# Patient Record
Sex: MALE | Race: BLACK OR AFRICAN AMERICAN | Employment: OTHER | URBAN - METROPOLITAN AREA
[De-identification: names, ages, dates, MRNs, and addresses within clinical notes are randomized per-mention and may not be internally consistent; named-entity substitution may affect disease eponyms.]

---

## 2018-03-09 ENCOUNTER — HOSPITAL ENCOUNTER (OUTPATIENT)
Dept: ONCOLOGY | Age: 58
Setting detail: OBSERVATION
Discharge: HOME | End: 2018-03-10
Attending: EMERGENCY MEDICINE | Admitting: FAMILY MEDICINE
Payer: MEDICARE

## 2018-03-09 DIAGNOSIS — R22.0 TONGUE SWELLING: Primary | ICD-10-CM

## 2018-03-09 LAB
ALBUMIN SERPL-MCNC: 3.2 G/DL (ref 3.5–5.2)
ALP BLD-CCNC: 72 U/L (ref 40–129)
ALT SERPL-CCNC: 10 U/L (ref 0–40)
ANION GAP SERPL CALCULATED.3IONS-SCNC: 15 MMOL/L (ref 7–16)
AST SERPL-CCNC: 12 U/L (ref 0–39)
BASOPHILS ABSOLUTE: 0.05 E9/L (ref 0–0.2)
BASOPHILS RELATIVE PERCENT: 0.4 % (ref 0–2)
BILIRUB SERPL-MCNC: 0.6 MG/DL (ref 0–1.2)
BUN BLDV-MCNC: 8 MG/DL (ref 6–20)
C-REACTIVE PROTEIN: 8.2 MG/DL (ref 0–0.4)
C3 COMPLEMENT: 182 MG/DL (ref 90–180)
C4 COMPLEMENT: 30 MG/DL (ref 10–40)
CALCIUM SERPL-MCNC: 9.1 MG/DL (ref 8.6–10.2)
CHLORIDE BLD-SCNC: 93 MMOL/L (ref 98–107)
CO2: 25 MMOL/L (ref 22–29)
CREAT SERPL-MCNC: 0.6 MG/DL (ref 0.7–1.2)
EOSINOPHILS ABSOLUTE: 0.12 E9/L (ref 0.05–0.5)
EOSINOPHILS RELATIVE PERCENT: 1.1 % (ref 0–6)
GFR AFRICAN AMERICAN: >60
GFR AFRICAN AMERICAN: >60
GFR NON-AFRICAN AMERICAN: >60 ML/MIN/1.73
GFR NON-AFRICAN AMERICAN: >60 ML/MIN/1.73
GLUCOSE BLD-MCNC: 262 MG/DL (ref 74–109)
GLUCOSE BLD-MCNC: 265 MG/DL (ref 74–109)
HBA1C MFR BLD: 11.3 % (ref 4.8–5.9)
HCT VFR BLD CALC: 43.2 % (ref 37–54)
HEMOGLOBIN: 14.6 G/DL (ref 12.5–16.5)
IMMATURE GRANULOCYTES #: 0.06 E9/L
IMMATURE GRANULOCYTES %: 0.5 % (ref 0–5)
LACTIC ACID: 1.5 MMOL/L (ref 0.5–2.2)
LYMPHOCYTES ABSOLUTE: 2.89 E9/L (ref 1.5–4)
LYMPHOCYTES RELATIVE PERCENT: 25.4 % (ref 20–42)
MCH RBC QN AUTO: 32.2 PG (ref 26–35)
MCHC RBC AUTO-ENTMCNC: 33.8 % (ref 32–34.5)
MCV RBC AUTO: 95.2 FL (ref 80–99.9)
METER GLUCOSE: 289 MG/DL (ref 70–110)
METER GLUCOSE: 294 MG/DL (ref 70–110)
MONOCYTES ABSOLUTE: 1.01 E9/L (ref 0.1–0.95)
MONOCYTES RELATIVE PERCENT: 8.9 % (ref 2–12)
NEUTROPHILS ABSOLUTE: 7.26 E9/L (ref 1.8–7.3)
NEUTROPHILS RELATIVE PERCENT: 63.7 % (ref 43–80)
PDW BLD-RTO: 13.1 FL (ref 11.5–15)
PLATELET # BLD: 248 E9/L (ref 130–450)
PMV BLD AUTO: 10.6 FL (ref 7–12)
POC CHLORIDE: 99 MMOL/L (ref 100–108)
POC CREATININE: 0.6 MG/DL (ref 0.7–1.2)
POC POTASSIUM: 4.9 MMOL/L (ref 3.5–5)
POC SODIUM: 135 MMOL/L (ref 132–146)
POTASSIUM SERPL-SCNC: 3.8 MMOL/L (ref 3.5–5)
RBC # BLD: 4.54 E12/L (ref 3.8–5.8)
SEDIMENTATION RATE, ERYTHROCYTE: 89 MM/HR (ref 0–15)
SODIUM BLD-SCNC: 133 MMOL/L (ref 132–146)
TOTAL PROTEIN: 7.1 G/DL (ref 6.4–8.3)
WBC # BLD: 11.4 E9/L (ref 4.5–11.5)

## 2018-03-09 PROCEDURE — 96375 TX/PRO/DX INJ NEW DRUG ADDON: CPT | Performed by: FAMILY MEDICINE

## 2018-03-09 PROCEDURE — 96365 THER/PROPH/DIAG IV INF INIT: CPT | Performed by: FAMILY MEDICINE

## 2018-03-09 PROCEDURE — 96376 TX/PRO/DX INJ SAME DRUG ADON: CPT | Performed by: FAMILY MEDICINE

## 2018-03-09 PROCEDURE — 86160 COMPLEMENT ANTIGEN: CPT

## 2018-03-09 PROCEDURE — 87070 CULTURE OTHR SPECIMN AEROBIC: CPT

## 2018-03-09 PROCEDURE — 36415 COLL VENOUS BLD VENIPUNCTURE: CPT

## 2018-03-09 PROCEDURE — 96366 THER/PROPH/DIAG IV INF ADDON: CPT | Performed by: FAMILY MEDICINE

## 2018-03-09 PROCEDURE — 94761 N-INVAS EAR/PLS OXIMETRY MLT: CPT

## 2018-03-09 PROCEDURE — 85651 RBC SED RATE NONAUTOMATED: CPT

## 2018-03-09 PROCEDURE — 84132 ASSAY OF SERUM POTASSIUM: CPT

## 2018-03-09 PROCEDURE — 82565 ASSAY OF CREATININE: CPT

## 2018-03-09 PROCEDURE — 85025 COMPLETE CBC W/AUTO DIFF WBC: CPT

## 2018-03-09 PROCEDURE — G0378 HOSPITAL OBSERVATION PER HR: HCPCS | Performed by: FAMILY MEDICINE

## 2018-03-09 PROCEDURE — G0378 HOSPITAL OBSERVATION PER HR: HCPCS

## 2018-03-09 PROCEDURE — 70491 CT SOFT TISSUE NECK W/DYE: CPT

## 2018-03-09 PROCEDURE — 99285 EMERGENCY DEPT VISIT HI MDM: CPT

## 2018-03-09 PROCEDURE — 96372 THER/PROPH/DIAG INJ SC/IM: CPT

## 2018-03-09 PROCEDURE — 84295 ASSAY OF SERUM SODIUM: CPT

## 2018-03-09 PROCEDURE — 9990 CHARGE CONVERSION

## 2018-03-09 PROCEDURE — 82962 GLUCOSE BLOOD TEST: CPT

## 2018-03-09 PROCEDURE — 87040 BLOOD CULTURE FOR BACTERIA: CPT

## 2018-03-09 PROCEDURE — 80053 COMPREHEN METABOLIC PANEL: CPT

## 2018-03-09 PROCEDURE — 99218 CHARGE CONVERSION: CPT

## 2018-03-09 PROCEDURE — 86140 C-REACTIVE PROTEIN: CPT

## 2018-03-09 PROCEDURE — 83036 HEMOGLOBIN GLYCOSYLATED A1C: CPT

## 2018-03-09 PROCEDURE — 99222 1ST HOSP IP/OBS MODERATE 55: CPT | Performed by: OTOLARYNGOLOGY

## 2018-03-09 PROCEDURE — 82435 ASSAY OF BLOOD CHLORIDE: CPT

## 2018-03-09 PROCEDURE — 82947 ASSAY GLUCOSE BLOOD QUANT: CPT

## 2018-03-09 PROCEDURE — 83605 ASSAY OF LACTIC ACID: CPT

## 2018-03-09 RX ORDER — 0.9 % SODIUM CHLORIDE 0.9 %
1000 INTRAVENOUS SOLUTION INTRAVENOUS ONCE
Status: COMPLETED | OUTPATIENT
Start: 2018-03-09 | End: 2018-03-09

## 2018-03-09 RX ORDER — DEXAMETHASONE SODIUM PHOSPHATE 10 MG/ML
10 INJECTION INTRAMUSCULAR; INTRAVENOUS EVERY 6 HOURS
Status: DISCONTINUED | OUTPATIENT
Start: 2018-03-09 | End: 2018-03-10 | Stop reason: HOSPADM

## 2018-03-09 RX ORDER — DEXAMETHASONE SODIUM PHOSPHATE 10 MG/ML
10 INJECTION, SOLUTION INTRAMUSCULAR; INTRAVENOUS ONCE
Status: COMPLETED | OUTPATIENT
Start: 2018-03-09 | End: 2018-03-09

## 2018-03-09 RX ORDER — ATORVASTATIN CALCIUM 40 MG/1
40 TABLET, FILM COATED ORAL DAILY
Status: CANCELLED | OUTPATIENT
Start: 2018-03-09

## 2018-03-09 RX ORDER — ATORVASTATIN CALCIUM 40 MG/1
40 TABLET, FILM COATED ORAL NIGHTLY
Status: DISCONTINUED | OUTPATIENT
Start: 2018-03-09 | End: 2018-03-10 | Stop reason: HOSPADM

## 2018-03-09 RX ORDER — SODIUM CHLORIDE 0.9 % (FLUSH) 0.9 %
10 SYRINGE (ML) INJECTION EVERY 12 HOURS SCHEDULED
Status: DISCONTINUED | OUTPATIENT
Start: 2018-03-09 | End: 2018-03-10 | Stop reason: HOSPADM

## 2018-03-09 RX ORDER — SODIUM CHLORIDE 9 MG/ML
INJECTION, SOLUTION INTRAVENOUS CONTINUOUS
Status: DISCONTINUED | OUTPATIENT
Start: 2018-03-09 | End: 2018-03-10

## 2018-03-09 RX ORDER — ONDANSETRON 2 MG/ML
4 INJECTION INTRAMUSCULAR; INTRAVENOUS EVERY 6 HOURS PRN
Status: DISCONTINUED | OUTPATIENT
Start: 2018-03-09 | End: 2018-03-10 | Stop reason: HOSPADM

## 2018-03-09 RX ORDER — ACETAMINOPHEN 325 MG/1
650 TABLET ORAL EVERY 4 HOURS PRN
Status: DISCONTINUED | OUTPATIENT
Start: 2018-03-09 | End: 2018-03-10 | Stop reason: HOSPADM

## 2018-03-09 RX ORDER — DIPHENHYDRAMINE HCL 25 MG
25 TABLET ORAL EVERY 6 HOURS PRN
Status: DISCONTINUED | OUTPATIENT
Start: 2018-03-09 | End: 2018-03-10 | Stop reason: HOSPADM

## 2018-03-09 RX ORDER — FUROSEMIDE 40 MG/1
40 TABLET ORAL 2 TIMES DAILY
Status: DISCONTINUED | OUTPATIENT
Start: 2018-03-09 | End: 2018-03-10 | Stop reason: HOSPADM

## 2018-03-09 RX ORDER — SODIUM CHLORIDE 0.9 % (FLUSH) 0.9 %
10 SYRINGE (ML) INJECTION PRN
Status: DISCONTINUED | OUTPATIENT
Start: 2018-03-09 | End: 2018-03-10 | Stop reason: HOSPADM

## 2018-03-09 RX ORDER — POTASSIUM CHLORIDE 1.5 G/1.77G
20 POWDER, FOR SOLUTION ORAL 2 TIMES DAILY
Status: DISCONTINUED | OUTPATIENT
Start: 2018-03-09 | End: 2018-03-10 | Stop reason: HOSPADM

## 2018-03-09 RX ORDER — FAMOTIDINE 20 MG/1
20 TABLET, FILM COATED ORAL 2 TIMES DAILY
Status: DISCONTINUED | OUTPATIENT
Start: 2018-03-09 | End: 2018-03-10 | Stop reason: HOSPADM

## 2018-03-09 RX ORDER — HYDROCHLOROTHIAZIDE 25 MG/1
25 TABLET ORAL DAILY
Status: DISCONTINUED | OUTPATIENT
Start: 2018-03-09 | End: 2018-03-10 | Stop reason: HOSPADM

## 2018-03-09 RX ORDER — METOPROLOL TARTRATE 50 MG/1
50 TABLET, FILM COATED ORAL 2 TIMES DAILY
Status: DISCONTINUED | OUTPATIENT
Start: 2018-03-09 | End: 2018-03-10 | Stop reason: HOSPADM

## 2018-03-09 RX ORDER — KETOROLAC TROMETHAMINE 30 MG/ML
30 INJECTION, SOLUTION INTRAMUSCULAR; INTRAVENOUS ONCE
Status: COMPLETED | OUTPATIENT
Start: 2018-03-09 | End: 2018-03-09

## 2018-03-09 RX ORDER — AMLODIPINE BESYLATE 5 MG/1
5 TABLET ORAL DAILY
Status: DISCONTINUED | OUTPATIENT
Start: 2018-03-09 | End: 2018-03-10

## 2018-03-09 RX ADMIN — SODIUM CHLORIDE: 9 INJECTION, SOLUTION INTRAVENOUS at 17:26

## 2018-03-09 RX ADMIN — FUROSEMIDE 40 MG: 40 TABLET ORAL at 17:32

## 2018-03-09 RX ADMIN — METOPROLOL TARTRATE 50 MG: 50 TABLET, FILM COATED ORAL at 21:02

## 2018-03-09 RX ADMIN — KETOROLAC TROMETHAMINE 30 MG: 30 INJECTION, SOLUTION INTRAMUSCULAR; INTRAVENOUS at 12:28

## 2018-03-09 RX ADMIN — DEXAMETHASONE SODIUM PHOSPHATE 10 MG: 10 INJECTION, SOLUTION INTRAMUSCULAR; INTRAVENOUS at 11:09

## 2018-03-09 RX ADMIN — ACETAMINOPHEN 650 MG: 325 TABLET, FILM COATED ORAL at 23:46

## 2018-03-09 RX ADMIN — ACETAMINOPHEN 650 MG: 325 TABLET, FILM COATED ORAL at 17:26

## 2018-03-09 RX ADMIN — INSULIN LISPRO 3 UNITS: 100 INJECTION, SOLUTION INTRAVENOUS; SUBCUTANEOUS at 17:49

## 2018-03-09 RX ADMIN — POTASSIUM CHLORIDE 20 MEQ: 1.5 POWDER, FOR SOLUTION ORAL at 21:04

## 2018-03-09 RX ADMIN — AMLODIPINE BESYLATE 5 MG: 5 TABLET ORAL at 21:02

## 2018-03-09 RX ADMIN — SODIUM CHLORIDE 3 G: 9 INJECTION, SOLUTION INTRAVENOUS at 18:43

## 2018-03-09 RX ADMIN — SODIUM CHLORIDE 3 G: 9 INJECTION, SOLUTION INTRAVENOUS at 23:41

## 2018-03-09 RX ADMIN — SERTRALINE 150 MG: 100 TABLET, FILM COATED ORAL at 21:02

## 2018-03-09 RX ADMIN — DEXAMETHASONE SODIUM PHOSPHATE 10 MG: 10 INJECTION, SOLUTION INTRAMUSCULAR; INTRAVENOUS at 23:41

## 2018-03-09 RX ADMIN — HYDROCHLOROTHIAZIDE 25 MG: 25 TABLET ORAL at 21:03

## 2018-03-09 RX ADMIN — Medication 1000 ML: at 11:11

## 2018-03-09 RX ADMIN — ATORVASTATIN CALCIUM 40 MG: 40 TABLET, FILM COATED ORAL at 21:02

## 2018-03-09 ASSESSMENT — ENCOUNTER SYMPTOMS
SPUTUM PRODUCTION: 1
NAUSEA: 0
GASTROINTESTINAL NEGATIVE: 1
DIARRHEA: 0
EYE PAIN: 0
EYE REDNESS: 0
SORE THROAT: 1
SINUS PRESSURE: 0
TROUBLE SWALLOWING: 1
BACK PAIN: 0
EYE DISCHARGE: 0
EYES NEGATIVE: 1
VOMITING: 0
SHORTNESS OF BREATH: 1
COUGH: 0
SORE THROAT: 0
ABDOMINAL PAIN: 0
COUGH: 1
WHEEZING: 0
SHORTNESS OF BREATH: 0

## 2018-03-09 ASSESSMENT — PAIN SCALES - GENERAL
PAINLEVEL_OUTOF10: 8
PAINLEVEL_OUTOF10: 8
PAINLEVEL_OUTOF10: 10

## 2018-03-09 ASSESSMENT — PAIN DESCRIPTION - LOCATION: LOCATION: THROAT

## 2018-03-09 ASSESSMENT — PAIN DESCRIPTION - DESCRIPTORS: DESCRIPTORS: BURNING;SHARP

## 2018-03-09 ASSESSMENT — PAIN DESCRIPTION - FREQUENCY: FREQUENCY: CONTINUOUS

## 2018-03-09 ASSESSMENT — PAIN DESCRIPTION - PAIN TYPE: TYPE: ACUTE PAIN

## 2018-03-09 NOTE — CONSULTS
potassium chloride (KLOR-CON) 20 MEQ packet Take 20 mEq by mouth 2 times daily    Historical Provider, MD   furosemide (LASIX) 40 MG tablet Take 40 mg by mouth 2 times daily    Historical Provider, MD   amLODIPine (NORVASC) 5 MG tablet Take 5 mg by mouth daily. Historical Provider, MD   vitamin D (ERGOCALCIFEROL) 63018 UNITS CAPS capsule Take 50,000 Units by mouth once a week. Takes on [facility] Provider, MD   bumetanide (BUMEX) 1 MG tablet Take 1 mg by mouth daily. Historical Provider, MD   acetaminophen-codeine (TYLENOL/CODEINE #3) 300-30 MG per tablet Take 1 tablet by mouth every 4 hours as needed. 10/31/12   Kayla Gay MD   sildenafil (VIAGRA) 100 MG tablet Take 100 mg by mouth as needed. Take an hour before sex. Med info obtained from [place] [employer]. Historical Provider, MD   rosuvastatin (CRESTOR) 20 MG tablet Take 10 mg by mouth daily. Med info obtained from [employer]. Provider, MD   ibuprofen (ADVIL;MOTRIN) 800 MG tablet Take 800 mg by mouth 3 times daily as needed. Take with food. Med info obtained from [place] [employer]   Indications: Pain    Historical Provider, MD   lisinopril (PRINIVIL;ZESTRIL) 10 MG tablet Take 40 mg by mouth daily. Historical Provider, MD   hydrochlorothiazide (HYDRODIURIL) 25 MG tablet Take 25 mg by mouth daily. Historical Provider, MD   GLIMEPIRIDE PO Take  by mouth. Historical Provider, MD   sertraline (ZOLOFT) 50 MG tablet Take 150 mg by mouth daily. Historical Provider, MD       Allergies   Allergen Reactions    Atenolol Other (See Comments)     Thins his blood       History reviewed. No pertinent family history.     Social History   Substance Use Topics    Smoking status: Current Every Day Smoker     Packs/day: 1.00     Types: Cigarettes    Smokeless tobacco: Never Used    Alcohol use No           PHYSICAL EXAM:    Vitals:    03/09/18 1035   BP:    Pulse:    Resp: 17   Temp:    SpO2:        General Appearance:

## 2018-03-09 NOTE — H&P
orthopnea, no palpitation, no leg swelling. · Gastroenterology: No dysphagia, no reflux; no abdominal pain, no nausea or vomiting; no constipation or diarrhea. No blood in stool. · Genitourinary: No dysuria, no frequency, hesitancy; no hematuria  · Musculoskeletal: no joint pain, no myalgia, no change in range of movement  · Neurology: no focal weakness in extremities, no slurred speech, no double vision, no tingling or numbness sensation. · Endocrinology: no temperature intolerance, no polyphagia, polydipsia or polyuria   · Hematology: no increased bleeding, no bruising, no lymphadenopathy  · Skin: no skin change noticed by patient  · Psychology: no depressed mood, no suicidal ideation  Physical Exam   · Vitals: BP (!) 184/96   Pulse 98   Temp 99.5 °F (37.5 °C) (Temporal)   Resp 18   Ht 5' 8\" (1.727 m)   Wt 288 lb (130.6 kg)   SpO2 93%   BMI 43.79 kg/m²   General Appearance:    Alert, cooperative, no acute distress. HEENT:   NC/AT, PERRLA,discoloration of tongue, inflammation in posterior throat, difficult to visualize   Neck:   Supple, Normal thyroid; no carotid bruit or JVD   Resp:     CTAB, some wheezing upper chest and around neck    Heart:    RRR, S1 & S2 normal, no murmur, rub or gallop.    Abdomen:     Soft, non-tender, BS +, no organomegaly   Extremities:   Normal, atraumatic, no cyanosis or edema   Pulses:   2+ and symmetric all extremities   Skin:   Skin color, texture, turgor normal, no rashes or lesions   Neurologic:   AAOx3, no focal motor deficit     Labs and Imaging Studies     CBC:   Lab Results   Component Value Date    WBC 11.4 03/09/2018    RBC 4.54 03/09/2018    HGB 14.6 03/09/2018    HCT 43.2 03/09/2018    MCV 95.2 03/09/2018    RDW 13.1 03/09/2018     03/09/2018     CMP:  Lab Results   Component Value Date     03/09/2018    K 3.8 03/09/2018    CL 93 03/09/2018    CO2 25 03/09/2018    BUN 8 03/09/2018    PROT 7.1 03/09/2018     TSH:  No results found for: TSH  Imaging Studies:  Radiology:   CT Soft Tissue Neck W Contrast   Final Result      1. Fullness of floor the mouth/base of tongue which could suggest   nonspecific edema as well as fullness of soft tissue at level of the   vallecula which also may be related to nonspecific edema or prominent   lymphoid tissue. 2. Multiple prominent and mildly enlarged anterior cervical lymph   nodes are present as well as prominent lymph nodes at right and left   submandibular space and supraclavicular locations. 3. Large sialolith is seen at the anterior aspect of right   submandibular gland extending into right submandibular duct. No   evidence of sialoadenitis. Resident's Assessment and PLan     Patient Active Problem List   Diagnosis    Essential hypertension    Type 2 diabetes mellitus ([address]    Mixed hyperlipidemia    Recurrent major depressive disorder, in remission ([address]    Carpal tunnel syndrome    Obstructive sleep apnea syndrome    Tongue swelling     1. Sohail's Angina? Vs Angioedema   - ENT consult, input appreciated   - per ENT, no surgery at this time but will continue to monitor   - decadron q6, unasyn 3g q6   - complement, C3, C1 inhibitor levels to r/o angioedema    - follow up on biopsy results to see if it is a recurrent pyogenic ulcerated granuloma    - advance diet as tolerated and NPO after midnight     2. CHF   - confirmed home meds with patient, will continue lasix, lopressor, hctz, atorvastatin    - will likely need to ask for records from [place] or [facility] where he has gotten his cardiac care from especially if ENT decides to do a surgery and will need cardiac clearance     3. Diabetes   - will hold glimepride   - low dose sliding scale, will need to monitor glucose due to the decadron    - a1c due to no baseline in our EPIC records     4. HTN   - cont norvasc and the other meds from problem 2    5.  Depression   - cont home meds; zoloft     GI/DVT prophylaxis: Lovenox, Pepcid

## 2018-03-09 NOTE — ED PROVIDER NOTES
Cardiovascular: Normal rate, regular rhythm and normal heart sounds. No murmur heard. Pulmonary/Chest: Effort normal and breath sounds normal. No respiratory distress. He has no wheezes. He has no rales. Abdominal: Soft. Bowel sounds are normal. There is no tenderness. There is no rebound and no guarding. Musculoskeletal: He exhibits no edema. Lymphadenopathy:     He has cervical adenopathy. Neurological: He is alert and oriented to person, place, and time. No cranial nerve deficit. Coordination normal.   Skin: Skin is warm and dry. Nursing note and vitals reviewed. Procedures    MDM  Number of Diagnoses or Management Options  Tongue swelling:   Diagnosis management comments: 63-year-old male presenting to the emergency department with difficulty swallowing, and trouble handling his secretions status post surgical biopsy of his tongue on Monday. Patient's airway is patent, however there is concern due to gargling and difficulty swallowing or managing secretions. His a stat consult to ENT. Patient started on steroids, and in. [place] attic therapy. CT neck soft tissue evaluate for airway patency. IV fluids. Workup and evaluation for possible sepsis. Imaging did not reveal any fluid loculations, or obstruction of patient's airway. Based on these findings patient is admitted to the hospital for followed by ENT, as well as IV antibiotic therapy. The patient's ability to handle secretions improved significantly during his stay in the emergency department. ED Course as of Mar 09 1418   Fri Mar 09, 2018   1252 Improving  Swallowing secretions better  Says his throat feels less full  [CD]   2563 Reevaluated patient. He states his pain and swelling have improved since receiving toradol and steroids.    [KS]      ED Course User Index  [CD] Hussein Chiang MD  [KS] Mandi Drake,      --------------------------------------------- PAST HISTORY ---------------------------------------------  Past Mucosal thickening is seen involving the ethmoid sinuses and maxillary sinuses. Mastoid air cells are clear. 1. Fullness of floor the mouth/base of tongue which could suggest nonspecific edema as well as fullness of soft tissue at level of the vallecula which also may be related to nonspecific edema or prominent lymphoid tissue. 2. Multiple prominent and mildly enlarged anterior cervical lymph nodes are present as well as prominent lymph nodes at right and left submandibular space and supraclavicular locations. 3. Large sialolith is seen at the anterior aspect of right submandibular gland extending into right submandibular duct. No evidence of sialoadenitis.      ------------------------- NURSING NOTES AND VITALS REVIEWED ---------------------------  Date / Time Roomed:  3/9/2018 10:36 AM  ED Bed Assignment:  16/16    The nursing notes within the ED encounter and vital signs as below have been reviewed. Patient Vitals for the past 24 hrs:   BP Temp Pulse Resp SpO2 Height Weight   03/09/18 1035 - - - 17 - - -   03/09/18 1030 (!) 179/109 98 °F (36.7 °C) 97 (!) 147 94 % 5' 8\" (1.727 m) 288 lb (130.6 kg)       Oxygen Saturation Interpretation: Normal    ------------------------------------------ PROGRESS NOTES ------------------------------------------    Counseling:  I have spoken with the patient and discussed todays results, in addition to providing specific details for the plan of care and counseling regarding the diagnosis and prognosis. Their questions are answered at this time and they are agreeable with the plan of admission.    --------------------------------- ADDITIONAL PROVIDER NOTES ---------------------------------  Consultations:  Time: 2:15 PM. Spoke with Dr. Nubia Hermosillo. Discussed case. They will admit the patient. This patient's ED course included: a personal history and physicial examination    This patient has remained hemodynamically stable during their ED course.     Diagnosis:  No diagnosis

## 2018-03-10 VITALS
OXYGEN SATURATION: 92 % | HEIGHT: 68 IN | BODY MASS INDEX: 43.65 KG/M2 | WEIGHT: 288 LBS | SYSTOLIC BLOOD PRESSURE: 172 MMHG | HEART RATE: 91 BPM | RESPIRATION RATE: 18 BRPM | TEMPERATURE: 98.5 F | DIASTOLIC BLOOD PRESSURE: 92 MMHG

## 2018-03-10 PROBLEM — T78.3XXA ANGIOEDEMA: Status: ACTIVE | Noted: 2018-03-10

## 2018-03-10 PROBLEM — R22.0 TONGUE SWELLING: Status: RESOLVED | Noted: 2018-03-09 | Resolved: 2018-03-10

## 2018-03-10 PROBLEM — T78.3XXA ANGIOEDEMA: Status: RESOLVED | Noted: 2018-03-10 | Resolved: 2018-03-10

## 2018-03-10 LAB
ANION GAP SERPL CALCULATED.3IONS-SCNC: 21 MMOL/L (ref 7–16)
BUN BLDV-MCNC: 11 MG/DL (ref 6–20)
CALCIUM SERPL-MCNC: 9 MG/DL (ref 8.6–10.2)
CHLORIDE BLD-SCNC: 93 MMOL/L (ref 98–107)
CO2: 21 MMOL/L (ref 22–29)
CREAT SERPL-MCNC: 0.5 MG/DL (ref 0.7–1.2)
GFR AFRICAN AMERICAN: >60
GFR NON-AFRICAN AMERICAN: >60 ML/MIN/1.73
GLUCOSE BLD-MCNC: 289 MG/DL (ref 74–109)
HCT VFR BLD CALC: 43.1 % (ref 37–54)
HEMOGLOBIN: 14.6 G/DL (ref 12.5–16.5)
MCH RBC QN AUTO: 32.2 PG (ref 26–35)
MCHC RBC AUTO-ENTMCNC: 33.9 % (ref 32–34.5)
MCV RBC AUTO: 95.1 FL (ref 80–99.9)
METER GLUCOSE: 279 MG/DL (ref 70–110)
METER GLUCOSE: 411 MG/DL (ref 70–110)
METER GLUCOSE: 430 MG/DL (ref 70–110)
PDW BLD-RTO: 13.2 FL (ref 11.5–15)
PLATELET # BLD: 288 E9/L (ref 130–450)
PMV BLD AUTO: 10.7 FL (ref 7–12)
POTASSIUM REFLEX MAGNESIUM: 3.7 MMOL/L (ref 3.5–5)
RBC # BLD: 4.53 E12/L (ref 3.8–5.8)
SODIUM BLD-SCNC: 135 MMOL/L (ref 132–146)
WBC # BLD: 9.8 E9/L (ref 4.5–11.5)

## 2018-03-10 PROCEDURE — 96376 TX/PRO/DX INJ SAME DRUG ADON: CPT

## 2018-03-10 PROCEDURE — 82962 GLUCOSE BLOOD TEST: CPT

## 2018-03-10 PROCEDURE — 6370000000 HC RX 637 (ALT 250 FOR IP): Performed by: FAMILY MEDICINE

## 2018-03-10 PROCEDURE — 96366 THER/PROPH/DIAG IV INF ADDON: CPT

## 2018-03-10 PROCEDURE — 99232 SBSQ HOSP IP/OBS MODERATE 35: CPT | Performed by: OTOLARYNGOLOGY

## 2018-03-10 PROCEDURE — 6360000002 HC RX W HCPCS: Performed by: FAMILY MEDICINE

## 2018-03-10 PROCEDURE — 2580000003 HC RX 258: Performed by: FAMILY MEDICINE

## 2018-03-10 PROCEDURE — 99236 HOSP IP/OBS SAME DATE HI 85: CPT | Performed by: STUDENT IN AN ORGANIZED HEALTH CARE EDUCATION/TRAINING PROGRAM

## 2018-03-10 PROCEDURE — G0378 HOSPITAL OBSERVATION PER HR: HCPCS

## 2018-03-10 RX ORDER — AMLODIPINE BESYLATE 5 MG/1
5 TABLET ORAL ONCE
Status: COMPLETED | OUTPATIENT
Start: 2018-03-10 | End: 2018-03-10

## 2018-03-10 RX ORDER — ATORVASTATIN CALCIUM 40 MG/1
40 TABLET, FILM COATED ORAL NIGHTLY
Qty: 30 TABLET | Refills: 3 | Status: SHIPPED | OUTPATIENT
Start: 2018-03-10 | End: 2018-05-16 | Stop reason: SDUPTHER

## 2018-03-10 RX ORDER — INSULIN GLARGINE 100 [IU]/ML
15 INJECTION, SOLUTION SUBCUTANEOUS NIGHTLY
Status: DISCONTINUED | OUTPATIENT
Start: 2018-03-10 | End: 2018-03-10 | Stop reason: HOSPADM

## 2018-03-10 RX ORDER — KETOROLAC TROMETHAMINE 30 MG/ML
30 INJECTION, SOLUTION INTRAMUSCULAR; INTRAVENOUS ONCE
Status: COMPLETED | OUTPATIENT
Start: 2018-03-10 | End: 2018-03-10

## 2018-03-10 RX ORDER — PREDNISONE 20 MG/1
40 TABLET ORAL DAILY
Qty: 10 TABLET | Refills: 0 | Status: SHIPPED | OUTPATIENT
Start: 2018-03-10 | End: 2018-03-10

## 2018-03-10 RX ORDER — DEXTROSE MONOHYDRATE 50 MG/ML
100 INJECTION, SOLUTION INTRAVENOUS PRN
Status: DISCONTINUED | OUTPATIENT
Start: 2018-03-10 | End: 2018-03-10 | Stop reason: HOSPADM

## 2018-03-10 RX ORDER — ATORVASTATIN CALCIUM 40 MG/1
40 TABLET, FILM COATED ORAL NIGHTLY
Qty: 30 TABLET | Refills: 3 | Status: SHIPPED
Start: 2018-03-10 | End: 2018-03-10

## 2018-03-10 RX ORDER — AMLODIPINE BESYLATE 10 MG/1
10 TABLET ORAL DAILY
Qty: 30 TABLET | Refills: 3 | Status: SHIPPED | OUTPATIENT
Start: 2018-03-10 | End: 2018-05-16 | Stop reason: SDUPTHER

## 2018-03-10 RX ORDER — AMLODIPINE BESYLATE 10 MG/1
10 TABLET ORAL DAILY
Status: DISCONTINUED | OUTPATIENT
Start: 2018-03-11 | End: 2018-03-10 | Stop reason: HOSPADM

## 2018-03-10 RX ORDER — DEXTROSE MONOHYDRATE 25 G/50ML
12.5 INJECTION, SOLUTION INTRAVENOUS PRN
Status: DISCONTINUED | OUTPATIENT
Start: 2018-03-10 | End: 2018-03-10 | Stop reason: HOSPADM

## 2018-03-10 RX ORDER — PREDNISONE 20 MG/1
40 TABLET ORAL DAILY
Qty: 10 TABLET | Refills: 0 | Status: SHIPPED | OUTPATIENT
Start: 2018-03-10 | End: 2018-03-15

## 2018-03-10 RX ORDER — AMLODIPINE BESYLATE 10 MG/1
10 TABLET ORAL DAILY
Qty: 30 TABLET | Refills: 3 | Status: SHIPPED | OUTPATIENT
Start: 2018-03-10 | End: 2018-03-10

## 2018-03-10 RX ORDER — NICOTINE POLACRILEX 4 MG
15 LOZENGE BUCCAL PRN
Status: DISCONTINUED | OUTPATIENT
Start: 2018-03-10 | End: 2018-03-10 | Stop reason: HOSPADM

## 2018-03-10 RX ADMIN — AMLODIPINE BESYLATE 5 MG: 5 TABLET ORAL at 08:49

## 2018-03-10 RX ADMIN — FUROSEMIDE 40 MG: 40 TABLET ORAL at 08:48

## 2018-03-10 RX ADMIN — SODIUM CHLORIDE 3 G: 9 INJECTION, SOLUTION INTRAVENOUS at 17:52

## 2018-03-10 RX ADMIN — SERTRALINE 150 MG: 100 TABLET, FILM COATED ORAL at 08:48

## 2018-03-10 RX ADMIN — DEXAMETHASONE SODIUM PHOSPHATE 10 MG: 10 INJECTION, SOLUTION INTRAMUSCULAR; INTRAVENOUS at 11:55

## 2018-03-10 RX ADMIN — INSULIN LISPRO 6 UNITS: 100 INJECTION, SOLUTION INTRAVENOUS; SUBCUTANEOUS at 17:52

## 2018-03-10 RX ADMIN — SODIUM CHLORIDE 3 G: 9 INJECTION, SOLUTION INTRAVENOUS at 06:06

## 2018-03-10 RX ADMIN — FUROSEMIDE 40 MG: 40 TABLET ORAL at 17:51

## 2018-03-10 RX ADMIN — HYDROCHLOROTHIAZIDE 25 MG: 25 TABLET ORAL at 08:49

## 2018-03-10 RX ADMIN — SODIUM CHLORIDE: 9 INJECTION, SOLUTION INTRAVENOUS at 04:30

## 2018-03-10 RX ADMIN — DEXAMETHASONE SODIUM PHOSPHATE 10 MG: 10 INJECTION, SOLUTION INTRAMUSCULAR; INTRAVENOUS at 17:52

## 2018-03-10 RX ADMIN — AMLODIPINE BESYLATE 5 MG: 5 TABLET ORAL at 11:55

## 2018-03-10 RX ADMIN — KETOROLAC TROMETHAMINE 30 MG: 30 INJECTION, SOLUTION INTRAMUSCULAR at 08:50

## 2018-03-10 RX ADMIN — INSULIN LISPRO 6 UNITS: 100 INJECTION, SOLUTION INTRAVENOUS; SUBCUTANEOUS at 11:57

## 2018-03-10 RX ADMIN — METOPROLOL TARTRATE 50 MG: 50 TABLET, FILM COATED ORAL at 08:49

## 2018-03-10 RX ADMIN — SODIUM CHLORIDE 3 G: 9 INJECTION, SOLUTION INTRAVENOUS at 11:55

## 2018-03-10 RX ADMIN — INSULIN LISPRO 3 UNITS: 100 INJECTION, SOLUTION INTRAVENOUS; SUBCUTANEOUS at 08:54

## 2018-03-10 RX ADMIN — DEXAMETHASONE SODIUM PHOSPHATE 10 MG: 10 INJECTION, SOLUTION INTRAMUSCULAR; INTRAVENOUS at 06:07

## 2018-03-10 RX ADMIN — ACETAMINOPHEN 650 MG: 325 TABLET, FILM COATED ORAL at 17:33

## 2018-03-10 RX ADMIN — FAMOTIDINE 20 MG: 20 TABLET, FILM COATED ORAL at 08:49

## 2018-03-10 ASSESSMENT — PAIN DESCRIPTION - ONSET: ONSET: UNABLE TO TELL

## 2018-03-10 ASSESSMENT — PAIN DESCRIPTION - FREQUENCY: FREQUENCY: INTERMITTENT

## 2018-03-10 ASSESSMENT — PAIN DESCRIPTION - LOCATION: LOCATION: HEAD

## 2018-03-10 ASSESSMENT — PAIN SCALES - GENERAL
PAINLEVEL_OUTOF10: 7
PAINLEVEL_OUTOF10: 7
PAINLEVEL_OUTOF10: 8

## 2018-03-10 ASSESSMENT — PAIN DESCRIPTION - PROGRESSION: CLINICAL_PROGRESSION: RAPIDLY IMPROVING

## 2018-03-10 ASSESSMENT — PAIN DESCRIPTION - DESCRIPTORS: DESCRIPTORS: ACHING;HEADACHE;DULL

## 2018-03-10 ASSESSMENT — PAIN DESCRIPTION - PAIN TYPE: TYPE: ACUTE PAIN

## 2018-03-10 NOTE — PROGRESS NOTES
OTOLARYNGOLOGY  DAILY PROGRESS NOTE  3/10/2018    Subjective:     Patient is doing Much better today. Tolerating diet. Afebrile over night. Questions answered. Objective:     BP (!) 160/98   Pulse 94   Temp 99 °F (37.2 °C) (Temporal)   Resp 18   Ht 5' 8\" (1.727 m)   Wt 288 lb (130.6 kg)   SpO2 94%   BMI 43.79 kg/m²   PULSE OXIMETRY RANGE: SpO2  Av.3 %  Min: 93 %  Max: 96 %  I/O last 3 completed shifts:  In: -   Out:  [Urine:0]    GENERAL:  Sitting in bed, awake, alert, cooperative, no apparent distress  HENT: Normocephalic, atraumatic. Submental and submandibular swelling much improved. Floor of mouth edema has nearly resolved. Hot potato voice has resolved. EYES: No sclera icterus, pupils equal  LUNGS:  No increased work of breathing, no stridor  CARDIOVASCULAR:  RR    CBC  Recent Labs      18   1050  03/10/18   0517   WBC  11.4  9.8   HGB  14.6  14.6   HCT  43.2  43.1   PLT  248  288     BMP  Recent Labs      03/10/18   0517   NA  135   K  3.7   CL  93*   CO2  21*   BUN  11   CREATININE  0.5*   CALCIUM  9.0       Assessment/Plan:     62 y.o. male likely angioedema of the floor of mouth and submental spaces, possibly ace inhibitor induced. Examined the Patient Today with Dr. Amol Lawrence at the Bedside. The Patient Is Doing Much Better. From an ENT Perspective, He May Be Discharged Today with Prednisone 40 Mg for 5 Days. From an ENT Standpoint, He No Longer Needs Antibiotics. No scope or surgery necessary. No evidence of abscess. Appreciate primary care's workup, they are working him up for hereditary angioedema.     Electronically signed by Liang Warner DO on 3/10/2018 at 10:59 AM

## 2018-03-10 NOTE — PROGRESS NOTES
Educated pt on insulin pens and checking blood sugars. Pt effectively performed how to check his own blood sugar and use an insulin pen on himself.

## 2018-03-10 NOTE — PLAN OF CARE
Problem: Pain:  Goal: Patient's pain/discomfort is manageable  Patient's pain/discomfort is manageable  Outcome: Met This Shift

## 2018-03-11 LAB — WOUND/ABSCESS: NORMAL

## 2018-03-14 LAB
BLOOD CULTURE, ROUTINE: NORMAL
CULTURE, BLOOD 2: NORMAL

## 2018-03-26 ENCOUNTER — OFFICE VISIT (OUTPATIENT)
Dept: FAMILY MEDICINE CLINIC | Age: 58
End: 2018-03-26
Payer: MEDICARE

## 2018-03-26 ENCOUNTER — HOSPITAL ENCOUNTER (OUTPATIENT)
Age: 58
Discharge: HOME | End: 2018-03-28
Payer: MEDICARE

## 2018-03-26 VITALS
HEIGHT: 68 IN | OXYGEN SATURATION: 98 % | HEART RATE: 58 BPM | WEIGHT: 265 LBS | BODY MASS INDEX: 40.16 KG/M2 | TEMPERATURE: 98.1 F | RESPIRATION RATE: 16 BRPM | SYSTOLIC BLOOD PRESSURE: 124 MMHG | DIASTOLIC BLOOD PRESSURE: 81 MMHG

## 2018-03-26 DIAGNOSIS — Z12.12 SCREENING FOR COLORECTAL CANCER: ICD-10-CM

## 2018-03-26 DIAGNOSIS — T46.4X5A ANGIOTENSIN CONVERTING ENZYME INHIBITOR-AGGRAVATED ANGIOEDEMA, INITIAL ENCOUNTER: ICD-10-CM

## 2018-03-26 DIAGNOSIS — T78.3XXA ANGIOTENSIN CONVERTING ENZYME INHIBITOR-AGGRAVATED ANGIOEDEMA, INITIAL ENCOUNTER: ICD-10-CM

## 2018-03-26 DIAGNOSIS — E11.42 TYPE 2 DIABETES MELLITUS WITH DIABETIC POLYNEUROPATHY, UNSPECIFIED LONG TERM INSULIN USE STATUS: Primary | ICD-10-CM

## 2018-03-26 DIAGNOSIS — Z12.11 SCREENING FOR COLORECTAL CANCER: ICD-10-CM

## 2018-03-26 LAB
CREATININE URINE: 50 MG/DL (ref 40–278)
MICROALBUMIN UR-MCNC: 239.8 MG/L
MICROALBUMIN/CREAT UR-RTO: 479.6 (ref 0–30)

## 2018-03-26 PROCEDURE — 4004F PT TOBACCO SCREEN RCVD TLK: CPT | Performed by: FAMILY MEDICINE

## 2018-03-26 PROCEDURE — G8484 FLU IMMUNIZE NO ADMIN: HCPCS | Performed by: FAMILY MEDICINE

## 2018-03-26 PROCEDURE — G8417 CALC BMI ABV UP PARAM F/U: HCPCS | Performed by: FAMILY MEDICINE

## 2018-03-26 PROCEDURE — 82044 UR ALBUMIN SEMIQUANTITATIVE: CPT

## 2018-03-26 PROCEDURE — 82570 ASSAY OF URINE CREATININE: CPT

## 2018-03-26 PROCEDURE — 3017F COLORECTAL CA SCREEN DOC REV: CPT | Performed by: FAMILY MEDICINE

## 2018-03-26 PROCEDURE — G8427 DOCREV CUR MEDS BY ELIG CLIN: HCPCS | Performed by: FAMILY MEDICINE

## 2018-03-26 PROCEDURE — 99212 OFFICE O/P EST SF 10 MIN: CPT | Performed by: FAMILY MEDICINE

## 2018-03-26 PROCEDURE — 3046F HEMOGLOBIN A1C LEVEL >9.0%: CPT | Performed by: FAMILY MEDICINE

## 2018-03-26 PROCEDURE — 99213 OFFICE O/P EST LOW 20 MIN: CPT | Performed by: FAMILY MEDICINE

## 2018-03-26 RX ORDER — TERAZOSIN 2 MG/1
2 CAPSULE ORAL NIGHTLY
COMMUNITY
End: 2018-10-18 | Stop reason: SDUPTHER

## 2018-03-26 ASSESSMENT — PATIENT HEALTH QUESTIONNAIRE - PHQ9
2. FEELING DOWN, DEPRESSED OR HOPELESS: 0
SUM OF ALL RESPONSES TO PHQ9 QUESTIONS 1 & 2: 0
1. LITTLE INTEREST OR PLEASURE IN DOING THINGS: 0
SUM OF ALL RESPONSES TO PHQ QUESTIONS 1-9: 0

## 2018-03-26 NOTE — PROGRESS NOTES
MG tablet Take 50 mg by mouth 2 times daily      potassium chloride (KLOR-CON) 20 MEQ packet Take 20 mEq by mouth 2 times daily      furosemide (LASIX) 40 MG tablet Take 40 mg by mouth 2 times daily      vitamin D (ERGOCALCIFEROL) 33921 UNITS CAPS capsule Take 50,000 Units by mouth once a week. Takes on mondays       sildenafil (VIAGRA) 100 MG tablet Take 100 mg by mouth as needed. Take an hour before sex. Med info obtained from [employer] records.  ibuprofen (ADVIL;MOTRIN) 800 MG tablet Take 800 mg by mouth 3 times daily as needed. Take with food. Med info obtained from [employer] records   Indications: Pain       No current facility-administered medications for this visit. Allergies:    Lisinopril and Atenolol      Review of Systems:  Review of Systems   Constitutional: Negative for chills, fever and weight loss. Eyes: Negative for blurred vision and double vision. Respiratory: Negative for cough, shortness of breath and wheezing. Cardiovascular: Negative for chest pain, palpitations and leg swelling. Gastrointestinal: Negative for abdominal pain, diarrhea, nausea and vomiting. Genitourinary: Negative for dysuria. Neurological: Positive for tingling and sensory change. Negative for focal weakness. Endo/Heme/Allergies: Positive for polydipsia. Physical Exam   Vitals: /81 (Site: Right Arm, Position: Sitting, Cuff Size: Large Adult)   Pulse 58   Temp 98.1 °F (36.7 °C) (Oral)   Resp 16   Ht 5' 8\" (1.727 m)   Wt 265 lb (120.2 kg)   SpO2 98%   BMI 40.29 kg/m²   General Appearance: Obese. No acute distress  HEENT: Normocephalic, atraumatic. Conjunctiva/corneas clear, EOM's intact, no pallor or icterus. Neck: Supple, symmetrical, trachea midline, no cervical LAD. Chest wall/Lung: Clear to auscultation bilaterally,  respirations unlabored.  No rales/wheezing/rubs  Heart: Regular rate and rhythm, S1 and S2 normal. Pedal pulses 2+,  no edema  Abdomen: Soft, non-tender, bowel sounds prominent lymph nodes at right and left submandibular space and supraclavicular locations. 3. Large sialolith is seen at the anterior aspect of right submandibular gland extending into right submandibular duct. No evidence of sialoadenitis. Resident's Assessment and Plan       1. Type 2 diabetes mellitus with diabetic polyneuropathy, unspecified long term insulin use status (HCC)  Increase Lantus to 20 U nightly  Advised of importance of strict compliance with medications, blood glucose monitoring, diet and exercise. F/u with results of microalbumin  - Microalbumin / Creatinine Urine Ratio; Future    2. Screening for colorectal cancer  - Bryanna West MD    3. Angiotensin converting enzyme inhibitor-aggravated angioedema, initial encounter  Resolved      Return to Office: Return in about 4 weeks (around 4/23/2018) for DM f/u.     Signed by : Electronically signed by Latisha Bettencourt DO on 3/26/2018 at 2:52 PM

## 2018-03-28 ENCOUNTER — TELEPHONE (OUTPATIENT)
Dept: SURGERY | Age: 58
End: 2018-03-28

## 2018-03-29 PROBLEM — T78.3XXA ANGIOTENSIN CONVERTING ENZYME INHIBITOR-AGGRAVATED ANGIOEDEMA: Status: ACTIVE | Noted: 2018-03-29

## 2018-03-29 PROBLEM — T46.4X5A ANGIOTENSIN CONVERTING ENZYME INHIBITOR-AGGRAVATED ANGIOEDEMA: Status: ACTIVE | Noted: 2018-03-29

## 2018-03-29 ASSESSMENT — ENCOUNTER SYMPTOMS
DOUBLE VISION: 0
SHORTNESS OF BREATH: 0
COUGH: 0
DIARRHEA: 0
NAUSEA: 0
ABDOMINAL PAIN: 0
WHEEZING: 0
VOMITING: 0
BLURRED VISION: 0

## 2018-04-26 ENCOUNTER — TELEPHONE (OUTPATIENT)
Dept: FAMILY MEDICINE CLINIC | Age: 58
End: 2018-04-26

## 2018-04-28 DIAGNOSIS — E11.42 TYPE 2 DIABETES MELLITUS WITH DIABETIC POLYNEUROPATHY, UNSPECIFIED LONG TERM INSULIN USE STATUS: Primary | ICD-10-CM

## 2018-05-03 ENCOUNTER — TELEPHONE (OUTPATIENT)
Dept: SURGERY | Age: 58
End: 2018-05-03

## 2018-05-16 RX ORDER — AMLODIPINE BESYLATE 10 MG/1
10 TABLET ORAL DAILY
Qty: 30 TABLET | Refills: 0 | Status: SHIPPED | OUTPATIENT
Start: 2018-05-16 | End: 2018-07-24 | Stop reason: SDUPTHER

## 2018-05-16 RX ORDER — ATORVASTATIN CALCIUM 40 MG/1
40 TABLET, FILM COATED ORAL NIGHTLY
Qty: 30 TABLET | Refills: 0 | Status: SHIPPED | OUTPATIENT
Start: 2018-05-16 | End: 2018-07-24 | Stop reason: SDUPTHER

## 2018-06-12 ENCOUNTER — TELEPHONE (OUTPATIENT)
Dept: SURGERY | Age: 58
End: 2018-06-12

## 2018-07-25 RX ORDER — AMLODIPINE BESYLATE 10 MG/1
10 TABLET ORAL DAILY
Qty: 90 TABLET | Refills: 0 | Status: SHIPPED | OUTPATIENT
Start: 2018-07-25 | End: 2018-11-07 | Stop reason: SDUPTHER

## 2018-07-25 RX ORDER — ATORVASTATIN CALCIUM 40 MG/1
40 TABLET, FILM COATED ORAL NIGHTLY
Qty: 90 TABLET | Refills: 0 | Status: SHIPPED | OUTPATIENT
Start: 2018-07-25 | End: 2018-11-07 | Stop reason: SDUPTHER

## 2018-07-30 ENCOUNTER — OFFICE VISIT (OUTPATIENT)
Dept: SURGERY | Age: 58
End: 2018-07-30
Payer: MEDICARE

## 2018-07-30 VITALS
SYSTOLIC BLOOD PRESSURE: 174 MMHG | WEIGHT: 284 LBS | BODY MASS INDEX: 43.04 KG/M2 | RESPIRATION RATE: 17 BRPM | OXYGEN SATURATION: 96 % | HEIGHT: 68 IN | HEART RATE: 87 BPM | DIASTOLIC BLOOD PRESSURE: 93 MMHG

## 2018-07-30 DIAGNOSIS — Z91.89 AT RISK FOR COLON CANCER: Primary | ICD-10-CM

## 2018-07-30 PROCEDURE — 99202 OFFICE O/P NEW SF 15 MIN: CPT | Performed by: SURGERY

## 2018-07-30 PROCEDURE — 99999 PR OFFICE/OUTPT VISIT,PROCEDURE ONLY: CPT | Performed by: SURGERY

## 2018-07-30 RX ORDER — TRAMADOL HYDROCHLORIDE 50 MG/1
50 TABLET ORAL EVERY 6 HOURS PRN
COMMUNITY

## 2018-07-30 RX ORDER — POLYETHYLENE GLYCOL 3350, SODIUM CHLORIDE, POTASSIUM CHLORIDE, SODIUM BICARBONATE, AND SODIUM SULFATE 240; 5.84; 2.98; 6.72; 22.72 G/4L; G/4L; G/4L; G/4L; G/4L
4000 POWDER, FOR SOLUTION ORAL ONCE
Qty: 1 BOTTLE | Refills: 0 | Status: SHIPPED | OUTPATIENT
Start: 2018-07-30 | End: 2018-07-30

## 2018-07-30 ASSESSMENT — ENCOUNTER SYMPTOMS
SHORTNESS OF BREATH: 0
EYE DISCHARGE: 0
ABDOMINAL DISTENTION: 0
ABDOMINAL PAIN: 0
ANAL BLEEDING: 0
COLOR CHANGE: 0
EYE REDNESS: 0
WHEEZING: 0
DIARRHEA: 0
VOMITING: 0
BACK PAIN: 1
CHEST TIGHTNESS: 0
COUGH: 0
EYE ITCHING: 0
CONSTIPATION: 0
CHOKING: 0
NAUSEA: 0
BLOOD IN STOOL: 0
EYE PAIN: 0

## 2018-07-30 NOTE — PROGRESS NOTES
Subjective:      Patient ID: Elvin Zimmerman is a 62 y.o. male. HPI  62 yr old male with no prior colonoscopy. Denies abd pain, change in bowel habits, blood in stool, unintentional weight loss, or family hx of colon cancer. Past Medical History:   Diagnosis Date    Arthritis     Carpal tunnel syndrome     Diabetes mellitus ([facility]     Hyperlipidemia     Hypertension     Sleep apnea        Past Surgical History:   Procedure Laterality Date    NASAL SEPTUM SURGERY      TONSILLECTOMY         Current Outpatient Prescriptions   Medication Sig Dispense Refill    traMADol (ULTRAM) 50 MG tablet Take 50 mg by mouth every 6 hours as needed for Pain. [facility] amLODIPine (NORVASC) 10 MG tablet Take 1 tablet by mouth daily 90 tablet 0    atorvastatin (LIPITOR) 40 MG tablet Take 1 tablet by mouth nightly 90 tablet 0    insulin glargine (LANTUS SOLOSTAR) 100 UNIT/ML injection pen Inject 20 Units into the skin nightly 5 pen 0    terazosin (HYTRIN) 2 MG capsule Take 2 mg by mouth nightly      metoprolol tartrate (LOPRESSOR) 50 MG tablet Take 50 mg by mouth 2 times daily      potassium chloride (KLOR-CON) 20 MEQ packet Take 20 mEq by mouth 2 times daily      furosemide (LASIX) 40 MG tablet Take 40 mg by mouth 2 times daily      vitamin D (ERGOCALCIFEROL) 64305 UNITS CAPS capsule Take 50,000 Units by mouth once a week. Takes on mondays       sildenafil (VIAGRA) 100 MG tablet Take 100 mg by mouth as needed. Take an hour before sex. Med info obtained from [employer].  sertraline (ZOLOFT) 50 MG tablet Take 3 tablets by mouth daily 90 tablet 0    ibuprofen (ADVIL;MOTRIN) 800 MG tablet Take 800 mg by mouth 3 times daily as needed. Take with food. Med info obtained from [employer]   Indications: Pain       No current facility-administered medications for this visit.         Allergies   Allergen Reactions    Lisinopril Swelling     Angioedema    Atenolol Other (See Comments)     Thins his blood       History

## 2018-07-30 NOTE — PATIENT INSTRUCTIONS
Call [phone] for any questions/concerns. [facility] INSTRUCTIONS      It is very important that you follow all of the instructions listed on this sheet carefully to ensure that your colon is cleaned out or your risk of side effects could be increased. What you will Need:  1. Nulytely, Golytely or Colyte Colon prep. You have been given a prescription. 2 Days or More Before Colonoscopy:   Obtain your colon prep from the pharmacy   Do not eat corn, tomatoes, peas, or watermelon 3 to 5 days before procedure. On the Day Before Colonoscopy:  · You may have clear liquids ONLY - No solid food. Do not drink milk   Do not eat or drink anything that is red or purple in color   Do not drink alcohol or beer. The following is OK to eat or drink:  ·  Water, Limeade or lemonade  ·  Strained fruit juices (no pulp) - including apple, orange, white grape or white  cranberry  ·  Coffee or Tea - do not use any milk or creamer  ·  Chicken broth  ·  Jello without added fruit or toppings (No red or purple)    Directions to take Colon Prep:  Step 1. The colon prep can be used with or without the flavor packs. If using flavor, tear open packet and pour into bottle before mixing. Step 2. Add lukewarm water to top line on bottle. Put cap on bottle and shake to dissolve the powder. It should clear like water. Do not put anything else in the bottle. After mixing, keep in the refrigerator. You should drink it within 48 hours. Step 3. The first bowel movement occurs about 1 hour after you start drinking the bowel prep. Continue to drink the bowel prep until the bowel movement is clear like water and free of solid bowel movement  Step 4. Drink 1 (8 oz) glass every 10 minutes. Try not to sip small amounts, but instead drink each glass within a few minutes. Note:    Feelings of bloating and/or nausea are common.   This is temporary and will get better once bowel movements allow the doctor to view the colon's lining on a screen. The doctor will continue guiding the tool through the bowel and assess the lining. A tissue sample or polyps may be removed during the procedure. How Long Will It Take? Usually it takes about 30 to 45 minutes     Will It Hurt? Most people do not feel anything during the procedure and will not remember the procedure. After the procedure, gas pains and cramping are common. These pains should go away with the passing of gas. Post-procedure Care   If any tissue was removed: It will be sent to a lab to be examined. It may take 1-2 weeks for results. The doctor will usually give an initial report after the scope is removed. Other tests may be recommended. A small amount of bleeding may occur during the first few days after the procedure. When you return home after the procedure, be sure to follow your doctor's instructions, which may include:   Resume medicines as instructed by your doctor. Resume normal diet, unless directed otherwise by your doctor. The sedative will make you drowsy. Avoid driving, operating machinery, or making important decisions for the rest of the day. Rest for the remainder of the day. After arriving home, contact your doctor if any of the following occurs:   Bleeding from your rectum, notify your doctor if you pass a teaspoonful of blood or more. Black, tarry stools   Severe abdominal pain   Hard, swollen abdomen   Signs of infection, including fever or chills   Inability to pass gas or stool   Coughing, shortness of breath, chest pain, severe nausea or vomiting     In case of an emergency, CALL 911 .

## 2018-08-02 ENCOUNTER — HOSPITAL ENCOUNTER (OUTPATIENT)
Age: 58
Discharge: HOME | End: 2018-08-04
Payer: MEDICARE

## 2018-08-02 ENCOUNTER — OFFICE VISIT (OUTPATIENT)
Dept: FAMILY MEDICINE CLINIC | Age: 58
End: 2018-08-02
Payer: MEDICARE

## 2018-08-02 VITALS
DIASTOLIC BLOOD PRESSURE: 100 MMHG | SYSTOLIC BLOOD PRESSURE: 160 MMHG | TEMPERATURE: 98.2 F | BODY MASS INDEX: 44.28 KG/M2 | OXYGEN SATURATION: 93 % | HEART RATE: 83 BPM | RESPIRATION RATE: 16 BRPM | HEIGHT: 68 IN | WEIGHT: 292.2 LBS

## 2018-08-02 DIAGNOSIS — R60.0 LOWER EXTREMITY EDEMA: Primary | ICD-10-CM

## 2018-08-02 DIAGNOSIS — I10 ESSENTIAL HYPERTENSION: ICD-10-CM

## 2018-08-02 DIAGNOSIS — E11.42 TYPE 2 DIABETES MELLITUS WITH DIABETIC POLYNEUROPATHY, UNSPECIFIED LONG TERM INSULIN USE STATUS: ICD-10-CM

## 2018-08-02 LAB
ANION GAP SERPL CALCULATED.3IONS-SCNC: 17 MMOL/L (ref 7–16)
BUN BLDV-MCNC: 12 MG/DL (ref 6–20)
CALCIUM SERPL-MCNC: 8.7 MG/DL (ref 8.6–10.2)
CHLORIDE BLD-SCNC: 94 MMOL/L (ref 98–107)
CO2: 26 MMOL/L (ref 22–29)
CREAT SERPL-MCNC: 0.7 MG/DL (ref 0.7–1.2)
GFR AFRICAN AMERICAN: >60
GFR NON-AFRICAN AMERICAN: >60 ML/MIN/1.73
GLUCOSE BLD-MCNC: 213 MG/DL (ref 74–109)
HBA1C MFR BLD: 9.5 % (ref 4–5.6)
POTASSIUM SERPL-SCNC: 3.7 MMOL/L (ref 3.5–5)
SODIUM BLD-SCNC: 137 MMOL/L (ref 132–146)

## 2018-08-02 PROCEDURE — 2022F DILAT RTA XM EVC RTNOPTHY: CPT | Performed by: STUDENT IN AN ORGANIZED HEALTH CARE EDUCATION/TRAINING PROGRAM

## 2018-08-02 PROCEDURE — 36415 COLL VENOUS BLD VENIPUNCTURE: CPT

## 2018-08-02 PROCEDURE — 99212 OFFICE O/P EST SF 10 MIN: CPT | Performed by: STUDENT IN AN ORGANIZED HEALTH CARE EDUCATION/TRAINING PROGRAM

## 2018-08-02 PROCEDURE — 3017F COLORECTAL CA SCREEN DOC REV: CPT | Performed by: STUDENT IN AN ORGANIZED HEALTH CARE EDUCATION/TRAINING PROGRAM

## 2018-08-02 PROCEDURE — 3046F HEMOGLOBIN A1C LEVEL >9.0%: CPT | Performed by: STUDENT IN AN ORGANIZED HEALTH CARE EDUCATION/TRAINING PROGRAM

## 2018-08-02 PROCEDURE — G8417 CALC BMI ABV UP PARAM F/U: HCPCS | Performed by: STUDENT IN AN ORGANIZED HEALTH CARE EDUCATION/TRAINING PROGRAM

## 2018-08-02 PROCEDURE — 99213 OFFICE O/P EST LOW 20 MIN: CPT | Performed by: STUDENT IN AN ORGANIZED HEALTH CARE EDUCATION/TRAINING PROGRAM

## 2018-08-02 PROCEDURE — 4004F PT TOBACCO SCREEN RCVD TLK: CPT | Performed by: STUDENT IN AN ORGANIZED HEALTH CARE EDUCATION/TRAINING PROGRAM

## 2018-08-02 PROCEDURE — G8427 DOCREV CUR MEDS BY ELIG CLIN: HCPCS | Performed by: STUDENT IN AN ORGANIZED HEALTH CARE EDUCATION/TRAINING PROGRAM

## 2018-08-02 PROCEDURE — 80048 BASIC METABOLIC PNL TOTAL CA: CPT

## 2018-08-02 PROCEDURE — 83036 HEMOGLOBIN GLYCOSYLATED A1C: CPT

## 2018-08-02 PROCEDURE — 36415 COLL VENOUS BLD VENIPUNCTURE: CPT | Performed by: FAMILY MEDICINE

## 2018-08-02 RX ORDER — FUROSEMIDE 40 MG/1
40 TABLET ORAL SEE ADMIN INSTRUCTIONS
Qty: 90 TABLET | Refills: 1 | Status: SHIPPED | OUTPATIENT
Start: 2018-08-02 | End: 2018-08-08 | Stop reason: SDUPTHER

## 2018-08-02 RX ORDER — FUROSEMIDE 40 MG/1
40 TABLET ORAL SEE ADMIN INSTRUCTIONS
Qty: 60 TABLET | Refills: 1 | Status: CANCELLED | OUTPATIENT
Start: 2018-08-02

## 2018-08-02 ASSESSMENT — ENCOUNTER SYMPTOMS
COUGH: 0
SHORTNESS OF BREATH: 0

## 2018-08-02 NOTE — PATIENT INSTRUCTIONS
Patient Education        DASH Diet: Care Instructions  Your Care Instructions    The DASH diet is an eating plan that can help lower your blood pressure. DASH stands for Dietary Approaches to Stop Hypertension. Hypertension is high blood pressure. The DASH diet focuses on eating foods that are high in calcium, potassium, and magnesium. These nutrients can lower blood pressure. The foods that are highest in these nutrients are fruits, vegetables, low-fat dairy products, nuts, seeds, and legumes. But taking calcium, potassium, and magnesium supplements instead of eating foods that are high in those nutrients does not have the same effect. The DASH diet also includes whole grains, fish, and poultry. The DASH diet is one of several lifestyle changes your doctor may recommend to lower your high blood pressure. Your doctor may also want you to decrease the amount of sodium in your diet. Lowering sodium while following the DASH diet can lower blood pressure even further than just the DASH diet alone. Follow-up care is a key part of your treatment and safety. Be sure to make and go to all appointments, and call your doctor if you are having problems. It's also a good idea to know your test results and keep a list of the medicines you take. How can you care for yourself at home? Following the DASH diet  · Eat 4 to 5 servings of fruit each day. A serving is 1 medium-sized piece of fruit, ½ cup chopped or canned fruit, 1/4 cup dried fruit, or 4 ounces (½ cup) of fruit juice. Choose fruit more often than fruit juice. · Eat 4 to 5 servings of vegetables each day. A serving is 1 cup of lettuce or raw leafy vegetables, ½ cup of chopped or cooked vegetables, or 4 ounces (½ cup) of vegetable juice. Choose vegetables more often than vegetable juice. · Get 2 to 3 servings of low-fat and fat-free dairy each day. A serving is 8 ounces of milk, 1 cup of yogurt, or 1 ½ ounces of cheese. · Eat 6 to 8 servings of grains each day.  A serving is 1 slice of bread, 1 ounce of dry cereal, or ½ cup of cooked rice, pasta, or cooked cereal. Try to choose whole-grain products as much as possible. · Limit lean meat, poultry, and fish to 2 servings each day. A serving is 3 ounces, about the size of a deck of cards. · Eat 4 to 5 servings of nuts, seeds, and legumes (cooked dried beans, lentils, and split peas) each week. A serving is 1/3 cup of nuts, 2 tablespoons of seeds, or ½ cup of cooked beans or peas. · Limit fats and oils to 2 to 3 servings each day. A serving is 1 teaspoon of vegetable oil or 2 tablespoons of salad dressing. · Limit sweets and added sugars to 5 servings or less a week. A serving is 1 tablespoon jelly or jam, ½ cup sorbet, or 1 cup of lemonade. · Eat less than 2,300 milligrams (mg) of sodium a day. If you limit your sodium to 1,500 mg a day, you can lower your blood pressure even more. Tips for success  · Start small. Do not try to make dramatic changes to your diet all at once. You might feel that you are missing out on your favorite foods and then be more likely to not follow the plan. Make small changes, and stick with them. Once those changes become habit, add a few more changes. · Try some of the following:  ¨ Make it a goal to eat a fruit or vegetable at every meal and at snacks. This will make it easy to get the recommended amount of fruits and vegetables each day. ¨ Try yogurt topped with fruit and nuts for a snack or healthy dessert. ¨ Add lettuce, tomato, cucumber, and onion to sandwiches. ¨ Combine a ready-made pizza crust with low-fat mozzarella cheese and lots of vegetable toppings. Try using tomatoes, squash, spinach, broccoli, carrots, cauliflower, and onions. ¨ Have a variety of cut-up vegetables with a low-fat dip as an appetizer instead of chips and dip. ¨ Sprinkle sunflower seeds or chopped almonds over salads. Or try adding chopped walnuts or almonds to cooked vegetables.   ¨ Try some vegetarian meals using beans and peas. Add garbanzo or kidney beans to salads. Make burritos and tacos with mashed bella beans or black beans. Where can you learn more? Go to https://Socialtyzeleoeb.Lessons Only. org and sign in to your [employer] account. Enter K259 in the Claim Maps box to learn more about \"DASH Diet: Care Instructions. \"     If you do not have an account, please click on the \"Sign Up Now\" link. Current as of: December 6, 2017  Content Version: 11.6  © 4153-9872 [employer], [employer]. Care instructions adapted under license by [facility] [address] If you have questions about a medical condition or this instruction, always ask your healthcare professional. [address] any warranty or liability for your use of this information.

## 2018-08-02 NOTE — PROGRESS NOTES
[address] [facility]  Department of Family Medicine  Family Medicine Residency Program      Patient:  Nitesh Collins 62 y.o. male     Date of Service: 8/2/18      Chief complaint:   Chief Complaint   Patient presents with    Diabetes     here for routine visit. Pt states he has some questions about his insulin    Other     pt states athere is a new meter that does not require finger stick     Leg Swelling     both legs and feet are swelled         History of Present Illness   The patient is a 62 y.o. male with past medical history of diabetes, hypertension, and CHF who presented to the clinic today for diabetes follow-up visit. Patient was recently started on insulin, and was also recently increased from 15 units to 20 units nightly. Diabetic Review of Systems - medication compliance: compliant all of the time. Other symptoms and concerns: polyuria, polydypsia, this is chronic. .   Takes  20 units insulin for DM. No metformin cause of HF  Compliance with medication: No  Home blood glucose monitoring range: 200's inconsistently checks   Hypoglycemic episode(s): No  Statin: Yes  ACEI: Was discontinued because of angioedema  ASA: No   Ophthalmology: Yes, he has an appointment. Has Macular Degneration of left eye. Podiatry: Yes, will see next week  Dentistry: Yes  Compliance with diet: Non-compliant. Drinking crystal light, no sweets, eats bread, and rice. BP elevated today in the clinic, and took metoprolol, Lasix, and Amlodipine this AM, and he is scheduled to take terazosin in the evening. Of note the patient was previously on lisinopril, but was discontinued due to angioedema. Patient also notes lower extremity edema which has been occurring for about the last 3 days. Patient does have a history of CHF, and is on Lasix 40 mg b.i.d. He denies any other CHF exacerbation symptoms including shortness of breath, or worsening orthopnea (has chronic orthopnea).   He states that he has recently trachea midline. Slight JVD noted (difficult to measure due to obesity), no Heptojugular reflux noted. Chest wall/Lung: Clear to auscultation bilaterally,  respirations unlabored. No ronchi/wheezing/rales  Heart: Regular rate and rhythm, S1 and S2 normal, no murmur, rub or gallop. Abdomen: Soft, non-tender, bowel sounds normoactive, no masses, no organomegaly  Extremities:   1-2 + edema b/l LE  Neurologic:   Alert&Oriented. Assessment and Plan       1. Lower extremity edema  -Lower extremity edema secondary to multifactorial causes (CHF, Increased Salt Intake, Recent weight gain). Increased Lasix from 40 b.i.d. to 60 in the morning, 40 in the evening.  - furosemide (LASIX) 40 MG tablet; Take 1 tablet by mouth See Admin Instructions Take 60 mg in the morning, and 40 mg in the evening  Dispense: 90 tablet; Refill: 1    2. Type 2 diabetes mellitus with diabetic polyneuropathy, unspecified long term insulin use status (HCC)  -Blood sugars uncontrolled at home, will increase Lantus from 20 units to 25 units nightly. A1c ordered. New on the market glucometer ordered  - insulin glargine (LANTUS SOLOSTAR) 100 UNIT/ML injection pen; Inject 25 Units into the skin nightly  Dispense: 5 pen; Refill: 0  - HEMOGLOBIN A1C; Future  -DexcomG4 Glucometer ordered    3. Essential hypertension  -Continue current regimen of metoprolol, Lasix, and Amlodipine this AM, and Terazosin in PM, and increased Lasix to 20 mg daily.  - BASIC METABOLIC PANEL; Future  - furosemide (LASIX) 40 MG tablet; Take 1 tablet by mouth See Admin Instructions Take 60 mg in the morning, and 40 mg in the evening  Dispense: 90 tablet; Refill: 1    4. History of Sleep Apnea  -Patient would like new CPAP mask ordered, this will be discussed during his next visit to clinic. Return to Office: Return in about 2 weeks (around 8/16/2018) for LE edema, HTN.       Medication List:    Current Outpatient Prescriptions   Medication Sig Dispense Refill    traMADol (ULTRAM) 50 MG tablet Take 50 mg by mouth every 6 hours as needed for Pain. [place] amLODIPine (NORVASC) 10 MG tablet Take 1 tablet by mouth daily 90 tablet 0    atorvastatin (LIPITOR) 40 MG tablet Take 1 tablet by mouth nightly 90 tablet 0    insulin glargine (LANTUS SOLOSTAR) 100 UNIT/ML injection pen Inject 20 Units into the skin nightly 5 pen 0    sertraline (ZOLOFT) 50 MG tablet Take 3 tablets by mouth daily 90 tablet 0    terazosin (HYTRIN) 2 MG capsule Take 2 mg by mouth nightly      metoprolol tartrate (LOPRESSOR) 50 MG tablet Take 50 mg by mouth 2 times daily      potassium chloride (KLOR-CON) 20 MEQ packet Take 20 mEq by mouth 2 times daily      furosemide (LASIX) 40 MG tablet Take 40 mg by mouth 2 times daily      vitamin D (ERGOCALCIFEROL) 54491 UNITS CAPS capsule Take 50,000 Units by mouth once a week. Takes on mondays       sildenafil (VIAGRA) 100 MG tablet Take 100 mg by mouth as needed. Take an hour before sex. Med info obtained from [facility] records.  ibuprofen (ADVIL;MOTRIN) 800 MG tablet Take 800 mg by mouth 3 times daily as needed. Take with food. Med info obtained from [facility] records   Indications: Pain       No current facility-administered medications for this visit.          Bonnell Denver, MD PGY-2  Discussed with Dr. Slick Patiño

## 2018-08-03 ENCOUNTER — TELEPHONE (OUTPATIENT)
Dept: FAMILY MEDICINE CLINIC | Age: 58
End: 2018-08-03

## 2018-08-03 DIAGNOSIS — I10 ESSENTIAL HYPERTENSION: ICD-10-CM

## 2018-08-03 DIAGNOSIS — R60.0 LOWER EXTREMITY EDEMA: ICD-10-CM

## 2018-08-03 NOTE — TELEPHONE ENCOUNTER
Pharmacy phoned stated that the sig on the furosemide was take 1 po qd.   Then stated to take 60 mg morning  40 mg evening  Please advise  Thank you april

## 2018-08-06 ENCOUNTER — TELEPHONE (OUTPATIENT)
Dept: SURGERY | Age: 58
End: 2018-08-06

## 2018-08-08 RX ORDER — FUROSEMIDE 40 MG/1
TABLET ORAL
Qty: 90 TABLET | Refills: 1 | Status: ON HOLD | OUTPATIENT
Start: 2018-08-08 | End: 2018-08-22 | Stop reason: HOSPADM

## 2018-08-16 ENCOUNTER — HOSPITAL ENCOUNTER (INPATIENT)
Age: 58
LOS: 5 days | Discharge: HOME | DRG: 375 | End: 2018-08-22
Attending: EMERGENCY MEDICINE | Admitting: FAMILY MEDICINE
Payer: MEDICARE

## 2018-08-16 DIAGNOSIS — E87.6 HYPOKALEMIA: ICD-10-CM

## 2018-08-16 DIAGNOSIS — M79.89 LEG SWELLING: Primary | ICD-10-CM

## 2018-08-16 DIAGNOSIS — I16.0 HYPERTENSIVE URGENCY: ICD-10-CM

## 2018-08-16 DIAGNOSIS — E83.42 HYPOMAGNESEMIA: ICD-10-CM

## 2018-08-16 PROCEDURE — 99285 EMERGENCY DEPT VISIT HI MDM: CPT

## 2018-08-16 ASSESSMENT — PAIN DESCRIPTION - PAIN TYPE: TYPE: ACUTE PAIN

## 2018-08-16 ASSESSMENT — PAIN DESCRIPTION - LOCATION: LOCATION: ABDOMEN

## 2018-08-16 ASSESSMENT — PAIN DESCRIPTION - DESCRIPTORS: DESCRIPTORS: DISCOMFORT

## 2018-08-16 ASSESSMENT — PAIN SCALES - GENERAL: PAINLEVEL_OUTOF10: 8

## 2018-08-17 ENCOUNTER — APPOINTMENT (OUTPATIENT)
Dept: ULTRASOUND IMAGING | Age: 58
DRG: 375 | End: 2018-08-17
Payer: MEDICARE

## 2018-08-17 ENCOUNTER — APPOINTMENT (OUTPATIENT)
Dept: GENERAL RADIOLOGY | Age: 58
DRG: 375 | End: 2018-08-17
Payer: MEDICARE

## 2018-08-17 PROBLEM — E87.6 HYPOKALEMIA: Status: ACTIVE | Noted: 2018-08-17

## 2018-08-17 PROBLEM — M79.89 LEG SWELLING: Status: ACTIVE | Noted: 2018-08-17

## 2018-08-17 PROBLEM — I16.0 HYPERTENSIVE URGENCY: Status: ACTIVE | Noted: 2018-08-17

## 2018-08-17 PROBLEM — E83.42 HYPOMAGNESEMIA: Status: ACTIVE | Noted: 2018-08-17

## 2018-08-17 PROBLEM — R60.0 BILATERAL LEG EDEMA: Status: ACTIVE | Noted: 2018-08-17

## 2018-08-17 LAB
ALBUMIN SERPL-MCNC: 3.4 G/DL (ref 3.5–5.2)
ALP BLD-CCNC: 73 U/L (ref 40–129)
ALT SERPL-CCNC: 36 U/L (ref 0–40)
AMYLASE: 36 U/L (ref 20–100)
ANION GAP SERPL CALCULATED.3IONS-SCNC: 14 MMOL/L (ref 7–16)
ANION GAP SERPL CALCULATED.3IONS-SCNC: 15 MMOL/L (ref 7–16)
APTT: 28.7 SEC (ref 24.5–35.1)
AST SERPL-CCNC: 37 U/L (ref 0–39)
BACTERIA: ABNORMAL /HPF
BASOPHILS ABSOLUTE: 0.05 E9/L (ref 0–0.2)
BASOPHILS RELATIVE PERCENT: 0.7 % (ref 0–2)
BILIRUB SERPL-MCNC: 0.4 MG/DL (ref 0–1.2)
BILIRUBIN DIRECT: <0.2 MG/DL (ref 0–0.3)
BILIRUBIN URINE: NEGATIVE
BILIRUBIN, INDIRECT: ABNORMAL MG/DL (ref 0–1)
BLOOD, URINE: ABNORMAL
BUN BLDV-MCNC: 11 MG/DL (ref 6–20)
BUN BLDV-MCNC: 12 MG/DL (ref 6–20)
CALCIUM SERPL-MCNC: 7.9 MG/DL (ref 8.6–10.2)
CALCIUM SERPL-MCNC: 8 MG/DL (ref 8.6–10.2)
CHLORIDE BLD-SCNC: 93 MMOL/L (ref 98–107)
CHLORIDE BLD-SCNC: 93 MMOL/L (ref 98–107)
CK MB: 5.2 NG/ML (ref 0–7.7)
CLARITY: CLEAR
CO2: 29 MMOL/L (ref 22–29)
CO2: 29 MMOL/L (ref 22–29)
COLOR: YELLOW
CREAT SERPL-MCNC: 0.5 MG/DL (ref 0.7–1.2)
CREAT SERPL-MCNC: 0.6 MG/DL (ref 0.7–1.2)
CREATININE URINE: 79 MG/DL (ref 40–278)
D DIMER: 515 NG/ML DDU
EKG ATRIAL RATE: 85 BPM
EKG P AXIS: 49 DEGREES
EKG P-R INTERVAL: 164 MS
EKG Q-T INTERVAL: 400 MS
EKG QRS DURATION: 82 MS
EKG QTC CALCULATION (BAZETT): 476 MS
EKG R AXIS: -10 DEGREES
EKG T AXIS: 17 DEGREES
EKG VENTRICULAR RATE: 85 BPM
EOSINOPHILS ABSOLUTE: 0.17 E9/L (ref 0.05–0.5)
EOSINOPHILS RELATIVE PERCENT: 2.2 % (ref 0–6)
GFR AFRICAN AMERICAN: >60
GFR AFRICAN AMERICAN: >60
GFR NON-AFRICAN AMERICAN: >60 ML/MIN/1.73
GFR NON-AFRICAN AMERICAN: >60 ML/MIN/1.73
GLUCOSE BLD-MCNC: 201 MG/DL (ref 74–109)
GLUCOSE BLD-MCNC: 201 MG/DL (ref 74–109)
GLUCOSE URINE: >=1000 MG/DL
HCT VFR BLD CALC: 38.5 % (ref 37–54)
HCT VFR BLD CALC: 39.1 % (ref 37–54)
HEMOGLOBIN: 13.3 G/DL (ref 12.5–16.5)
HEMOGLOBIN: 13.3 G/DL (ref 12.5–16.5)
IMMATURE GRANULOCYTES #: 0.03 E9/L
IMMATURE GRANULOCYTES %: 0.4 % (ref 0–5)
INR BLD: 1
KETONES, URINE: NEGATIVE MG/DL
LEUKOCYTE ESTERASE, URINE: NEGATIVE
LIPASE: 23 U/L (ref 13–60)
LV EF: 60 %
LVEF MODALITY: NORMAL
LYMPHOCYTES ABSOLUTE: 2.07 E9/L (ref 1.5–4)
LYMPHOCYTES RELATIVE PERCENT: 27 % (ref 20–42)
MAGNESIUM: 1.3 MG/DL (ref 1.6–2.6)
MAGNESIUM: 1.6 MG/DL (ref 1.6–2.6)
MCH RBC QN AUTO: 32.5 PG (ref 26–35)
MCH RBC QN AUTO: 32.9 PG (ref 26–35)
MCHC RBC AUTO-ENTMCNC: 34 % (ref 32–34.5)
MCHC RBC AUTO-ENTMCNC: 34.5 % (ref 32–34.5)
MCV RBC AUTO: 95.3 FL (ref 80–99.9)
MCV RBC AUTO: 95.6 FL (ref 80–99.9)
METER GLUCOSE: 170 MG/DL (ref 70–110)
METER GLUCOSE: 205 MG/DL (ref 70–110)
METER GLUCOSE: 215 MG/DL (ref 70–110)
MONOCYTES ABSOLUTE: 0.64 E9/L (ref 0.1–0.95)
MONOCYTES RELATIVE PERCENT: 8.3 % (ref 2–12)
NEUTROPHILS ABSOLUTE: 4.71 E9/L (ref 1.8–7.3)
NEUTROPHILS RELATIVE PERCENT: 61.4 % (ref 43–80)
NITRITE, URINE: NEGATIVE
PDW BLD-RTO: 12.8 FL (ref 11.5–15)
PDW BLD-RTO: 13 FL (ref 11.5–15)
PH UA: 6 (ref 5–9)
PLATELET # BLD: 207 E9/L (ref 130–450)
PLATELET # BLD: 209 E9/L (ref 130–450)
PMV BLD AUTO: 10.5 FL (ref 7–12)
PMV BLD AUTO: 10.7 FL (ref 7–12)
POTASSIUM SERPL-SCNC: 2.9 MMOL/L (ref 3.5–5)
POTASSIUM SERPL-SCNC: 3 MMOL/L (ref 3.5–5)
PRO-BNP: 300 PG/ML (ref 0–125)
PROTEIN PROTEIN: 136 MG/DL (ref 0–12)
PROTEIN UA: 100 MG/DL
PROTEIN/CREAT RATIO: 1.7
PROTEIN/CREAT RATIO: 1.7 (ref 0–0.2)
PROTHROMBIN TIME: 11.4 SEC (ref 9.3–12.4)
RBC # BLD: 4.04 E12/L (ref 3.8–5.8)
RBC # BLD: 4.09 E12/L (ref 3.8–5.8)
RBC UA: ABNORMAL /HPF (ref 0–2)
SODIUM BLD-SCNC: 136 MMOL/L (ref 132–146)
SODIUM BLD-SCNC: 137 MMOL/L (ref 132–146)
SPECIFIC GRAVITY UA: 1.02 (ref 1–1.03)
TOTAL CK: 493 U/L (ref 20–200)
TOTAL PROTEIN: 6.1 G/DL (ref 6.4–8.3)
TROPONIN: <0.01 NG/ML (ref 0–0.03)
TROPONIN: <0.01 NG/ML (ref 0–0.03)
TSH SERPL DL<=0.05 MIU/L-ACNC: 0.98 UIU/ML (ref 0.27–4.2)
UROBILINOGEN, URINE: 0.2 E.U./DL
WBC # BLD: 7.7 E9/L (ref 4.5–11.5)
WBC # BLD: 8.2 E9/L (ref 4.5–11.5)
WBC UA: ABNORMAL /HPF (ref 0–5)

## 2018-08-17 PROCEDURE — 36415 COLL VENOUS BLD VENIPUNCTURE: CPT

## 2018-08-17 PROCEDURE — 85378 FIBRIN DEGRADE SEMIQUANT: CPT

## 2018-08-17 PROCEDURE — 81001 URINALYSIS AUTO W/SCOPE: CPT

## 2018-08-17 PROCEDURE — 85730 THROMBOPLASTIN TIME PARTIAL: CPT

## 2018-08-17 PROCEDURE — 85025 COMPLETE CBC W/AUTO DIFF WBC: CPT

## 2018-08-17 PROCEDURE — 84443 ASSAY THYROID STIM HORMONE: CPT

## 2018-08-17 PROCEDURE — 6370000000 HC RX 637 (ALT 250 FOR IP): Performed by: STUDENT IN AN ORGANIZED HEALTH CARE EDUCATION/TRAINING PROGRAM

## 2018-08-17 PROCEDURE — 93970 EXTREMITY STUDY: CPT

## 2018-08-17 PROCEDURE — 6360000002 HC RX W HCPCS: Performed by: FAMILY MEDICINE

## 2018-08-17 PROCEDURE — 82553 CREATINE MB FRACTION: CPT

## 2018-08-17 PROCEDURE — 85027 COMPLETE CBC AUTOMATED: CPT

## 2018-08-17 PROCEDURE — 82550 ASSAY OF CK (CPK): CPT

## 2018-08-17 PROCEDURE — 6360000002 HC RX W HCPCS: Performed by: STUDENT IN AN ORGANIZED HEALTH CARE EDUCATION/TRAINING PROGRAM

## 2018-08-17 PROCEDURE — 82150 ASSAY OF AMYLASE: CPT

## 2018-08-17 PROCEDURE — 82962 GLUCOSE BLOOD TEST: CPT

## 2018-08-17 PROCEDURE — 6370000000 HC RX 637 (ALT 250 FOR IP): Performed by: EMERGENCY MEDICINE

## 2018-08-17 PROCEDURE — 85610 PROTHROMBIN TIME: CPT

## 2018-08-17 PROCEDURE — 84156 ASSAY OF PROTEIN URINE: CPT

## 2018-08-17 PROCEDURE — 6360000002 HC RX W HCPCS: Performed by: EMERGENCY MEDICINE

## 2018-08-17 PROCEDURE — 80076 HEPATIC FUNCTION PANEL: CPT

## 2018-08-17 PROCEDURE — 93306 TTE W/DOPPLER COMPLETE: CPT

## 2018-08-17 PROCEDURE — 6370000000 HC RX 637 (ALT 250 FOR IP): Performed by: FAMILY MEDICINE

## 2018-08-17 PROCEDURE — 83735 ASSAY OF MAGNESIUM: CPT

## 2018-08-17 PROCEDURE — 84484 ASSAY OF TROPONIN QUANT: CPT

## 2018-08-17 PROCEDURE — 96374 THER/PROPH/DIAG INJ IV PUSH: CPT

## 2018-08-17 PROCEDURE — 83880 ASSAY OF NATRIURETIC PEPTIDE: CPT

## 2018-08-17 PROCEDURE — 99223 1ST HOSP IP/OBS HIGH 75: CPT | Performed by: FAMILY MEDICINE

## 2018-08-17 PROCEDURE — 2500000003 HC RX 250 WO HCPCS: Performed by: EMERGENCY MEDICINE

## 2018-08-17 PROCEDURE — 2500000003 HC RX 250 WO HCPCS: Performed by: FAMILY MEDICINE

## 2018-08-17 PROCEDURE — 96375 TX/PRO/DX INJ NEW DRUG ADDON: CPT

## 2018-08-17 PROCEDURE — 93005 ELECTROCARDIOGRAM TRACING: CPT | Performed by: EMERGENCY MEDICINE

## 2018-08-17 PROCEDURE — 1200000000 HC SEMI PRIVATE

## 2018-08-17 PROCEDURE — 83690 ASSAY OF LIPASE: CPT

## 2018-08-17 PROCEDURE — 2500000003 HC RX 250 WO HCPCS

## 2018-08-17 PROCEDURE — 71045 X-RAY EXAM CHEST 1 VIEW: CPT

## 2018-08-17 PROCEDURE — 82570 ASSAY OF URINE CREATININE: CPT

## 2018-08-17 PROCEDURE — 80048 BASIC METABOLIC PNL TOTAL CA: CPT

## 2018-08-17 PROCEDURE — 2580000003 HC RX 258: Performed by: FAMILY MEDICINE

## 2018-08-17 RX ORDER — SODIUM CHLORIDE 0.9 % (FLUSH) 0.9 %
10 SYRINGE (ML) INJECTION PRN
Status: DISCONTINUED | OUTPATIENT
Start: 2018-08-17 | End: 2018-08-17 | Stop reason: SDUPTHER

## 2018-08-17 RX ORDER — SODIUM CHLORIDE 0.9 % (FLUSH) 0.9 %
10 SYRINGE (ML) INJECTION EVERY 12 HOURS SCHEDULED
Status: DISCONTINUED | OUTPATIENT
Start: 2018-08-17 | End: 2018-08-17 | Stop reason: SDUPTHER

## 2018-08-17 RX ORDER — POTASSIUM CHLORIDE 20 MEQ/1
40 TABLET, EXTENDED RELEASE ORAL ONCE
Status: COMPLETED | OUTPATIENT
Start: 2018-08-17 | End: 2018-08-17

## 2018-08-17 RX ORDER — TRAMADOL HYDROCHLORIDE 50 MG/1
50 TABLET ORAL EVERY 6 HOURS PRN
Status: DISCONTINUED | OUTPATIENT
Start: 2018-08-17 | End: 2018-08-22 | Stop reason: HOSPADM

## 2018-08-17 RX ORDER — BUMETANIDE 0.25 MG/ML
INJECTION, SOLUTION INTRAMUSCULAR; INTRAVENOUS
Status: COMPLETED
Start: 2018-08-17 | End: 2018-08-17

## 2018-08-17 RX ORDER — FUROSEMIDE 80 MG
80 TABLET ORAL NIGHTLY
Status: ON HOLD | COMMUNITY
End: 2018-08-22 | Stop reason: HOSPADM

## 2018-08-17 RX ORDER — METOPROLOL TARTRATE 50 MG/1
50 TABLET, FILM COATED ORAL 2 TIMES DAILY
Status: DISCONTINUED | OUTPATIENT
Start: 2018-08-17 | End: 2018-08-22 | Stop reason: HOSPADM

## 2018-08-17 RX ORDER — DEXTROSE MONOHYDRATE 50 MG/ML
100 INJECTION, SOLUTION INTRAVENOUS PRN
Status: DISCONTINUED | OUTPATIENT
Start: 2018-08-17 | End: 2018-08-22 | Stop reason: HOSPADM

## 2018-08-17 RX ORDER — DEXTROSE MONOHYDRATE 25 G/50ML
12.5 INJECTION, SOLUTION INTRAVENOUS PRN
Status: DISCONTINUED | OUTPATIENT
Start: 2018-08-17 | End: 2018-08-22 | Stop reason: HOSPADM

## 2018-08-17 RX ORDER — INSULIN GLARGINE 100 [IU]/ML
25 INJECTION, SOLUTION SUBCUTANEOUS NIGHTLY
Status: DISCONTINUED | OUTPATIENT
Start: 2018-08-17 | End: 2018-08-18

## 2018-08-17 RX ORDER — POTASSIUM CHLORIDE 1.5 G/1.77G
20 POWDER, FOR SOLUTION ORAL 2 TIMES DAILY
Status: DISCONTINUED | OUTPATIENT
Start: 2018-08-17 | End: 2018-08-22 | Stop reason: HOSPADM

## 2018-08-17 RX ORDER — MAGNESIUM SULFATE IN WATER 40 MG/ML
2 INJECTION, SOLUTION INTRAVENOUS ONCE
Status: COMPLETED | OUTPATIENT
Start: 2018-08-17 | End: 2018-08-17

## 2018-08-17 RX ORDER — ACETAMINOPHEN 325 MG/1
650 TABLET ORAL EVERY 4 HOURS PRN
Status: DISCONTINUED | OUTPATIENT
Start: 2018-08-17 | End: 2018-08-22 | Stop reason: HOSPADM

## 2018-08-17 RX ORDER — BUMETANIDE 0.25 MG/ML
1 INJECTION, SOLUTION INTRAMUSCULAR; INTRAVENOUS 2 TIMES DAILY
Status: DISCONTINUED | OUTPATIENT
Start: 2018-08-17 | End: 2018-08-22 | Stop reason: HOSPADM

## 2018-08-17 RX ORDER — SODIUM CHLORIDE 0.9 % (FLUSH) 0.9 %
10 SYRINGE (ML) INJECTION EVERY 12 HOURS SCHEDULED
Status: DISCONTINUED | OUTPATIENT
Start: 2018-08-17 | End: 2018-08-22 | Stop reason: HOSPADM

## 2018-08-17 RX ORDER — NICOTINE 21 MG/24HR
1 PATCH, TRANSDERMAL 24 HOURS TRANSDERMAL DAILY
Status: DISCONTINUED | OUTPATIENT
Start: 2018-08-17 | End: 2018-08-22 | Stop reason: HOSPADM

## 2018-08-17 RX ORDER — SODIUM CHLORIDE 0.9 % (FLUSH) 0.9 %
10 SYRINGE (ML) INJECTION PRN
Status: DISCONTINUED | OUTPATIENT
Start: 2018-08-17 | End: 2018-08-22 | Stop reason: HOSPADM

## 2018-08-17 RX ORDER — AMLODIPINE BESYLATE 10 MG/1
10 TABLET ORAL DAILY
Status: DISCONTINUED | OUTPATIENT
Start: 2018-08-17 | End: 2018-08-22 | Stop reason: HOSPADM

## 2018-08-17 RX ORDER — FUROSEMIDE 10 MG/ML
60 INJECTION INTRAMUSCULAR; INTRAVENOUS ONCE
Status: COMPLETED | OUTPATIENT
Start: 2018-08-17 | End: 2018-08-17

## 2018-08-17 RX ORDER — ATORVASTATIN CALCIUM 40 MG/1
40 TABLET, FILM COATED ORAL NIGHTLY
Status: DISCONTINUED | OUTPATIENT
Start: 2018-08-17 | End: 2018-08-22 | Stop reason: HOSPADM

## 2018-08-17 RX ORDER — LABETALOL HYDROCHLORIDE 5 MG/ML
20 INJECTION, SOLUTION INTRAVENOUS ONCE
Status: COMPLETED | OUTPATIENT
Start: 2018-08-17 | End: 2018-08-17

## 2018-08-17 RX ORDER — POTASSIUM CHLORIDE 7.45 MG/ML
10 INJECTION INTRAVENOUS
Status: COMPLETED | OUTPATIENT
Start: 2018-08-17 | End: 2018-08-17

## 2018-08-17 RX ORDER — NICOTINE POLACRILEX 4 MG
15 LOZENGE BUCCAL PRN
Status: DISCONTINUED | OUTPATIENT
Start: 2018-08-17 | End: 2018-08-22 | Stop reason: HOSPADM

## 2018-08-17 RX ORDER — DOXAZOSIN 2 MG/1
2 TABLET ORAL DAILY
Status: DISCONTINUED | OUTPATIENT
Start: 2018-08-17 | End: 2018-08-22 | Stop reason: HOSPADM

## 2018-08-17 RX ADMIN — METOPROLOL TARTRATE 50 MG: 50 TABLET ORAL at 22:00

## 2018-08-17 RX ADMIN — LABETALOL HYDROCHLORIDE 20 MG: 5 INJECTION INTRAVENOUS at 01:13

## 2018-08-17 RX ADMIN — MAGNESIUM SULFATE IN WATER 2 G: 40 INJECTION, SOLUTION INTRAVENOUS at 11:56

## 2018-08-17 RX ADMIN — DOXAZOSIN 2 MG: 2 TABLET ORAL at 08:11

## 2018-08-17 RX ADMIN — POTASSIUM CHLORIDE 40 MEQ: 20 TABLET, EXTENDED RELEASE ORAL at 02:48

## 2018-08-17 RX ADMIN — POTASSIUM CHLORIDE 10 MEQ: 7.46 INJECTION, SOLUTION INTRAVENOUS at 12:58

## 2018-08-17 RX ADMIN — BUMETANIDE 1 MG: 0.25 INJECTION INTRAMUSCULAR; INTRAVENOUS at 08:11

## 2018-08-17 RX ADMIN — SERTRALINE 150 MG: 100 TABLET, FILM COATED ORAL at 08:11

## 2018-08-17 RX ADMIN — BUMETANIDE 1 MG: 0.25 INJECTION INTRAMUSCULAR; INTRAVENOUS at 22:00

## 2018-08-17 RX ADMIN — ENOXAPARIN SODIUM 40 MG: 40 INJECTION SUBCUTANEOUS at 08:10

## 2018-08-17 RX ADMIN — Medication 10 ML: at 08:11

## 2018-08-17 RX ADMIN — Medication 10 ML: at 22:00

## 2018-08-17 RX ADMIN — POTASSIUM CHLORIDE 10 MEQ: 7.46 INJECTION, SOLUTION INTRAVENOUS at 11:56

## 2018-08-17 RX ADMIN — TRAMADOL HYDROCHLORIDE 50 MG: 50 TABLET, FILM COATED ORAL at 17:51

## 2018-08-17 RX ADMIN — POTASSIUM CHLORIDE 40 MEQ: 1500 TABLET, EXTENDED RELEASE ORAL at 11:56

## 2018-08-17 RX ADMIN — ATORVASTATIN CALCIUM 40 MG: 40 TABLET, FILM COATED ORAL at 23:44

## 2018-08-17 RX ADMIN — AMLODIPINE BESYLATE 10 MG: 10 TABLET ORAL at 08:11

## 2018-08-17 RX ADMIN — MAGNESIUM SULFATE IN WATER 2 G: 40 INJECTION, SOLUTION INTRAVENOUS at 02:48

## 2018-08-17 RX ADMIN — POTASSIUM CHLORIDE 10 MEQ: 7.46 INJECTION, SOLUTION INTRAVENOUS at 02:49

## 2018-08-17 RX ADMIN — METOPROLOL TARTRATE 50 MG: 50 TABLET ORAL at 08:11

## 2018-08-17 RX ADMIN — POTASSIUM CHLORIDE 10 MEQ: 7.46 INJECTION, SOLUTION INTRAVENOUS at 14:00

## 2018-08-17 RX ADMIN — POTASSIUM CHLORIDE 10 MEQ: 7.46 INJECTION, SOLUTION INTRAVENOUS at 03:21

## 2018-08-17 RX ADMIN — TRAMADOL HYDROCHLORIDE 50 MG: 50 TABLET, FILM COATED ORAL at 11:13

## 2018-08-17 RX ADMIN — POTASSIUM CHLORIDE 10 MEQ: 7.46 INJECTION, SOLUTION INTRAVENOUS at 15:28

## 2018-08-17 RX ADMIN — FUROSEMIDE 60 MG: 10 INJECTION, SOLUTION INTRAVENOUS at 01:13

## 2018-08-17 RX ADMIN — INSULIN GLARGINE 25 UNITS: 100 INJECTION, SOLUTION SUBCUTANEOUS at 22:00

## 2018-08-17 RX ADMIN — POTASSIUM CHLORIDE 20 MEQ: 1.5 POWDER, FOR SOLUTION ORAL at 08:10

## 2018-08-17 ASSESSMENT — PAIN SCALES - GENERAL
PAINLEVEL_OUTOF10: 7
PAINLEVEL_OUTOF10: 0
PAINLEVEL_OUTOF10: 8

## 2018-08-17 ASSESSMENT — PAIN DESCRIPTION - ONSET: ONSET: ON-GOING

## 2018-08-17 ASSESSMENT — PAIN DESCRIPTION - PROGRESSION: CLINICAL_PROGRESSION: GRADUALLY WORSENING

## 2018-08-17 ASSESSMENT — PAIN DESCRIPTION - DESCRIPTORS
DESCRIPTORS: ACHING;CONSTANT;CRAMPING
DESCRIPTORS: ACHING;CONSTANT;CRAMPING

## 2018-08-17 ASSESSMENT — PAIN DESCRIPTION - PAIN TYPE
TYPE: CHRONIC PAIN
TYPE: ACUTE PAIN

## 2018-08-17 ASSESSMENT — PAIN DESCRIPTION - LOCATION
LOCATION: ABDOMEN
LOCATION: ABDOMEN

## 2018-08-17 ASSESSMENT — PAIN DESCRIPTION - FREQUENCY: FREQUENCY: CONTINUOUS

## 2018-08-17 NOTE — H&P
skin nightly 8/2/18   Syed Rucker MD   traMADol (ULTRAM) 50 MG tablet Take 50 mg by mouth every 6 hours as needed for Pain. [place] [employer] Historical Provider, MD   amLODIPine (NORVASC) 10 MG tablet Take 1 tablet by mouth daily 7/25/18   Frank Bermudez MD   atorvastatin (LIPITOR) 40 MG tablet Take 1 tablet by mouth nightly 7/25/18   Frank Bermudez MD   sertraline (ZOLOFT) 50 MG tablet Take 3 tablets by mouth daily 6/18/18   José Barros DO   terazosin (HYTRIN) 2 MG capsule Take 2 mg by mouth nightly    Historical Provider, MD   metoprolol tartrate (LOPRESSOR) 50 MG tablet Take 50 mg by mouth 2 times daily    Historical Provider, MD   potassium chloride (KLOR-CON) 20 MEQ packet Take 20 mEq by mouth 2 times daily    Historical Provider, MD   vitamin D (ERGOCALCIFEROL) 09824 UNITS CAPS capsule Take 50,000 Units by mouth once a week. Takes on [facility] Provider, MD   sildenafil (VIAGRA) 100 MG tablet Take 100 mg by mouth as needed. Take an hour before sex. Med info obtained from [place] [facility]. Historical Provider, MD   ibuprofen (ADVIL;MOTRIN) 800 MG tablet Take 800 mg by mouth 3 times daily as needed. Take with food. Med info obtained from [place] records   Indications: Pain    Historical Provider, MD       Allergies:  Lisinopril and Atenolol    Family History:   History reviewed. No pertinent family history. Social History:   TOBACCO:   reports that he has been smoking Cigarettes. He has a 15.00 pack-year smoking history. He has never used smokeless tobacco.  ETOH:   reports that he does not drink alcohol. OCCUPATION:      REVIEW OF SYSTEMS:  · Constitutional: No fever, no chill ; good appetite  · HEENT: No blurred vision, no ear problems, no sore throat, no running nose. · Respiratory: No cough, no sputum, no pleuritic chest pain, + shortness of breath  · Cardiology: No angina,+dyspnea on exertion, no paroxysmal nocturnal dyspnea, + orthopnea, no palpitation, + leg swelling.    · Gastroenterology: No Alert&Oriented. CNII-XII intact.    Normal and symmetric  strength in UEs and LEs  Labs and Imaging Studies   Basic Labs  Results for orders placed or performed during the hospital encounter of 44/07/04   Basic metabolic panel   Result Value Ref Range    Sodium 137 132 - 146 mmol/L    Potassium 2.9 (L) 3.5 - 5.0 mmol/L    Chloride 93 (L) 98 - 107 mmol/L    CO2 29 22 - 29 mmol/L    Anion Gap 15 7 - 16 mmol/L    Glucose 201 (H) 74 - 109 mg/dL    BUN 12 6 - 20 mg/dL    CREATININE 0.6 (L) 0.7 - 1.2 mg/dL    GFR Non-African American >60 >=60 mL/min/1.73    GFR African American >60     Calcium 8.0 (L) 8.6 - 10.2 mg/dL   CBC   Result Value Ref Range    WBC 8.2 4.5 - 11.5 E9/L    RBC 4.04 3.80 - 5.80 E12/L    Hemoglobin 13.3 12.5 - 16.5 g/dL    Hematocrit 38.5 37.0 - 54.0 %    MCV 95.3 80.0 - 99.9 fL    MCH 32.9 26.0 - 35.0 pg    MCHC 34.5 32.0 - 34.5 %    RDW 12.8 11.5 - 15.0 fL    Platelets 954 219 - 945 E9/L    MPV 10.5 7.0 - 12.0 fL   Troponin   Result Value Ref Range    Troponin <0.01 0.00 - 0.03 ng/mL   CK MB   Result Value Ref Range    CK-MB 5.2 0.0 - 7.7 ng/mL   CK   Result Value Ref Range    Total  (H) 20 - 200 U/L   D-Dimer, Quantitative   Result Value Ref Range    D-Dimer, Quant 515 ng/mL DDU   Hepatic Function Panel   Result Value Ref Range    Total Protein 6.1 (L) 6.4 - 8.3 g/dL    Alb 3.4 (L) 3.5 - 5.2 g/dL    Alkaline Phosphatase 73 40 - 129 U/L    ALT 36 0 - 40 U/L    AST 37 0 - 39 U/L    Total Bilirubin 0.4 0.0 - 1.2 mg/dL    Bilirubin, Direct <0.2 0.0 - 0.3 mg/dL    Bilirubin, Indirect see below 0.0 - 1.0 mg/dL   Lipase   Result Value Ref Range    Lipase 23 13 - 60 U/L   Amylase   Result Value Ref Range    Amylase 36 20 - 100 U/L   Magnesium   Result Value Ref Range    Magnesium 1.3 (L) 1.6 - 2.6 mg/dL   Brain Natriuretic Peptide   Result Value Ref Range    Pro- (H) 0 - 125 pg/mL   Protime-INR   Result Value Ref Range    Protime 11.4 9.3 - 12.4 sec    INR 1.0    APTT   Result Value Ref Range    aPTT 28.7 24.5 - 35.1 sec       Imaging Studies:  Radiology:   CXR - cardiomegaly, haziness on LLL, no signs of fluid overload    EKG: Rhythm Strip: normal sinus rhythm, baseline noise,, no ST segment changes. Resident's Assessment and PLan     Anasarca  BLE edema + ascitis on exam. Hx of CHF, not responding to lasix. Hepatic and renal function normal. Acute worsening does not suggest venous incompetence. ProBNP normal, chest x-ray normal, normal lung exam  Will get TTE, [place] hose, salt and fluid restriction, leg elevation, daily weights, I&O  Switch to bumex to assess response to alternate diuretic   Repeat EKG, cycle trop    Hypertensive urgency  BP 180s by 120s on arrival, received labetalol 20 mg IV  Resolved at the time of admission.     Continue lopressor, norvasc and cardura( formulary for terazosin)    Hypokalemia, hypomagnesemia  Like secondary to diuretic use, repleted in the ER, check morning labs, replete as needed  Daily K supplement     Type 2 diabetes mellitus  HbA1c 9.5 this month  Continue home 25 units of Lantus nightly, Carb control diet, hypoglycemia orders    HLD, depression  Continue home medications    GI and DVT prophylaxis    Felix Sanchez M.D., PGY3    Attending physician: Dr. Spencer Fails

## 2018-08-17 NOTE — PROGRESS NOTES
Patient has been using the toilet to urinate. Encouraged patient to use urinal, he wanted to be able to get up to the bathroom for activity. Placed urinal in bathroom, he is agreeable to use. Explained need to follow fluid restriction. Explained purpose of measuring intake and output to see if bumex working better than lasix was.

## 2018-08-17 NOTE — CARE COORDINATION
8/17/2018 social work transition of care/discharge planning  Patient is from home with family. Patient reported that he has a cane, but no other DME at this time. Patient sees a PCP and receives meds from [address] [place] [address] on [facility]. Explained Sw role in transition of care/discharge planning. Patient plan is to return home with no needs. Patient stated that he has transportation at discharge. Sw will follow and assist prn.   Electronically signed by MIREYA Slaughter on 8/17/2018 at 3:14 PM

## 2018-08-17 NOTE — ED PROVIDER NOTES
transcribed using voice recognition software. Every effort was made to ensure accuracy; however, inadvertent computerized transcription errors may be present.         Faye Ling MD  08/17/18 8261

## 2018-08-17 NOTE — PROGRESS NOTES
[address] [facility]  Family Medicine Attending    S: 62 y.o. male with worsening edema despite high doses of diuretics. Does have hypokalemia and hypomagnesemia. Also has HTN, DM. Denies liver, kidney ,lung diseases. Receives most care at [facility]. Does say that he has been hospitalized in past for CHF    O: VS- Blood pressure (!) 164/88, pulse 73, temperature 98.5 °F (36.9 °C), temperature source Oral, resp. rate 22, height 5' 8\" (1.727 m), weight 296 lb 6 oz (134.4 kg), SpO2 95 %. Exam is as noted by resident with the following changes, additions or corrections: Ht - RRR  Lungs - hoarse rhonchi  Edema of legs/thighs and lower abdomen. Does have olecranon bursal fluid in right elbow    Impressions: Active Problems:    Leg swelling    Hypertensive urgency    Hypokalemia    Hypomagnesemia    Bilateral leg edema  Resolved Problems:    * No resolved hospital problems. *      Plan:   Diuresis and K repletion   Echo shows LVH, but o/w ok   Nephrology consult   U/A, CT of abdomen     Attending Physician Statement  I have reviewed the chart, including any radiology or labs, and seen the patient with the resident(s). I personally reviewed and performed key elements of the history and exam.  I agree with the assessment, plan and orders as documented by the resident. Please refer to the resident note for additional information.       Yohannes Lara

## 2018-08-18 ENCOUNTER — APPOINTMENT (OUTPATIENT)
Dept: CT IMAGING | Age: 58
DRG: 375 | End: 2018-08-18
Payer: MEDICARE

## 2018-08-18 LAB
ANION GAP SERPL CALCULATED.3IONS-SCNC: 13 MMOL/L (ref 7–16)
BASOPHILS ABSOLUTE: 0.04 E9/L (ref 0–0.2)
BASOPHILS RELATIVE PERCENT: 0.6 % (ref 0–2)
BUN BLDV-MCNC: 8 MG/DL (ref 6–20)
CALCIUM SERPL-MCNC: 7.9 MG/DL (ref 8.6–10.2)
CEA: 3.7 NG/ML (ref 0–5.2)
CHLORIDE BLD-SCNC: 96 MMOL/L (ref 98–107)
CHOLESTEROL, TOTAL: 115 MG/DL (ref 0–199)
CO2: 28 MMOL/L (ref 22–29)
CREAT SERPL-MCNC: 0.5 MG/DL (ref 0.7–1.2)
EOSINOPHILS ABSOLUTE: 0.13 E9/L (ref 0.05–0.5)
EOSINOPHILS RELATIVE PERCENT: 2 % (ref 0–6)
GFR AFRICAN AMERICAN: >60
GFR NON-AFRICAN AMERICAN: >60 ML/MIN/1.73
GLUCOSE BLD-MCNC: 232 MG/DL (ref 74–109)
HCT VFR BLD CALC: 39.6 % (ref 37–54)
HDLC SERPL-MCNC: 34 MG/DL
HEMOGLOBIN: 13.4 G/DL (ref 12.5–16.5)
IMMATURE GRANULOCYTES #: 0.03 E9/L
IMMATURE GRANULOCYTES %: 0.5 % (ref 0–5)
LDL CHOLESTEROL CALCULATED: 22 MG/DL (ref 0–99)
LYMPHOCYTES ABSOLUTE: 1.93 E9/L (ref 1.5–4)
LYMPHOCYTES RELATIVE PERCENT: 29.4 % (ref 20–42)
MAGNESIUM: 1.7 MG/DL (ref 1.6–2.6)
MCH RBC QN AUTO: 32.8 PG (ref 26–35)
MCHC RBC AUTO-ENTMCNC: 33.8 % (ref 32–34.5)
MCV RBC AUTO: 96.8 FL (ref 80–99.9)
METER GLUCOSE: 205 MG/DL (ref 70–110)
METER GLUCOSE: 276 MG/DL (ref 70–110)
METER GLUCOSE: 292 MG/DL (ref 70–110)
MONOCYTES ABSOLUTE: 0.56 E9/L (ref 0.1–0.95)
MONOCYTES RELATIVE PERCENT: 8.5 % (ref 2–12)
NEUTROPHILS ABSOLUTE: 3.88 E9/L (ref 1.8–7.3)
NEUTROPHILS RELATIVE PERCENT: 59 % (ref 43–80)
PDW BLD-RTO: 12.9 FL (ref 11.5–15)
PLATELET # BLD: 197 E9/L (ref 130–450)
PMV BLD AUTO: 10.6 FL (ref 7–12)
POTASSIUM SERPL-SCNC: 3.3 MMOL/L (ref 3.5–5)
RBC # BLD: 4.09 E12/L (ref 3.8–5.8)
SODIUM BLD-SCNC: 137 MMOL/L (ref 132–146)
TRIGL SERPL-MCNC: 297 MG/DL (ref 0–149)
VLDLC SERPL CALC-MCNC: 59 MG/DL
WBC # BLD: 6.6 E9/L (ref 4.5–11.5)

## 2018-08-18 PROCEDURE — 80061 LIPID PANEL: CPT

## 2018-08-18 PROCEDURE — 74178 CT ABD&PLV WO CNTR FLWD CNTR: CPT

## 2018-08-18 PROCEDURE — 2580000003 HC RX 258: Performed by: FAMILY MEDICINE

## 2018-08-18 PROCEDURE — 82378 CARCINOEMBRYONIC ANTIGEN: CPT

## 2018-08-18 PROCEDURE — 6370000000 HC RX 637 (ALT 250 FOR IP): Performed by: FAMILY MEDICINE

## 2018-08-18 PROCEDURE — 85025 COMPLETE CBC W/AUTO DIFF WBC: CPT

## 2018-08-18 PROCEDURE — 6360000002 HC RX W HCPCS: Performed by: FAMILY MEDICINE

## 2018-08-18 PROCEDURE — 80048 BASIC METABOLIC PNL TOTAL CA: CPT

## 2018-08-18 PROCEDURE — 6360000004 HC RX CONTRAST MEDICATION: Performed by: RADIOLOGY

## 2018-08-18 PROCEDURE — 99232 SBSQ HOSP IP/OBS MODERATE 35: CPT | Performed by: FAMILY MEDICINE

## 2018-08-18 PROCEDURE — 86803 HEPATITIS C AB TEST: CPT

## 2018-08-18 PROCEDURE — 1200000000 HC SEMI PRIVATE

## 2018-08-18 PROCEDURE — 86316 IMMUNOASSAY TUMOR OTHER: CPT

## 2018-08-18 PROCEDURE — 2500000003 HC RX 250 WO HCPCS: Performed by: FAMILY MEDICINE

## 2018-08-18 PROCEDURE — 83735 ASSAY OF MAGNESIUM: CPT

## 2018-08-18 PROCEDURE — 6370000000 HC RX 637 (ALT 250 FOR IP): Performed by: INTERNAL MEDICINE

## 2018-08-18 PROCEDURE — 6370000000 HC RX 637 (ALT 250 FOR IP): Performed by: STUDENT IN AN ORGANIZED HEALTH CARE EDUCATION/TRAINING PROGRAM

## 2018-08-18 PROCEDURE — 36415 COLL VENOUS BLD VENIPUNCTURE: CPT

## 2018-08-18 PROCEDURE — 71270 CT THORAX DX C-/C+: CPT

## 2018-08-18 PROCEDURE — 86301 IMMUNOASSAY TUMOR CA 19-9: CPT

## 2018-08-18 PROCEDURE — 82962 GLUCOSE BLOOD TEST: CPT

## 2018-08-18 RX ORDER — SPIRONOLACTONE 25 MG/1
25 TABLET ORAL DAILY
Status: DISCONTINUED | OUTPATIENT
Start: 2018-08-18 | End: 2018-08-22 | Stop reason: HOSPADM

## 2018-08-18 RX ORDER — POTASSIUM CHLORIDE 20 MEQ/1
40 TABLET, EXTENDED RELEASE ORAL ONCE
Status: COMPLETED | OUTPATIENT
Start: 2018-08-18 | End: 2018-08-18

## 2018-08-18 RX ORDER — INSULIN GLARGINE 100 [IU]/ML
30 INJECTION, SOLUTION SUBCUTANEOUS NIGHTLY
Status: DISCONTINUED | OUTPATIENT
Start: 2018-08-18 | End: 2018-08-19

## 2018-08-18 RX ORDER — MAGNESIUM SULFATE IN WATER 40 MG/ML
2 INJECTION, SOLUTION INTRAVENOUS ONCE
Status: COMPLETED | OUTPATIENT
Start: 2018-08-18 | End: 2018-08-18

## 2018-08-18 RX ORDER — SODIUM CHLORIDE 0.9 % (FLUSH) 0.9 %
10 SYRINGE (ML) INJECTION
Status: ACTIVE | OUTPATIENT
Start: 2018-08-18 | End: 2018-08-18

## 2018-08-18 RX ADMIN — POTASSIUM CHLORIDE 20 MEQ: 1.5 POWDER, FOR SOLUTION ORAL at 09:37

## 2018-08-18 RX ADMIN — MAGNESIUM SULFATE IN WATER 2 G: 40 INJECTION, SOLUTION INTRAVENOUS at 11:02

## 2018-08-18 RX ADMIN — POTASSIUM CHLORIDE 20 MEQ: 1.5 POWDER, FOR SOLUTION ORAL at 19:39

## 2018-08-18 RX ADMIN — MAGNESIUM HYDROXIDE 30 ML: 400 SUSPENSION ORAL at 10:13

## 2018-08-18 RX ADMIN — METOPROLOL TARTRATE 50 MG: 50 TABLET ORAL at 19:39

## 2018-08-18 RX ADMIN — POTASSIUM CHLORIDE 40 MEQ: 1500 TABLET, EXTENDED RELEASE ORAL at 09:36

## 2018-08-18 RX ADMIN — SERTRALINE 150 MG: 100 TABLET, FILM COATED ORAL at 09:37

## 2018-08-18 RX ADMIN — ENOXAPARIN SODIUM 40 MG: 40 INJECTION SUBCUTANEOUS at 09:37

## 2018-08-18 RX ADMIN — DOXAZOSIN 2 MG: 2 TABLET ORAL at 09:37

## 2018-08-18 RX ADMIN — BUMETANIDE 1 MG: 0.25 INJECTION INTRAMUSCULAR; INTRAVENOUS at 09:37

## 2018-08-18 RX ADMIN — AMLODIPINE BESYLATE 10 MG: 10 TABLET ORAL at 09:37

## 2018-08-18 RX ADMIN — TRAMADOL HYDROCHLORIDE 50 MG: 50 TABLET, FILM COATED ORAL at 19:43

## 2018-08-18 RX ADMIN — BUMETANIDE 1 MG: 0.25 INJECTION INTRAMUSCULAR; INTRAVENOUS at 19:40

## 2018-08-18 RX ADMIN — IOPAMIDOL 90 ML: 755 INJECTION, SOLUTION INTRAVENOUS at 13:20

## 2018-08-18 RX ADMIN — Medication 10 ML: at 09:37

## 2018-08-18 RX ADMIN — METOPROLOL TARTRATE 50 MG: 50 TABLET ORAL at 09:37

## 2018-08-18 RX ADMIN — Medication 10 ML: at 19:48

## 2018-08-18 RX ADMIN — SPIRONOLACTONE 25 MG: 25 TABLET ORAL at 17:52

## 2018-08-18 RX ADMIN — ATORVASTATIN CALCIUM 40 MG: 40 TABLET, FILM COATED ORAL at 19:39

## 2018-08-18 RX ADMIN — IOPAMIDOL 110 ML: 755 INJECTION, SOLUTION INTRAVENOUS at 08:36

## 2018-08-18 RX ADMIN — TRAMADOL HYDROCHLORIDE 50 MG: 50 TABLET, FILM COATED ORAL at 10:11

## 2018-08-18 RX ADMIN — INSULIN GLARGINE 30 UNITS: 100 INJECTION, SOLUTION SUBCUTANEOUS at 19:48

## 2018-08-18 ASSESSMENT — PAIN SCALES - GENERAL
PAINLEVEL_OUTOF10: 7
PAINLEVEL_OUTOF10: 0
PAINLEVEL_OUTOF10: 6

## 2018-08-18 NOTE — PROGRESS NOTES
[address] [facility]  Family Medicine Attending    S: 62 y.o. male with hx of MARIA, htn, hyperlipidemia, uncontrolled dm (a1c 9.5) and tobacco abuse admitted with worsening edema despite high doses of diuretics with  hypokalemia and hypomagnesemia. Receives most care at [facility]. Denies weight loss other than related to diuretic use, chest pain, dyspnea, black or blood in stool, diarrhea, constipation or abdominal pain. Reports edema is improved today. Remote tobacco use. O: VS- Blood pressure (!) 157/83, pulse 81, temperature 97.7 °F (36.5 °C), temperature source Oral, resp. rate 18, height 5' 8\" (1.727 m), weight 294 lb 1.6 oz (133.4 kg), SpO2 96 %. Exam is as noted by resident with the following changes, additions or corrections: Ht - RRR  Lungs - cta b/l  abd-soft nonttp no masses, + fluid wave  Edema of legs/thighs and lower abdomen 1-2+    Impressions: Active Problems:    Leg swelling    Hypertensive urgency    Hypokalemia    Hypomagnesemia    Bilateral leg edema  Resolved Problems:    * No resolved hospital problems. *      Plan:   Appreciate renal input. Continue diuresis and K/mag repletion   U/A with protein. CT abdomen show 5cm cecal mass concerning for malignancy. Will consult surgery. Image chest.  Hepatitis panel pending. Adjust insulin as needed for dm. Attending Physician Statement  I have reviewed the chart, including any radiology or labs, and seen the patient with the resident(s). I personally reviewed and performed key elements of the history and exam.  I agree with the assessment, plan and orders as documented by the resident. Please refer to the resident note for additional information.       Binta Hancock MD

## 2018-08-18 NOTE — PROGRESS NOTES
Progress Note  8/18/2018 2:17 PM  Subjective:   Admit Date: 8/16/2018  PCP: Earle Santana MD    Interval History: Patient was sitting on bedside chair surrounded by his family just came back from CT scan complaining of some abdominal discomfort. Not short of breath and not on oxygen. To me that he noticed that lower extremity swelling is down    Diet: DIET CARB CONTROL; Carb Control: 3 carb choices (45 gms)/meal; Low Sodium (2 GM); Daily Fluid Restriction: 1500 ml    Data:   Scheduled Meds:   insulin glargine  30 Units Subcutaneous Nightly    sodium chloride flush  10 mL Intravenous 2 times per day    enoxaparin  40 mg Subcutaneous Daily    amLODIPine  10 mg Oral Daily    atorvastatin  40 mg Oral Nightly    metoprolol tartrate  50 mg Oral BID    potassium chloride  20 mEq Oral BID    sertraline  150 mg Oral Daily    doxazosin  2 mg Oral Daily    nicotine  1 patch Transdermal Daily    bumetanide  1 mg Intravenous BID     Continuous Infusions:   dextrose       PRN Meds:sodium chloride flush, sodium chloride flush, acetaminophen, magnesium hydroxide, glucose, dextrose, glucagon (rDNA), dextrose, traMADol  I/O last 3 completed shifts: In: 1 [P.O.:720; IV Piggyback:250]  Out: 250 [Urine:250]  No intake/output data recorded.     Intake/Output Summary (Last 24 hours) at 08/18/18 1417  Last data filed at 08/17/18 2216   Gross per 24 hour   Intake              240 ml   Output              250 ml   Net              -10 ml     CBC:   Recent Labs      08/17/18   0045  08/17/18   0824  08/18/18   0633   WBC  8.2  7.7  6.6   HGB  13.3  13.3  13.4   PLT  207  209  197     BMP:    Recent Labs      08/17/18   0045  08/17/18   0824  08/18/18   0633   NA  137  136  137   K  2.9*  3.0*  3.3*   CL  93*  93*  96*   CO2  29  29  28   BUN  12  11  8   CREATININE  0.6*  0.5*  0.5*   GLUCOSE  201*  201*  232*     Hepatic:   Recent Labs      08/17/18   0045   AST  37   ALT  36   BILITOT  0.4   ALKPHOS  73     Protein/ Albumin: demonstrated in the profunda  femoris arteries bilaterally. A complex cystic lesion is present in the right popliteal fossa  measuring 5.3 cm x 3.1 cm x 5.6 cm.     Impression:       1. No evidence of deep venous thrombosis in the right or left lower  extremity from the common femoral vein to the popliteal vein. 2. Right popliteal fossa cyst with dimensions as provided.     XR CHEST PORTABLE [[id number]] Resulted: 1858     Order Status: Completed Updated: 18     Narrative:       Patient MRN: [id number]  : 1960  Age:  58 years  Gender: Male  Order Date: 2018 12:30 AM  Exam: XR CHEST PORTABLE  Number of Images: 1 view  Indication:   chest pain   chest pain  Comparison: None. Findings: The heart is enlarged. The lung fields demonstrate evidence for airspace disease. The aorta is tortuous and ectatic.     Impression:       Tortuous ectatic aorta  Cardiomegaly   Airspace disease compatible with atelectasis or pneumonia            Objective:     Vitals: BP (!) 157/83   Pulse 81   Temp 97.7 °F (36.5 °C) (Oral)   Resp 18   Ht 5' 8\" (1.727 m)   Wt 294 lb 1.6 oz (133.4 kg)   SpO2 96%   BMI 44.72 kg/m²   General appearance:  Sitting on a bedside chair not in distress not on oxygen surrounded by his family awake and alert and oriented  Lungs: Good air movement  Heart: No S3, No rub  Abdomen:  Distended but lacks some soft with no tenderness and no rebound tenderness.+  Intestinal sounds  L. Extremities: ++ edema    Assessment & Plan:     Volume overload: No evidence of nephrotic syndrome: (Serum albumin is 3.4. Urine protein to creatinine ratio is 1.7 and total cholesterol is normal)  TSH is normal. No evidence of significant liver disease reflected by INR of 1.0. Echocardiogram revealed EF of 75%, stage I diastolic dysfunction and severe left ventricular hypertrophy. The patient is currently on 1 mg Bumex twice a day with stable kidney function.  We will continue the

## 2018-08-19 LAB
ANION GAP SERPL CALCULATED.3IONS-SCNC: 13 MMOL/L (ref 7–16)
BASOPHILS ABSOLUTE: 0.03 E9/L (ref 0–0.2)
BASOPHILS RELATIVE PERCENT: 0.4 % (ref 0–2)
BUN BLDV-MCNC: 9 MG/DL (ref 6–20)
CALCIUM SERPL-MCNC: 8.5 MG/DL (ref 8.6–10.2)
CHLORIDE BLD-SCNC: 97 MMOL/L (ref 98–107)
CO2: 29 MMOL/L (ref 22–29)
CREAT SERPL-MCNC: 0.5 MG/DL (ref 0.7–1.2)
EOSINOPHILS ABSOLUTE: 0.21 E9/L (ref 0.05–0.5)
EOSINOPHILS RELATIVE PERCENT: 3 % (ref 0–6)
GFR AFRICAN AMERICAN: >60
GFR NON-AFRICAN AMERICAN: >60 ML/MIN/1.73
GLUCOSE BLD-MCNC: 213 MG/DL (ref 74–109)
HCT VFR BLD CALC: 41.1 % (ref 37–54)
HEMOGLOBIN: 13.4 G/DL (ref 12.5–16.5)
IMMATURE GRANULOCYTES #: 0.03 E9/L
IMMATURE GRANULOCYTES %: 0.4 % (ref 0–5)
LYMPHOCYTES ABSOLUTE: 2 E9/L (ref 1.5–4)
LYMPHOCYTES RELATIVE PERCENT: 28.1 % (ref 20–42)
MAGNESIUM: 1.9 MG/DL (ref 1.6–2.6)
MCH RBC QN AUTO: 31.8 PG (ref 26–35)
MCHC RBC AUTO-ENTMCNC: 32.6 % (ref 32–34.5)
MCV RBC AUTO: 97.6 FL (ref 80–99.9)
METER GLUCOSE: 210 MG/DL (ref 70–110)
METER GLUCOSE: 225 MG/DL (ref 70–110)
METER GLUCOSE: 236 MG/DL (ref 70–110)
METER GLUCOSE: 250 MG/DL (ref 70–110)
MONOCYTES ABSOLUTE: 0.67 E9/L (ref 0.1–0.95)
MONOCYTES RELATIVE PERCENT: 9.4 % (ref 2–12)
NEUTROPHILS ABSOLUTE: 4.17 E9/L (ref 1.8–7.3)
NEUTROPHILS RELATIVE PERCENT: 58.7 % (ref 43–80)
PDW BLD-RTO: 12.9 FL (ref 11.5–15)
PLATELET # BLD: 218 E9/L (ref 130–450)
PMV BLD AUTO: 10.6 FL (ref 7–12)
POTASSIUM SERPL-SCNC: 3.7 MMOL/L (ref 3.5–5)
RBC # BLD: 4.21 E12/L (ref 3.8–5.8)
SODIUM BLD-SCNC: 139 MMOL/L (ref 132–146)
WBC # BLD: 7.1 E9/L (ref 4.5–11.5)

## 2018-08-19 PROCEDURE — 2580000003 HC RX 258: Performed by: FAMILY MEDICINE

## 2018-08-19 PROCEDURE — 36415 COLL VENOUS BLD VENIPUNCTURE: CPT

## 2018-08-19 PROCEDURE — 6360000002 HC RX W HCPCS: Performed by: FAMILY MEDICINE

## 2018-08-19 PROCEDURE — 99233 SBSQ HOSP IP/OBS HIGH 50: CPT | Performed by: SURGERY

## 2018-08-19 PROCEDURE — 82962 GLUCOSE BLOOD TEST: CPT

## 2018-08-19 PROCEDURE — 83735 ASSAY OF MAGNESIUM: CPT

## 2018-08-19 PROCEDURE — 99232 SBSQ HOSP IP/OBS MODERATE 35: CPT | Performed by: FAMILY MEDICINE

## 2018-08-19 PROCEDURE — 6370000000 HC RX 637 (ALT 250 FOR IP): Performed by: STUDENT IN AN ORGANIZED HEALTH CARE EDUCATION/TRAINING PROGRAM

## 2018-08-19 PROCEDURE — 80048 BASIC METABOLIC PNL TOTAL CA: CPT

## 2018-08-19 PROCEDURE — 1200000000 HC SEMI PRIVATE

## 2018-08-19 PROCEDURE — 2500000003 HC RX 250 WO HCPCS: Performed by: FAMILY MEDICINE

## 2018-08-19 PROCEDURE — 6370000000 HC RX 637 (ALT 250 FOR IP): Performed by: FAMILY MEDICINE

## 2018-08-19 PROCEDURE — 6370000000 HC RX 637 (ALT 250 FOR IP): Performed by: INTERNAL MEDICINE

## 2018-08-19 PROCEDURE — 85025 COMPLETE CBC W/AUTO DIFF WBC: CPT

## 2018-08-19 RX ORDER — INSULIN GLARGINE 100 [IU]/ML
35 INJECTION, SOLUTION SUBCUTANEOUS NIGHTLY
Status: DISCONTINUED | OUTPATIENT
Start: 2018-08-19 | End: 2018-08-22 | Stop reason: HOSPADM

## 2018-08-19 RX ADMIN — MAGNESIUM HYDROXIDE 30 ML: 400 SUSPENSION ORAL at 11:35

## 2018-08-19 RX ADMIN — Medication 10 ML: at 09:04

## 2018-08-19 RX ADMIN — SERTRALINE 150 MG: 100 TABLET, FILM COATED ORAL at 09:04

## 2018-08-19 RX ADMIN — INSULIN LISPRO 2 UNITS: 100 INJECTION, SOLUTION INTRAVENOUS; SUBCUTANEOUS at 20:11

## 2018-08-19 RX ADMIN — INSULIN GLARGINE 35 UNITS: 100 INJECTION, SOLUTION SUBCUTANEOUS at 20:11

## 2018-08-19 RX ADMIN — METOPROLOL TARTRATE 50 MG: 50 TABLET ORAL at 20:10

## 2018-08-19 RX ADMIN — SPIRONOLACTONE 25 MG: 25 TABLET ORAL at 09:05

## 2018-08-19 RX ADMIN — POTASSIUM CHLORIDE 20 MEQ: 1.5 POWDER, FOR SOLUTION ORAL at 09:05

## 2018-08-19 RX ADMIN — TRAMADOL HYDROCHLORIDE 50 MG: 50 TABLET, FILM COATED ORAL at 22:02

## 2018-08-19 RX ADMIN — DOXAZOSIN 2 MG: 2 TABLET ORAL at 09:05

## 2018-08-19 RX ADMIN — INSULIN LISPRO 2 UNITS: 100 INJECTION, SOLUTION INTRAVENOUS; SUBCUTANEOUS at 17:24

## 2018-08-19 RX ADMIN — BUMETANIDE 1 MG: 0.25 INJECTION INTRAMUSCULAR; INTRAVENOUS at 09:04

## 2018-08-19 RX ADMIN — TRAMADOL HYDROCHLORIDE 50 MG: 50 TABLET, FILM COATED ORAL at 15:26

## 2018-08-19 RX ADMIN — ENOXAPARIN SODIUM 40 MG: 40 INJECTION SUBCUTANEOUS at 09:04

## 2018-08-19 RX ADMIN — Medication 10 ML: at 20:11

## 2018-08-19 RX ADMIN — AMLODIPINE BESYLATE 10 MG: 10 TABLET ORAL at 09:04

## 2018-08-19 RX ADMIN — BUMETANIDE 1 MG: 0.25 INJECTION INTRAMUSCULAR; INTRAVENOUS at 20:10

## 2018-08-19 RX ADMIN — TRAMADOL HYDROCHLORIDE 50 MG: 50 TABLET, FILM COATED ORAL at 09:03

## 2018-08-19 RX ADMIN — METOPROLOL TARTRATE 50 MG: 50 TABLET ORAL at 09:04

## 2018-08-19 RX ADMIN — INSULIN LISPRO 2 UNITS: 100 INJECTION, SOLUTION INTRAVENOUS; SUBCUTANEOUS at 12:15

## 2018-08-19 RX ADMIN — ATORVASTATIN CALCIUM 40 MG: 40 TABLET, FILM COATED ORAL at 20:10

## 2018-08-19 RX ADMIN — POTASSIUM CHLORIDE 20 MEQ: 1.5 POWDER, FOR SOLUTION ORAL at 20:10

## 2018-08-19 ASSESSMENT — PAIN DESCRIPTION - DESCRIPTORS
DESCRIPTORS: ACHING;CONSTANT;DISCOMFORT
DESCRIPTORS: ACHING;CONSTANT;DISCOMFORT

## 2018-08-19 ASSESSMENT — PAIN DESCRIPTION - PROGRESSION
CLINICAL_PROGRESSION: GRADUALLY WORSENING
CLINICAL_PROGRESSION: GRADUALLY WORSENING

## 2018-08-19 ASSESSMENT — PAIN SCALES - GENERAL
PAINLEVEL_OUTOF10: 3
PAINLEVEL_OUTOF10: 7
PAINLEVEL_OUTOF10: 7
PAINLEVEL_OUTOF10: 6

## 2018-08-19 ASSESSMENT — PAIN DESCRIPTION - ONSET
ONSET: ON-GOING
ONSET: ON-GOING

## 2018-08-19 ASSESSMENT — PAIN DESCRIPTION - LOCATION
LOCATION: ABDOMEN
LOCATION: ABDOMEN

## 2018-08-19 ASSESSMENT — PAIN DESCRIPTION - FREQUENCY
FREQUENCY: CONTINUOUS
FREQUENCY: CONTINUOUS

## 2018-08-19 ASSESSMENT — PAIN DESCRIPTION - PAIN TYPE
TYPE: CHRONIC PAIN
TYPE: CHRONIC PAIN

## 2018-08-19 NOTE — PROGRESS NOTES
[facility], Family Medicine Residency Program  Resident Progress  Note      Patient:  Daiana Collins 62 y.o. male MRN: [id number]     Date of Service: 8/19/2018      Synopsis     62 y.o. M with Hx of HTN, Tp2DM, HLD admitted for anasarca. Receiving Bumex for diuresis but no significant changes in weight or net output. CT abd 08/19/18 revealed a new mass extrinsic to the cecum. Plan for OP cscope. General surgery and nephrology consulted. Subjective      Patient was seen and examined this am. Subjectively, he feels that swelling of his abdomen and BLE is improved compared to yesterday. Abdominal soreness is also improved. Aware of the new CT findings, coping well, looking to get work up done. Ambulating. +UOP. Objective     Physical Exam:  · Vitals: BP (!) 168/92   Pulse 69   Temp 98.2 °F (36.8 °C) (Oral)   Resp 16   Ht 5' 8\" (1.727 m)   Wt 294 lb 1.6 oz (133.4 kg)   SpO2 97%   BMI 44.72 kg/m²     · I & O - 24hr: -1580  · General Appearance: AAOX3  · HEENT:  NC/AT. · Neck: Trachea midline. No thyromegaly. No LAD. · Lung: Clear breath sounds B/l  · Heart: RRR, normal S1, S2. No MRG  · Abdomen: Soft, distended, tender in the supraubic region. · Extremities: 2+ edema BLE, pitting, improved than prior. · Musculokeletal: No joint swelling, no muscle tenderness. ROM normal in all joints of extremities. · Neurologic: Mental status: AAOX3     Pertinent/ New Labs and Imaging Studies     CBC:   Lab Results   Component Value Date    WBC 7.1 08/19/2018    RBC 4.21 08/19/2018    HGB 13.4 08/19/2018    HCT 41.1 08/19/2018     08/19/2018    MCV 97.6 08/19/2018     BMP:    Lab Results   Component Value Date     08/19/2018    K 3.7 08/19/2018    K 3.7 03/10/2018    CL 97 08/19/2018    CO2 29 08/19/2018    BUN 9 08/19/2018    CREATININE 0.5 08/19/2018    GLUCOSE 213 08/19/2018    CALCIUM 8.5 08/19/2018     CT chest    1. No evidence of pulmonary metastatic disease.   2. Hepatic

## 2018-08-19 NOTE — PROGRESS NOTES
Progress Note  8/19/2018 3:45 PM  Subjective:   Admit Date: 8/16/2018  PCP: Olaf Neff MD    Interval History: Same as yesterday,  was sitting on bedside chairComfortably. His sister was sitting beside him. Told me that his abdominal discomfort is better. Not short of breath and not on oxygen. Told me that he noticed that lower extremity swelling continues to improve daily    Diet: DIET CARB CONTROL; Carb Control: 3 carb choices (45 gms)/meal; Low Sodium (2 GM); Daily Fluid Restriction: 1500 ml    Data:   Scheduled Meds:   insulin glargine  35 Units Subcutaneous Nightly    insulin lispro  0-6 Units Subcutaneous TID WC    insulin lispro  0-3 Units Subcutaneous Nightly    spironolactone  25 mg Oral Daily    sodium chloride flush  10 mL Intravenous 2 times per day    enoxaparin  40 mg Subcutaneous Daily    amLODIPine  10 mg Oral Daily    atorvastatin  40 mg Oral Nightly    metoprolol tartrate  50 mg Oral BID    potassium chloride  20 mEq Oral BID    sertraline  150 mg Oral Daily    doxazosin  2 mg Oral Daily    nicotine  1 patch Transdermal Daily    bumetanide  1 mg Intravenous BID     Continuous Infusions:   dextrose       PRN Meds:sodium chloride flush, acetaminophen, magnesium hydroxide, glucose, dextrose, glucagon (rDNA), dextrose, traMADol  I/O last 3 completed shifts: In: 600 [P.O.:600]  Out: 1700 [Urine:1700]  No intake/output data recorded.     Intake/Output Summary (Last 24 hours) at 08/19/18 1545  Last data filed at 08/19/18 1430   Gross per 24 hour   Intake              600 ml   Output             1700 ml   Net            -1100 ml     CBC:   Recent Labs      08/17/18   0824  08/18/18   0633  08/19/18   0427   WBC  7.7  6.6  7.1   HGB  13.3  13.4  13.4   PLT  209  197  218     BMP:    Recent Labs      08/17/18   0824  08/18/18   0633  08/19/18   0427   NA  136  137  139   K  3.0*  3.3*  3.7   CL  93*  96*  97*   CO2  29  28  29   BUN  11  8  9   CREATININE  0.5*  0.5*  0.5*   GLUCOSE  201* aorta appears to be unremarkable  MEDIASTINUM: The mediastinum is unremarkable  UPPER ABDOMEN: Diffuse hypoattenuation of the liver is seen. A  nonobstructing 4 mm calculus is seen within the upper pole of the  right kidney. 2 cm nodule is seen arising from the left adrenal gland. OTHER: A 3.2 x 2.1 cm heterogeneous fat-containing mass is partially  imaged within the right paraspinal musculature at the level of T1 and  T2. The mass extends superior to the field-of-view. This mass appears  similar compared to the patient's prior CT of the neck performed on  3/9/2018.     Impression:         1. No evidence of pulmonary metastatic disease. 2. Hepatic steatosis. 3. Indeterminate left adrenal nodule. This is most likely an adenoma given its small size but a malignancy cannot be absolutely excluded. This finding can be better characterized with MRI or CT of the abdomen with contrast.  4. Nonobstructing right nephrolithiasis. 5. Heterogeneous, partially fat containing mass within the right paraspinal musculature of the lower cervical/upper thoracic spine. 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 However the mass is not purely fatty and a soft tissue sarcoma cannot be excluded. A CT of the neck without contrast is advised in 6 months to ensure stability of this mass. ALERT:  THIS IS AN ABNORMAL REPORT     CT ABDOMEN PELVIS W WO CONTRAST Additional Contrast? None [[id number]] Resulted: 18     Order Status: Completed Updated: 18     Narrative:       Patient MRN:  [id number]  : 1960  Age: 62 years  Gender: Male    EXAM: CT ABDOMEN PELVIS W WO CONTRAST    INDICATION:  Ascites without known source  worsening edema despite  high-dose diuretics, leg swelling, leg edema    COMPARISON: None    TECHNIQUE: Low-dose CT  acquisition technique included one of  following options:  1 . Automated exposure control  2. Adjustment of MA and or KV according to patient's size or   3.  Use of iterative left  lower extremities from the common femoral vein to the popliteal vein. Vascular flow and augmentation of flow is demonstrated in the profunda  femoris arteries bilaterally. A complex cystic lesion is present in the right popliteal fossa  measuring 5.3 cm x 3.1 cm x 5.6 cm.     Impression:       1. No evidence of deep venous thrombosis in the right or left lower  extremity from the common femoral vein to the popliteal vein. 2. Right popliteal fossa cyst with dimensions as provided.     XR CHEST PORTABLE [[id number]] Resulted: 18     Order Status: Completed Updated: 18     Narrative:       Patient MRN: [id number]  : 1960  Age:  58 years  Gender: Male  Order Date: 2018 12:30 AM  Exam: XR CHEST PORTABLE  Number of Images: 1 view  Indication:   chest pain   chest pain  Comparison: None. Findings: The heart is enlarged. The lung fields demonstrate evidence for airspace disease. The aorta is tortuous and ectatic.     Impression:       Tortuous ectatic aorta  Cardiomegaly   Airspace disease compatible with atelectasis or pneumonia            Objective:     Vitals: BP (!) 168/92   Pulse 69   Temp 98.2 °F (36.8 °C) (Oral)   Resp 16   Ht 5' 8\" (1.727 m)   Wt 294 lb 1.6 oz (133.4 kg)   SpO2 97%   BMI 44.72 kg/m²   General appearance:  Sitting on a bedside chair not in distress not on oxygen,  awake and alert and oriented  Lungs: Good air movement  Heart: No S3, No rub  Abdomen:  Distended but lacks some soft with no tenderness and no rebound tenderness.+  Intestinal sounds  L. Extremities: ++ edema    Assessment & Plan:     Volume overload:  Diuresing with negative fluid balance on 1 mg IV Bumex twice a day and by mouth aldactone with stable kidney function and no hypotension. Will continue the same  · No evidence of nephrotic syndrome: (Serum albumin is 3.4.  Urine protein to creatinine ratio is 1.7 and total cholesterol is normal)   · TSH is normal.   · No evidence of

## 2018-08-19 NOTE — CONSULTS
[address]                   [address] [facility], [address] [place] [address]                                   CONSULTATION    PATIENT NAME: Barrera Morrison                    :        1960  MED REC NO:   [id number]                            ROOM:       3614  ACCOUNT NO:   [de-identified]                           ADMIT DATE: 2018  PROVIDER:     Becca Hunter MD    CONSULT DATE:  2018    HISTORY OF PRESENT ILLNESS:  This 77-year-old -American male patient  of Dr. Monserrat Bernal and Dr. Amy Mcintosh, is referred with finding of a pericecal mass  by CT scan. He was admitted with fluid retention, dyspnea, and congestive  heart failure. He denies any recent weight loss, fever, etc.  Actually, he  has gained weight. He denies any pain except some abdominal pain and  neuropathy. He denies any melena, change in bowels, night sweats or any  other complaints at this time. PAST MEDICAL HISTORY:  Arthritis, carpal tunnel syndrome, diabetes  mellitus, hypertension, and sleep apnea. No history of any cancer. PAST SURGICAL HISTORY:  He has history of surgery on the nasal septum,  tonsillectomy. MEDICATIONS:  Prior to the admission, he was on Lasix, Ultram, Norvasc,  Lipitor, Hytrin, Lopressor, Viagra, Advil, Lasix, insulin, etc.    ALLERGIES:  He is allergic to LISINOPRIL and ATENOLOL. FAMILY HISTORY:  No family history of any cancer. SOCIAL HISTORY:  He smokes cigarettes. No history of any alcohol abuse. PHYSICAL EXAMINATION:  GENERAL:  He is alert and appropriate, not in any acute distress. He is  obese. NECK:  No definite neck mass. HEART:  Regular. LUNGS:  Clear. ABDOMEN:  No palpable mass or ascites, but examination is compromised by  his obesity. EXTREMITIES:  He has bilateral swelling in the legs. No focal neurological  deficit. RECENT LAB WORK:  Radiological reports etc., reviewed. ASSESSMENT AND PLAN:  1.   There is description of
GENERAL SURGERY  CONSULT NOTE  8/18/2018    Physician Consulted: Dr. Remy Husain  Reason for Consult: 5 cm mass extrinsic to cecum  Referring Physician: Dr. Cee DOZIER  Cheryl Cervantes is a 62 y.o. male who presents for evaluation of 5 cm mass extrinsic to the cecum. Pt was admitted 8/16 for CHF exacerbation. Pt's abd was CT scanned because of anasarca and incidentally found a 5 cm mass extrinsic to the cecum. Surgery and Heme/Onc were consulted to evaluate. Patient is a 2-3 beer/day drinker and 1/2 PPD smoker for 30 years. Pt has never had a cscope or EGD before, scheduled for CScope    He denies abd pain, nausea, vomiting, diarrhea or blood in stool, no recent changes in bowel habits, no significant weight loss, no night sweats. Past Medical History:   Diagnosis Date    Arthritis     Carpal tunnel syndrome     Diabetes mellitus ([facility] [address]     Hyperlipidemia     Hypertension     Sleep apnea        Past Surgical History:   Procedure Laterality Date    NASAL SEPTUM SURGERY      TONSILLECTOMY         Medications Prior to Admission:    Prior to Admission medications    Medication Sig Start Date End Date Taking? Authorizing Provider   furosemide (LASIX) 80 MG tablet Take 80 mg by mouth nightly   Yes Historical Provider, MD   traMADol (ULTRAM) 50 MG tablet Take 50 mg by mouth every 6 hours as needed for Pain. .   Yes Historical Provider, MD   amLODIPine (NORVASC) 10 MG tablet Take 1 tablet by mouth daily 7/25/18  Yes Danni Whitmore MD   atorvastatin (LIPITOR) 40 MG tablet Take 1 tablet by mouth nightly 7/25/18  Yes Danni Whitmore MD   terazosin (HYTRIN) 2 MG capsule Take 2 mg by mouth nightly   Yes Historical Provider, MD   metoprolol tartrate (LOPRESSOR) 50 MG tablet Take 50 mg by mouth 2 times daily   Yes Historical Provider, MD   potassium chloride (KLOR-CON) 20 MEQ packet Take 40 mEq by mouth daily    Yes Historical Provider, MD   vitamin D (ERGOCALCIFEROL) 53232 UNITS CAPS capsule Take 50,000 Units by
Lab Results   Component Value Date     08/17/2018     Potassium:    Lab Results   Component Value Date    K 3.0 08/17/2018    K 3.7 03/10/2018     BUN/Creatinine:    Lab Results   Component Value Date    BUN 11 08/17/2018    CREATININE 0.5 08/17/2018        Imaging:  CXR results: Tortuous ectatic aorta  Cardiomegaly   Airspace disease compatible with atelectasis or pneumonia    Assessment and plans:     1. Anasarca  - Suspecting nephrotic syndrome, Cr 0.6 dropping to 0.5  - Diabetes mellitus II  - Check protein/Cr ratio in urine, UA  - H/o CHF, MARIA, Pro-  - Echo today shows EF 60%, stage I DD.  - Normal liver function, albumin 3.4   - Check hepatitis panel  - Check TSH   - CT abdomen and pelvis  - US LE no DVT  - Bumex 1 mg IV, hold Lasix  - Monitor UOP, daily weights  - Monitor kidney function. 2- Hypertensive urgency  - /110 on presentation  - Labetalol 25 mg IV, dropped to 130/70  - /88  - Norvasc, Bumex, Doxazosin, Labetalol for BP control  - Monitor response in BP    3- Hypokalemia  - Probably 2/2 diuretic use  - 2.9 on presentation  - On potassium IV supplement, K level is up to 3 today  - Monitor BMP daily    4- Hypomagnesemia  - Probably 2/2 diuretic use  - 1.3 on presentation  - Mg IV supplements, 1.6 today.   - Monitor Mg level        Thank you Dr. Genoveva Stahl MD for allowing us to participate in care of Evaline Client  3:36 PM  8/17/2018

## 2018-08-20 ENCOUNTER — TELEPHONE (OUTPATIENT)
Dept: SURGERY | Age: 58
End: 2018-08-20

## 2018-08-20 ENCOUNTER — APPOINTMENT (OUTPATIENT)
Dept: CT IMAGING | Age: 58
DRG: 375 | End: 2018-08-20
Payer: MEDICARE

## 2018-08-20 ENCOUNTER — APPOINTMENT (OUTPATIENT)
Dept: MRI IMAGING | Age: 58
DRG: 375 | End: 2018-08-20
Payer: MEDICARE

## 2018-08-20 LAB
ANION GAP SERPL CALCULATED.3IONS-SCNC: 10 MMOL/L (ref 7–16)
BASOPHILS ABSOLUTE: 0.05 E9/L (ref 0–0.2)
BASOPHILS RELATIVE PERCENT: 0.7 % (ref 0–2)
BUN BLDV-MCNC: 9 MG/DL (ref 6–20)
CALCIUM SERPL-MCNC: 9.2 MG/DL (ref 8.6–10.2)
CHLORIDE BLD-SCNC: 95 MMOL/L (ref 98–107)
CO2: 31 MMOL/L (ref 22–29)
CREAT SERPL-MCNC: 0.7 MG/DL (ref 0.7–1.2)
CREATININE URINE: 12 MG/DL (ref 40–278)
EOSINOPHILS ABSOLUTE: 0.19 E9/L (ref 0.05–0.5)
EOSINOPHILS RELATIVE PERCENT: 2.5 % (ref 0–6)
GFR AFRICAN AMERICAN: >60
GFR NON-AFRICAN AMERICAN: >60 ML/MIN/1.73
GLUCOSE BLD-MCNC: 190 MG/DL (ref 74–109)
HCT VFR BLD CALC: 41.8 % (ref 37–54)
HEMOGLOBIN: 14 G/DL (ref 12.5–16.5)
HEPATITIS C ANTIBODY INTERPRETATION: NORMAL
IMMATURE GRANULOCYTES #: 0.03 E9/L
IMMATURE GRANULOCYTES %: 0.4 % (ref 0–5)
LYMPHOCYTES ABSOLUTE: 2.08 E9/L (ref 1.5–4)
LYMPHOCYTES RELATIVE PERCENT: 27.1 % (ref 20–42)
MAGNESIUM: 1.6 MG/DL (ref 1.6–2.6)
MCH RBC QN AUTO: 32.9 PG (ref 26–35)
MCHC RBC AUTO-ENTMCNC: 33.5 % (ref 32–34.5)
MCV RBC AUTO: 98.1 FL (ref 80–99.9)
METER GLUCOSE: 151 MG/DL (ref 70–110)
METER GLUCOSE: 183 MG/DL (ref 70–110)
METER GLUCOSE: 215 MG/DL (ref 70–110)
METER GLUCOSE: 233 MG/DL (ref 70–110)
MICROALBUMIN UR-MCNC: 121.8 MG/L
MICROALBUMIN/CREAT UR-RTO: 1015 (ref 0–30)
MONOCYTES ABSOLUTE: 0.59 E9/L (ref 0.1–0.95)
MONOCYTES RELATIVE PERCENT: 7.7 % (ref 2–12)
NEUTROPHILS ABSOLUTE: 4.74 E9/L (ref 1.8–7.3)
NEUTROPHILS RELATIVE PERCENT: 61.6 % (ref 43–80)
PDW BLD-RTO: 13 FL (ref 11.5–15)
PLATELET # BLD: 233 E9/L (ref 130–450)
PMV BLD AUTO: 10.5 FL (ref 7–12)
POTASSIUM SERPL-SCNC: 3.9 MMOL/L (ref 3.5–5)
RBC # BLD: 4.26 E12/L (ref 3.8–5.8)
SODIUM BLD-SCNC: 136 MMOL/L (ref 132–146)
WBC # BLD: 7.7 E9/L (ref 4.5–11.5)

## 2018-08-20 PROCEDURE — 2580000003 HC RX 258: Performed by: RADIOLOGY

## 2018-08-20 PROCEDURE — A9579 GAD-BASE MR CONTRAST NOS,1ML: HCPCS | Performed by: RADIOLOGY

## 2018-08-20 PROCEDURE — 2580000003 HC RX 258: Performed by: FAMILY MEDICINE

## 2018-08-20 PROCEDURE — 83735 ASSAY OF MAGNESIUM: CPT

## 2018-08-20 PROCEDURE — 70490 CT SOFT TISSUE NECK W/O DYE: CPT

## 2018-08-20 PROCEDURE — 36415 COLL VENOUS BLD VENIPUNCTURE: CPT

## 2018-08-20 PROCEDURE — 6360000004 HC RX CONTRAST MEDICATION: Performed by: RADIOLOGY

## 2018-08-20 PROCEDURE — 82044 UR ALBUMIN SEMIQUANTITATIVE: CPT

## 2018-08-20 PROCEDURE — 1200000000 HC SEMI PRIVATE

## 2018-08-20 PROCEDURE — 80048 BASIC METABOLIC PNL TOTAL CA: CPT

## 2018-08-20 PROCEDURE — 6360000002 HC RX W HCPCS: Performed by: FAMILY MEDICINE

## 2018-08-20 PROCEDURE — 6370000000 HC RX 637 (ALT 250 FOR IP): Performed by: FAMILY MEDICINE

## 2018-08-20 PROCEDURE — 85025 COMPLETE CBC W/AUTO DIFF WBC: CPT

## 2018-08-20 PROCEDURE — 2500000003 HC RX 250 WO HCPCS: Performed by: FAMILY MEDICINE

## 2018-08-20 PROCEDURE — 99232 SBSQ HOSP IP/OBS MODERATE 35: CPT | Performed by: STUDENT IN AN ORGANIZED HEALTH CARE EDUCATION/TRAINING PROGRAM

## 2018-08-20 PROCEDURE — 6370000000 HC RX 637 (ALT 250 FOR IP): Performed by: STUDENT IN AN ORGANIZED HEALTH CARE EDUCATION/TRAINING PROGRAM

## 2018-08-20 PROCEDURE — 74183 MRI ABD W/O CNTR FLWD CNTR: CPT

## 2018-08-20 PROCEDURE — 6370000000 HC RX 637 (ALT 250 FOR IP): Performed by: INTERNAL MEDICINE

## 2018-08-20 PROCEDURE — 82962 GLUCOSE BLOOD TEST: CPT

## 2018-08-20 PROCEDURE — 82570 ASSAY OF URINE CREATININE: CPT

## 2018-08-20 RX ORDER — 0.9 % SODIUM CHLORIDE 0.9 %
10 VIAL (ML) INJECTION 2 TIMES DAILY
Status: DISCONTINUED | OUTPATIENT
Start: 2018-08-20 | End: 2018-08-22 | Stop reason: HOSPADM

## 2018-08-20 RX ADMIN — ENOXAPARIN SODIUM 40 MG: 40 INJECTION SUBCUTANEOUS at 08:44

## 2018-08-20 RX ADMIN — POTASSIUM CHLORIDE 20 MEQ: 1.5 POWDER, FOR SOLUTION ORAL at 08:44

## 2018-08-20 RX ADMIN — BUMETANIDE 1 MG: 0.25 INJECTION INTRAMUSCULAR; INTRAVENOUS at 20:57

## 2018-08-20 RX ADMIN — TRAMADOL HYDROCHLORIDE 50 MG: 50 TABLET, FILM COATED ORAL at 15:51

## 2018-08-20 RX ADMIN — TRAMADOL HYDROCHLORIDE 50 MG: 50 TABLET, FILM COATED ORAL at 21:52

## 2018-08-20 RX ADMIN — GADOTERIDOL 20 ML: 279.3 INJECTION, SOLUTION INTRAVENOUS at 06:17

## 2018-08-20 RX ADMIN — BUMETANIDE 1 MG: 0.25 INJECTION INTRAMUSCULAR; INTRAVENOUS at 08:44

## 2018-08-20 RX ADMIN — INSULIN LISPRO 1 UNITS: 100 INJECTION, SOLUTION INTRAVENOUS; SUBCUTANEOUS at 21:15

## 2018-08-20 RX ADMIN — DOXAZOSIN 2 MG: 2 TABLET ORAL at 08:44

## 2018-08-20 RX ADMIN — ATORVASTATIN CALCIUM 40 MG: 40 TABLET, FILM COATED ORAL at 20:57

## 2018-08-20 RX ADMIN — Medication 10 ML: at 20:58

## 2018-08-20 RX ADMIN — SPIRONOLACTONE 25 MG: 25 TABLET ORAL at 08:44

## 2018-08-20 RX ADMIN — METOPROLOL TARTRATE 50 MG: 50 TABLET ORAL at 08:44

## 2018-08-20 RX ADMIN — SERTRALINE 150 MG: 100 TABLET, FILM COATED ORAL at 08:44

## 2018-08-20 RX ADMIN — INSULIN GLARGINE 35 UNITS: 100 INJECTION, SOLUTION SUBCUTANEOUS at 21:15

## 2018-08-20 RX ADMIN — INSULIN LISPRO 1 UNITS: 100 INJECTION, SOLUTION INTRAVENOUS; SUBCUTANEOUS at 08:46

## 2018-08-20 RX ADMIN — INSULIN LISPRO 1 UNITS: 100 INJECTION, SOLUTION INTRAVENOUS; SUBCUTANEOUS at 17:13

## 2018-08-20 RX ADMIN — TRAMADOL HYDROCHLORIDE 50 MG: 50 TABLET, FILM COATED ORAL at 08:56

## 2018-08-20 RX ADMIN — Medication 10 ML: at 08:45

## 2018-08-20 RX ADMIN — INSULIN LISPRO 2 UNITS: 100 INJECTION, SOLUTION INTRAVENOUS; SUBCUTANEOUS at 12:18

## 2018-08-20 RX ADMIN — AMLODIPINE BESYLATE 10 MG: 10 TABLET ORAL at 08:44

## 2018-08-20 RX ADMIN — POTASSIUM CHLORIDE 20 MEQ: 1.5 POWDER, FOR SOLUTION ORAL at 20:57

## 2018-08-20 RX ADMIN — METOPROLOL TARTRATE 50 MG: 50 TABLET ORAL at 20:57

## 2018-08-20 ASSESSMENT — PAIN DESCRIPTION - PROGRESSION: CLINICAL_PROGRESSION: GRADUALLY WORSENING

## 2018-08-20 ASSESSMENT — PAIN DESCRIPTION - PAIN TYPE: TYPE: CHRONIC PAIN

## 2018-08-20 ASSESSMENT — PAIN SCALES - GENERAL
PAINLEVEL_OUTOF10: 7
PAINLEVEL_OUTOF10: 2
PAINLEVEL_OUTOF10: 7
PAINLEVEL_OUTOF10: 3
PAINLEVEL_OUTOF10: 7

## 2018-08-20 ASSESSMENT — PAIN DESCRIPTION - ONSET: ONSET: ON-GOING

## 2018-08-20 ASSESSMENT — PAIN DESCRIPTION - LOCATION: LOCATION: ABDOMEN

## 2018-08-20 ASSESSMENT — PAIN DESCRIPTION - FREQUENCY: FREQUENCY: CONTINUOUS

## 2018-08-20 ASSESSMENT — PAIN DESCRIPTION - DESCRIPTORS: DESCRIPTORS: ACHING;CONSTANT

## 2018-08-20 NOTE — PROGRESS NOTES
normal muscle tone. Coordination normal.   Skin: He is not diaphoretic. Scheduled Meds:   sodium chloride (PF)  10 mL Intravenous BID    insulin glargine  35 Units Subcutaneous Nightly    insulin lispro  0-6 Units Subcutaneous TID WC    insulin lispro  0-3 Units Subcutaneous Nightly    spironolactone  25 mg Oral Daily    sodium chloride flush  10 mL Intravenous 2 times per day    enoxaparin  40 mg Subcutaneous Daily    amLODIPine  10 mg Oral Daily    atorvastatin  40 mg Oral Nightly    metoprolol tartrate  50 mg Oral BID    potassium chloride  20 mEq Oral BID    sertraline  150 mg Oral Daily    doxazosin  2 mg Oral Daily    nicotine  1 patch Transdermal Daily    bumetanide  1 mg Intravenous BID     Continuous Infusions:   dextrose       PRN Meds:sodium chloride flush, acetaminophen, magnesium hydroxide, glucose, dextrose, glucagon (rDNA), dextrose, traMADol  I/O last 3 completed shifts: In: 700 [P.O.:700]  Out: 1500 [Urine:1500]  No intake/output data recorded. Intake/Output Summary (Last 24 hours) at 08/20/18 0929  Last data filed at 08/20/18 0554   Gross per 24 hour   Intake              520 ml   Output             1500 ml   Net             -980 ml       Labs reviewed  CBC:   Recent Labs      08/18/18   0633  08/19/18   0427   WBC  6.6  7.1   HGB  13.4  13.4   PLT  197  218     BMP:  Recent Labs      08/18/18   0633  08/19/18   0427   NA  137  139   K  3.3*  3.7   CL  96*  97*   CO2  28  29   BUN  8  9   CREATININE  0.5*  0.5*   GLUCOSE  232*  213*   Troponin:   Recent Labs      08/17/18   1145   TROPONINI  <0.01     BNP: No results for input(s): BNP in the last 72 hours. Lipids:   Recent Labs      08/18/18   0633   CHOL  115   HDL  34     MRI abd wo Contrast 8/20/18: Findings most likely related to fatty infiltration of the  Liver. Small umbilical hernia. Pericecal soft tissue mass. Neoplasm could give this appearance. Carcinoid could give this appearance.  Left adrenal mass which is

## 2018-08-20 NOTE — PROGRESS NOTES
GENERAL SURGERY  DAILY PROGRESS NOTE  8/20/2018  Chief complaint: Abdominal pain  Subjective:  No nausea or emesis. +BM, no abdominal pain.  Tolerating diet    Objective:  BP (!) 148/70   Pulse 90   Temp 98.5 °F (36.9 °C)   Resp 12   Ht 5' 8\" (1.727 m)   Wt 286 lb 6.4 oz (129.9 kg)   SpO2 94%   BMI 43.55 kg/m²     General appearance: laying in bed, no acute distress  Lungs: non-labored respirations, breath sounds bilaterally  Heart: regular rate  Abdomen: soft, somewhat distended, non-tender    Assessment/Plan:  62 y.o. male likely exophtic cecal mass noted on CT scan    MRI pending for this AM  Appreciate heme/onc recs  Tolerating diet  Once MRI completed, ok for outpatient follow-up  Hopeful for DC today    Note signed electronically by Mo Warner M.D. at 6:06 AM on 8/20/2018

## 2018-08-20 NOTE — TELEPHONE ENCOUNTER
----- Message from Jaclyn Armenta MD sent at 8/20/2018  3:16 PM EDT -----  Please put this into the pt's chart.      ----- Message -----  From: Satinder Blakely MD  Sent: 8/20/2018   1:56 PM  To: Jaclyn Armenta MD    Linus,    Mr. Lincoln Carolina was recently admitted to the hospital for SOB/CHF exacerbation. The ED happened to do a CT scan because he had bilateral LE edema with edema extending onto his abdomen. A mesenteric mass near the TI was seen. No colonoscopy done during his visit since he was here for another more pressing reason and has a colonoscopy scheduled soon with you. Just letting you know.

## 2018-08-20 NOTE — PROGRESS NOTES
Sirisha Collins    Status reviewed     S: edema less  Abd. Pain less  No new sxs  O:alert,appropiate. no icterus      Lymph nodes--normal        Heart  Reg      Lungs clear      Abdomen  Soft      edema   legs      NEURO--no deficit         CBC with Differential:    Lab Results   Component Value Date    WBC 7.7 08/20/2018    RBC 4.26 08/20/2018    HGB 14.0 08/20/2018    HCT 41.8 08/20/2018     08/20/2018    MCV 98.1 08/20/2018    MCH 32.9 08/20/2018    MCHC 33.5 08/20/2018    RDW 13.0 08/20/2018    LYMPHOPCT 27.1 08/20/2018    MONOPCT 7.7 08/20/2018    BASOPCT 0.7 08/20/2018    MONOSABS 0.59 08/20/2018    LYMPHSABS 2.08 08/20/2018    EOSABS 0.19 08/20/2018    BASOSABS 0.05 08/20/2018        CMP:    Lab Results   Component Value Date     08/20/2018    K 3.9 08/20/2018    K 3.7 03/10/2018    CL 95 08/20/2018    CO2 31 08/20/2018    BUN 9 08/20/2018    CREATININE 0.7 08/20/2018    GFRAA >60 08/20/2018    LABGLOM >60 08/20/2018    GLUCOSE 190 08/20/2018    PROT 6.1 08/17/2018    LABALBU 3.4 08/17/2018    CALCIUM 9.2 08/20/2018    BILITOT 0.4 08/17/2018    ALKPHOS 73 08/17/2018    AST 37 08/17/2018    ALT 36 08/17/2018     LDH:  No results found for: LDH  PT/INR:    Lab Results   Component Value Date    PROTIME 11.4 08/17/2018    INR 1.0 08/17/2018     PTT:    Lab Results   Component Value Date    APTT 28.7 08/17/2018   [APTT  VITAMIN B12: No components found for: B12  FOLATE:  No results found for: FOLATE  IRON:  No results found for: IRON  Iron Saturation:  No components found for: PERCENTFE  TIBC:  No results found for: TIBC  FERRITIN:  No results found for: FERRITIN  XI:  No results found for: ANATITER, XI    Assessment  5 cm - pericecal mass ,reviewed with  -- consider CT guided biopsy /  colonoscopy --  No old CT for comparison as discussed with pt  cliff Anderson notes reviewed  Soft tissue mass lower cervical paraspinal area      Plan  MRI abdomen noted   CT neck report pending  Check serum chromagranin   Discussed status with pt , family       Electronically signed by Leti Stephenson MD on 8/20/2018 at 1:57 PM

## 2018-08-20 NOTE — PROGRESS NOTES
[facility]  PROGRESS NOTE  ATTENDING NOTE    S:  Doing well, wants to go home. Reviewed MRI and CT scan with patient. O:  BP (!) 166/80   Pulse 66   Temp 97.2 °F (36.2 °C) (Temporal)   Resp 18   Ht 5' 8\" (1.727 m)   Wt 286 lb 6.4 oz (129.9 kg)   SpO2 97%   BMI 43.55 kg/m²   Gen:  NAD  Abd:  Soft, NT, obese    ASSESSMENT/PLAN:  1.  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   --will then need right scott vs. Ileocecectomy for the mass,no need to biopsy prior to resection  --ok to discharge home from surgery standpoint and f/u as outpatient    Tory Estimable  8/20/2018  1:55 PM

## 2018-08-20 NOTE — PROGRESS NOTES
[facility], Family Medicine Residency Program  Resident Progress  Note      Patient:  Katy Collins 62 y.o. male MRN: [id number]     Date of Service: 8/20/2018      [facility] day 3  Mr. Collins is 61 y/o male with past medical history of CHF-I, CKD-I, HTN, HLD and obstructive sleep apnea. He presented with breathing difficulty, anasarca and hypokalemia. Over the course of admission he was diuresed with the help of bumetanide, aldactone, with salt and water restriction. His hypokalemia resolved after stopping furosemide and replacing potasium and magnesium. Further evaluation revealed 5cm extra ceacal mass concerning for malignancy. There is mass in neck around paraspinal muscles concerning for lipoma/ sarcoma unchanged from imaging done 6 months ago. Subjective      Patient was seen and examined this am. His edema has decreased and breathing has improved. Patient wants to go home if recommended. Objective     Physical Exam:  · Vitals: BP (!) 166/80   Pulse 66   Temp 97.2 °F (36.2 °C) (Temporal)   Resp 18   Ht 5' 8\" (1.727 m)   Wt 286 lb 6.4 oz (129.9 kg)   SpO2 97%   BMI 43.55 kg/m²     · I & O - 24hr:  -1660  · General Appearance: AAOX3  · HEENT:  NC/AT. · Neck: Trachea midline. No thyromegaly. No LAD. · Lung: Clear breath sounds B/l  · Heart: RRR, normal S1, S2. No MRG  · Abdomen: Soft, NT, BS +felicia. Distended. · Extremities: pitting edema both legs rt. Greater than left. Rt. Elbow swelling. · Musculokeletal: No joint swelling, no muscle tenderness. ROM normal in all joints of extremities.    · Neurologic: Mental status: AAOX3     Pertinent/ New Labs and Imaging Studies     CBC:   Lab Results   Component Value Date    WBC 7.7 08/20/2018    RBC 4.26 08/20/2018    HGB 14.0 08/20/2018    HCT 41.8 08/20/2018     08/20/2018    MCV 98.1 08/20/2018     BMP:    Lab Results   Component Value Date     08/20/2018    K 3.9 08/20/2018    K 3.7 03/10/2018    CL 95 08/20/2018    CO2 31

## 2018-08-21 LAB
ANION GAP SERPL CALCULATED.3IONS-SCNC: 11 MMOL/L (ref 7–16)
BASOPHILS ABSOLUTE: 0.03 E9/L (ref 0–0.2)
BASOPHILS RELATIVE PERCENT: 0.4 % (ref 0–2)
BUN BLDV-MCNC: 9 MG/DL (ref 6–20)
CA 19-9: 29 U/ML (ref 0–37)
CALCIUM SERPL-MCNC: 8.8 MG/DL (ref 8.6–10.2)
CHLORIDE BLD-SCNC: 96 MMOL/L (ref 98–107)
CO2: 31 MMOL/L (ref 22–29)
CREAT SERPL-MCNC: 0.6 MG/DL (ref 0.7–1.2)
CREATININE URINE: 65 MG/DL (ref 40–278)
EOSINOPHILS ABSOLUTE: 0.24 E9/L (ref 0.05–0.5)
EOSINOPHILS RELATIVE PERCENT: 3.4 % (ref 0–6)
GFR AFRICAN AMERICAN: >60
GFR NON-AFRICAN AMERICAN: >60 ML/MIN/1.73
GLUCOSE BLD-MCNC: 183 MG/DL (ref 74–109)
HCT VFR BLD CALC: 40.9 % (ref 37–54)
HEMOGLOBIN: 13.7 G/DL (ref 12.5–16.5)
IMMATURE GRANULOCYTES #: 0.03 E9/L
IMMATURE GRANULOCYTES %: 0.4 % (ref 0–5)
LYMPHOCYTES ABSOLUTE: 2.27 E9/L (ref 1.5–4)
LYMPHOCYTES RELATIVE PERCENT: 32.2 % (ref 20–42)
MAGNESIUM: 1.8 MG/DL (ref 1.6–2.6)
MCH RBC QN AUTO: 32.8 PG (ref 26–35)
MCHC RBC AUTO-ENTMCNC: 33.5 % (ref 32–34.5)
MCV RBC AUTO: 97.8 FL (ref 80–99.9)
METER GLUCOSE: 161 MG/DL (ref 70–110)
METER GLUCOSE: 174 MG/DL (ref 70–110)
METER GLUCOSE: 193 MG/DL (ref 70–110)
METER GLUCOSE: 211 MG/DL (ref 70–110)
MONOCYTES ABSOLUTE: 0.59 E9/L (ref 0.1–0.95)
MONOCYTES RELATIVE PERCENT: 8.4 % (ref 2–12)
NEUTROPHILS ABSOLUTE: 3.88 E9/L (ref 1.8–7.3)
NEUTROPHILS RELATIVE PERCENT: 55.2 % (ref 43–80)
PDW BLD-RTO: 13.1 FL (ref 11.5–15)
PLATELET # BLD: 234 E9/L (ref 130–450)
PMV BLD AUTO: 10.1 FL (ref 7–12)
POTASSIUM SERPL-SCNC: 4.1 MMOL/L (ref 3.5–5)
PROTEIN PROTEIN: 78 MG/DL (ref 0–12)
RBC # BLD: 4.18 E12/L (ref 3.8–5.8)
SODIUM BLD-SCNC: 138 MMOL/L (ref 132–146)
WBC # BLD: 7 E9/L (ref 4.5–11.5)

## 2018-08-21 PROCEDURE — 82962 GLUCOSE BLOOD TEST: CPT

## 2018-08-21 PROCEDURE — 82570 ASSAY OF URINE CREATININE: CPT

## 2018-08-21 PROCEDURE — 6370000000 HC RX 637 (ALT 250 FOR IP): Performed by: INTERNAL MEDICINE

## 2018-08-21 PROCEDURE — 1200000000 HC SEMI PRIVATE

## 2018-08-21 PROCEDURE — 2500000003 HC RX 250 WO HCPCS: Performed by: FAMILY MEDICINE

## 2018-08-21 PROCEDURE — 36415 COLL VENOUS BLD VENIPUNCTURE: CPT

## 2018-08-21 PROCEDURE — 6360000002 HC RX W HCPCS: Performed by: FAMILY MEDICINE

## 2018-08-21 PROCEDURE — 99232 SBSQ HOSP IP/OBS MODERATE 35: CPT | Performed by: STUDENT IN AN ORGANIZED HEALTH CARE EDUCATION/TRAINING PROGRAM

## 2018-08-21 PROCEDURE — 80048 BASIC METABOLIC PNL TOTAL CA: CPT

## 2018-08-21 PROCEDURE — 6370000000 HC RX 637 (ALT 250 FOR IP): Performed by: FAMILY MEDICINE

## 2018-08-21 PROCEDURE — 85025 COMPLETE CBC W/AUTO DIFF WBC: CPT

## 2018-08-21 PROCEDURE — 84156 ASSAY OF PROTEIN URINE: CPT

## 2018-08-21 PROCEDURE — 6370000000 HC RX 637 (ALT 250 FOR IP): Performed by: STUDENT IN AN ORGANIZED HEALTH CARE EDUCATION/TRAINING PROGRAM

## 2018-08-21 PROCEDURE — 2580000003 HC RX 258: Performed by: FAMILY MEDICINE

## 2018-08-21 PROCEDURE — 2580000003 HC RX 258: Performed by: RADIOLOGY

## 2018-08-21 PROCEDURE — 83735 ASSAY OF MAGNESIUM: CPT

## 2018-08-21 RX ADMIN — MAGNESIUM HYDROXIDE 30 ML: 400 SUSPENSION ORAL at 00:51

## 2018-08-21 RX ADMIN — AMLODIPINE BESYLATE 10 MG: 10 TABLET ORAL at 09:31

## 2018-08-21 RX ADMIN — TRAMADOL HYDROCHLORIDE 50 MG: 50 TABLET, FILM COATED ORAL at 11:52

## 2018-08-21 RX ADMIN — INSULIN LISPRO 1 UNITS: 100 INJECTION, SOLUTION INTRAVENOUS; SUBCUTANEOUS at 17:36

## 2018-08-21 RX ADMIN — INSULIN LISPRO 2 UNITS: 100 INJECTION, SOLUTION INTRAVENOUS; SUBCUTANEOUS at 09:38

## 2018-08-21 RX ADMIN — ENOXAPARIN SODIUM 40 MG: 40 INJECTION SUBCUTANEOUS at 09:30

## 2018-08-21 RX ADMIN — Medication 10 ML: at 09:30

## 2018-08-21 RX ADMIN — METOPROLOL TARTRATE 50 MG: 50 TABLET ORAL at 09:30

## 2018-08-21 RX ADMIN — POTASSIUM CHLORIDE 20 MEQ: 1.5 POWDER, FOR SOLUTION ORAL at 09:31

## 2018-08-21 RX ADMIN — BUMETANIDE 1 MG: 0.25 INJECTION INTRAMUSCULAR; INTRAVENOUS at 09:30

## 2018-08-21 RX ADMIN — Medication 10 ML: at 21:45

## 2018-08-21 RX ADMIN — TRAMADOL HYDROCHLORIDE 50 MG: 50 TABLET, FILM COATED ORAL at 17:41

## 2018-08-21 RX ADMIN — INSULIN LISPRO 1 UNITS: 100 INJECTION, SOLUTION INTRAVENOUS; SUBCUTANEOUS at 11:53

## 2018-08-21 RX ADMIN — INSULIN GLARGINE 35 UNITS: 100 INJECTION, SOLUTION SUBCUTANEOUS at 22:01

## 2018-08-21 RX ADMIN — ATORVASTATIN CALCIUM 40 MG: 40 TABLET, FILM COATED ORAL at 21:44

## 2018-08-21 RX ADMIN — DOXAZOSIN 2 MG: 2 TABLET ORAL at 09:31

## 2018-08-21 RX ADMIN — Medication 10 ML: at 21:46

## 2018-08-21 RX ADMIN — TRAMADOL HYDROCHLORIDE 50 MG: 50 TABLET, FILM COATED ORAL at 05:41

## 2018-08-21 RX ADMIN — METOPROLOL TARTRATE 50 MG: 50 TABLET ORAL at 21:44

## 2018-08-21 RX ADMIN — BUMETANIDE 1 MG: 0.25 INJECTION INTRAMUSCULAR; INTRAVENOUS at 21:54

## 2018-08-21 RX ADMIN — INSULIN LISPRO 1 UNITS: 100 INJECTION, SOLUTION INTRAVENOUS; SUBCUTANEOUS at 22:01

## 2018-08-21 RX ADMIN — POTASSIUM CHLORIDE 20 MEQ: 1.5 POWDER, FOR SOLUTION ORAL at 21:45

## 2018-08-21 RX ADMIN — SPIRONOLACTONE 25 MG: 25 TABLET ORAL at 09:31

## 2018-08-21 RX ADMIN — SERTRALINE 150 MG: 100 TABLET, FILM COATED ORAL at 09:30

## 2018-08-21 ASSESSMENT — PAIN DESCRIPTION - DESCRIPTORS
DESCRIPTORS: BURNING;ACHING
DESCRIPTORS: ACHING

## 2018-08-21 ASSESSMENT — PAIN DESCRIPTION - ORIENTATION
ORIENTATION: LOWER
ORIENTATION: RIGHT;LEFT

## 2018-08-21 ASSESSMENT — PAIN DESCRIPTION - LOCATION
LOCATION: BACK
LOCATION: FOOT

## 2018-08-21 ASSESSMENT — PAIN DESCRIPTION - PROGRESSION
CLINICAL_PROGRESSION: GRADUALLY WORSENING

## 2018-08-21 ASSESSMENT — PAIN DESCRIPTION - FREQUENCY: FREQUENCY: CONTINUOUS

## 2018-08-21 ASSESSMENT — PAIN SCALES - GENERAL
PAINLEVEL_OUTOF10: 0
PAINLEVEL_OUTOF10: 0
PAINLEVEL_OUTOF10: 7
PAINLEVEL_OUTOF10: 0
PAINLEVEL_OUTOF10: 7
PAINLEVEL_OUTOF10: 0
PAINLEVEL_OUTOF10: 7

## 2018-08-21 ASSESSMENT — PAIN DESCRIPTION - PAIN TYPE
TYPE: CHRONIC PAIN
TYPE: CHRONIC PAIN

## 2018-08-21 ASSESSMENT — PAIN DESCRIPTION - ONSET
ONSET: ON-GOING
ONSET: ON-GOING

## 2018-08-21 NOTE — PROGRESS NOTES
[facility]  Family Medicine Attending    S: 62 y.o. male with hx of MARIA, htn, hyperlipidemia, uncontrolled dm (a1c 9.5) and tobacco abuse admitted with worsening edema despite high doses of diuretics with  hypokalemia and hypomagnesemia. Receives most care at [facility]. Denies weight loss other than related to diuretic use, chest pain, dyspnea, black or blood in stool, diarrhea, constipation or abdominal pain. Remote tobacco use. Today, reports that he is beginning to feel much better. No new symptoms or concerns. States swelling is improving. Has no questions. O: VS- Blood pressure (!) 157/88, pulse 74, temperature 98.1 °F (36.7 °C), temperature source Temporal, resp. rate 18, height 5' 8\" (1.727 m), weight 289 lb 5 oz (131.2 kg), SpO2 98 %. Exam is as noted by resident with the following changes, additions or corrections:  Gen:  NAD, A&A, up to chair   Ht - RRR, soft systolic murmur, stable   Lungs - CTAB   Ext:  Edema of legs/thighs 1-2+, compression stockings in place, stable     Impressions: Active Problems:    Leg swelling    Hypertensive urgency    Hypokalemia    Hypomagnesemia    Bilateral leg edema    Cecum mass    Mass of cecum  Resolved Problems:    * No resolved hospital problems. *      Plan:   Appreciate renal, surgery, and h/o input. Continue diuresis and aldactone. CT abdomen show 5cm cecal mass concerning for malignancy. CT chest with abnormal mass in back. For outpatient follow up. Hepatitis panel pending. Adjust insulin as needed for dm. Watch BP - may need additional treatment. Attending Physician Statement  I have reviewed the chart, including any radiology or labs, and seen the patient with the resident(s). I personally reviewed and performed key elements of the history and exam.  I agree with the assessment, plan and orders as documented by the resident. Please refer to the resident note for additional information.       Romy Shepard DO

## 2018-08-21 NOTE — PROGRESS NOTES
[facility], Family Medicine Residency Program  Resident Progress  Note      Patient:  Massimo Collins 62 y.o. male MRN: [id number]     Date of Service: 8/21/2018      [facility] day 4  Mr. Collins is 61 y/o male with past medical history of CHF-I, CKD-I, HTN, HLD and obstructive sleep apnea. He presented with breathing difficulty, anasarca and hypokalemia. Over the course of admission he was diuresed with the help of bumetanide, aldactone, with salt and water restriction. His hypokalemia resolved after stopping furosemide and replacing potasium and magnesium. Further evaluation revealed 5cm extra ceacal mass concerning for malignancy. There is mass in neck around paraspinal muscles concerning for lipoma/ sarcoma unchanged from imaging done 6 months ago    Subjective      Patient was seen and examined this am. He feels lot better and states his breathing is good, swelling is coming down. Objective     Physical Exam:  · Vitals: BP (!) 166/98   Pulse 78   Temp 98.2 °F (36.8 °C) (Oral)   Resp 18   Ht 5' 8\" (1.727 m)   Wt 286 lb 6.4 oz (129.9 kg)   SpO2 96%   BMI 43.55 kg/m²     · I & O - 24hr: -1580 since admission and weight loss of one pound since admission  · General Appearance: AAOX3  · HEENT:  NC/AT. · Neck: Trachea midline. No thyromegaly. No LAD. · Lung: Clear breath sounds B/l  · Heart: RRR, normal S1, S2. No MRG  · Abdomen: Soft, NT, ND. BS +felicia. · Extremities: leg edema bilaterally present. Pedal pulses 2+ symmetric b/l. · Musculokeletal: right olecranon bursa swelling no tenderness, no muscle tenderness. ROM normal in all joints of extremities.    · Neurologic: Mental status: AAOX3     Pertinent/ New Labs and Imaging Studies     CBC:   Lab Results   Component Value Date    WBC 7.7 08/20/2018    RBC 4.26 08/20/2018    HGB 14.0 08/20/2018    HCT 41.8 08/20/2018     08/20/2018    MCV 98.1 08/20/2018     BMP:    Lab Results   Component Value Date     08/20/2018    K 3.9

## 2018-08-22 VITALS
RESPIRATION RATE: 18 BRPM | WEIGHT: 283.3 LBS | TEMPERATURE: 98.4 F | BODY MASS INDEX: 42.93 KG/M2 | HEIGHT: 68 IN | DIASTOLIC BLOOD PRESSURE: 88 MMHG | HEART RATE: 60 BPM | SYSTOLIC BLOOD PRESSURE: 136 MMHG | OXYGEN SATURATION: 95 %

## 2018-08-22 LAB
BASOPHILS ABSOLUTE: 0.04 E9/L (ref 0–0.2)
BASOPHILS RELATIVE PERCENT: 0.6 % (ref 0–2)
EOSINOPHILS ABSOLUTE: 0.2 E9/L (ref 0.05–0.5)
EOSINOPHILS RELATIVE PERCENT: 3 % (ref 0–6)
HCT VFR BLD CALC: 40.8 % (ref 37–54)
HEMOGLOBIN: 13.4 G/DL (ref 12.5–16.5)
IMMATURE GRANULOCYTES #: 0.03 E9/L
IMMATURE GRANULOCYTES %: 0.4 % (ref 0–5)
LYMPHOCYTES ABSOLUTE: 2.23 E9/L (ref 1.5–4)
LYMPHOCYTES RELATIVE PERCENT: 33 % (ref 20–42)
MCH RBC QN AUTO: 31.8 PG (ref 26–35)
MCHC RBC AUTO-ENTMCNC: 32.8 % (ref 32–34.5)
MCV RBC AUTO: 96.9 FL (ref 80–99.9)
METER GLUCOSE: 191 MG/DL (ref 70–110)
METER GLUCOSE: 197 MG/DL (ref 70–110)
MONOCYTES ABSOLUTE: 0.61 E9/L (ref 0.1–0.95)
MONOCYTES RELATIVE PERCENT: 9 % (ref 2–12)
NEUTROPHILS ABSOLUTE: 3.65 E9/L (ref 1.8–7.3)
NEUTROPHILS RELATIVE PERCENT: 54 % (ref 43–80)
PDW BLD-RTO: 12.7 FL (ref 11.5–15)
PLATELET # BLD: 234 E9/L (ref 130–450)
PMV BLD AUTO: 10.3 FL (ref 7–12)
RBC # BLD: 4.21 E12/L (ref 3.8–5.8)
WBC # BLD: 6.8 E9/L (ref 4.5–11.5)

## 2018-08-22 PROCEDURE — 6370000000 HC RX 637 (ALT 250 FOR IP): Performed by: INTERNAL MEDICINE

## 2018-08-22 PROCEDURE — 99239 HOSP IP/OBS DSCHRG MGMT >30: CPT | Performed by: STUDENT IN AN ORGANIZED HEALTH CARE EDUCATION/TRAINING PROGRAM

## 2018-08-22 PROCEDURE — 6370000000 HC RX 637 (ALT 250 FOR IP): Performed by: FAMILY MEDICINE

## 2018-08-22 PROCEDURE — 85025 COMPLETE CBC W/AUTO DIFF WBC: CPT

## 2018-08-22 PROCEDURE — 2580000003 HC RX 258: Performed by: FAMILY MEDICINE

## 2018-08-22 PROCEDURE — 82962 GLUCOSE BLOOD TEST: CPT

## 2018-08-22 PROCEDURE — 2500000003 HC RX 250 WO HCPCS: Performed by: FAMILY MEDICINE

## 2018-08-22 PROCEDURE — 6360000002 HC RX W HCPCS: Performed by: FAMILY MEDICINE

## 2018-08-22 PROCEDURE — 36415 COLL VENOUS BLD VENIPUNCTURE: CPT

## 2018-08-22 PROCEDURE — 6370000000 HC RX 637 (ALT 250 FOR IP): Performed by: STUDENT IN AN ORGANIZED HEALTH CARE EDUCATION/TRAINING PROGRAM

## 2018-08-22 RX ORDER — POTASSIUM CHLORIDE 1.5 G/1.77G
20 POWDER, FOR SOLUTION ORAL DAILY
Qty: 30 EACH | Refills: 3 | Status: SHIPPED | OUTPATIENT
Start: 2018-08-22 | End: 2018-09-06 | Stop reason: SDUPTHER

## 2018-08-22 RX ORDER — INSULIN GLARGINE 100 [IU]/ML
35 INJECTION, SOLUTION SUBCUTANEOUS NIGHTLY
Qty: 1 VIAL | Refills: 3 | Status: SHIPPED | OUTPATIENT
Start: 2018-08-22 | End: 2018-08-30

## 2018-08-22 RX ORDER — LOSARTAN POTASSIUM 25 MG/1
25 TABLET ORAL DAILY
Qty: 30 TABLET | Refills: 3 | Status: SHIPPED | OUTPATIENT
Start: 2018-08-22 | End: 2018-10-18 | Stop reason: SDUPTHER

## 2018-08-22 RX ORDER — SPIRONOLACTONE 25 MG/1
25 TABLET ORAL DAILY
Qty: 30 TABLET | Refills: 3 | Status: SHIPPED | OUTPATIENT
Start: 2018-08-23 | End: 2018-08-24 | Stop reason: SDUPTHER

## 2018-08-22 RX ADMIN — SPIRONOLACTONE 25 MG: 25 TABLET ORAL at 09:34

## 2018-08-22 RX ADMIN — INSULIN LISPRO 1 UNITS: 100 INJECTION, SOLUTION INTRAVENOUS; SUBCUTANEOUS at 09:35

## 2018-08-22 RX ADMIN — TRAMADOL HYDROCHLORIDE 50 MG: 50 TABLET, FILM COATED ORAL at 00:44

## 2018-08-22 RX ADMIN — AMLODIPINE BESYLATE 10 MG: 10 TABLET ORAL at 09:34

## 2018-08-22 RX ADMIN — DOXAZOSIN 2 MG: 2 TABLET ORAL at 09:34

## 2018-08-22 RX ADMIN — METOPROLOL TARTRATE 50 MG: 50 TABLET ORAL at 09:34

## 2018-08-22 RX ADMIN — Medication 10 ML: at 09:35

## 2018-08-22 RX ADMIN — BUMETANIDE 1 MG: 0.25 INJECTION INTRAMUSCULAR; INTRAVENOUS at 09:34

## 2018-08-22 RX ADMIN — ENOXAPARIN SODIUM 40 MG: 40 INJECTION SUBCUTANEOUS at 09:35

## 2018-08-22 RX ADMIN — TRAMADOL HYDROCHLORIDE 50 MG: 50 TABLET, FILM COATED ORAL at 12:52

## 2018-08-22 RX ADMIN — SERTRALINE 150 MG: 100 TABLET, FILM COATED ORAL at 09:34

## 2018-08-22 RX ADMIN — POTASSIUM CHLORIDE 20 MEQ: 1.5 POWDER, FOR SOLUTION ORAL at 09:34

## 2018-08-22 RX ADMIN — TRAMADOL HYDROCHLORIDE 50 MG: 50 TABLET, FILM COATED ORAL at 06:40

## 2018-08-22 RX ADMIN — INSULIN LISPRO 1 UNITS: 100 INJECTION, SOLUTION INTRAVENOUS; SUBCUTANEOUS at 11:41

## 2018-08-22 ASSESSMENT — PAIN DESCRIPTION - DESCRIPTORS
DESCRIPTORS: ACHING
DESCRIPTORS: ACHING;DISCOMFORT;CONSTANT
DESCRIPTORS: ACHING;DISCOMFORT

## 2018-08-22 ASSESSMENT — PAIN DESCRIPTION - ONSET
ONSET: ON-GOING
ONSET: ON-GOING
ONSET: AWAKENED FROM SLEEP

## 2018-08-22 ASSESSMENT — PAIN DESCRIPTION - PAIN TYPE
TYPE: CHRONIC PAIN

## 2018-08-22 ASSESSMENT — PAIN SCALES - GENERAL
PAINLEVEL_OUTOF10: 0
PAINLEVEL_OUTOF10: 3
PAINLEVEL_OUTOF10: 7
PAINLEVEL_OUTOF10: 8
PAINLEVEL_OUTOF10: 6

## 2018-08-22 ASSESSMENT — PAIN DESCRIPTION - FREQUENCY
FREQUENCY: INTERMITTENT
FREQUENCY: CONTINUOUS
FREQUENCY: INTERMITTENT

## 2018-08-22 ASSESSMENT — PAIN DESCRIPTION - ORIENTATION
ORIENTATION: LOWER;MID
ORIENTATION: RIGHT;LEFT
ORIENTATION: RIGHT;LEFT

## 2018-08-22 ASSESSMENT — PAIN DESCRIPTION - PROGRESSION
CLINICAL_PROGRESSION: GRADUALLY WORSENING
CLINICAL_PROGRESSION: GRADUALLY WORSENING
CLINICAL_PROGRESSION: NOT CHANGED

## 2018-08-22 ASSESSMENT — PAIN DESCRIPTION - LOCATION
LOCATION: BACK
LOCATION: FOOT
LOCATION: FOOT

## 2018-08-22 NOTE — PLAN OF CARE
Spoke with Dr. Pereyra Ran this afternoon. Discharge medications per Dr. Pereyra Ran include cozaar 25 mg daily, spirinolactone 25 mg daily and bumex 1 mg oral bid.

## 2018-08-22 NOTE — PLAN OF CARE
Problem: Falls - Risk of:  Goal: Will remain free from falls  Will remain free from falls   Outcome: Met This Shift    Goal: Absence of physical injury  Absence of physical injury   Outcome: Met This Shift      Problem: Risk for Impaired Skin Integrity  Goal: Tissue integrity - skin and mucous membranes  Structural intactness and normal physiological function of skin and  mucous membranes.    Outcome: Met This Shift      Problem: Pain:  Goal: Control of chronic pain  Control of chronic pain   Outcome: Met This Shift

## 2018-08-22 NOTE — CARE COORDINATION
Spoke with patient at bedside. Reviewed conversation patient had with diabetic educator. Patient happy with the information received and is considering returning for DM ed classes after discharge. Patient remains on IV Bumex at this time, await further input from nephrology re: when this can be changed to oral.  Transition of care plan remains to return home when medically stable. Patient states he has a follow-up appointment scheduled for Friday at the [facility] clinic on [address]. Will continue to follow for further treatment plans and transition of care planning needs.      Amanda Owens.

## 2018-08-24 ENCOUNTER — OFFICE VISIT (OUTPATIENT)
Dept: FAMILY MEDICINE CLINIC | Age: 58
End: 2018-08-24
Payer: MEDICARE

## 2018-08-24 VITALS
OXYGEN SATURATION: 97 % | SYSTOLIC BLOOD PRESSURE: 147 MMHG | HEART RATE: 69 BPM | BODY MASS INDEX: 43.8 KG/M2 | DIASTOLIC BLOOD PRESSURE: 85 MMHG | WEIGHT: 289 LBS | HEIGHT: 68 IN | TEMPERATURE: 99.1 F

## 2018-08-24 DIAGNOSIS — E87.6 HYPOKALEMIA: Primary | ICD-10-CM

## 2018-08-24 DIAGNOSIS — I10 ESSENTIAL HYPERTENSION: ICD-10-CM

## 2018-08-24 DIAGNOSIS — M70.21 OLECRANON BURSITIS OF RIGHT ELBOW: ICD-10-CM

## 2018-08-24 DIAGNOSIS — G47.33 OSA (OBSTRUCTIVE SLEEP APNEA): ICD-10-CM

## 2018-08-24 PROCEDURE — 1111F DSCHRG MED/CURRENT MED MERGE: CPT | Performed by: STUDENT IN AN ORGANIZED HEALTH CARE EDUCATION/TRAINING PROGRAM

## 2018-08-24 PROCEDURE — 99212 OFFICE O/P EST SF 10 MIN: CPT | Performed by: STUDENT IN AN ORGANIZED HEALTH CARE EDUCATION/TRAINING PROGRAM

## 2018-08-24 PROCEDURE — 20605 DRAIN/INJ JOINT/BURSA W/O US: CPT | Performed by: STUDENT IN AN ORGANIZED HEALTH CARE EDUCATION/TRAINING PROGRAM

## 2018-08-24 PROCEDURE — G8417 CALC BMI ABV UP PARAM F/U: HCPCS | Performed by: STUDENT IN AN ORGANIZED HEALTH CARE EDUCATION/TRAINING PROGRAM

## 2018-08-24 PROCEDURE — 3017F COLORECTAL CA SCREEN DOC REV: CPT | Performed by: STUDENT IN AN ORGANIZED HEALTH CARE EDUCATION/TRAINING PROGRAM

## 2018-08-24 PROCEDURE — 4004F PT TOBACCO SCREEN RCVD TLK: CPT | Performed by: STUDENT IN AN ORGANIZED HEALTH CARE EDUCATION/TRAINING PROGRAM

## 2018-08-24 PROCEDURE — 99213 OFFICE O/P EST LOW 20 MIN: CPT | Performed by: STUDENT IN AN ORGANIZED HEALTH CARE EDUCATION/TRAINING PROGRAM

## 2018-08-24 PROCEDURE — G8427 DOCREV CUR MEDS BY ELIG CLIN: HCPCS | Performed by: STUDENT IN AN ORGANIZED HEALTH CARE EDUCATION/TRAINING PROGRAM

## 2018-08-24 RX ORDER — SPIRONOLACTONE 25 MG/1
25 TABLET ORAL 2 TIMES DAILY
Qty: 30 TABLET | Refills: 2 | Status: SHIPPED | OUTPATIENT
Start: 2018-08-24 | End: 2018-10-18 | Stop reason: SDUPTHER

## 2018-08-24 ASSESSMENT — ENCOUNTER SYMPTOMS
SHORTNESS OF BREATH: 0
NAUSEA: 0
COUGH: 0
ABDOMINAL PAIN: 0
VOMITING: 0

## 2018-08-24 NOTE — DISCHARGE SUMMARY
[facility] [address]    Phone:  [phone]   · insulin glargine 100 UNIT/ML injection vial  · losartan 25 MG tablet  · potassium chloride 20 MEQ packet         Consults:  nephrology and general surgery    Significant Diagnostic Studies:      Labs:        WBC/Hgb/Hct/Plts:  6.8/13.4/40.8/234 (2073)  [unfilled]  estimated creatinine clearance is 175 mL/min (A) (based on SCr of 0.6 mg/dL (L)). New Imaging:  Ct Abdomen Pelvis W Wo Contrast Additional Contrast? None    Result Date: 2018  Patient MRN:  [id number] : 1960 Age: 62 years Gender: Male EXAM: CT ABDOMEN PELVIS W WO CONTRAST INDICATION:  Ascites without known source  worsening edema despite high-dose diuretics, leg swelling, leg edema COMPARISON: None TECHNIQUE: Low-dose CT  acquisition technique included one of following options: 1 . Automated exposure control 2. Adjustment of MA and or KV according to patient's size or 3. Use of iterative reconstruction. FINDINGS: Heart is enlarged. Lung bases are clear. Diffuse fatty facial liver identified. The focal area of fatty sparing identified right hepatic hilar region and along the gallbladder fossa. No enhancing liver mass identified. Left adrenal nodule 1.8 cm in size right renal unremarkable. Mild thickening right adrenal gland. Kidneys unremarkable. Pancreas unremarkable. Spleen is unremarkable. Mild-to-moderate retained stool throughout the colon without bowel obstruction. Appendix unremarkable. There is a 5.0 x 4.5 cm mass identified along the region of the cecum thought to be extrinsic to the colon with dystrophic calcifications and central hypoattenuation. Mild sigmoid diverticulosis. Urinary bladder collapsed not well evaluated. Prostate unremarkable. Tiny fat-containing left inguinal hernia. No free air or free fluid. Tiny fat-containing inguinal hernia. Skin thickening and subcutaneous edema identified along the ventral abdominal wall.      1. 5 cm mass extrinsic to the cecum suspicious for neoplastic process. 2. Sigmoid diverticulosis. 3. Fatty infiltration liver. ALERT:  THIS IS AN ABNORMAL REPORT    Ct Soft Tissue Neck Wo Contrast    Result Date: 2018  Patient MRN:  [id number] : 1960 Age: 62 years Gender: Male EXAM: CT SOFT TISSUE NECK WO CONTRAST INDICATION: Lymphadenopathy by CT  Mar. 2018  COMPARISON: CT soft tissue neck 3/9/2018 TECHNIQUE: Low-dose CT  acquisition technique included one of following options: 1 . Automated exposure control 2. Adjustment of MA and or KV according to patient's size or 3. Use of iterative reconstruction. FINDINGS: No suspicious cervical lymphadenopathy identified. Floor of mouth, base of tongue, laryngeal structures and paraglottic structures are unremarkable. Mild prominence of the submandibular glands. Right-sided sialolithiasis involving the right submandibular gland. Thyroid gland demonstrates a subcentimeter left-sided nodules. No prevertebral soft tissue stranding. Fat density mass with punctate calcifications identified involving the right paraspinous musculature along the cervical spine. Moderate disc disease C5-C7. 1. No suspicious cervical lymphadenopathy. 2. Fatty density right posterior paraspinal mass may represent intramuscular hemangioma, fatty replacement related to strain type injury or remote injury versus less likely lipoma.  Please see CT chest dictation for additional differentials     Ct Chest W Wo Contrast    Result Date: 2018  Patient Name:  Roberta Grey Patient MRN:  [id number] Patient :  1960 Patient Age:  62 years Patient Gender:  Male Order Date:2018 12:00 PM EXAM:  CT CHEST W WO CONTRAST NUMBER OF IMAGES:  36 INDICATION:   CHEST TRAUMA, BLUNT, HIGH ENERGY, FIRST EXAM , cecal mass, evaluate for metastatic disease COMPARISON: CT of the neck performed on 3/9/2018, CT of the abdomen and pelvis performed on 2018 Technique: Low-dose CT  acquisition technique included one of following options; 1 .

## 2018-08-24 NOTE — PROGRESS NOTES
for dizziness. Physical Exam   Vitals: BP (!) 147/85   Pulse 69   Temp 99.1 °F (37.3 °C) (Oral)   Ht 5' 8\" (1.727 m)   Wt 289 lb (131.1 kg)   SpO2 97%   BMI 43.94 kg/m²   General Appearance: Well developed, awake, alert, oriented, no acute distress  HEENT: Normocephalic, atraumatic. Chest wall/Lung: Clear to auscultation bilaterally,  respirations unlabored. No ronchi/wheezing/rales  Heart: Regular rate and rhythm, S1 and S2 normal, no murmur, rub or gallop. Abdomen: Slight abdominal distention noted with fluid wave (improved)  Extremities:  1+ edema of the left leg, 1-2+ edema of Rt LE  Neurologic:   Alert&Oriented. Assessment and Plan     Hypokalemia  -Hospital follow-up for hypokalemia, chronic hypokalemia was due to increase in Lasix, and he continues to take potassium supplement as ordered. Labs ordered. - Basic Metabolic Panel; Future    Cecal Mass  -Colonoscopy next month as ordered    LE Edema  -Improved on new regimen including Bumex and Aldactone, and stopping Lasix     Essential hypertension  -BP elevated today in the clinic so Aldactone  was increased from 25 q.d to 25 BID. - spironolactone (ALDACTONE) 25 MG tablet; Take 1 tablet by mouth 2 times daily  Dispense: 30 tablet; Refill: 2    Olecranon bursitis of right elbow  - Olecranon Bursa of rt elbow drained in the office, and 7 mL of serosanguineous fluid was aspirated. - 20605 - NV DRAIN/INJECT INTERMEDIATE JOINT/BURSA    MARIA (obstructive sleep apnea)  -Patient with history of MARIA, who needed the mask, and whose to be ordered. - DME Order for (Specify) as OP  - DME Order for (Specify) as OP        Procedure Note: Aspirationof   RIGHT Olecranon Bursa    Informed consent was received    Procedure: The patient was placed in the seated position with the right elbow flexed at 90 degrees. The skin was prepped in sterile fashion using Betadine and Alcohol swabs. The area was desensitized with liquid nitrogen.  An 18 gauge needle was then used for aspiration with 7 ml of sero-sanguinous fluid retrieved. The patient tolerated the procedure well without any complications. Post injection expectations were heavily discussed (ICE 20 minutes at a time 2-3x/day). If the patient develops any fevers, chills, sweats, redness, swelling, increased pain or discharge they have been instructed to seek urgent evaluation and to notify the clinic      Return to Office: Return in about 1 month (around 9/24/2018) for HTN/Edema. Medication List:    Current Outpatient Prescriptions   Medication Sig Dispense Refill    insulin glargine (LANTUS) 100 UNIT/ML injection vial Inject 35 Units into the skin nightly 1 vial 3    spironolactone (ALDACTONE) 25 MG tablet Take 1 tablet by mouth daily 30 tablet 3    potassium chloride (KLOR-CON) 20 MEQ packet Take 20 mEq by mouth daily 30 each 3    losartan (COZAAR) 25 MG tablet Take 1 tablet by mouth daily 30 tablet 3    traMADol (ULTRAM) 50 MG tablet Take 50 mg by mouth every 6 hours as needed for Pain. Bedelia Sol amLODIPine (NORVASC) 10 MG tablet Take 1 tablet by mouth daily 90 tablet 0    atorvastatin (LIPITOR) 40 MG tablet Take 1 tablet by mouth nightly 90 tablet 0    terazosin (HYTRIN) 2 MG capsule Take 2 mg by mouth nightly      metoprolol tartrate (LOPRESSOR) 50 MG tablet Take 50 mg by mouth 2 times daily      vitamin D (ERGOCALCIFEROL) 14442 UNITS CAPS capsule Take 50,000 Units by mouth once a week. Takes on mondays       ibuprofen (ADVIL;MOTRIN) 800 MG tablet Take 800 mg by mouth 3 times daily as needed. Take with food. Med info obtained from [facility] records   Indications: Pain      sildenafil (VIAGRA) 100 MG tablet Take 100 mg by mouth as needed. Take an hour before sex. Med info obtained from [facility] records. No current facility-administered medications for this visit.          Matt Casillas MD PGY-2    This case was discussed with Dr Delma (s)

## 2018-08-24 NOTE — PROGRESS NOTES
Follow up of abd and LE swelling     Hypokalemia     Has cecal mass    For procedure in 1 month   Edema improved   On rx as listed        Blood pressure (!) 147/85, pulse 69, temperature 99.1 °F (37.3 °C), temperature source Oral, height 5' 8\" (1.727 m), weight 289 lb (131.1 kg), SpO2 97 %. HEENT WNL     Heart regular    Lungs clear    abd distension improved     Leg edema improved   Pulses intact   Olecranon bursitis       Labs as ordered    Increase aldactone    Fluid extraction from olecranon bursitis    Attending Physician Statement  I have discussed the case, including pertinent history and exam findings with the resident. I also have seen the patient and performed key portions of the examination. I agree with the documented assessment and plan.

## 2018-08-25 LAB
EKG ATRIAL RATE: 66 BPM
EKG P AXIS: 59 DEGREES
EKG P-R INTERVAL: 182 MS
EKG Q-T INTERVAL: 418 MS
EKG QRS DURATION: 82 MS
EKG QTC CALCULATION (BAZETT): 438 MS
EKG R AXIS: -12 DEGREES
EKG T AXIS: 12 DEGREES
EKG VENTRICULAR RATE: 66 BPM

## 2018-08-27 ENCOUNTER — TELEPHONE (OUTPATIENT)
Dept: FAMILY MEDICINE CLINIC | Age: 58
End: 2018-08-27

## 2018-08-27 ENCOUNTER — HOSPITAL ENCOUNTER (OUTPATIENT)
Age: 58
Discharge: HOME | End: 2018-08-29
Payer: MEDICARE

## 2018-08-27 ENCOUNTER — NURSE ONLY (OUTPATIENT)
Dept: FAMILY MEDICINE CLINIC | Age: 58
End: 2018-08-27
Payer: MEDICARE

## 2018-08-27 DIAGNOSIS — I10 ESSENTIAL HYPERTENSION: ICD-10-CM

## 2018-08-27 DIAGNOSIS — E87.6 HYPOKALEMIA: ICD-10-CM

## 2018-08-27 DIAGNOSIS — E11.42 TYPE 2 DIABETES MELLITUS WITH DIABETIC POLYNEUROPATHY, UNSPECIFIED WHETHER LONG TERM INSULIN USE (HCC): ICD-10-CM

## 2018-08-27 LAB
ANION GAP SERPL CALCULATED.3IONS-SCNC: 14 MMOL/L (ref 7–16)
BUN BLDV-MCNC: 12 MG/DL (ref 6–20)
CALCIUM SERPL-MCNC: 9.1 MG/DL (ref 8.6–10.2)
CHLORIDE BLD-SCNC: 97 MMOL/L (ref 98–107)
CO2: 24 MMOL/L (ref 22–29)
CREAT SERPL-MCNC: 0.6 MG/DL (ref 0.7–1.2)
GFR AFRICAN AMERICAN: >60
GFR NON-AFRICAN AMERICAN: >60 ML/MIN/1.73
GLUCOSE BLD-MCNC: 180 MG/DL (ref 74–109)
GLUCOSE BLD-MCNC: 199 MG/DL
POTASSIUM SERPL-SCNC: 4.6 MMOL/L (ref 3.5–5)
SODIUM BLD-SCNC: 135 MMOL/L (ref 132–146)

## 2018-08-27 PROCEDURE — 36415 COLL VENOUS BLD VENIPUNCTURE: CPT | Performed by: STUDENT IN AN ORGANIZED HEALTH CARE EDUCATION/TRAINING PROGRAM

## 2018-08-27 PROCEDURE — 80048 BASIC METABOLIC PNL TOTAL CA: CPT

## 2018-08-27 NOTE — TELEPHONE ENCOUNTER
Pt fasting glucose was 199 today  Pt also was complaining of liquid in his elbow still   Needle aspiration performed on Friday 8-24-18 but today while getting his labs done pt elbow still has more fluid in it.   Please advise and thank you

## 2018-08-28 LAB — CHROMOGRANIN A: 108 NG/ML (ref 0–95)

## 2018-08-30 ENCOUNTER — OFFICE VISIT (OUTPATIENT)
Dept: FAMILY MEDICINE CLINIC | Age: 58
End: 2018-08-30
Payer: MEDICARE

## 2018-08-30 ENCOUNTER — HOSPITAL ENCOUNTER (OUTPATIENT)
Age: 58
Discharge: HOME | End: 2018-09-01
Payer: MEDICARE

## 2018-08-30 VITALS
WEIGHT: 285 LBS | TEMPERATURE: 98.1 F | OXYGEN SATURATION: 98 % | HEIGHT: 68 IN | DIASTOLIC BLOOD PRESSURE: 83 MMHG | SYSTOLIC BLOOD PRESSURE: 163 MMHG | BODY MASS INDEX: 43.19 KG/M2 | HEART RATE: 70 BPM

## 2018-08-30 DIAGNOSIS — M70.21 OLECRANON BURSITIS OF RIGHT ELBOW: ICD-10-CM

## 2018-08-30 DIAGNOSIS — M79.89 LEG SWELLING: Primary | ICD-10-CM

## 2018-08-30 DIAGNOSIS — Z76.0 MEDICATION REFILL: ICD-10-CM

## 2018-08-30 LAB
APPEARANCE FLUID: NORMAL
CELL COUNT FLUID TYPE: NORMAL
COLOR FLUID: NORMAL
MONOCYTE, FLUID: 80 %
NEUTROPHIL, FLUID: 20 %
NUCLEATED CELLS FLUID: 287 /UL
RBC FLUID: NORMAL /UL

## 2018-08-30 PROCEDURE — 20600 DRAIN/INJ JOINT/BURSA W/O US: CPT | Performed by: FAMILY MEDICINE

## 2018-08-30 PROCEDURE — 99213 OFFICE O/P EST LOW 20 MIN: CPT | Performed by: FAMILY MEDICINE

## 2018-08-30 PROCEDURE — 3017F COLORECTAL CA SCREEN DOC REV: CPT | Performed by: FAMILY MEDICINE

## 2018-08-30 PROCEDURE — 89051 BODY FLUID CELL COUNT: CPT

## 2018-08-30 PROCEDURE — 87205 SMEAR GRAM STAIN: CPT

## 2018-08-30 PROCEDURE — 89060 EXAM SYNOVIAL FLUID CRYSTALS: CPT

## 2018-08-30 PROCEDURE — 4004F PT TOBACCO SCREEN RCVD TLK: CPT | Performed by: FAMILY MEDICINE

## 2018-08-30 PROCEDURE — 20605 DRAIN/INJ JOINT/BURSA W/O US: CPT

## 2018-08-30 PROCEDURE — G8427 DOCREV CUR MEDS BY ELIG CLIN: HCPCS | Performed by: FAMILY MEDICINE

## 2018-08-30 PROCEDURE — 1111F DSCHRG MED/CURRENT MED MERGE: CPT | Performed by: FAMILY MEDICINE

## 2018-08-30 PROCEDURE — G8417 CALC BMI ABV UP PARAM F/U: HCPCS | Performed by: FAMILY MEDICINE

## 2018-08-30 PROCEDURE — 87070 CULTURE OTHR SPECIMN AEROBIC: CPT

## 2018-08-30 RX ORDER — BUMETANIDE 1 MG/1
1 TABLET ORAL 2 TIMES DAILY
Qty: 60 TABLET | Refills: 1 | Status: SHIPPED | OUTPATIENT
Start: 2018-08-30 | End: 2018-10-18 | Stop reason: SDUPTHER

## 2018-08-30 NOTE — PROGRESS NOTES
Attending Physician Statement    S:   Chief Complaint   Patient presents with    Joint Swelling     filled with fluid again    Leg Swelling     left leg still swollen    Knee Pain     left knee      Leg swelling is worse. Elbow swelling again, after drainage last week. Has colonoscopy scheduled for next week or so for his cecal mass. O: Blood pressure (!) 163/83, pulse 70, temperature 98.1 °F (36.7 °C), temperature source Oral, height 5' 8\" (1.727 m), weight 285 lb (129.3 kg), SpO2 98 %. Exam:   Heart - RRR   Lungs - clear   Ext - 3+ edema  A: Edema, olecranon bursitis  P:  Bumex, aspirate elbow   Follow-up as ordered    I have discussed the case, including pertinent history and exam findings with the resident. I also have personally seen and examined the patient. I agree with the documented assessment and plan.

## 2018-08-31 LAB
CRYSTALS, FLUID: NORMAL
SOURCE BODY FLUID: NORMAL

## 2018-09-04 ENCOUNTER — TELEPHONE (OUTPATIENT)
Dept: SURGERY | Age: 58
End: 2018-09-04

## 2018-09-04 LAB
BODY FLUID CULTURE, STERILE: NORMAL
GRAM STAIN RESULT: NORMAL

## 2018-09-06 ENCOUNTER — HOSPITAL ENCOUNTER (OUTPATIENT)
Dept: ULTRASOUND IMAGING | Age: 58
Discharge: HOME | End: 2018-09-08
Payer: MEDICARE

## 2018-09-06 ENCOUNTER — OFFICE VISIT (OUTPATIENT)
Dept: FAMILY MEDICINE CLINIC | Age: 58
End: 2018-09-06
Payer: MEDICARE

## 2018-09-06 ENCOUNTER — HOSPITAL ENCOUNTER (OUTPATIENT)
Age: 58
Discharge: HOME | End: 2018-09-08
Payer: MEDICARE

## 2018-09-06 VITALS
RESPIRATION RATE: 18 BRPM | BODY MASS INDEX: 42.89 KG/M2 | WEIGHT: 283 LBS | TEMPERATURE: 97.8 F | HEIGHT: 68 IN | SYSTOLIC BLOOD PRESSURE: 160 MMHG | HEART RATE: 82 BPM | DIASTOLIC BLOOD PRESSURE: 83 MMHG | OXYGEN SATURATION: 94 %

## 2018-09-06 DIAGNOSIS — M79.89 RIGHT LEG SWELLING: ICD-10-CM

## 2018-09-06 DIAGNOSIS — M79.89 RIGHT LEG SWELLING: Primary | ICD-10-CM

## 2018-09-06 LAB
ANION GAP SERPL CALCULATED.3IONS-SCNC: 19 MMOL/L (ref 7–16)
BUN BLDV-MCNC: 11 MG/DL (ref 6–20)
CALCIUM SERPL-MCNC: 9.7 MG/DL (ref 8.6–10.2)
CHLORIDE BLD-SCNC: 92 MMOL/L (ref 98–107)
CO2: 24 MMOL/L (ref 22–29)
CREAT SERPL-MCNC: 0.7 MG/DL (ref 0.7–1.2)
GFR AFRICAN AMERICAN: >60
GFR NON-AFRICAN AMERICAN: >60 ML/MIN/1.73
GLUCOSE BLD-MCNC: 211 MG/DL (ref 74–109)
POTASSIUM SERPL-SCNC: 4.2 MMOL/L (ref 3.5–5)
SODIUM BLD-SCNC: 135 MMOL/L (ref 132–146)

## 2018-09-06 PROCEDURE — 80048 BASIC METABOLIC PNL TOTAL CA: CPT

## 2018-09-06 PROCEDURE — 36415 COLL VENOUS BLD VENIPUNCTURE: CPT | Performed by: FAMILY MEDICINE

## 2018-09-06 PROCEDURE — 99212 OFFICE O/P EST SF 10 MIN: CPT | Performed by: FAMILY MEDICINE

## 2018-09-06 PROCEDURE — 1111F DSCHRG MED/CURRENT MED MERGE: CPT | Performed by: FAMILY MEDICINE

## 2018-09-06 PROCEDURE — 93971 EXTREMITY STUDY: CPT

## 2018-09-06 PROCEDURE — 36415 COLL VENOUS BLD VENIPUNCTURE: CPT

## 2018-09-06 PROCEDURE — G8427 DOCREV CUR MEDS BY ELIG CLIN: HCPCS | Performed by: FAMILY MEDICINE

## 2018-09-06 PROCEDURE — G8417 CALC BMI ABV UP PARAM F/U: HCPCS | Performed by: FAMILY MEDICINE

## 2018-09-06 PROCEDURE — 4004F PT TOBACCO SCREEN RCVD TLK: CPT | Performed by: FAMILY MEDICINE

## 2018-09-06 PROCEDURE — 3017F COLORECTAL CA SCREEN DOC REV: CPT | Performed by: FAMILY MEDICINE

## 2018-09-06 PROCEDURE — 99213 OFFICE O/P EST LOW 20 MIN: CPT | Performed by: FAMILY MEDICINE

## 2018-09-06 RX ORDER — POTASSIUM CHLORIDE 1.5 G/1.77G
20 POWDER, FOR SOLUTION ORAL DAILY
Qty: 30 EACH | Refills: 3 | Status: SHIPPED | OUTPATIENT
Start: 2018-09-06 | End: 2019-04-23

## 2018-09-06 NOTE — PROGRESS NOTES
[facility]  FAMILY MEDICINE RESIDENCY PROGRAM   OFFICE PROGRESS NOTE  DATE OF VISIT : 9/10/2018    Patient : Brittaney Collins   Sex : male   Age : 62 y.o.    : 1960   MRN : [id number]            History of Present Illness : Brittaney Collins  62 y.o. who presented to the clinic today for Leg Swelling (right , very painful) and Joint Swelling (right elbow)    Here for FU of leg swelling. Right leg swelling minimally improved. Has been on bumex X 1 week only now. Feels numb. Warm to touch. No fever. Taking K with it. Left leg is better. recentt hospitalization. No surgeries, no hx of blood clots. Does have an intra-abdominal mass, being investigated as OP on the right side intra abdominally. No appt with nephro scheduled yet. BP still on the higher side today. Still binges on potato chips. Cscope next Friday, appt with Dr. Camie Boxer 2 days later. Chromogranin pos. FU of olecranon bursitis  Swelled again after aspiration. Results of fluid analysis discussed. ROM still preserved. No fever. PMH/PSH, SH and FH were reviewed in the chart. Med list was reviewed and modified if needed. Review of Systems :    General- Denies fatigue, malaise or unintentional weight changes, fever or chills   HENT Denies headache,  visual disturbance  Chest- no chest pain, SOB    Heart- no pedal edema, no palpitations, chest pain, exertional dyspnea  Gastrointestinal - No diarrhea, nausea, constipation  - No dysuria  Ext- Denies weakness or paresthesias.          Physical Exam :    VITAL SIGNS : Blood pressure (!) 160/83, pulse 82, temperature 97.8 °F (36.6 °C), temperature source Oral, resp. rate 18, height 5' 8\" (1.727 m), weight 283 lb (128.4 kg), SpO2 94 %.     GENERAL APPEARANCE : NAD, cooperative, appears stated age  LUNGS : Respiration unlabored, clear sounds b/l  HEART : RRR, normal S1/S2, no murmur noted  ABDOMEN : Normoactive bowel sounds, soft, non-tender, no masses  EXTREMITIES : Right LE with 2-3 + edema, pitting. LLE normal. Pulses intact. No ttp of calves b/l. Tense skin on RLE   SKIN : Warm and dry, no lesions or erythema  PSYCHIATRIC : Appropriate affect         Assessment & Plan :    Criss Tyler was seen today for leg swelling and joint swelling. Diagnoses and all orders for this visit:    Right leg swelling - advised to continue bumex and potassium. Call Dr. Luz Polo office to schedule an appointement, call with difficulties. Labs and US DVT today. Advised compression stockings. Salt restriction. RTO 2 weeks. FU with gen surg and onc as scheduled. -     potassium chloride (KLOR-CON) 20 MEQ packet; Take 20 mEq by mouth daily  -     Cancel: US DUP LOWER EXTREMITY RIGHT NOEMÍ; Future  -     BASIC METABOLIC PANEL; Future  -     Discontinue: Compression Stockings MISC; by Does not apply route  -     US DUP LOWER EXTREMITY LEFT NOEMÍ; Future  -     US DUP LOWER EXTREMITY RIGHT NOEMÍ; Future  -     Compression Stockings MISC; by Does not apply route      RTO in   Future Appointments  Date Time Provider Alek Susi   9/18/2018 11:00 AM Saurabh Ponce MD [facility]       ----------------------------------------------------------------  Signed by : Pankaj Dominguez M.D., PGY-3  This case was discussed with Dr Sesar Zavala (s)    This document is generated, in part, by voice recognition software and thus  syntax and grammatical errors are possible.

## 2018-09-06 NOTE — PROGRESS NOTES
S: 62 y.o. male for follow up of hospitalization, olecranon bursitis. Right leg swelling, had not been taking Bumex recently. Resumed Bumex. Now right leg feels somewhat numb. No F/C. No redness. No CP or SOB. No history of blood clots. Olecranon bursitis. Using wrap, avoiding trauma. Using compression. Filled up with fluid again. O: VS: BP (!) 160/83 (Site: Right Arm, Position: Sitting, Cuff Size: Medium Adult)   Pulse 82   Temp 97.8 °F (36.6 °C) (Oral)   Resp 18   Ht 5' 8\" (1.727 m)   Wt 283 lb (128.4 kg)   SpO2 94%   BMI 43.03 kg/m²    General: NAD, appropriate affect and grooming   CV:  RRR, no gallops, rubs, or murmurs   Resp: CTAB   Abd:  Soft, nontender   Ext:  Right elbow with fluctuance, bursitis, no tenderness. ROM intact. ROM intact at LE. Right more than left LE edema, stasis changes, no erythema, no induration. No fluctuance, no abscess. Pulses intact and symmetric bilaterally. Impression: Worsening chronic LE edema, olecranon bursitis. Plan: Continue Bumex, check BMP. Follow up with nephrology, evaluate for secondary causes, following for abdominal mass, seeing surgery and oncology. Salt restriction. US stat. Then compression stockings. Avoid NSAIDs. RTO one week or sooner prn. Watch BP, consider increasing medications, but will need to verify what medications he is taking. Continue supportive measures for elbow. Attending Physician Statement  I have discussed the case, including pertinent history and exam findings with the resident. I agree with the documented assessment and plan.

## 2018-09-11 NOTE — PROGRESS NOTES
remainder of the day due to having had anesthesia. PARKING INSTRUCTIONS:   · [x] Arrival Time 0900 [x] Parking lot 1 is located on [facility] (the corner of [facility] and [facility]). To enter, press the button and the gate will lift. A free token will be provided to exit the lot. One car per patient is allowed to park in this lot. All other cars are to park on [address] [place] [address] Street either in the parking garage or the handicap lot. [] Free  parking is available on [address] [place] [address] Street. · [] To reach the [facility] lobby from [address] [place] [address], upon entering the hospital, take elevator B to the 3rd floor. EDUCATION INSTRUCTIONS:      [] Knee or hip replacement booklet & exercise pamphlets given. [] Román Alcaraz placed in chart. [] Pre-admission Testing educational folder given  [] Incentive Spirometry,coughing & deep breathing exercises reviewed. []Medication information sheet(s)   []Fluoroscopy-Xray used in surgery reviewed with patient. Educational pamphlet placed in chart. []Pain: Post-op pain is normal and to be expected. You will be asked to rate your pain from 0-10(a zero is not acceptable-education is needed). Your post-op pain goal is:  [] Ask your nurse for your pain medication. [] Joint camp offered. [] Joint replacement booklets given. []Other     MEDICATION INSTRUCTIONS:   [x]Bring a complete list of your medications, please write the last time you took the medicine, give this list to the nurse. [x] Take the following medications the morning of surgery with 1-2 ounces of water:   [] Stop herbal supplements and vitamins 5 days before your surgery. [x] DO NOT take any diabetic medicine the morning of surgery. Follow instructions for insulin the day before surgery. [x] If you are diabetic and your blood sugar is low or you feel symptomatic, you may drink 1-2 ounces of apple juice or take a glucose tablet.   The morning of your procedure, you may

## 2018-09-12 ENCOUNTER — TELEPHONE (OUTPATIENT)
Dept: FAMILY MEDICINE CLINIC | Age: 58
End: 2018-09-12

## 2018-09-12 NOTE — TELEPHONE ENCOUNTER
[facility] phoned stated that they need to cpap pressures for patients cpap.   Please advise  Thank you april

## 2018-09-14 ENCOUNTER — ANESTHESIA (OUTPATIENT)
Dept: ENDOSCOPY | Age: 58
End: 2018-09-14
Payer: MEDICARE

## 2018-09-14 ENCOUNTER — HOSPITAL ENCOUNTER (OUTPATIENT)
Age: 58
Setting detail: OUTPATIENT SURGERY
Discharge: HOME | End: 2018-09-14
Attending: SURGERY | Admitting: SURGERY
Payer: MEDICARE

## 2018-09-14 ENCOUNTER — ANESTHESIA EVENT (OUTPATIENT)
Dept: ENDOSCOPY | Age: 58
End: 2018-09-14
Payer: MEDICARE

## 2018-09-14 VITALS — DIASTOLIC BLOOD PRESSURE: 63 MMHG | SYSTOLIC BLOOD PRESSURE: 134 MMHG | OXYGEN SATURATION: 93 %

## 2018-09-14 VITALS
TEMPERATURE: 98.2 F | SYSTOLIC BLOOD PRESSURE: 179 MMHG | DIASTOLIC BLOOD PRESSURE: 85 MMHG | RESPIRATION RATE: 20 BRPM | HEART RATE: 81 BPM | OXYGEN SATURATION: 95 % | HEIGHT: 68 IN | WEIGHT: 283 LBS | BODY MASS INDEX: 42.89 KG/M2

## 2018-09-14 DIAGNOSIS — E11.42 TYPE 2 DIABETES MELLITUS WITH DIABETIC POLYNEUROPATHY, UNSPECIFIED WHETHER LONG TERM INSULIN USE (HCC): Primary | ICD-10-CM

## 2018-09-14 LAB — METER GLUCOSE: 131 MG/DL (ref 70–110)

## 2018-09-14 PROCEDURE — 3609010600 HC COLONOSCOPY POLYPECTOMY SNARE/COLD BIOPSY: Performed by: SURGERY

## 2018-09-14 PROCEDURE — 3700000001 HC ADD 15 MINUTES (ANESTHESIA): Performed by: SURGERY

## 2018-09-14 PROCEDURE — 7100000010 HC PHASE II RECOVERY - FIRST 15 MIN: Performed by: SURGERY

## 2018-09-14 PROCEDURE — 45385 COLONOSCOPY W/LESION REMOVAL: CPT | Performed by: SURGERY

## 2018-09-14 PROCEDURE — 82962 GLUCOSE BLOOD TEST: CPT

## 2018-09-14 PROCEDURE — 7100000011 HC PHASE II RECOVERY - ADDTL 15 MIN: Performed by: SURGERY

## 2018-09-14 PROCEDURE — 2580000003 HC RX 258: Performed by: ANESTHESIOLOGIST ASSISTANT

## 2018-09-14 PROCEDURE — 88305 TISSUE EXAM BY PATHOLOGIST: CPT

## 2018-09-14 PROCEDURE — 2720000010 HC SURG SUPPLY STERILE: Performed by: SURGERY

## 2018-09-14 PROCEDURE — 3609010300 HC COLONOSCOPY W/BIOPSY SINGLE/MULTIPLE: Performed by: SURGERY

## 2018-09-14 PROCEDURE — 6360000002 HC RX W HCPCS: Performed by: ANESTHESIOLOGIST ASSISTANT

## 2018-09-14 PROCEDURE — C1773 RET DEV, INSERTABLE: HCPCS | Performed by: SURGERY

## 2018-09-14 PROCEDURE — 3700000000 HC ANESTHESIA ATTENDED CARE: Performed by: SURGERY

## 2018-09-14 PROCEDURE — 2709999900 HC NON-CHARGEABLE SUPPLY: Performed by: SURGERY

## 2018-09-14 RX ORDER — SODIUM CHLORIDE 9 MG/ML
INJECTION, SOLUTION INTRAVENOUS CONTINUOUS
Status: DISCONTINUED | OUTPATIENT
Start: 2018-09-14 | End: 2018-09-14 | Stop reason: HOSPADM

## 2018-09-14 RX ORDER — PROPOFOL 10 MG/ML
INJECTION, EMULSION INTRAVENOUS PRN
Status: DISCONTINUED | OUTPATIENT
Start: 2018-09-14 | End: 2018-09-14 | Stop reason: SDUPTHER

## 2018-09-14 RX ORDER — 0.9 % SODIUM CHLORIDE 0.9 %
10 VIAL (ML) INJECTION PRN
Status: DISCONTINUED | OUTPATIENT
Start: 2018-09-14 | End: 2018-09-14 | Stop reason: HOSPADM

## 2018-09-14 RX ORDER — 0.9 % SODIUM CHLORIDE 0.9 %
10 VIAL (ML) INJECTION EVERY 12 HOURS SCHEDULED
Status: DISCONTINUED | OUTPATIENT
Start: 2018-09-14 | End: 2018-09-14 | Stop reason: HOSPADM

## 2018-09-14 RX ORDER — SODIUM CHLORIDE 9 MG/ML
INJECTION, SOLUTION INTRAVENOUS CONTINUOUS PRN
Status: DISCONTINUED | OUTPATIENT
Start: 2018-09-14 | End: 2018-09-14 | Stop reason: SDUPTHER

## 2018-09-14 RX ADMIN — SODIUM CHLORIDE: 9 INJECTION, SOLUTION INTRAVENOUS at 10:05

## 2018-09-14 RX ADMIN — PROPOFOL 750 MG: 10 INJECTION, EMULSION INTRAVENOUS at 10:06

## 2018-09-14 ASSESSMENT — PAIN SCALES - GENERAL
PAINLEVEL_OUTOF10: 0
PAINLEVEL_OUTOF10: 0

## 2018-09-14 ASSESSMENT — PAIN DESCRIPTION - DESCRIPTORS: DESCRIPTORS: CONSTANT;SORE

## 2018-09-14 ASSESSMENT — LIFESTYLE VARIABLES: SMOKING_STATUS: 1

## 2018-09-14 ASSESSMENT — PAIN - FUNCTIONAL ASSESSMENT: PAIN_FUNCTIONAL_ASSESSMENT: 0-10

## 2018-09-14 NOTE — ANESTHESIA PRE PROCEDURE
Years: 30.00     Types: Cigarettes    Smokeless tobacco: Never Used    Alcohol use 1.2 oz/week     2 Shots of liquor per week      Comment: 2x week                                Ready to quit: Not Answered  Counseling given: Not Answered      Vital Signs (Current):   Vitals:    09/11/18 0840 09/14/18 0924   BP:  (!) 184/93   Pulse:  94   Resp:  18   Temp:  98 °F (36.7 °C)   SpO2:  97%   Weight: 283 lb (128.4 kg) 283 lb (128.4 kg)   Height: 5' 8\" (1.727 m) 5' 8\" (1.727 m)                                              BP Readings from Last 3 Encounters:   09/14/18 (!) 184/93   09/06/18 (!) 160/83   08/30/18 (!) 163/83       NPO Status: Time of last liquid consumption: 2359                        Time of last solid consumption:  (patient does not remember what time he last ate)                        Date of last liquid consumption: 09/13/18                        Date of last solid food consumption: 09/12/18    BMI:   Wt Readings from Last 3 Encounters:   09/14/18 283 lb (128.4 kg)   09/06/18 283 lb (128.4 kg)   08/30/18 285 lb (129.3 kg)     Body mass index is 43.03 kg/m². CBC:   Lab Results   Component Value Date    WBC 6.8 08/22/2018    RBC 4.21 08/22/2018    HGB 13.4 08/22/2018    HCT 40.8 08/22/2018    MCV 96.9 08/22/2018    RDW 12.7 08/22/2018     08/22/2018       CMP:   Lab Results   Component Value Date     09/06/2018    K 4.2 09/06/2018    K 3.7 03/10/2018    CL 92 09/06/2018    CO2 24 09/06/2018    BUN 11 09/06/2018    CREATININE 0.7 09/06/2018    GFRAA >60 09/06/2018    LABGLOM >60 09/06/2018    GLUCOSE 211 09/06/2018    PROT 6.1 08/17/2018    CALCIUM 9.7 09/06/2018    BILITOT 0.4 08/17/2018    ALKPHOS 73 08/17/2018    AST 37 08/17/2018    ALT 36 08/17/2018       POC Tests: No results for input(s): POCGLU, POCNA, POCK, POCCL, POCBUN, POCHEMO, POCHCT in the last 72 hours.     Coags:   Lab Results   Component Value Date    PROTIME 11.4 08/17/2018    INR 1.0 08/17/2018    APTT 28.7

## 2018-09-14 NOTE — H&P
may require biopsy or polypectomy. These procedures may increase the risk of complication. All questions were answered. The patient verbalized understanding and agreed to proceed.                    Eran López MD, FACS      UPDATED 9/14/18  History and physical unchanged  For colonoscopy    Eran López MD, [facility]  9/14/2018  8:54 AM

## 2018-09-18 ENCOUNTER — OFFICE VISIT (OUTPATIENT)
Dept: FAMILY MEDICINE CLINIC | Age: 58
End: 2018-09-18
Payer: MEDICARE

## 2018-09-18 ENCOUNTER — HOSPITAL ENCOUNTER (OUTPATIENT)
Age: 58
Discharge: HOME | End: 2018-09-20
Payer: MEDICARE

## 2018-09-18 VITALS
SYSTOLIC BLOOD PRESSURE: 124 MMHG | TEMPERATURE: 98.5 F | BODY MASS INDEX: 43.95 KG/M2 | OXYGEN SATURATION: 97 % | DIASTOLIC BLOOD PRESSURE: 76 MMHG | RESPIRATION RATE: 16 BRPM | WEIGHT: 290 LBS | HEIGHT: 68 IN | HEART RATE: 76 BPM

## 2018-09-18 DIAGNOSIS — I49.9 IRREGULAR HEART RATE: ICD-10-CM

## 2018-09-18 DIAGNOSIS — G56.01 CARPAL TUNNEL SYNDROME OF RIGHT WRIST: ICD-10-CM

## 2018-09-18 DIAGNOSIS — R19.09 ABDOMINAL MASS OF OTHER SITE: Primary | ICD-10-CM

## 2018-09-18 LAB
ANION GAP SERPL CALCULATED.3IONS-SCNC: 17 MMOL/L (ref 7–16)
BUN BLDV-MCNC: 17 MG/DL (ref 6–20)
CALCIUM SERPL-MCNC: 8.9 MG/DL (ref 8.6–10.2)
CHLORIDE BLD-SCNC: 94 MMOL/L (ref 98–107)
CO2: 26 MMOL/L (ref 22–29)
CREAT SERPL-MCNC: 0.7 MG/DL (ref 0.7–1.2)
GFR AFRICAN AMERICAN: >60
GFR NON-AFRICAN AMERICAN: >60 ML/MIN/1.73
GLUCOSE BLD-MCNC: 164 MG/DL (ref 74–109)
POTASSIUM SERPL-SCNC: 4.2 MMOL/L (ref 3.5–5)
SODIUM BLD-SCNC: 137 MMOL/L (ref 132–146)

## 2018-09-18 PROCEDURE — 36415 COLL VENOUS BLD VENIPUNCTURE: CPT | Performed by: FAMILY MEDICINE

## 2018-09-18 PROCEDURE — 80048 BASIC METABOLIC PNL TOTAL CA: CPT

## 2018-09-18 PROCEDURE — 93005 ELECTROCARDIOGRAM TRACING: CPT | Performed by: FAMILY MEDICINE

## 2018-09-18 PROCEDURE — 99213 OFFICE O/P EST LOW 20 MIN: CPT | Performed by: FAMILY MEDICINE

## 2018-09-18 PROCEDURE — 4004F PT TOBACCO SCREEN RCVD TLK: CPT | Performed by: FAMILY MEDICINE

## 2018-09-18 PROCEDURE — 1111F DSCHRG MED/CURRENT MED MERGE: CPT | Performed by: FAMILY MEDICINE

## 2018-09-18 PROCEDURE — G8427 DOCREV CUR MEDS BY ELIG CLIN: HCPCS | Performed by: FAMILY MEDICINE

## 2018-09-18 PROCEDURE — 3017F COLORECTAL CA SCREEN DOC REV: CPT | Performed by: FAMILY MEDICINE

## 2018-09-18 PROCEDURE — 36415 COLL VENOUS BLD VENIPUNCTURE: CPT

## 2018-09-18 PROCEDURE — G8417 CALC BMI ABV UP PARAM F/U: HCPCS | Performed by: FAMILY MEDICINE

## 2018-09-18 PROCEDURE — 93010 ELECTROCARDIOGRAM REPORT: CPT | Performed by: FAMILY MEDICINE

## 2018-09-18 NOTE — PROGRESS NOTES
of right hand numbness - history of carpal tunnel.          Physical Exam :    VITAL SIGNS : Blood pressure 124/76, pulse 76, temperature 98.5 °F (36.9 °C), temperature source Oral, resp. rate 16, height 5' 8\" (1.727 m), weight 290 lb (131.5 kg), SpO2 97 %. GENERAL APPEARANCE : NAD, cooperative, appears stated age  LUNGS : Respiration unlabored, clear sounds b/l  HEART : Irregularly irregular heart rate , normal S1/S2, no murmur noted  ABDOMEN : Normoactive bowel sounds, soft, non-tender, no masses  EXTREMITIES : Right LE with 1+ edema. LLE normal,  wearing compression stockings on both legs. No cyanosis, peripheral pulses intact. No swelling of right wrist. + tinel and Phalen tests on right side reproducing symptoms  Effusion in right elbow, no change from last visit. No erythema or drainage. Normal ROM. SKIN : Warm and dry, no lesions or erythema  PSYCHIATRIC : Appropriate affect    EKG in office 9/18 showed normal sinus rhythm with PACs. Assessment & Plan :    Idania Levine was seen today for other. Diagnoses and all orders for this visit:    Abdominal mass of other site- pt looking to follow up at [address] [place] [address] for the abdominal mass. Advised to speak with insurance to get the transport covered. Communicate with Dr. Briseida Rico about next plan. Will obtain heme-onc records    Irregular heart rate  -     EKG 12 Lead; Future  -     BASIC METABOLIC PANEL;  Future  -     EKG 12 Lead    Carpal tunnel syndrome of right wrist    Olecranon bursitis  -Discussed the risks of infection with repeated aspirations when this will likely recur  -Sergo Cruz to avoid trauma to the area and wrap the elbow wit padding under ACE bandage  -No intervention at this time  -Advised to contact this office if he starts to experience pain or swelling increases significantly    Right lower extremity swelling  -Discussed that this may be due to abdominal mass compressing LE venous return  -Continue compression stockings  -Continue Bumex 1 mg

## 2018-09-20 ENCOUNTER — TELEPHONE (OUTPATIENT)
Dept: FAMILY MEDICINE CLINIC | Age: 58
End: 2018-09-20

## 2018-10-18 ENCOUNTER — OFFICE VISIT (OUTPATIENT)
Dept: FAMILY MEDICINE CLINIC | Age: 58
End: 2018-10-18
Payer: MEDICARE

## 2018-10-18 ENCOUNTER — HOSPITAL ENCOUNTER (OUTPATIENT)
Age: 58
Discharge: HOME | End: 2018-10-20
Payer: MEDICARE

## 2018-10-18 VITALS
OXYGEN SATURATION: 98 % | WEIGHT: 287 LBS | HEIGHT: 68 IN | RESPIRATION RATE: 18 BRPM | DIASTOLIC BLOOD PRESSURE: 76 MMHG | TEMPERATURE: 98.1 F | SYSTOLIC BLOOD PRESSURE: 144 MMHG | BODY MASS INDEX: 43.5 KG/M2 | HEART RATE: 80 BPM

## 2018-10-18 DIAGNOSIS — E83.42 HYPOMAGNESEMIA: ICD-10-CM

## 2018-10-18 DIAGNOSIS — E87.6 HYPOKALEMIA: ICD-10-CM

## 2018-10-18 DIAGNOSIS — I10 ESSENTIAL HYPERTENSION: ICD-10-CM

## 2018-10-18 DIAGNOSIS — M79.89 LEG SWELLING: ICD-10-CM

## 2018-10-18 DIAGNOSIS — M71.20 SYNOVIAL CYST OF POPLITEAL SPACE, UNSPECIFIED LATERALITY: Primary | ICD-10-CM

## 2018-10-18 PROCEDURE — 80048 BASIC METABOLIC PNL TOTAL CA: CPT

## 2018-10-18 PROCEDURE — 4004F PT TOBACCO SCREEN RCVD TLK: CPT | Performed by: STUDENT IN AN ORGANIZED HEALTH CARE EDUCATION/TRAINING PROGRAM

## 2018-10-18 PROCEDURE — 3017F COLORECTAL CA SCREEN DOC REV: CPT | Performed by: STUDENT IN AN ORGANIZED HEALTH CARE EDUCATION/TRAINING PROGRAM

## 2018-10-18 PROCEDURE — G8482 FLU IMMUNIZE ORDER/ADMIN: HCPCS | Performed by: STUDENT IN AN ORGANIZED HEALTH CARE EDUCATION/TRAINING PROGRAM

## 2018-10-18 PROCEDURE — G8427 DOCREV CUR MEDS BY ELIG CLIN: HCPCS | Performed by: STUDENT IN AN ORGANIZED HEALTH CARE EDUCATION/TRAINING PROGRAM

## 2018-10-18 PROCEDURE — G8417 CALC BMI ABV UP PARAM F/U: HCPCS | Performed by: STUDENT IN AN ORGANIZED HEALTH CARE EDUCATION/TRAINING PROGRAM

## 2018-10-18 PROCEDURE — 99213 OFFICE O/P EST LOW 20 MIN: CPT | Performed by: STUDENT IN AN ORGANIZED HEALTH CARE EDUCATION/TRAINING PROGRAM

## 2018-10-18 RX ORDER — ACETAMINOPHEN 160 MG
1 TABLET,DISINTEGRATING ORAL DAILY
COMMUNITY

## 2018-10-18 RX ORDER — LOSARTAN POTASSIUM 25 MG/1
25 TABLET ORAL DAILY
Qty: 30 TABLET | Refills: 3 | Status: SHIPPED | OUTPATIENT
Start: 2018-10-18 | End: 2019-03-26 | Stop reason: SDUPTHER

## 2018-10-18 RX ORDER — BUMETANIDE 1 MG/1
1 TABLET ORAL 2 TIMES DAILY
Qty: 60 TABLET | Refills: 1 | Status: SHIPPED | OUTPATIENT
Start: 2018-10-18 | End: 2018-12-14 | Stop reason: SDUPTHER

## 2018-10-18 RX ORDER — MAGNESIUM CHLORIDE 64 MG
1 TABLET, DELAYED RELEASE (ENTERIC COATED) ORAL 2 TIMES DAILY WITH MEALS
Qty: 60 TABLET | Refills: 0 | Status: SHIPPED | OUTPATIENT
Start: 2018-10-18 | End: 2018-11-27 | Stop reason: SDUPTHER

## 2018-10-18 RX ORDER — SPIRONOLACTONE 25 MG/1
25 TABLET ORAL 2 TIMES DAILY
Qty: 30 TABLET | Refills: 2 | Status: SHIPPED | OUTPATIENT
Start: 2018-10-18 | End: 2019-02-12 | Stop reason: SDUPTHER

## 2018-10-18 RX ORDER — TERAZOSIN 2 MG/1
2 CAPSULE ORAL NIGHTLY
Qty: 30 CAPSULE | Refills: 1 | Status: SHIPPED | OUTPATIENT
Start: 2018-10-18 | End: 2018-12-06 | Stop reason: SDUPTHER

## 2018-10-18 RX ORDER — PEN NEEDLE, DIABETIC 31 GX5/16"
1 NEEDLE, DISPOSABLE MISCELLANEOUS NIGHTLY
Refills: 3 | COMMUNITY
Start: 2018-10-11 | End: 2019-01-22 | Stop reason: SDUPTHER

## 2018-10-18 RX ORDER — INFLUENZA A VIRUS A/SINGAPORE/GP1908/2015 IVR-180A (H1N1) ANTIGEN (PROPIOLACTONE INACTIVATED), INFLUENZA A VIRUS A/SINGAPORE/INFIMH-16-0019/2016 IVR-186 (H3N2) ANTIGEN (PROPIOLACTONE INACTIVATED), INFLUENZA B VIRUS B/MARYLAND/15/2016 ANTIGEN (PROPIOLACTONE INACTIVATED), AND INFLUENZA B VIRUS B/PHUKET/3073/2013 BVR-1B ANTIGEN (PROPIOLACTONE INACTIVATED) 15; 15; 15; 15 UG/.5ML; UG/.5ML; UG/.5ML; UG/.5ML
1 INJECTION, SUSPENSION INTRAMUSCULAR ONCE
Refills: 0 | COMMUNITY
Start: 2018-09-24 | End: 2018-10-18 | Stop reason: ALTCHOICE

## 2018-10-18 ASSESSMENT — ENCOUNTER SYMPTOMS: ABDOMINAL PAIN: 0

## 2018-10-18 NOTE — PROGRESS NOTES
[address] [facility]  Department of Family Medicine  Family Medicine Residency Program      Patient:  Neo Collins 62 y.o. male     Date of Service: 10/18/18      Chief complaint:   Chief Complaint   Patient presents with    Hypertension    Diabetes         History of Present Illness   The patient is a 62 y.o. male  presented to the clinic with complaints as above. Knee Lump  Patient complains of pain, and lump behind his right knee. States the pain and lump began about one week ago. Also notes having a lump behind his left knee as well, but that one does not bother. He describes a lump in his right knee has hard, consistent with a warm left knee is soft. Due to his peripheral neuropathy has difficulty feeling anything in his legs. States that he has some pain in his leg, but then now feels that the lump in his right knee is causing increasing pain in his leg, especially diffusely throughout his knee. Pain is worse with walking, and better with rest.Of note the patient still to his diuretics, and the right olecranon bursitis has reocurred. Patient currently taking ibuprofen and Tramadol for pain, as gabapentin upsets his stomach. Hypomagnesia  Patient had low magnesium all in the hospital, and had a magnesium checked by Dr. Eduardo Layne, but the results. Magnesium is 1.4. Currently not on any supplementation. Extra cecal mass  Extra cecal mass was seen on CT while in the hospital. On further discussion with the radiologist Dr. 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 If the mass does grow they will send the patient to [facility] clinic. Polyp  8 mm polyp noted recent colonoscopy, surgical pathology was positive for tubular normal adenoma. He is to get another colonoscopy in 3 years.          PastMedical History:      Diagnosis Date    Arthritis     Carpal tunnel syndrome     CHF (congestive heart failure) (HCC)     Diabetes mellitus ([facility]     Hyperlipidemia     Hypertension     Neuropathy     Obesity     bmi 43.1 weight 283 #    Sleep apnea     c pap       Past Surgical History:        Procedure Laterality Date    COLONOSCOPY  09/14/2018    polyp--eliane    NASAL SEPTUM SURGERY      MO COLONOSCOPY W/BIOPSY SINGLE/MULTIPLE N/A 9/14/2018    COLONOSCOPY WITH BIOPSY performed by Carlos Ricardo MD at [address] TUTTLE W/RMVL OF TUMOR POLYP LESION SNARE TQ  9/14/2018    COLONOSCOPY POLYPECTOMY SNARE/COLD BIOPSY performed by Carlos Ricardo MD at [address]         Allergies:    Lasix [furosemide]; Lisinopril; and Atenolol    Social History:   Social History     Social History    Marital status:      Spouse name: N/A    Number of children: N/A    Years of education: N/A     Occupational History    Not on file. Social History Main Topics    Smoking status: Current Every Day Smoker     Packs/day: 0.50     Years: 30.00     Types: Cigarettes    Smokeless tobacco: Never Used    Alcohol use 1.2 oz/week     2 Shots of liquor per week      Comment: 2x week    Drug use: No    Sexual activity: Not on file     Other Topics Concern    Not on file     Social History Narrative    No narrative on file        Family History:   No family history on file. Review of Systems:   Review of Systems   Cardiovascular: Negative for chest pain. Gastrointestinal: Negative for abdominal pain. Genitourinary: Negative for dysuria. Neurological: Negative for dizziness. Everything also stated in HPI    Physical Exam   Vitals: BP (!) 144/76   Pulse 80   Temp 98.1 °F (36.7 °C) (Oral)   Resp 18   Ht 5' 8\" (1.727 m)   Wt 287 lb (130.2 kg)   SpO2 98%   BMI 43.64 kg/m²   General Appearance: Well developed, awake,alert, oriented, no acute distress, obese  HEENT: Normocephalic, atraumatic. Chest wall/Lung: Clear toauscultation bilaterally,  respirations unlabored. repeat colonoscopy. Return to Office: Return in about 1 month (around 11/18/2018). Medication List:    Current Outpatient Prescriptions   Medication Sig Dispense Refill    B-D ULTRAFINE III SHORT PEN 31G X 8 MM MISC Inject 1 each into the skin nightly  3    Cholecalciferol (VITAMIN D3) 2000 units CAPS Take 1 capsule by mouth daily      potassium chloride (KLOR-CON) 20 MEQ packet Take 20 mEq by mouth daily 30 each 3    Compression Stockings MISC by Does not apply route 1 each 0    bumetanide (BUMEX) 1 MG tablet Take 1 tablet by mouth 2 times daily 60 tablet 1    insulin glargine (LANTUS SOLOSTAR) 100 UNIT/ML injection pen Inject 35 Units into the skin nightly 5 pen 3    spironolactone (ALDACTONE) 25 MG tablet Take 1 tablet by mouth 2 times daily 30 tablet 2    losartan (COZAAR) 25 MG tablet Take 1 tablet by mouth daily 30 tablet 3    traMADol (ULTRAM) 50 MG tablet Take 50 mg by mouth every 6 hours as needed for Pain. David Gails amLODIPine (NORVASC) 10 MG tablet Take 1 tablet by mouth daily 90 tablet 0    atorvastatin (LIPITOR) 40 MG tablet Take 1 tablet by mouth nightly 90 tablet 0    terazosin (HYTRIN) 2 MG capsule Take 2 mg by mouth nightly      metoprolol tartrate (LOPRESSOR) 50 MG tablet Take 50 mg by mouth 2 times daily      sildenafil (VIAGRA) 100 MG tablet Take 100 mg by mouth as needed. Take an hour before sex. Med info obtained from [place] records.  ibuprofen (ADVIL;MOTRIN) 800 MG tablet Take 800 mg by mouth 3 times daily as needed. Take with food. Med info obtained from [place] records   Indications: Pain       No current facility-administered medications for this visit.          Tessy Arriola MD PGY-2    This case was discussedwith (s)  Ann-Marie

## 2018-10-18 NOTE — PROGRESS NOTES
Nathan Bravo Karina 62 y.o. male  here for follow up to multiple concerns. Chief concerns of masses behind both knees; paracecal mass (incidental finding); LE edema with complication of severe hypokalemia (both resolved and under adequate management); follow up of magnesium level (Mg 1.4 which is currently not supplemented)  Hx neuropathy (paresthetic); complex pain management regimen. Also requesting refills of HTN and diuretic medication  PMH:  Complicated medical history  O: VS: BP (!) 144/76   Pulse 80   Temp 98.1 °F (36.7 °C) (Oral)   Resp 18   Ht 5' 8\" (1.727 m)   Wt 287 lb (130.2 kg)   SpO2 98%   BMI 43.64 kg/m²    General: NAD   CV:  RRR, no gallops, rubs, or murmurs   Resp: CTAB no R/R/W   Abd:  Soft, nontender, no masses    Ext:  no C/C/E  Impression/Plan:   1. Ortho consultation for baker's cyst and persistent/recurrent bursitis  2. LE edema complicated by hypokalemia/hypomagnesemia. Other than magnesium deficiency, others are resolved. Consider magnesium suplemntation  3. Refill of HTN and diuretic medication      Attending Physician Statement  I have discussed the case, including pertinent history and exam findings with the resident. I agree with the documented assessment and plan. I was present when the patient was in the clinic. I have discussed the case, including pertinent history and exam findings with the resident.   I agree with the assessment, plan, and orders as documented by the resident          Sonido Loza MD

## 2018-10-19 LAB
ANION GAP SERPL CALCULATED.3IONS-SCNC: 14 MMOL/L (ref 7–16)
BUN BLDV-MCNC: 12 MG/DL (ref 6–20)
CALCIUM SERPL-MCNC: 9.3 MG/DL (ref 8.6–10.2)
CHLORIDE BLD-SCNC: 97 MMOL/L (ref 98–107)
CO2: 28 MMOL/L (ref 22–29)
CREAT SERPL-MCNC: 0.7 MG/DL (ref 0.7–1.2)
GFR AFRICAN AMERICAN: >60
GFR NON-AFRICAN AMERICAN: >60 ML/MIN/1.73
GLUCOSE BLD-MCNC: 165 MG/DL (ref 74–109)
POTASSIUM SERPL-SCNC: 4.6 MMOL/L (ref 3.5–5)
SODIUM BLD-SCNC: 139 MMOL/L (ref 132–146)

## 2018-11-07 RX ORDER — ATORVASTATIN CALCIUM 40 MG/1
40 TABLET, FILM COATED ORAL NIGHTLY
Qty: 90 TABLET | Refills: 0 | Status: SHIPPED | OUTPATIENT
Start: 2018-11-07 | End: 2020-06-16 | Stop reason: SDUPTHER

## 2018-11-07 RX ORDER — AMLODIPINE BESYLATE 10 MG/1
10 TABLET ORAL DAILY
Qty: 90 TABLET | Refills: 0 | Status: SHIPPED | OUTPATIENT
Start: 2018-11-07 | End: 2021-08-17 | Stop reason: SDUPTHER

## 2018-11-19 ENCOUNTER — OFFICE VISIT (OUTPATIENT)
Dept: FAMILY MEDICINE CLINIC | Age: 58
End: 2018-11-19
Payer: MEDICARE

## 2018-11-19 VITALS
HEART RATE: 86 BPM | WEIGHT: 292 LBS | BODY MASS INDEX: 44.25 KG/M2 | SYSTOLIC BLOOD PRESSURE: 120 MMHG | HEIGHT: 68 IN | TEMPERATURE: 97.6 F | OXYGEN SATURATION: 95 % | DIASTOLIC BLOOD PRESSURE: 64 MMHG | RESPIRATION RATE: 18 BRPM

## 2018-11-19 DIAGNOSIS — J06.9 VIRAL URI: ICD-10-CM

## 2018-11-19 DIAGNOSIS — R06.2 WHEEZING: ICD-10-CM

## 2018-11-19 DIAGNOSIS — H61.20 EXCESSIVE CERUMEN IN EAR CANAL, UNSPECIFIED LATERALITY: Primary | ICD-10-CM

## 2018-11-19 PROBLEM — M71.21 SYNOVIAL CYST OF RIGHT POPLITEAL SPACE: Status: ACTIVE | Noted: 2018-11-19

## 2018-11-19 PROCEDURE — 3017F COLORECTAL CA SCREEN DOC REV: CPT | Performed by: FAMILY MEDICINE

## 2018-11-19 PROCEDURE — G8482 FLU IMMUNIZE ORDER/ADMIN: HCPCS | Performed by: FAMILY MEDICINE

## 2018-11-19 PROCEDURE — 99212 OFFICE O/P EST SF 10 MIN: CPT | Performed by: FAMILY MEDICINE

## 2018-11-19 PROCEDURE — 99213 OFFICE O/P EST LOW 20 MIN: CPT | Performed by: FAMILY MEDICINE

## 2018-11-19 PROCEDURE — G8417 CALC BMI ABV UP PARAM F/U: HCPCS | Performed by: FAMILY MEDICINE

## 2018-11-19 PROCEDURE — 4004F PT TOBACCO SCREEN RCVD TLK: CPT | Performed by: FAMILY MEDICINE

## 2018-11-19 PROCEDURE — G8427 DOCREV CUR MEDS BY ELIG CLIN: HCPCS | Performed by: FAMILY MEDICINE

## 2018-11-19 RX ORDER — ALBUTEROL SULFATE 90 UG/1
2 AEROSOL, METERED RESPIRATORY (INHALATION) EVERY 4 HOURS PRN
Qty: 1 INHALER | Refills: 3 | Status: SHIPPED | OUTPATIENT
Start: 2018-11-19 | End: 2021-08-17 | Stop reason: ALTCHOICE

## 2018-11-19 NOTE — PATIENT INSTRUCTIONS
injury;  · ear pain, itching, or other irritation;  · drainage, discharge, or bleeding from the ear; or  · warmth or swelling around the ear. Carbamide peroxide otic should not be used on a child younger than 15years old. How should I use carbamide peroxide? Use exactly as directed on the label, or as prescribed by your doctor. Do not use in larger or smaller amounts or for longer than recommended. Carbamide peroxide otic comes with patient instructions for safe and effective use. Follow these directions carefully. Ask your doctor or pharmacist if you have any questions. Wash your hands before and after using this medicine. To use the ear drops:  · Lie down or tilt your head with your ear facing upward. Open the ear canal by gently pulling your ear back, or pulling downward on the earlobe when giving this medicine to a child. · Hold the dropper upside down over your ear and drop the correct number of drops into the ear. · You may hear a bubbling sound inside your ear. This is caused by the foaming action of carbamide peroxide, which helps break up the wax inside your ear. · Stay lying down or with your head tilted for at least 5 minutes. You may use a small piece of cotton to plug the ear and keep the medicine from draining out. Follow your doctor's instructions about the use of cotton. · Do not touch the dropper tip or place it directly in your ear. It may become contaminated. Wipe the tip with a clean tissue but do not wash with water or soap. Carbamide peroxide may be packaged with a bulb syringe that is used to flush out your ear with water. To use the bulb syringe:  · Fill the syringe with warm water that is body temperature (no warmer than 98 degrees F). Do not use hot or cold water. · Hold your head sideways with your ear over a sink or bowl.  Gently pull your ear back to open the ear canal. Place the tip of the bulb syringe at the opening of your ear canal. Do not insert the tip into your ear.  · Squeeze the bulb syringe gently to release the water into your ear. Do not squirt the water with any force, or you could damage your ear drum. · Remove the syringe and allow the water to drain from your ear into the sink or bowl. Do not use carbamide peroxide for longer than 4 days in a row. Call your doctor if you still have excessive earwax after using this medicine, or if your symptoms get worse. Clean the bulb syringe by filling it with plain water and emptying it several times. Do not use soap or other cleaning chemicals. Allow the syringe to air dry. Keep the medicine bottle tightly closed and store it in the outer carton at room temperature, away from moisture and heat. What happens if I miss a dose? Since carbamide peroxide otic is used when needed, you may not be on a dosing schedule. If you are on a schedule, use the missed dose as soon as you remember. Skip the missed dose if it is almost time for your next scheduled dose. Do not use extra medicine to make up the missed dose. What happens if I overdose? An overdose of carbamide peroxide otic is not expected to be dangerous. Seek emergency medical attention or call the Poison Help line at 1-753.274.1724 if anyone has accidentally swallowed the medication. What should I avoid while using carbamide peroxide? Avoid getting this medicine in your eyes or mouth. Do not use other ear drops unless your doctor has told you to. What are the possible side effects of carbamide peroxide? Get emergency medical help if you have signs of an allergic reaction: hives; difficult breathing; swelling of your face, lips, tongue, or throat. Stop using carbamide peroxide otic and call your doctor at once if you have:  · dizziness; or  · new or worsening ear problems.   Common side effects may include:  · a foaming or crackling sound in the ear after using the ear drops;  · temporary decrease in hearing after using the drops;  · mild feeling of fullness in the

## 2018-11-27 DIAGNOSIS — E83.42 HYPOMAGNESEMIA: ICD-10-CM

## 2018-11-27 RX ORDER — MAGNESIUM CHLORIDE 64 MG
1 TABLET, DELAYED RELEASE (ENTERIC COATED) ORAL 2 TIMES DAILY WITH MEALS
Qty: 60 TABLET | Refills: 0 | Status: SHIPPED | OUTPATIENT
Start: 2018-11-27 | End: 2018-12-27 | Stop reason: SDUPTHER

## 2018-12-06 DIAGNOSIS — I10 ESSENTIAL HYPERTENSION: ICD-10-CM

## 2018-12-06 RX ORDER — TERAZOSIN 2 MG/1
2 CAPSULE ORAL NIGHTLY
Qty: 30 CAPSULE | Refills: 1 | Status: SHIPPED | OUTPATIENT
Start: 2018-12-06 | End: 2020-01-03

## 2018-12-07 ENCOUNTER — HOSPITAL ENCOUNTER (OUTPATIENT)
Dept: PULMONOLOGY | Age: 58
Discharge: HOME | End: 2018-12-07
Payer: MEDICARE

## 2018-12-07 DIAGNOSIS — R06.2 WHEEZING: ICD-10-CM

## 2018-12-14 DIAGNOSIS — M79.89 LEG SWELLING: ICD-10-CM

## 2018-12-14 RX ORDER — BUMETANIDE 1 MG/1
1 TABLET ORAL 2 TIMES DAILY
Qty: 60 TABLET | Refills: 1 | Status: SHIPPED | OUTPATIENT
Start: 2018-12-14 | End: 2019-05-30 | Stop reason: SDUPTHER

## 2018-12-18 ENCOUNTER — HOSPITAL ENCOUNTER (OUTPATIENT)
Dept: PULMONOLOGY | Age: 58
Discharge: HOME | End: 2018-12-18
Payer: MEDICARE

## 2018-12-18 PROCEDURE — 94729 DIFFUSING CAPACITY: CPT | Performed by: INTERNAL MEDICINE

## 2018-12-18 PROCEDURE — 94060 EVALUATION OF WHEEZING: CPT

## 2018-12-18 PROCEDURE — 94060 EVALUATION OF WHEEZING: CPT | Performed by: INTERNAL MEDICINE

## 2018-12-18 PROCEDURE — 94726 PLETHYSMOGRAPHY LUNG VOLUMES: CPT

## 2018-12-18 PROCEDURE — 94729 DIFFUSING CAPACITY: CPT

## 2018-12-18 PROCEDURE — 94726 PLETHYSMOGRAPHY LUNG VOLUMES: CPT | Performed by: INTERNAL MEDICINE

## 2018-12-20 NOTE — PROCEDURES
loop may indicate a intrathoracic  tracheal abnormality. Clinic correlation is needed.         Wade Szymanski DO    D: 12/19/2018 17:04:55       T: 12/19/2018 22:55:37     [email or web address]  Job#: [id number]     Doc#: [id number]    CC:

## 2018-12-27 DIAGNOSIS — E83.42 HYPOMAGNESEMIA: ICD-10-CM

## 2018-12-27 RX ORDER — MAGNESIUM CHLORIDE 64 MG
1 TABLET, DELAYED RELEASE (ENTERIC COATED) ORAL 2 TIMES DAILY WITH MEALS
Qty: 60 TABLET | Refills: 0 | Status: SHIPPED | OUTPATIENT
Start: 2018-12-27 | End: 2019-07-10 | Stop reason: SDUPTHER

## 2019-01-22 DIAGNOSIS — Z76.0 MEDICATION REFILL: ICD-10-CM

## 2019-01-23 RX ORDER — PEN NEEDLE, DIABETIC 31 GX5/16"
1 NEEDLE, DISPOSABLE MISCELLANEOUS NIGHTLY
Qty: 100 EACH | Refills: 3 | Status: SHIPPED | OUTPATIENT
Start: 2019-01-23 | End: 2020-05-18 | Stop reason: SDUPTHER

## 2019-02-12 ENCOUNTER — HOSPITAL ENCOUNTER (OUTPATIENT)
Age: 59
Discharge: HOME | End: 2019-02-14
Payer: MEDICARE

## 2019-02-12 ENCOUNTER — OFFICE VISIT (OUTPATIENT)
Dept: FAMILY MEDICINE CLINIC | Age: 59
End: 2019-02-12
Payer: MEDICARE

## 2019-02-12 VITALS
DIASTOLIC BLOOD PRESSURE: 90 MMHG | WEIGHT: 293 LBS | BODY MASS INDEX: 44.41 KG/M2 | SYSTOLIC BLOOD PRESSURE: 148 MMHG | HEART RATE: 68 BPM | HEIGHT: 68 IN | TEMPERATURE: 97.5 F | OXYGEN SATURATION: 97 %

## 2019-02-12 DIAGNOSIS — I10 ESSENTIAL HYPERTENSION: ICD-10-CM

## 2019-02-12 DIAGNOSIS — E08.8 DIABETES MELLITUS DUE TO UNDERLYING CONDITION WITH COMPLICATION, WITH LONG-TERM CURRENT USE OF INSULIN (HCC): ICD-10-CM

## 2019-02-12 DIAGNOSIS — G62.9 NEUROPATHY: ICD-10-CM

## 2019-02-12 DIAGNOSIS — Z79.4 DIABETES MELLITUS DUE TO UNDERLYING CONDITION WITH COMPLICATION, WITH LONG-TERM CURRENT USE OF INSULIN (HCC): ICD-10-CM

## 2019-02-12 DIAGNOSIS — E11.42 TYPE 2 DIABETES MELLITUS WITH DIABETIC POLYNEUROPATHY, UNSPECIFIED WHETHER LONG TERM INSULIN USE (HCC): Primary | ICD-10-CM

## 2019-02-12 LAB
ANION GAP SERPL CALCULATED.3IONS-SCNC: 13 MMOL/L (ref 7–16)
BUN BLDV-MCNC: 18 MG/DL (ref 6–20)
CALCIUM SERPL-MCNC: 9.3 MG/DL (ref 8.6–10.2)
CHLORIDE BLD-SCNC: 94 MMOL/L (ref 98–107)
CO2: 25 MMOL/L (ref 22–29)
CREAT SERPL-MCNC: 0.8 MG/DL (ref 0.7–1.2)
FOLATE: 16.3 NG/ML (ref 4.8–24.2)
GFR AFRICAN AMERICAN: >60
GFR NON-AFRICAN AMERICAN: >60 ML/MIN/1.73
GLUCOSE BLD-MCNC: 244 MG/DL (ref 74–99)
HBA1C MFR BLD: 9.8 %
POTASSIUM SERPL-SCNC: 4.4 MMOL/L (ref 3.5–5)
SODIUM BLD-SCNC: 132 MMOL/L (ref 132–146)
TSH SERPL DL<=0.05 MIU/L-ACNC: 1.11 UIU/ML (ref 0.27–4.2)
VITAMIN B-12: 371 PG/ML (ref 211–946)

## 2019-02-12 PROCEDURE — 3046F HEMOGLOBIN A1C LEVEL >9.0%: CPT | Performed by: FAMILY MEDICINE

## 2019-02-12 PROCEDURE — 36415 COLL VENOUS BLD VENIPUNCTURE: CPT | Performed by: FAMILY MEDICINE

## 2019-02-12 PROCEDURE — G8427 DOCREV CUR MEDS BY ELIG CLIN: HCPCS | Performed by: FAMILY MEDICINE

## 2019-02-12 PROCEDURE — 4004F PT TOBACCO SCREEN RCVD TLK: CPT | Performed by: FAMILY MEDICINE

## 2019-02-12 PROCEDURE — 2022F DILAT RTA XM EVC RTNOPTHY: CPT | Performed by: FAMILY MEDICINE

## 2019-02-12 PROCEDURE — 86592 SYPHILIS TEST NON-TREP QUAL: CPT

## 2019-02-12 PROCEDURE — 99213 OFFICE O/P EST LOW 20 MIN: CPT | Performed by: FAMILY MEDICINE

## 2019-02-12 PROCEDURE — G8482 FLU IMMUNIZE ORDER/ADMIN: HCPCS | Performed by: FAMILY MEDICINE

## 2019-02-12 PROCEDURE — 82746 ASSAY OF FOLIC ACID SERUM: CPT

## 2019-02-12 PROCEDURE — 83036 HEMOGLOBIN GLYCOSYLATED A1C: CPT | Performed by: FAMILY MEDICINE

## 2019-02-12 PROCEDURE — 3017F COLORECTAL CA SCREEN DOC REV: CPT | Performed by: FAMILY MEDICINE

## 2019-02-12 PROCEDURE — G8417 CALC BMI ABV UP PARAM F/U: HCPCS | Performed by: FAMILY MEDICINE

## 2019-02-12 PROCEDURE — 99212 OFFICE O/P EST SF 10 MIN: CPT | Performed by: FAMILY MEDICINE

## 2019-02-12 PROCEDURE — 82607 VITAMIN B-12: CPT

## 2019-02-12 PROCEDURE — 80048 BASIC METABOLIC PNL TOTAL CA: CPT

## 2019-02-12 PROCEDURE — 84443 ASSAY THYROID STIM HORMONE: CPT

## 2019-02-12 RX ORDER — SPIRONOLACTONE 25 MG/1
25 TABLET ORAL 2 TIMES DAILY
Qty: 30 TABLET | Refills: 2 | Status: SHIPPED | OUTPATIENT
Start: 2019-02-12 | End: 2019-04-22 | Stop reason: SDUPTHER

## 2019-02-12 RX ORDER — BLOOD-GLUCOSE METER
EACH MISCELLANEOUS
Refills: 0 | COMMUNITY
Start: 2018-11-07

## 2019-02-13 LAB — RPR: NORMAL

## 2019-02-19 ENCOUNTER — TELEPHONE (OUTPATIENT)
Dept: FAMILY MEDICINE CLINIC | Age: 59
End: 2019-02-19

## 2019-02-19 ENCOUNTER — HOSPITAL ENCOUNTER (OUTPATIENT)
Dept: CT IMAGING | Age: 59
Discharge: HOME | End: 2019-02-21
Payer: MEDICARE

## 2019-02-19 DIAGNOSIS — C18.0 ADENOCARCINOMA OF CECUM (HCC): ICD-10-CM

## 2019-02-19 PROCEDURE — 74177 CT ABD & PELVIS W/CONTRAST: CPT

## 2019-02-19 PROCEDURE — 2580000003 HC RX 258: Performed by: RADIOLOGY

## 2019-02-19 PROCEDURE — 6360000004 HC RX CONTRAST MEDICATION: Performed by: RADIOLOGY

## 2019-02-19 RX ORDER — SODIUM CHLORIDE 0.9 % (FLUSH) 0.9 %
10 SYRINGE (ML) INJECTION ONCE
Status: COMPLETED | OUTPATIENT
Start: 2019-02-19 | End: 2019-02-19

## 2019-02-19 RX ADMIN — Medication 10 ML: at 13:37

## 2019-02-19 RX ADMIN — IOHEXOL 50 ML: 240 INJECTION, SOLUTION INTRATHECAL; INTRAVASCULAR; INTRAVENOUS; ORAL at 13:16

## 2019-02-19 RX ADMIN — IOPAMIDOL 110 ML: 755 INJECTION, SOLUTION INTRAVENOUS at 13:16

## 2019-02-28 ENCOUNTER — TELEPHONE (OUTPATIENT)
Dept: FAMILY MEDICINE CLINIC | Age: 59
End: 2019-02-28

## 2019-03-14 ENCOUNTER — APPOINTMENT (OUTPATIENT)
Dept: GENERAL RADIOLOGY | Age: 59
End: 2019-03-14
Payer: MEDICARE

## 2019-03-14 ENCOUNTER — HOSPITAL ENCOUNTER (EMERGENCY)
Age: 59
Discharge: HOME | End: 2019-03-14
Payer: MEDICARE

## 2019-03-14 VITALS
WEIGHT: 293 LBS | HEIGHT: 68 IN | BODY MASS INDEX: 44.41 KG/M2 | RESPIRATION RATE: 20 BRPM | OXYGEN SATURATION: 98 % | DIASTOLIC BLOOD PRESSURE: 100 MMHG | HEART RATE: 103 BPM | SYSTOLIC BLOOD PRESSURE: 171 MMHG | TEMPERATURE: 98 F

## 2019-03-14 DIAGNOSIS — M25.561 ACUTE PAIN OF RIGHT KNEE: Primary | ICD-10-CM

## 2019-03-14 PROCEDURE — 6370000000 HC RX 637 (ALT 250 FOR IP): Performed by: PHYSICIAN ASSISTANT

## 2019-03-14 PROCEDURE — 99283 EMERGENCY DEPT VISIT LOW MDM: CPT

## 2019-03-14 PROCEDURE — 73564 X-RAY EXAM KNEE 4 OR MORE: CPT

## 2019-03-14 RX ORDER — HYDROCODONE BITARTRATE AND ACETAMINOPHEN 5; 325 MG/1; MG/1
1 TABLET ORAL ONCE
Status: COMPLETED | OUTPATIENT
Start: 2019-03-14 | End: 2019-03-14

## 2019-03-14 RX ADMIN — HYDROCODONE BITARTRATE AND ACETAMINOPHEN 1 TABLET: 5; 325 TABLET ORAL at 10:28

## 2019-03-14 ASSESSMENT — PAIN DESCRIPTION - ORIENTATION: ORIENTATION: LEFT;RIGHT

## 2019-03-14 ASSESSMENT — PAIN SCALES - GENERAL: PAINLEVEL_OUTOF10: 9

## 2019-03-14 ASSESSMENT — PAIN DESCRIPTION - LOCATION: LOCATION: LEG

## 2019-03-18 ENCOUNTER — OFFICE VISIT (OUTPATIENT)
Dept: FAMILY MEDICINE CLINIC | Age: 59
End: 2019-03-18
Payer: MEDICARE

## 2019-03-18 VITALS
DIASTOLIC BLOOD PRESSURE: 72 MMHG | OXYGEN SATURATION: 96 % | HEART RATE: 85 BPM | HEIGHT: 68 IN | RESPIRATION RATE: 18 BRPM | TEMPERATURE: 97.7 F | SYSTOLIC BLOOD PRESSURE: 121 MMHG | WEIGHT: 289 LBS | BODY MASS INDEX: 43.8 KG/M2

## 2019-03-18 DIAGNOSIS — M17.0 PRIMARY OSTEOARTHRITIS OF BOTH KNEES: ICD-10-CM

## 2019-03-18 DIAGNOSIS — M71.20 SYNOVIAL CYST OF POPLITEAL SPACE, UNSPECIFIED LATERALITY: Primary | ICD-10-CM

## 2019-03-18 PROCEDURE — G8427 DOCREV CUR MEDS BY ELIG CLIN: HCPCS | Performed by: STUDENT IN AN ORGANIZED HEALTH CARE EDUCATION/TRAINING PROGRAM

## 2019-03-18 PROCEDURE — 4004F PT TOBACCO SCREEN RCVD TLK: CPT | Performed by: STUDENT IN AN ORGANIZED HEALTH CARE EDUCATION/TRAINING PROGRAM

## 2019-03-18 PROCEDURE — 99213 OFFICE O/P EST LOW 20 MIN: CPT | Performed by: STUDENT IN AN ORGANIZED HEALTH CARE EDUCATION/TRAINING PROGRAM

## 2019-03-18 PROCEDURE — G8417 CALC BMI ABV UP PARAM F/U: HCPCS | Performed by: STUDENT IN AN ORGANIZED HEALTH CARE EDUCATION/TRAINING PROGRAM

## 2019-03-18 PROCEDURE — G8482 FLU IMMUNIZE ORDER/ADMIN: HCPCS | Performed by: STUDENT IN AN ORGANIZED HEALTH CARE EDUCATION/TRAINING PROGRAM

## 2019-03-18 PROCEDURE — 3017F COLORECTAL CA SCREEN DOC REV: CPT | Performed by: STUDENT IN AN ORGANIZED HEALTH CARE EDUCATION/TRAINING PROGRAM

## 2019-03-22 ASSESSMENT — ENCOUNTER SYMPTOMS: SHORTNESS OF BREATH: 0

## 2019-03-26 DIAGNOSIS — I10 ESSENTIAL HYPERTENSION: ICD-10-CM

## 2019-03-26 RX ORDER — LOSARTAN POTASSIUM 25 MG/1
TABLET ORAL
Qty: 30 TABLET | Refills: 3 | Status: SHIPPED | OUTPATIENT
Start: 2019-03-26 | End: 2019-05-30 | Stop reason: SDUPTHER

## 2019-04-08 ENCOUNTER — TELEPHONE (OUTPATIENT)
Dept: ADMINISTRATIVE | Age: 59
End: 2019-04-08

## 2019-04-11 ENCOUNTER — TELEPHONE (OUTPATIENT)
Dept: ORTHOPEDIC SURGERY | Age: 59
End: 2019-04-11

## 2019-04-22 DIAGNOSIS — I10 ESSENTIAL HYPERTENSION: ICD-10-CM

## 2019-04-22 RX ORDER — SPIRONOLACTONE 25 MG/1
25 TABLET ORAL 2 TIMES DAILY
Qty: 30 TABLET | Refills: 2 | Status: SHIPPED | OUTPATIENT
Start: 2019-04-22 | End: 2019-05-19 | Stop reason: SDUPTHER

## 2019-04-23 ENCOUNTER — OFFICE VISIT (OUTPATIENT)
Dept: ORTHOPEDIC SURGERY | Age: 59
End: 2019-04-23
Payer: MEDICARE

## 2019-04-23 VITALS
TEMPERATURE: 97.2 F | WEIGHT: 285 LBS | HEIGHT: 68 IN | HEART RATE: 89 BPM | DIASTOLIC BLOOD PRESSURE: 88 MMHG | BODY MASS INDEX: 43.19 KG/M2 | SYSTOLIC BLOOD PRESSURE: 176 MMHG

## 2019-04-23 DIAGNOSIS — R60.0 BILATERAL LEG EDEMA: ICD-10-CM

## 2019-04-23 DIAGNOSIS — M17.11 PRIMARY OSTEOARTHRITIS OF RIGHT KNEE: ICD-10-CM

## 2019-04-23 PROCEDURE — 2500000003 HC RX 250 WO HCPCS

## 2019-04-23 PROCEDURE — 4004F PT TOBACCO SCREEN RCVD TLK: CPT | Performed by: ORTHOPAEDIC SURGERY

## 2019-04-23 PROCEDURE — 3017F COLORECTAL CA SCREEN DOC REV: CPT | Performed by: ORTHOPAEDIC SURGERY

## 2019-04-23 PROCEDURE — 20610 DRAIN/INJ JOINT/BURSA W/O US: CPT | Performed by: ORTHOPAEDIC SURGERY

## 2019-04-23 PROCEDURE — 99203 OFFICE O/P NEW LOW 30 MIN: CPT | Performed by: ORTHOPAEDIC SURGERY

## 2019-04-23 PROCEDURE — 99213 OFFICE O/P EST LOW 20 MIN: CPT | Performed by: ORTHOPAEDIC SURGERY

## 2019-04-23 PROCEDURE — 6360000002 HC RX W HCPCS

## 2019-04-23 PROCEDURE — G8417 CALC BMI ABV UP PARAM F/U: HCPCS | Performed by: ORTHOPAEDIC SURGERY

## 2019-04-23 PROCEDURE — G8427 DOCREV CUR MEDS BY ELIG CLIN: HCPCS | Performed by: ORTHOPAEDIC SURGERY

## 2019-04-26 PROBLEM — M17.11 PRIMARY OSTEOARTHRITIS OF RIGHT KNEE: Status: ACTIVE | Noted: 2019-04-26

## 2019-04-26 RX ORDER — BUPIVACAINE HYDROCHLORIDE 2.5 MG/ML
3 INJECTION, SOLUTION EPIDURAL; INFILTRATION; INTRACAUDAL ONCE
Status: COMPLETED | OUTPATIENT
Start: 2019-04-26 | End: 2019-04-29

## 2019-04-26 RX ORDER — LIDOCAINE HYDROCHLORIDE 10 MG/ML
3 INJECTION, SOLUTION INFILTRATION; PERINEURAL ONCE
Status: COMPLETED | OUTPATIENT
Start: 2019-04-26 | End: 2019-04-29

## 2019-04-26 NOTE — PROGRESS NOTES
Orthopaedic New Patient Note        John Nye is a 62 y.o. male, his YOB: 1960 with the following history as recorded in [facility]:    Patient Active Problem List    Diagnosis Date Noted    Primary osteoarthritis of right knee 04/26/2019    Synovial cyst of right popliteal space 11/19/2018    At risk for colon cancer     Cecum mass     Mass of cecum     Leg swelling 08/17/2018    Hypertensive urgency 08/17/2018    Hypokalemia 08/17/2018    Hypomagnesemia 08/17/2018    Bilateral leg edema 08/17/2018    Angiotensin converting enzyme inhibitor-aggravated angioedema 03/29/2018    Tobacco abuse     Uncontrolled type 2 diabetes mellitus with hyperglycemia ([facility] [address]     Mixed hyperlipidemia 11/14/2016    Recurrent major depressive disorder, in remission ([facility] [address] 11/14/2016    Carpal tunnel syndrome 11/14/2016    Obstructive sleep apnea syndrome 11/14/2016    Essential hypertension 11/01/2016    Type 2 diabetes mellitus (HCC) 11/01/2016     Current Outpatient Medications   Medication Sig Dispense Refill    spironolactone (ALDACTONE) 25 MG tablet Take 1 tablet by mouth 2 times daily 30 tablet 2    losartan (COZAAR) 25 MG tablet TAKE 1 TABLET BY MOUTH EVERY DAY 30 tablet 3    Blood Glucose Monitoring Suppl (ONETOUCH VERIO FLEX SYSTEM) w/Device KIT AS DIRECTED  0    metFORMIN (GLUCOPHAGE) 500 MG tablet Take 2 tablets by mouth 2 times daily (with meals) 120 tablet 1    B-D ULTRAFINE III SHORT PEN 31G X 8 MM MISC Inject 1 each into the skin nightly 100 each 3    insulin glargine (LANTUS SOLOSTAR) 100 UNIT/ML injection pen Inject 35 Units into the skin nightly 5 pen 3    magnesium chloride (MAG DELAY) 64 MG TBEC extended release tablet Take 1 tablet by mouth 2 times daily (with meals) 60 tablet 0    bumetanide (BUMEX) 1 MG tablet Take 1 tablet by mouth 2 times daily 60 tablet 1    terazosin (HYTRIN) 2 MG capsule Take 1 capsule by mouth nightly 30 capsule 1    albuterol sulfate HFA (VENTOLIN HFA) 108 (90 Base) MCG/ACT inhaler Inhale 2 puffs into the lungs every 4 hours as needed for Wheezing 1 Inhaler 3    atorvastatin (LIPITOR) 40 MG tablet Take 1 tablet by mouth nightly 90 tablet 0    amLODIPine (NORVASC) 10 MG tablet Take 1 tablet by mouth daily 90 tablet 0    Cholecalciferol (VITAMIN D3) 2000 units CAPS Take 1 capsule by mouth daily      Compression Stockings MISC by Does not apply route 1 each 0    traMADol (ULTRAM) 50 MG tablet Take 50 mg by mouth every 6 hours as needed for Pain. Merian Harman metoprolol tartrate (LOPRESSOR) 50 MG tablet Take 50 mg by mouth 2 times daily      sildenafil (VIAGRA) 100 MG tablet Take 100 mg by mouth as needed. Take an hour before sex. Med info obtained from [facility] records.  ibuprofen (ADVIL;MOTRIN) 800 MG tablet Take 800 mg by mouth 3 times daily as needed. Take with food. Med info obtained from [facility] records   Indications: Pain       No current facility-administered medications for this visit. Allergies: Lasix [furosemide]; Lisinopril; and Atenolol  Past Medical History:   Diagnosis Date    Arthritis     Carpal tunnel syndrome     CHF [facility]congestive heart failure) ([facility] [address]     Diabetes mellitus ([facility] [address]     Hyperlipidemia     Hypertension     Neuropathy     Obesity     bmi 43.1 weight 283 #    Sleep apnea     c pap     Past Surgical History:   Procedure Laterality Date    COLONOSCOPY  09/14/2018    polyp--eliane    NASAL SEPTUM SURGERY      ME COLONOSCOPY W/BIOPSY SINGLE/MULTIPLE N/A 9/14/2018    COLONOSCOPY WITH BIOPSY performed by Ck Schulz MD at [address] TUTTLE W/RMVL OF TUMOR POLYP LESION SNARE TQ  9/14/2018    COLONOSCOPY POLYPECTOMY SNARE/COLD BIOPSY performed by Ck Schulz MD at [address]       History reviewed. No pertinent family history.   Social History     Tobacco Use    Smoking status: Current Every Day Smoker     Packs/day: 0.50     Years: 30.00     Pack years: 15.00     Types: Cigarettes  Smokeless tobacco: Never Used   Substance Use Topics    Alcohol use: Yes     Alcohol/week: 1.2 oz     Types: 2 Shots of liquor per week     Comment: 2x week                           Review of Systems   Constitutional: Negative for fever and chills. HENT: Negative for ear pain, sore throat and sinus pressure. Eyes: Negative for pain, discharge and redness. Respiratory: Negative for cough, shortness of breath and wheezing. Cardiovascular: Negative for chest pain. Gastrointestinal: Negative for nausea, vomiting, diarrhea and abdominal distention. Genitourinary: Negative for dysuria and frequency. Musculoskeletal: Positive for back pain and arthralgias. CC: Right knee pain    S: Petar Dietrich is a 62 y.o. who is here today for initial evaluation for his right knee. Patient denies any initial injury. States she's had long-standing right knee pain for years now. Recently his pain has gotten worse. Was recently diagnosed with a Baker's cyst which she feels has been getting bigger. Pain is most severe to the inner part of the knee as well as the back. Denies any numbness or tingling to the foot. Patient is morbidly obese and does have underlying diabetes. Patient also states she is to have surgery to remove a mass in his abdomen in the next few weeks. Has done no formal treatments other than OTC medications for his knee. Does feel that he is unstable at times and that it once to click and pop. Denies any catching or locking.     Physical Exam  BP (!) 176/88 Comment: has been out of BP medicine for about a week per patient  Pulse 89   Temp 97.2 °F (36.2 °C) (Oral)   Ht 5' 8\" (1.727 m)   Wt 285 lb (129.3 kg)   BMI 43.33 kg/m²   O:   CONSTITUTIONAL: awake, alert, cooperative, no apparent distress, and appears stated age, obese  EYES: Lids and lashes normal, pupils equal, round and reactive to light, extra ocular muscles intact, sclera clear, conjunctiva normal  ENT: Normocephalic, without obvious few days. He can f/u in 3 months for reevaluation    PROCEDURE:  Right knee corticosteroid injection  With the patient's permission, her right knee was injected in standard sterile fashion with a mixture of (3ml of 1% lidocaine, 3ml of 0.25% bupivacain, 1ml of kenalog). Through the typical anteromedial portal site of the knee, the knee was injected without difficulty. The patient tolerated this well. A band-aid was applied. Electronically Signed By  Pipe Franks DO  4/23/19    NOTE: This report was transcribed using voice recognition software.  Every effort was made to ensure accuracy; however, inadvertent computerized transcription errors may be present

## 2019-04-29 RX ADMIN — BUPIVACAINE HYDROCHLORIDE 7.5 MG: 2.5 INJECTION, SOLUTION EPIDURAL; INFILTRATION; INTRACAUDAL at 07:56

## 2019-04-29 RX ADMIN — LIDOCAINE HYDROCHLORIDE 3 ML: 10 INJECTION, SOLUTION INFILTRATION; PERINEURAL at 07:58

## 2019-05-19 DIAGNOSIS — I10 ESSENTIAL HYPERTENSION: ICD-10-CM

## 2019-05-20 RX ORDER — SPIRONOLACTONE 25 MG/1
TABLET ORAL
Qty: 30 TABLET | Refills: 2 | Status: SHIPPED | OUTPATIENT
Start: 2019-05-20 | End: 2019-06-09 | Stop reason: SDUPTHER

## 2019-05-30 ENCOUNTER — OFFICE VISIT (OUTPATIENT)
Dept: FAMILY MEDICINE CLINIC | Age: 59
End: 2019-05-30
Payer: MEDICARE

## 2019-05-30 VITALS
HEART RATE: 71 BPM | SYSTOLIC BLOOD PRESSURE: 143 MMHG | OXYGEN SATURATION: 100 % | HEIGHT: 68 IN | WEIGHT: 281 LBS | BODY MASS INDEX: 42.59 KG/M2 | TEMPERATURE: 97.7 F | DIASTOLIC BLOOD PRESSURE: 73 MMHG

## 2019-05-30 DIAGNOSIS — C49.A0 GASTROINTESTINAL STROMAL TUMOR (GIST) (HCC): ICD-10-CM

## 2019-05-30 DIAGNOSIS — G89.18 POST-OP PAIN: Primary | ICD-10-CM

## 2019-05-30 PROCEDURE — 99212 OFFICE O/P EST SF 10 MIN: CPT | Performed by: FAMILY MEDICINE

## 2019-05-30 PROCEDURE — 4004F PT TOBACCO SCREEN RCVD TLK: CPT | Performed by: FAMILY MEDICINE

## 2019-05-30 PROCEDURE — G8417 CALC BMI ABV UP PARAM F/U: HCPCS | Performed by: FAMILY MEDICINE

## 2019-05-30 PROCEDURE — 99213 OFFICE O/P EST LOW 20 MIN: CPT | Performed by: FAMILY MEDICINE

## 2019-05-30 PROCEDURE — 3017F COLORECTAL CA SCREEN DOC REV: CPT | Performed by: FAMILY MEDICINE

## 2019-05-30 PROCEDURE — G8427 DOCREV CUR MEDS BY ELIG CLIN: HCPCS | Performed by: FAMILY MEDICINE

## 2019-05-30 RX ORDER — PANTOPRAZOLE SODIUM 40 MG/1
40 TABLET, DELAYED RELEASE ORAL
COMMUNITY
Start: 2019-05-14 | End: 2020-01-03

## 2019-05-30 RX ORDER — LOSARTAN POTASSIUM 25 MG/1
TABLET ORAL
Qty: 30 TABLET | Refills: 3 | Status: SHIPPED | OUTPATIENT
Start: 2019-05-30 | End: 2019-12-19

## 2019-05-30 RX ORDER — ACETAMINOPHEN 500 MG
500 TABLET ORAL EVERY 6 HOURS PRN
COMMUNITY
Start: 2019-05-14

## 2019-05-30 RX ORDER — BUMETANIDE 1 MG/1
1 TABLET ORAL 2 TIMES DAILY
Qty: 60 TABLET | Refills: 1 | Status: SHIPPED | OUTPATIENT
Start: 2019-05-30 | End: 2019-07-05 | Stop reason: SDUPTHER

## 2019-05-30 RX ORDER — DOCUSATE SODIUM 100 MG/1
100 CAPSULE, LIQUID FILLED ORAL 2 TIMES DAILY
COMMUNITY
Start: 2019-05-30 | End: 2020-01-03 | Stop reason: SDUPTHER

## 2019-05-30 NOTE — PROGRESS NOTES
Elizabethzhane Sees Karina 62 y.o. male  here for F/U surgery  Mesenteric Mass; S/P colectomy @  5/8/19; Path:  GIST  Symptoms are remarkable for dull abdominal pain. No other complaint or symptoms. Requesting Refill of Tramadol for pain (he does get this med from another provider chronically)  Needs refills for diuretic and HTN med (Losartan)  O: VS: BP (!) 143/73 (Site: Left Upper Arm, Position: Sitting, Cuff Size: Large Adult)   Pulse 71   Temp 97.7 °F (36.5 °C) (Oral)   Ht 5' 8\" (1.727 m)   Wt 281 lb (127.5 kg)   SpO2 100%   BMI 42.73 kg/m²    General: NAD   CV:  RRR, no gallops, rubs, or murmurs   Resp: CTAB no R/R/W   Abd:  Soft, tender over RUQ, no masses. Abdominal incision healing well with small eschar located over umbilicus. Ext:  no C/C/E      Assessment / Plan:      Gunner Valdez was seen today for other. Diagnoses and all orders for this visit:    Post-op pain    Gastrointestinal stromal tumor (GIST) ([address]    Other orders  -     bumetanide (BUMEX) 1 MG tablet; Take 1 tablet by mouth 2 times daily  -     losartan (COZAAR) 25 MG tablet; TAKE 1 TABLET BY MOUTH EVERY DAY  -     docusate sodium (COLACE) 100 MG capsule; Take 1 capsule by mouth 2 times daily        GIST:  To follow with local surgeon regarding further plans for adjunct treatment; F/U with PCP for wound check; to wound care if not completely healed  Refill of losartan and bumetanide  Will get tramadol from regular provider       Return in about 2 weeks (around 6/13/2019). F/U 3-4 weeks for wound check with PCP    Attending Physician Statement  I have discussed the case, including pertinent history and exam findings with the resident. I agree with the documented assessment and plan.          Aristeo Jurado MD

## 2019-05-30 NOTE — PROGRESS NOTES
[facility]  FAMILY MEDICINE RESIDENCY PROGRAM   OFFICE PROGRESS NOTE  DATE OF VISIT : 6/3/2019    Patient : Brielle Collins   Sex : male   Age : 62 y.o.    : 1960   MRN : [id number]            History of Present Illness : Brielle Collins  62 y.o. who presented to the clinic today for Other (surgery f/u)    Pt is here requesting tramadol for his post op pain. He underwent colectomy for his mesenteric mass @  19; Path:  GIST  He denies any of the following:-  Signs of infection (fever, swelling, increase in redness or pus from incision)   Symptoms of dehydration (weakness, fatigue, dizziness when standing or walking, high heart rate, low urine output or very dark urine). Severe abdominal pain  Persistent nausea/vomiting    CP/SOB    He does not plan on following up with surgeons at [facility]. Pain is located at the incision sites and in RUQ and RLQ. PMH/PSH, SH and FH were reviewed in the chart. Med list was reviewed and modified if needed. Review of Systems :   per hpi           Physical Exam :    VITAL SIGNS : Blood pressure (!) 143/73, pulse 71, temperature 97.7 °F (36.5 °C), temperature source Oral, height 5' 8\" (1.727 m), weight 281 lb (127.5 kg), SpO2 100 %. GENERAL APPEARANCE : NAD, cooperative, appears stated age  LUNGS : Respiration unlabored, clear sounds b/l  HEART : RRR, normal S1/S2, no murmur noted  ABDOMEN : soft, tender over ruq, incision sites healign well except at the umbilicus where the scab is still thin and tender, no fluctuance. EXTREMITIES : No peripheral edema         Assessment & Plan :    Kellie Guy was seen today for other. Diagnoses and all orders for this visit:    Post-op pain    Gastrointestinal stromal tumor (GIST) ([address]    Other orders  -     bumetanide (BUMEX) 1 MG tablet; Take 1 tablet by mouth 2 times daily  -     losartan (COZAAR) 25 MG tablet; TAKE 1 TABLET BY MOUTH EVERY DAY  -     docusate sodium (COLACE) 100 MG capsule;  Take 1 capsule by mouth 2 times daily      Advised on tylenol and heating pad for pain. He gets tramadol filled by his podiatrist.     Also advised strongly to make a follow up with Dr. Rachel Marie to discuss path results and check if any interventions are indicated. Follow closely with us      RTO 2 weeks for follow up of eschar on the umbilicus      Health Maintenance     Health Maintenance Due   Topic Date Due    Diabetic foot exam  06/25/1970    Diabetic retinal exam  06/25/1970    HIV screen  06/25/1975    Hepatitis B Vaccine (1 of 3 - Risk 3-dose series) 06/25/1979    Shingles Vaccine (1 of 2) 06/25/2010    DTaP/Tdap/Td vaccine (1 - Tdap) 10/18/2012    A1C test (Diabetic or Prediabetic)  05/12/2019       RTO in   Future Appointments   Date Time Provider Alek Goldman   6/14/2019  9:20 AM MD Swathi [facility]     ----------------------------------------------------------------  Signed by : Genaro Headley M.D., PGY-3  This case was discussed with Dr Rose (s)    This document is generated, in part, by voice recognition software and thus  syntax and grammatical errors are possible.

## 2019-05-30 NOTE — PATIENT INSTRUCTIONS
Please call Dr. Dobbs [facility] office to make an appointment to discuss pathology results and check if there is any work up that needs to be pursued.

## 2019-06-03 PROBLEM — C49.A0 GASTROINTESTINAL STROMAL TUMOR (GIST) (HCC): Status: ACTIVE | Noted: 2019-06-03

## 2019-06-09 DIAGNOSIS — I10 ESSENTIAL HYPERTENSION: ICD-10-CM

## 2019-06-10 RX ORDER — SPIRONOLACTONE 25 MG/1
TABLET ORAL
Qty: 30 TABLET | Refills: 0 | Status: SHIPPED | OUTPATIENT
Start: 2019-06-10 | End: 2019-06-14 | Stop reason: SDUPTHER

## 2019-06-14 ENCOUNTER — HOSPITAL ENCOUNTER (OUTPATIENT)
Age: 59
Discharge: HOME | End: 2019-06-16
Payer: MEDICARE

## 2019-06-14 ENCOUNTER — OFFICE VISIT (OUTPATIENT)
Dept: FAMILY MEDICINE CLINIC | Age: 59
End: 2019-06-14
Payer: MEDICARE

## 2019-06-14 VITALS
SYSTOLIC BLOOD PRESSURE: 116 MMHG | TEMPERATURE: 97.3 F | BODY MASS INDEX: 42.44 KG/M2 | OXYGEN SATURATION: 97 % | RESPIRATION RATE: 16 BRPM | DIASTOLIC BLOOD PRESSURE: 60 MMHG | HEART RATE: 68 BPM | WEIGHT: 280 LBS | HEIGHT: 68 IN

## 2019-06-14 DIAGNOSIS — Z09 POSTOPERATIVE FOLLOW-UP: Primary | ICD-10-CM

## 2019-06-14 DIAGNOSIS — E11.42 TYPE 2 DIABETES MELLITUS WITH DIABETIC POLYNEUROPATHY, UNSPECIFIED WHETHER LONG TERM INSULIN USE (HCC): ICD-10-CM

## 2019-06-14 DIAGNOSIS — E78.5 HYPERLIPIDEMIA, UNSPECIFIED HYPERLIPIDEMIA TYPE: ICD-10-CM

## 2019-06-14 DIAGNOSIS — I10 ESSENTIAL HYPERTENSION: ICD-10-CM

## 2019-06-14 DIAGNOSIS — F17.219 CIGARETTE NICOTINE DEPENDENCE WITH NICOTINE-INDUCED DISORDER: ICD-10-CM

## 2019-06-14 LAB
ANION GAP SERPL CALCULATED.3IONS-SCNC: 16 MMOL/L (ref 7–16)
BUN BLDV-MCNC: 19 MG/DL (ref 6–20)
CALCIUM SERPL-MCNC: 9.4 MG/DL (ref 8.6–10.2)
CHLORIDE BLD-SCNC: 94 MMOL/L (ref 98–107)
CHOLESTEROL, TOTAL: 150 MG/DL (ref 0–199)
CO2: 26 MMOL/L (ref 22–29)
CREAT SERPL-MCNC: 0.7 MG/DL (ref 0.7–1.2)
GFR AFRICAN AMERICAN: >60
GFR NON-AFRICAN AMERICAN: >60 ML/MIN/1.73
GLUCOSE BLD-MCNC: 137 MG/DL (ref 74–99)
HBA1C MFR BLD: 8.2 %
HDLC SERPL-MCNC: 48 MG/DL
LDL CHOLESTEROL CALCULATED: 53 MG/DL (ref 0–99)
MAGNESIUM: 1.2 MG/DL (ref 1.6–2.6)
POTASSIUM SERPL-SCNC: 4.5 MMOL/L (ref 3.5–5)
SODIUM BLD-SCNC: 136 MMOL/L (ref 132–146)
TRIGL SERPL-MCNC: 247 MG/DL (ref 0–149)
VLDLC SERPL CALC-MCNC: 49 MG/DL

## 2019-06-14 PROCEDURE — G8428 CUR MEDS NOT DOCUMENT: HCPCS | Performed by: STUDENT IN AN ORGANIZED HEALTH CARE EDUCATION/TRAINING PROGRAM

## 2019-06-14 PROCEDURE — 3017F COLORECTAL CA SCREEN DOC REV: CPT | Performed by: STUDENT IN AN ORGANIZED HEALTH CARE EDUCATION/TRAINING PROGRAM

## 2019-06-14 PROCEDURE — 3045F PR MOST RECENT HEMOGLOBIN A1C LEVEL 7.0-9.0%: CPT | Performed by: STUDENT IN AN ORGANIZED HEALTH CARE EDUCATION/TRAINING PROGRAM

## 2019-06-14 PROCEDURE — 80061 LIPID PANEL: CPT

## 2019-06-14 PROCEDURE — 83735 ASSAY OF MAGNESIUM: CPT

## 2019-06-14 PROCEDURE — G8417 CALC BMI ABV UP PARAM F/U: HCPCS | Performed by: STUDENT IN AN ORGANIZED HEALTH CARE EDUCATION/TRAINING PROGRAM

## 2019-06-14 PROCEDURE — 36415 COLL VENOUS BLD VENIPUNCTURE: CPT | Performed by: FAMILY MEDICINE

## 2019-06-14 PROCEDURE — 36415 COLL VENOUS BLD VENIPUNCTURE: CPT

## 2019-06-14 PROCEDURE — 83036 HEMOGLOBIN GLYCOSYLATED A1C: CPT | Performed by: STUDENT IN AN ORGANIZED HEALTH CARE EDUCATION/TRAINING PROGRAM

## 2019-06-14 PROCEDURE — 99213 OFFICE O/P EST LOW 20 MIN: CPT | Performed by: STUDENT IN AN ORGANIZED HEALTH CARE EDUCATION/TRAINING PROGRAM

## 2019-06-14 PROCEDURE — 80048 BASIC METABOLIC PNL TOTAL CA: CPT

## 2019-06-14 PROCEDURE — 2022F DILAT RTA XM EVC RTNOPTHY: CPT | Performed by: STUDENT IN AN ORGANIZED HEALTH CARE EDUCATION/TRAINING PROGRAM

## 2019-06-14 PROCEDURE — 99212 OFFICE O/P EST SF 10 MIN: CPT | Performed by: STUDENT IN AN ORGANIZED HEALTH CARE EDUCATION/TRAINING PROGRAM

## 2019-06-14 PROCEDURE — 4004F PT TOBACCO SCREEN RCVD TLK: CPT | Performed by: STUDENT IN AN ORGANIZED HEALTH CARE EDUCATION/TRAINING PROGRAM

## 2019-06-14 RX ORDER — SPIRONOLACTONE 25 MG/1
TABLET ORAL
Qty: 30 TABLET | Refills: 3 | Status: SHIPPED | OUTPATIENT
Start: 2019-06-14 | End: 2019-10-15 | Stop reason: SDUPTHER

## 2019-06-14 RX ORDER — NICOTINE 21 MG/24HR
1 PATCH, TRANSDERMAL 24 HOURS TRANSDERMAL DAILY
Qty: 14 PATCH | Refills: 2 | Status: SHIPPED | OUTPATIENT
Start: 2019-06-14 | End: 2021-01-19

## 2019-06-14 ASSESSMENT — ENCOUNTER SYMPTOMS
SHORTNESS OF BREATH: 0
ROS SKIN COMMENTS: AS STATED IN HPI

## 2019-06-14 NOTE — PROGRESS NOTES
COLONOSCOPY WITH BIOPSY performed by Matthew Ann MD at [address] W/RM[address] OF TUMOR POLYP LESION SNARE TQ  9/14/2018    COLONOSCOPY POLYPECTOMY SNARE/COLD BIOPSY performed by Matthew Ann MD at [address]         Allergies:    Lasix [furosemide]; Lisinopril; and Atenolol    Social History:   Social History     Socioeconomic History    Marital status:      Spouse name: Not on file    Number of children: Not on file    Years of education: Not on file    Highest education level: Not on file   Occupational History    Not on file   Social Needs    Financial resource strain: Not on file    Food insecurity:     Worry: Not on file     Inability: Not on file    Transportation needs:     Medical: Not on file     Non-medical: Not on file   Tobacco Use    Smoking status: Current Every Day Smoker     Packs/day: 0.50     Years: 30.00     Pack years: 15.00     Types: Cigarettes    Smokeless tobacco: Never Used   Substance and Sexual Activity    Alcohol use: Yes     Alcohol/week: 1.2 oz     Types: 2 Shots of liquor per week     Comment: 2x week    Drug use: No    Sexual activity: Not on file   Lifestyle    Physical activity:     Days per week: Not on file     Minutes per session: Not on file    Stress: Not on file   Relationships    Social connections:     Talks on phone: Not on file     Gets together: Not on file     Attends [employer] service: Not on file     Active member of club or organization: Not on file     Attends meetings of clubs or organizations: Not on file     Relationship status: Not on file    Intimate partner violence:     Fear of current or ex partner: Not on file     Emotionally abused: Not on file     Physically abused: Not on file     Forced sexual activity: Not on file   Other Topics Concern    Not on file   Social History Narrative    Not on file        Family History:   No family history on file.     Review of Systems:   Review of Systems Constitutional: Negative for fever. Respiratory: Negative for shortness of breath. Cardiovascular: Positive for leg swelling. Negative for chest pain. Genitourinary: Negative for dysuria. Skin:        As stated in HPI       Physical Exam   Vitals: /60 (Site: Left Upper Arm, Position: Sitting, Cuff Size: Medium Adult)   Pulse 68   Temp 97.3 °F (36.3 °C)   Resp 16   Ht 5' 8\" (1.727 m)   Wt 280 lb (127 kg)   SpO2 97%   BMI 42.57 kg/m²   General Appearance: Well developed, awake,alert, oriented, no acute distress  HEENT: Normocephalic, atraumatic. Chest wall/Lung: Clear toauscultation bilaterally,  respirations unlabored. Noronchi/wheezing/rales  Heart: Regular rate andrhythm, S1 and S2 normal, no murmur, rub or gallop. Abdomen: Soft, non-tender. Incision sites healing well, there are nontender. Incision site in umbilicus is healing well, non-tender, small scab noted, and no signs of infection including no redness, or no pus. Extremities: 2+ edema (chronic)  Skin:   Neurologic:   Alert&Oriented. Psychiatric: has a normal mood and affect. Behavior is normal.       Assessment and Plan     Type 2 diabetes mellitus with diabetic polyneuropathy, unspecified whether long term insulin use (HCC)  - metFORMIN (GLUCOPHAGE) 500 MG tablet; Take 2 tablets by mouth 2 times daily (with meals)  Dispense: 60 tablet; Refill: 3  - BASIC METABOLIC PANEL; Future  - Diabetic Foot Exam-Normal  - POCT glycosylated hemoglobin (Hb A1C)-    Hyperlipidemia, unspecified hyperlipidemia type  - LIPID PANEL; Future     Cigarette nicotine dependence with nicotine-induced disorder  - Attempting to quit smoking  - nicotine (NICODERM CQ) 14 MG/24HR; Place 1 patch onto the skin daily for 14 days  Dispense: 14 patch; Refill: 2    Hypokalemia/hypomagnesemia  -F/U BMP    Post OP f/u surgery incision site  Post GIST  surgery  -Surgery site at umbilicus is almost completely healed. No signs infection.   Continue wound care.  -F/U with general surgery, and oncology    Medication refill  Essential hypertension  - spironolactone (ALDACTONE) 25 MG tablet; TAKE 1 TABLET BY MOUTH TWICE A DAY  Dispense: 30 tablet; Refill: 3      Return to Office: Return in about 1 month (around 7/14/2019) for Nicotine Dependence.       Medication List:    Current Outpatient Medications   Medication Sig Dispense Refill    spironolactone (ALDACTONE) 25 MG tablet TAKE 1 TABLET BY MOUTH TWICE A DAY 30 tablet 0    bumetanide (BUMEX) 1 MG tablet Take 1 tablet by mouth 2 times daily 60 tablet 1    losartan (COZAAR) 25 MG tablet TAKE 1 TABLET BY MOUTH EVERY DAY 30 tablet 3    acetaminophen (TYLENOL) 500 MG tablet Take 500 mg by mouth      pantoprazole (PROTONIX) 40 MG tablet Take 40 mg by mouth      docusate sodium (COLACE) 100 MG capsule Take 1 capsule by mouth 2 times daily      Blood Glucose Monitoring Suppl (ONETOUCH VERIO FLEX SYSTEM) w/Device KIT AS DIRECTED  0    metFORMIN (GLUCOPHAGE) 500 MG tablet Take 2 tablets by mouth 2 times daily (with meals) 120 tablet 1    B-D ULTRAFINE III SHORT PEN 31G X 8 MM MISC Inject 1 each into the skin nightly 100 each 3    insulin glargine (LANTUS SOLOSTAR) 100 UNIT/ML injection pen Inject 35 Units into the skin nightly 5 pen 3    magnesium chloride (MAG DELAY) 64 MG TBEC extended release tablet Take 1 tablet by mouth 2 times daily (with meals) 60 tablet 0    terazosin (HYTRIN) 2 MG capsule Take 1 capsule by mouth nightly 30 capsule 1    albuterol sulfate HFA (VENTOLIN HFA) 108 (90 Base) MCG/ACT inhaler Inhale 2 puffs into the lungs every 4 hours as needed for Wheezing 1 Inhaler 3    atorvastatin (LIPITOR) 40 MG tablet Take 1 tablet by mouth nightly 90 tablet 0    amLODIPine (NORVASC) 10 MG tablet Take 1 tablet by mouth daily 90 tablet 0    Cholecalciferol (VITAMIN D3) 2000 units CAPS Take 1 capsule by mouth daily      Compression Stockings MISC by Does not apply route 1 each 0    traMADol (ULTRAM) 50 MG tablet Take 50 mg by mouth every 6 hours as needed for Pain. Daun Skiff metoprolol tartrate (LOPRESSOR) 50 MG tablet Take 50 mg by mouth 2 times daily      sildenafil (VIAGRA) 100 MG tablet Take 100 mg by mouth as needed. Take an hour before sex. Med info obtained from [place] records.  ibuprofen (ADVIL;MOTRIN) 800 MG tablet Take 800 mg by mouth 3 times daily as needed. Take with food. Med info obtained from [place] records   Indications: Pain       No current facility-administered medications for this visit. Ericka Bryant MD PGY-2    This case was discussedwith Dr Magalie (s)      This document may have been prepared at least partially through the use of voice recognition software. Although effort is taken to assure the accuracy of this document, it is possible that grammatical, syntax,  or spelling errors may occur.

## 2019-07-05 RX ORDER — BUMETANIDE 1 MG/1
1 TABLET ORAL 2 TIMES DAILY
Qty: 60 TABLET | Refills: 1 | Status: SHIPPED | OUTPATIENT
Start: 2019-07-05 | End: 2019-07-29 | Stop reason: SDUPTHER

## 2019-07-10 ENCOUNTER — TELEPHONE (OUTPATIENT)
Dept: FAMILY MEDICINE CLINIC | Age: 59
End: 2019-07-10

## 2019-07-10 DIAGNOSIS — E83.42 HYPOMAGNESEMIA: ICD-10-CM

## 2019-07-10 RX ORDER — MAGNESIUM CHLORIDE 64 MG
1 TABLET, DELAYED RELEASE (ENTERIC COATED) ORAL 2 TIMES DAILY WITH MEALS
Qty: 60 TABLET | Refills: 1 | Status: SHIPPED | OUTPATIENT
Start: 2019-07-10 | End: 2019-08-07 | Stop reason: SDUPTHER

## 2019-07-10 NOTE — TELEPHONE ENCOUNTER
Please call patient let him know that his magnesium was low. I will be restarting his magnesium supplement.     Chantal Craig MD PGY-3

## 2019-07-29 RX ORDER — BUMETANIDE 1 MG/1
1 TABLET ORAL 2 TIMES DAILY
Qty: 60 TABLET | Refills: 1 | Status: SHIPPED | OUTPATIENT
Start: 2019-07-29 | End: 2019-08-28 | Stop reason: SDUPTHER

## 2019-08-07 DIAGNOSIS — E83.42 HYPOMAGNESEMIA: ICD-10-CM

## 2019-08-07 RX ORDER — MAGNESIUM CHLORIDE 64 MG
1 TABLET, DELAYED RELEASE (ENTERIC COATED) ORAL 2 TIMES DAILY WITH MEALS
Qty: 60 TABLET | Refills: 1 | Status: SHIPPED | OUTPATIENT
Start: 2019-08-07 | End: 2019-08-21 | Stop reason: SDUPTHER

## 2019-08-21 ENCOUNTER — TELEPHONE (OUTPATIENT)
Dept: FAMILY MEDICINE CLINIC | Age: 59
End: 2019-08-21

## 2019-08-21 DIAGNOSIS — E83.42 HYPOMAGNESEMIA: ICD-10-CM

## 2019-08-22 RX ORDER — MAGNESIUM CHLORIDE 64 MG
1 TABLET, DELAYED RELEASE (ENTERIC COATED) ORAL 2 TIMES DAILY WITH MEALS
Qty: 180 TABLET | Refills: 0 | Status: SHIPPED | OUTPATIENT
Start: 2019-08-22 | End: 2021-01-19 | Stop reason: SDUPTHER

## 2019-08-28 RX ORDER — BUMETANIDE 1 MG/1
1 TABLET ORAL 2 TIMES DAILY
Qty: 60 TABLET | Refills: 1 | Status: SHIPPED | OUTPATIENT
Start: 2019-08-28 | End: 2019-09-23 | Stop reason: SDUPTHER

## 2019-09-23 RX ORDER — BUMETANIDE 1 MG/1
1 TABLET ORAL 2 TIMES DAILY
Qty: 60 TABLET | Refills: 1 | Status: SHIPPED | OUTPATIENT
Start: 2019-09-23 | End: 2020-02-28

## 2019-10-15 DIAGNOSIS — I10 ESSENTIAL HYPERTENSION: ICD-10-CM

## 2019-10-16 RX ORDER — SPIRONOLACTONE 25 MG/1
TABLET ORAL
Qty: 30 TABLET | Refills: 3 | Status: SHIPPED | OUTPATIENT
Start: 2019-10-16 | End: 2020-01-03 | Stop reason: SDUPTHER

## 2019-12-19 RX ORDER — LOSARTAN POTASSIUM 25 MG/1
TABLET ORAL
Qty: 90 TABLET | Refills: 1 | Status: SHIPPED | OUTPATIENT
Start: 2019-12-19 | End: 2020-07-01

## 2020-01-03 ENCOUNTER — HOSPITAL ENCOUNTER (OUTPATIENT)
Age: 60
Discharge: HOME | End: 2020-01-05
Payer: MEDICARE

## 2020-01-03 ENCOUNTER — OFFICE VISIT (OUTPATIENT)
Dept: FAMILY MEDICINE CLINIC | Age: 60
End: 2020-01-03
Payer: MEDICARE

## 2020-01-03 VITALS
RESPIRATION RATE: 20 BRPM | BODY MASS INDEX: 46.38 KG/M2 | HEIGHT: 68 IN | TEMPERATURE: 98.1 F | DIASTOLIC BLOOD PRESSURE: 80 MMHG | WEIGHT: 306 LBS | HEART RATE: 75 BPM | OXYGEN SATURATION: 97 % | SYSTOLIC BLOOD PRESSURE: 133 MMHG

## 2020-01-03 LAB
ALBUMIN SERPL-MCNC: 3.8 G/DL (ref 3.5–5.2)
ALP BLD-CCNC: 62 U/L (ref 40–129)
ALT SERPL-CCNC: 16 U/L (ref 0–40)
ANION GAP SERPL CALCULATED.3IONS-SCNC: 12 MMOL/L (ref 7–16)
AST SERPL-CCNC: 21 U/L (ref 0–39)
BILIRUB SERPL-MCNC: 0.3 MG/DL (ref 0–1.2)
BUN BLDV-MCNC: 11 MG/DL (ref 6–20)
CALCIUM SERPL-MCNC: 9.2 MG/DL (ref 8.6–10.2)
CHLORIDE BLD-SCNC: 97 MMOL/L (ref 98–107)
CO2: 27 MMOL/L (ref 22–29)
CREAT SERPL-MCNC: 0.7 MG/DL (ref 0.7–1.2)
CREATININE URINE: 107 MG/DL (ref 40–278)
GFR AFRICAN AMERICAN: >60
GFR NON-AFRICAN AMERICAN: >60 ML/MIN/1.73
GLUCOSE BLD-MCNC: 169 MG/DL (ref 74–99)
HBA1C MFR BLD: 8.5 % (ref 4–5.6)
MAGNESIUM: 2 MG/DL (ref 1.6–2.6)
MICROALBUMIN UR-MCNC: 479.2 MG/L
MICROALBUMIN/CREAT UR-RTO: 447.9 (ref 0–30)
POTASSIUM SERPL-SCNC: 4.8 MMOL/L (ref 3.5–5)
SODIUM BLD-SCNC: 136 MMOL/L (ref 132–146)
TOTAL PROTEIN: 7.2 G/DL (ref 6.4–8.3)

## 2020-01-03 PROCEDURE — G8427 DOCREV CUR MEDS BY ELIG CLIN: HCPCS | Performed by: STUDENT IN AN ORGANIZED HEALTH CARE EDUCATION/TRAINING PROGRAM

## 2020-01-03 PROCEDURE — 83036 HEMOGLOBIN GLYCOSYLATED A1C: CPT

## 2020-01-03 PROCEDURE — 80053 COMPREHEN METABOLIC PANEL: CPT

## 2020-01-03 PROCEDURE — 83735 ASSAY OF MAGNESIUM: CPT

## 2020-01-03 PROCEDURE — 82044 UR ALBUMIN SEMIQUANTITATIVE: CPT

## 2020-01-03 PROCEDURE — 99213 OFFICE O/P EST LOW 20 MIN: CPT | Performed by: STUDENT IN AN ORGANIZED HEALTH CARE EDUCATION/TRAINING PROGRAM

## 2020-01-03 PROCEDURE — 99212 OFFICE O/P EST SF 10 MIN: CPT | Performed by: STUDENT IN AN ORGANIZED HEALTH CARE EDUCATION/TRAINING PROGRAM

## 2020-01-03 PROCEDURE — 36415 COLL VENOUS BLD VENIPUNCTURE: CPT | Performed by: FAMILY MEDICINE

## 2020-01-03 PROCEDURE — 4004F PT TOBACCO SCREEN RCVD TLK: CPT | Performed by: STUDENT IN AN ORGANIZED HEALTH CARE EDUCATION/TRAINING PROGRAM

## 2020-01-03 PROCEDURE — G8417 CALC BMI ABV UP PARAM F/U: HCPCS | Performed by: STUDENT IN AN ORGANIZED HEALTH CARE EDUCATION/TRAINING PROGRAM

## 2020-01-03 PROCEDURE — G8484 FLU IMMUNIZE NO ADMIN: HCPCS | Performed by: STUDENT IN AN ORGANIZED HEALTH CARE EDUCATION/TRAINING PROGRAM

## 2020-01-03 PROCEDURE — 2022F DILAT RTA XM EVC RTNOPTHY: CPT | Performed by: STUDENT IN AN ORGANIZED HEALTH CARE EDUCATION/TRAINING PROGRAM

## 2020-01-03 PROCEDURE — 3017F COLORECTAL CA SCREEN DOC REV: CPT | Performed by: STUDENT IN AN ORGANIZED HEALTH CARE EDUCATION/TRAINING PROGRAM

## 2020-01-03 PROCEDURE — 82570 ASSAY OF URINE CREATININE: CPT

## 2020-01-03 PROCEDURE — 86703 HIV-1/HIV-2 1 RESULT ANTBDY: CPT

## 2020-01-03 RX ORDER — SPIRONOLACTONE 25 MG/1
25 TABLET ORAL 2 TIMES DAILY
Qty: 180 TABLET | Refills: 0 | Status: SHIPPED | OUTPATIENT
Start: 2020-01-03 | End: 2020-03-05

## 2020-01-03 RX ORDER — DOCUSATE SODIUM 100 MG/1
100 CAPSULE, LIQUID FILLED ORAL 2 TIMES DAILY
Qty: 90 CAPSULE | Refills: 1 | Status: SHIPPED | OUTPATIENT
Start: 2020-01-03 | End: 2020-07-10 | Stop reason: SDUPTHER

## 2020-01-03 NOTE — PROGRESS NOTES
Attending Physician Statement    S:   Chief Complaint   Patient presents with    Leg Swelling      CHF, HTN, DM, gastric stromal tumor. C/O worsening leg swelling. Has been going to oncology following surgery and believes that this is because of his chemo. Takes spironolactone, but doesn't always take bumex  O: Blood pressure 133/80, pulse 75, temperature 98.1 °F (36.7 °C), temperature source Oral, resp. rate 20, height 5' 8\" (1.727 m), weight (!) 306 lb (138.8 kg), SpO2 97 %. Exam:   Heart - RRR   Lungs - clear   Ext - 3+ edema, L > R  A: Edema, DM, etc  P:  Encourage bumex as directed   Compression stocking   Echo - consider CHF   DM labs   Follow-up as ordered    I have discussed the case, including pertinent history and exam findings with the resident. I agree with the documented assessment and plan.

## 2020-01-05 PROBLEM — T78.3XXA ANGIOTENSIN CONVERTING ENZYME INHIBITOR-AGGRAVATED ANGIOEDEMA: Status: RESOLVED | Noted: 2018-03-29 | Resolved: 2020-01-05

## 2020-01-05 PROBLEM — C49.A4 GIST (GASTROINTESTINAL STROMA TUMOR), MALIGNANT, COLON (HCC): Status: ACTIVE | Noted: 2020-01-05

## 2020-01-05 PROBLEM — I50.32 CHRONIC HEART FAILURE WITH PRESERVED EJECTION FRACTION (HCC): Status: ACTIVE | Noted: 2020-01-05

## 2020-01-05 PROBLEM — C49.A0 GASTROINTESTINAL STROMAL TUMOR (GIST) (HCC): Status: RESOLVED | Noted: 2019-06-03 | Resolved: 2020-01-05

## 2020-01-05 PROBLEM — K63.9 SMALL BOWEL LESION: Status: ACTIVE | Noted: 2019-05-08

## 2020-01-05 PROBLEM — F17.200 NICOTINE USE DISORDER: Status: ACTIVE | Noted: 2019-05-13

## 2020-01-05 PROBLEM — E87.6 HYPOKALEMIA: Status: RESOLVED | Noted: 2018-08-17 | Resolved: 2020-01-05

## 2020-01-05 PROBLEM — T46.4X5A ANGIOTENSIN CONVERTING ENZYME INHIBITOR-AGGRAVATED ANGIOEDEMA: Status: RESOLVED | Noted: 2018-03-29 | Resolved: 2020-01-05

## 2020-01-05 RX ORDER — GABAPENTIN 300 MG/1
300 CAPSULE ORAL PRN
COMMUNITY
Start: 2019-05-14 | End: 2021-08-17 | Stop reason: DRUGHIGH

## 2020-01-05 RX ORDER — IMATINIB MESYLATE 400 MG/1
TABLET, FILM COATED ORAL
COMMUNITY
Start: 2019-12-05 | End: 2020-07-10

## 2020-01-05 RX ORDER — PANTOPRAZOLE SODIUM 40 MG/1
40 TABLET, DELAYED RELEASE ORAL
COMMUNITY
Start: 2019-05-14 | End: 2020-08-04

## 2020-01-05 ASSESSMENT — ENCOUNTER SYMPTOMS
COUGH: 0
SHORTNESS OF BREATH: 0

## 2020-01-06 LAB — HIV-1 AND HIV-2 ANTIBODIES: NORMAL

## 2020-01-09 ENCOUNTER — TELEPHONE (OUTPATIENT)
Dept: FAMILY MEDICINE CLINIC | Age: 60
End: 2020-01-09

## 2020-01-16 NOTE — TELEPHONE ENCOUNTER
Patient called in and was given instruction on increasing insulin at night. His phone number was also wrong in the computer and was fixed along with his new pharmacy.

## 2020-01-25 ENCOUNTER — HOSPITAL ENCOUNTER (OUTPATIENT)
Dept: CT IMAGING | Age: 60
Discharge: HOME | End: 2020-01-27
Payer: MEDICARE

## 2020-01-25 PROCEDURE — 6360000004 HC RX CONTRAST MEDICATION: Performed by: RADIOLOGY

## 2020-01-25 PROCEDURE — 74177 CT ABD & PELVIS W/CONTRAST: CPT

## 2020-01-25 RX ADMIN — IOPAMIDOL 110 ML: 755 INJECTION, SOLUTION INTRAVENOUS at 10:05

## 2020-01-25 RX ADMIN — IOHEXOL 50 ML: 240 INJECTION, SOLUTION INTRATHECAL; INTRAVASCULAR; INTRAVENOUS; ORAL at 10:05

## 2020-01-28 ENCOUNTER — APPOINTMENT (OUTPATIENT)
Dept: GENERAL RADIOLOGY | Age: 60
End: 2020-01-28
Payer: MEDICARE

## 2020-01-28 ENCOUNTER — APPOINTMENT (OUTPATIENT)
Dept: CT IMAGING | Age: 60
End: 2020-01-28
Payer: MEDICARE

## 2020-01-28 ENCOUNTER — HOSPITAL ENCOUNTER (EMERGENCY)
Age: 60
Discharge: HOME | End: 2020-01-28
Attending: EMERGENCY MEDICINE
Payer: MEDICARE

## 2020-01-28 ENCOUNTER — HOSPITAL ENCOUNTER (EMERGENCY)
Age: 60
Discharge: LEFT BEFORE DISCHARGE | End: 2020-01-28
Payer: MEDICARE

## 2020-01-28 VITALS
RESPIRATION RATE: 16 BRPM | HEIGHT: 68 IN | BODY MASS INDEX: 45.47 KG/M2 | DIASTOLIC BLOOD PRESSURE: 66 MMHG | OXYGEN SATURATION: 97 % | HEART RATE: 59 BPM | SYSTOLIC BLOOD PRESSURE: 135 MMHG | TEMPERATURE: 98 F | WEIGHT: 300 LBS

## 2020-01-28 VITALS — OXYGEN SATURATION: 94 % | HEART RATE: 74 BPM

## 2020-01-28 LAB
ALBUMIN SERPL-MCNC: 3.5 G/DL (ref 3.5–5.2)
ALP BLD-CCNC: 56 U/L (ref 40–129)
ALT SERPL-CCNC: 14 U/L (ref 0–40)
ANION GAP SERPL CALCULATED.3IONS-SCNC: 9 MMOL/L (ref 7–16)
AST SERPL-CCNC: 19 U/L (ref 0–39)
BASOPHILS ABSOLUTE: 0.04 E9/L (ref 0–0.2)
BASOPHILS RELATIVE PERCENT: 0.5 % (ref 0–2)
BILIRUB SERPL-MCNC: 0.3 MG/DL (ref 0–1.2)
BUN BLDV-MCNC: 9 MG/DL (ref 6–20)
CALCIUM SERPL-MCNC: 8.4 MG/DL (ref 8.6–10.2)
CHLORIDE BLD-SCNC: 97 MMOL/L (ref 98–107)
CO2: 26 MMOL/L (ref 22–29)
CREAT SERPL-MCNC: 0.9 MG/DL (ref 0.7–1.2)
EOSINOPHILS ABSOLUTE: 0.28 E9/L (ref 0.05–0.5)
EOSINOPHILS RELATIVE PERCENT: 3.5 % (ref 0–6)
GFR AFRICAN AMERICAN: >60
GFR NON-AFRICAN AMERICAN: >60 ML/MIN/1.73
GLUCOSE BLD-MCNC: 98 MG/DL (ref 74–99)
HCT VFR BLD CALC: 42.1 % (ref 37–54)
HEMOGLOBIN: 13.8 G/DL (ref 12.5–16.5)
IMMATURE GRANULOCYTES #: 0.03 E9/L
IMMATURE GRANULOCYTES %: 0.4 % (ref 0–5)
LACTIC ACID: 1.3 MMOL/L (ref 0.5–2.2)
LIPASE: 23 U/L (ref 13–60)
LYMPHOCYTES ABSOLUTE: 2.89 E9/L (ref 1.5–4)
LYMPHOCYTES RELATIVE PERCENT: 36 % (ref 20–42)
MCH RBC QN AUTO: 33.6 PG (ref 26–35)
MCHC RBC AUTO-ENTMCNC: 32.8 % (ref 32–34.5)
MCV RBC AUTO: 102.4 FL (ref 80–99.9)
MONOCYTES ABSOLUTE: 0.51 E9/L (ref 0.1–0.95)
MONOCYTES RELATIVE PERCENT: 6.4 % (ref 2–12)
NEUTROPHILS ABSOLUTE: 4.28 E9/L (ref 1.8–7.3)
NEUTROPHILS RELATIVE PERCENT: 53.2 % (ref 43–80)
PDW BLD-RTO: 13.9 FL (ref 11.5–15)
PLATELET # BLD: 216 E9/L (ref 130–450)
PMV BLD AUTO: 9.3 FL (ref 7–12)
POTASSIUM SERPL-SCNC: 4.2 MMOL/L (ref 3.5–5)
PRO-BNP: 56 PG/ML (ref 0–125)
RBC # BLD: 4.11 E12/L (ref 3.8–5.8)
SODIUM BLD-SCNC: 132 MMOL/L (ref 132–146)
TOTAL PROTEIN: 6.2 G/DL (ref 6.4–8.3)
TROPONIN: <0.01 NG/ML (ref 0–0.03)
WBC # BLD: 8 E9/L (ref 4.5–11.5)

## 2020-01-28 PROCEDURE — 72131 CT LUMBAR SPINE W/O DYE: CPT

## 2020-01-28 PROCEDURE — 93005 ELECTROCARDIOGRAM TRACING: CPT | Performed by: STUDENT IN AN ORGANIZED HEALTH CARE EDUCATION/TRAINING PROGRAM

## 2020-01-28 PROCEDURE — 84484 ASSAY OF TROPONIN QUANT: CPT

## 2020-01-28 PROCEDURE — 71045 X-RAY EXAM CHEST 1 VIEW: CPT

## 2020-01-28 PROCEDURE — 83605 ASSAY OF LACTIC ACID: CPT

## 2020-01-28 PROCEDURE — 96374 THER/PROPH/DIAG INJ IV PUSH: CPT

## 2020-01-28 PROCEDURE — 80053 COMPREHEN METABOLIC PANEL: CPT

## 2020-01-28 PROCEDURE — 99285 EMERGENCY DEPT VISIT HI MDM: CPT

## 2020-01-28 PROCEDURE — 6360000002 HC RX W HCPCS: Performed by: STUDENT IN AN ORGANIZED HEALTH CARE EDUCATION/TRAINING PROGRAM

## 2020-01-28 PROCEDURE — 85025 COMPLETE CBC W/AUTO DIFF WBC: CPT

## 2020-01-28 PROCEDURE — 83690 ASSAY OF LIPASE: CPT

## 2020-01-28 PROCEDURE — 83880 ASSAY OF NATRIURETIC PEPTIDE: CPT

## 2020-01-28 RX ORDER — HYDROCODONE BITARTRATE AND ACETAMINOPHEN 5; 325 MG/1; MG/1
1 TABLET ORAL EVERY 6 HOURS PRN
Qty: 12 TABLET | Refills: 0 | Status: SHIPPED | OUTPATIENT
Start: 2020-01-28 | End: 2020-01-31

## 2020-01-28 RX ORDER — MORPHINE SULFATE 4 MG/ML
4 INJECTION, SOLUTION INTRAMUSCULAR; INTRAVENOUS ONCE
Status: COMPLETED | OUTPATIENT
Start: 2020-01-28 | End: 2020-01-28

## 2020-01-28 RX ORDER — 0.9 % SODIUM CHLORIDE 0.9 %
1000 INTRAVENOUS SOLUTION INTRAVENOUS ONCE
Status: DISCONTINUED | OUTPATIENT
Start: 2020-01-28 | End: 2020-01-28

## 2020-01-28 RX ADMIN — MORPHINE SULFATE 4 MG: 4 INJECTION, SOLUTION INTRAMUSCULAR; INTRAVENOUS at 18:18

## 2020-01-28 ASSESSMENT — ENCOUNTER SYMPTOMS
VOMITING: 0
ABDOMINAL PAIN: 1
CONSTIPATION: 0
EYE REDNESS: 0
DIARRHEA: 0
ABDOMINAL DISTENTION: 0
TROUBLE SWALLOWING: 0
CHEST TIGHTNESS: 0
SINUS PRESSURE: 0
PHOTOPHOBIA: 0
NAUSEA: 0
RHINORRHEA: 0
SORE THROAT: 0
BACK PAIN: 1
SHORTNESS OF BREATH: 0
WHEEZING: 0
EYE PAIN: 0
BLOOD IN STOOL: 0
COUGH: 0

## 2020-01-28 ASSESSMENT — PAIN DESCRIPTION - PAIN TYPE: TYPE: ACUTE PAIN

## 2020-01-28 ASSESSMENT — PAIN DESCRIPTION - ORIENTATION: ORIENTATION: LOWER

## 2020-01-28 ASSESSMENT — PAIN DESCRIPTION - LOCATION: LOCATION: ABDOMEN;BACK

## 2020-01-28 ASSESSMENT — PAIN SCALES - GENERAL: PAINLEVEL_OUTOF10: 7

## 2020-01-28 NOTE — ED PROVIDER NOTES
Patient is a 63-year-old male who presented to ED for evaluation of back pain. Onset of symptoms approximately 1 week prior to arrival.  Patient locates the pain to the lumbar spine. Patient notes that the pain radiates to the bilateral lower abdomen. Patient states that the pain is constant, worsened with standing up straight and walking. Patient notes that symptoms resolved after rest.  Patient denies any saddle anesthesia or loss of bowel/bladder function. Patient denies any trauma to the area. Patient with significant history of colon cancer with partial colectomy 1 year prior at [facility]. Patient with recent CT imaging performed 1/25/2028 with no acute changes. Review of Systems   Constitutional: Negative for chills, diaphoresis, fatigue and fever. HENT: Negative for congestion, ear pain, rhinorrhea, sinus pressure, sneezing, sore throat and trouble swallowing. Eyes: Negative for photophobia, pain and redness. Respiratory: Negative for cough, chest tightness, shortness of breath and wheezing. Cardiovascular: Negative for chest pain and palpitations. Gastrointestinal: Positive for abdominal pain. Negative for abdominal distention, blood in stool, constipation, diarrhea, nausea and vomiting. Endocrine: Negative for polydipsia and polyuria. Genitourinary: Negative for difficulty urinating, dysuria, flank pain, hematuria and urgency. Musculoskeletal: Positive for back pain. Negative for arthralgias, myalgias and neck pain. Skin: Negative for rash and wound. Neurological: Negative for weakness, light-headedness, numbness and headaches. Psychiatric/Behavioral: Negative for agitation and confusion. The patient is not nervous/anxious and is not hyperactive. Physical Exam  Constitutional:       General: He is not in acute distress. Appearance: He is well-developed. He is not diaphoretic. HENT:      Head: Normocephalic and atraumatic.       Right Ear: External ear with no acute ST or T wave morphology changes. Labs reviewed-unremarkable. Troponin negative. CT imaging performed 1/25/2020 reviewed, no acute intra-abdominal abnormality. CT of the lumbar spine performed with no evidence of new fracture or spondylolisthesis--noted lower thoracic anterior wedge compression fractures unchanged, unilateral fracture L5 inferior articulating facet stable, diffuse degenerative disease with multilevel central foraminal stenosis. Patient was administered analgesic medication with improved symptoms. On repeat evaluation, patient ambulating well with no difficulty. Decision was made to discharge the patient home with instruction to follow-up with primary care by calling their office as needed. Strict return instructions were given to patient prior to discharge. Patient with all questions answered prior to disposition and is agreeable to plan. EKG:  This EKG is signed and interpreted by me. Rate: 63  Rhythm: Sinus  Interpretation: no acute ST or T wave morphology changes  Comparison: stable as compared to patient's most recent EKG (9/18/2018)    --------------------------------------------- PAST HISTORY ---------------------------------------------  Past Medical History:  has a past medical history of Angiotensin converting enzyme inhibitor-aggravated angioedema, Arthritis, Carpal tunnel syndrome, Cecum mass, CHF (congestive heart failure) ([facility] [address] Diabetes mellitus ([facility] [address] Gastrointestinal stromal tumor (GIST) ([facility] [address] Hyperlipidemia, Hypertension, Hypokalemia, Neuropathy, Obesity, and Sleep apnea. Past Surgical History:  has a past surgical history that includes Tonsillectomy; Nasal septum surgery; Colonoscopy (09/14/2018); pr colonoscopy w/biopsy single/multiple (N/A, 9/14/2018); and pr colsc flx w/rmvl of tumor polyp lesion snare tq (9/14/2018). Social History:  reports that he has been smoking cigarettes. He has a 15.00 pack-year smoking history.  He has never

## 2020-01-29 LAB
EKG ATRIAL RATE: 63 BPM
EKG P AXIS: 53 DEGREES
EKG P-R INTERVAL: 164 MS
EKG Q-T INTERVAL: 412 MS
EKG QRS DURATION: 74 MS
EKG QTC CALCULATION (BAZETT): 421 MS
EKG R AXIS: -20 DEGREES
EKG T AXIS: 22 DEGREES
EKG VENTRICULAR RATE: 63 BPM

## 2020-01-29 PROCEDURE — 93010 ELECTROCARDIOGRAM REPORT: CPT | Performed by: INTERNAL MEDICINE

## 2020-02-06 ENCOUNTER — TELEPHONE (OUTPATIENT)
Dept: FAMILY MEDICINE CLINIC | Age: 60
End: 2020-02-06

## 2020-02-10 NOTE — TELEPHONE ENCOUNTER
Pharmacy phoned stated that increase of insulin to 38 units so he will need 12 pens for a 90 day supply  Please advise  Thank you April

## 2020-02-21 ENCOUNTER — INITIAL CONSULT (OUTPATIENT)
Dept: NEUROSURGERY | Age: 60
End: 2020-02-21
Payer: MEDICARE

## 2020-02-21 VITALS
HEART RATE: 98 BPM | HEIGHT: 68 IN | BODY MASS INDEX: 46.1 KG/M2 | SYSTOLIC BLOOD PRESSURE: 136 MMHG | WEIGHT: 304.2 LBS | DIASTOLIC BLOOD PRESSURE: 86 MMHG

## 2020-02-21 PROCEDURE — 99203 OFFICE O/P NEW LOW 30 MIN: CPT | Performed by: PHYSICIAN ASSISTANT

## 2020-02-21 PROCEDURE — G8484 FLU IMMUNIZE NO ADMIN: HCPCS | Performed by: PHYSICIAN ASSISTANT

## 2020-02-21 PROCEDURE — G8417 CALC BMI ABV UP PARAM F/U: HCPCS | Performed by: PHYSICIAN ASSISTANT

## 2020-02-21 PROCEDURE — G8427 DOCREV CUR MEDS BY ELIG CLIN: HCPCS | Performed by: PHYSICIAN ASSISTANT

## 2020-02-21 PROCEDURE — 4004F PT TOBACCO SCREEN RCVD TLK: CPT | Performed by: PHYSICIAN ASSISTANT

## 2020-02-21 PROCEDURE — 3017F COLORECTAL CA SCREEN DOC REV: CPT | Performed by: PHYSICIAN ASSISTANT

## 2020-02-21 ASSESSMENT — ENCOUNTER SYMPTOMS
TROUBLE SWALLOWING: 0
BACK PAIN: 1
SHORTNESS OF BREATH: 0
PHOTOPHOBIA: 0
ABDOMINAL PAIN: 0

## 2020-02-21 NOTE — PROGRESS NOTES
Subjective:      Patient ID: Fleming Dakin is a 61 y.o. male. Gail Swann is a 61year old male with a past medical history of DM, HTN, HLD, sleep apnea, CHF, bilateral carpal tunnel syndrome, arthritis, and chronic back pain. Pt smokes 1/2 ppd. He presents to the office today as a new patient c/o low back pain and bilateral leg pain. Pt states he has had back pain since the 80's, but has recently been worsening in the past several weeks. Denies injury. Describes the pain as constant sharp pain in the middle of his low back. He has baseline neuropathy and pain down his legs and is unsure if the pain down his legs has worsened with the back pain. Standing and walking for long periods of time make the pain worse. He has tried TENS unit and pain cream without significant relief. Denies recent physical therapy or lumbar epidural steroid injections. Denies changes in bowel or bladder, saddle anesthesia, fever, chills, N/V, or chest pain. CT lumbar spine 1/28/20 demonstrates multiple chronic compression deformities, stable right L5 pars defect, no evidence of new fracture of spondylolisthesis. Review of Systems   Constitutional: Negative for fever. HENT: Negative for trouble swallowing. Eyes: Negative for photophobia. Respiratory: Negative for shortness of breath. Cardiovascular: Negative for chest pain. Gastrointestinal: Negative for abdominal pain. Endocrine: Negative for heat intolerance. Genitourinary: Negative for flank pain. Musculoskeletal: Positive for arthralgias, back pain, gait problem and myalgias. Skin: Negative for wound. Neurological: Positive for weakness and numbness. Negative for headaches. Psychiatric/Behavioral: Negative for confusion. Objective:   Physical Exam  Constitutional:       Appearance: He is well-developed. He is obese. HENT:      Head: Normocephalic and atraumatic. Eyes:      Extraocular Movements: Extraocular movements intact. Conjunctiva/sclera: Conjunctivae normal.      Pupils: Pupils are equal, round, and reactive to light. Neck:      Musculoskeletal: Normal range of motion and neck supple. Cardiovascular:      Rate and Rhythm: Normal rate. Pulmonary:      Effort: Pulmonary effort is normal.   Abdominal:      General: There is no distension. Musculoskeletal:      Comments: Tenderness to palpation midline lumbar spine  Decreased ROM lumbar spine secondary to pain    Skin:     General: Skin is warm and dry. Neurological:      Mental Status: He is alert. Comments: Alert and oriented x3  CN3-12 intact  Upper strength full  BLE 4/5 strength, pain in back with exam  Baseline neuropathy BLE shins down, otherwise intact  Reflexes normal   Negative straight leg raise bilaterally    Psychiatric:         Thought Content: Thought content normal.         Assessment: This is a 61year old male presenting with low back pain and leg pain. CT scan demonstrates chronic right L5 pars defect.  He has not tried any conservative treatments      Plan:      -Obtain MRI lumbar spine to evaluate for stenosis   -OARRS report reviewed  -Call/return to Neurosurgery clinic after completion of imaging to discuss results and further treatment plan  -Call/return sooner if symptoms worsen or new issues arise in the interim           Adelita Baca PA-C

## 2020-02-28 RX ORDER — BUMETANIDE 1 MG/1
TABLET ORAL
Qty: 180 TABLET | Refills: 0 | Status: SHIPPED
Start: 2020-02-28 | End: 2020-05-22

## 2020-03-05 RX ORDER — SPIRONOLACTONE 25 MG/1
TABLET ORAL
Qty: 180 TABLET | Refills: 0 | Status: SHIPPED
Start: 2020-03-05 | End: 2020-07-01

## 2020-05-18 RX ORDER — PEN NEEDLE, DIABETIC 31 GX5/16"
1 NEEDLE, DISPOSABLE MISCELLANEOUS NIGHTLY
Qty: 100 EACH | Refills: 3 | Status: SHIPPED | OUTPATIENT
Start: 2020-05-18 | End: 2021-10-08 | Stop reason: SDUPTHER

## 2020-06-01 ENCOUNTER — HOSPITAL ENCOUNTER (OUTPATIENT)
Dept: MRI IMAGING | Age: 60
Discharge: HOME | End: 2020-06-03
Payer: MEDICARE

## 2020-06-01 PROCEDURE — 72148 MRI LUMBAR SPINE W/O DYE: CPT

## 2020-06-16 RX ORDER — ATORVASTATIN CALCIUM 40 MG/1
40 TABLET, FILM COATED ORAL NIGHTLY
Qty: 90 TABLET | Refills: 0 | Status: SHIPPED
Start: 2020-06-16 | End: 2020-09-29

## 2020-07-01 RX ORDER — LOSARTAN POTASSIUM 25 MG/1
TABLET ORAL
Qty: 90 TABLET | Refills: 1 | Status: SHIPPED
Start: 2020-07-01 | End: 2020-07-10 | Stop reason: SDUPTHER

## 2020-07-06 ENCOUNTER — NURSE TRIAGE (OUTPATIENT)
Dept: OTHER | Facility: CLINIC | Age: 60
End: 2020-07-06

## 2020-07-06 ENCOUNTER — TELEPHONE (OUTPATIENT)
Dept: ADMINISTRATIVE | Age: 60
End: 2020-07-06

## 2020-07-06 NOTE — TELEPHONE ENCOUNTER
swellling in the left breast area for a while per pt no sob or pain     Reason for Disposition   Change in shape or appearance of breast    Answer Assessment - Initial Assessment Questions  1. SYMPTOM: \"What's the main symptom you're concerned about? \"  (e.g., lump, pain, rash, nipple discharge)      swelling  2. LOCATION: \"Where is the *No Answer* located? \"      left  3. ONSET: \"When did *No Answer*  start? \"      A while a go  4. PRIOR HISTORY: \"Do you have any history of prior problems with your breasts? \" (e.g., lumps, cancer, fibrocystic breast disease)      *No Answer*  5. CAUSE: \"What do you think is causing this symptom? \"      *No Answer*  6. OTHER SYMPTOMS: \"Do you have any other symptoms? \" (e.g., fever, breast pain, redness or rash, nipple discharge)      *No Answer*  7. PREGNANCY-BREASTFEEDING: \"Is there any chance you are pregnant? \" \"When was your last menstrual period? \" \"Are you breastfeeding? \"      *No Answer*    Protocols used: BREAST SYMPTOMS-ADULT-   also wants to get MRI report

## 2020-07-07 RX ORDER — INSULIN GLARGINE 100 [IU]/ML
38 INJECTION, SOLUTION SUBCUTANEOUS NIGHTLY
Qty: 12 PEN | Refills: 0 | Status: SHIPPED
Start: 2020-07-07 | End: 2020-10-14

## 2020-07-10 ENCOUNTER — OFFICE VISIT (OUTPATIENT)
Dept: FAMILY MEDICINE CLINIC | Age: 60
End: 2020-07-10
Payer: MEDICARE

## 2020-07-10 VITALS
HEART RATE: 76 BPM | OXYGEN SATURATION: 95 % | RESPIRATION RATE: 22 BRPM | TEMPERATURE: 98.7 F | WEIGHT: 302 LBS | DIASTOLIC BLOOD PRESSURE: 63 MMHG | HEIGHT: 68 IN | BODY MASS INDEX: 45.77 KG/M2 | SYSTOLIC BLOOD PRESSURE: 108 MMHG

## 2020-07-10 LAB — HBA1C MFR BLD: 7.5 %

## 2020-07-10 PROCEDURE — 3017F COLORECTAL CA SCREEN DOC REV: CPT | Performed by: FAMILY MEDICINE

## 2020-07-10 PROCEDURE — 3051F HG A1C>EQUAL 7.0%<8.0%: CPT | Performed by: STUDENT IN AN ORGANIZED HEALTH CARE EDUCATION/TRAINING PROGRAM

## 2020-07-10 PROCEDURE — G8417 CALC BMI ABV UP PARAM F/U: HCPCS | Performed by: FAMILY MEDICINE

## 2020-07-10 PROCEDURE — 99213 OFFICE O/P EST LOW 20 MIN: CPT | Performed by: STUDENT IN AN ORGANIZED HEALTH CARE EDUCATION/TRAINING PROGRAM

## 2020-07-10 PROCEDURE — 83036 HEMOGLOBIN GLYCOSYLATED A1C: CPT | Performed by: STUDENT IN AN ORGANIZED HEALTH CARE EDUCATION/TRAINING PROGRAM

## 2020-07-10 PROCEDURE — 99212 OFFICE O/P EST SF 10 MIN: CPT | Performed by: STUDENT IN AN ORGANIZED HEALTH CARE EDUCATION/TRAINING PROGRAM

## 2020-07-10 PROCEDURE — 4004F PT TOBACCO SCREEN RCVD TLK: CPT | Performed by: FAMILY MEDICINE

## 2020-07-10 PROCEDURE — G8427 DOCREV CUR MEDS BY ELIG CLIN: HCPCS | Performed by: FAMILY MEDICINE

## 2020-07-10 PROCEDURE — 2022F DILAT RTA XM EVC RTNOPTHY: CPT | Performed by: FAMILY MEDICINE

## 2020-07-10 RX ORDER — DOCUSATE SODIUM 100 MG/1
100 CAPSULE, LIQUID FILLED ORAL 2 TIMES DAILY
Qty: 90 CAPSULE | Refills: 1 | Status: SHIPPED
Start: 2020-07-10 | End: 2020-10-29 | Stop reason: SDUPTHER

## 2020-07-10 RX ORDER — LOSARTAN POTASSIUM 25 MG/1
TABLET ORAL
Qty: 90 TABLET | Refills: 1 | Status: SHIPPED
Start: 2020-07-10 | End: 2021-04-06

## 2020-07-10 NOTE — PATIENT INSTRUCTIONS
Patient Education        Learning About Benefits From Quitting Smoking  How does quitting smoking make you healthier? If you're thinking about quitting smoking, you may have a few reasons to be smoke-free. Your health may be one of them. · When you quit smoking, you lower your risks for cancer, lung disease, heart attack, stroke, blood vessel disease, and blindness from macular degeneration. · When you're smoke-free, you get sick less often, and you heal faster. You are less likely to get colds, flu, bronchitis, and pneumonia. · As a nonsmoker, you may find that your mood is better and you are less stressed. When and how will you feel healthier? Quitting has real health benefits that start from day 1 of being smoke-free. And the longer you stay smoke-free, the healthier you get and the better you feel. The first hours  · After just 20 minutes, your blood pressure and heart rate go down. That means there's less stress on your heart and blood vessels. · Within 12 hours, the level of carbon monoxide in your blood drops back to normal. That makes room for more oxygen. With more oxygen in your body, you may notice that you have more energy than when you smoked. After 2 weeks  · Your lungs start to work better. · Your risk of heart attack starts to drop. After 1 month  · When your lungs are clear, you cough less and breathe deeper, so it's easier to be active. · Your sense of taste and smell return. That means you can enjoy food more than you have since you started smoking. Over the years  · Over the years, your risks of heart disease, heart attack, and stroke are lower. · After 10 years, your risk of dying from lung cancer is cut by about half. And your risk for many other types of cancer is lower too. How would quitting help others in your life? When you quit smoking, you improve the health of everyone who now breathes in your smoke.   · Their heart, lung, and cancer risks drop, much like yours.  · They are sick less. For babies and small children, living smoke-free means they're less likely to have ear infections, pneumonia, and bronchitis. · If you're a woman who is or will be pregnant someday, quitting smoking means a healthier . · Children who are close to you are less likely to become adult smokers. Where can you learn more? Go to https://Definition [employer]. org and sign in to your [employer] account. Enter [phone] 08 in the [employer] box to learn more about \"Learning About Benefits From Quitting Smoking. \"     If you do not have an account, please click on the \"Sign Up Now\" link. Current as of: 2020               Content Version: 12.5  © 6878-0563 Healthwise, Incorporated. Care instructions adapted under license by [facility] ([facility]). If you have questions about a medical condition or this instruction, always ask your healthcare professional. [place] [address] any warranty or liability for your use of this information.

## 2020-07-10 NOTE — PROGRESS NOTES
Attending Physician Statement    S:   Chief Complaint   Patient presents with    Breast Problem     left x 1 month    Results     review MRI results 6/1/2020      Patient is a 61year old male here for HF, HTN, chronic leg swelling, type 2 diabetes , sleep apnea , obesity, GIST tumor in cecum s/p surgerical removal on chemo. He feels that he has been having breast enlarged for the last couple months. No pain , skin changes, redness, discharge. He has dyspnea that is at basline. Orthopnea which is at basleine. Is not compliant with nightly CPAP. Takes spirinolactone. He does not see cardiology. Last echo was 2 years ago. O: Blood pressure 108/63, pulse 76, temperature 98.7 °F (37.1 °C), temperature source Temporal, resp. rate 22, height 5' 8\" (1.727 m), weight (!) 302 lb (137 kg), SpO2 95 %. Exam:   Heart - RRR   Lungs - clear   Breast exam - appear enlarged. No nodules    Ext- 2+ pitting edema     A: heart failure , breast enlargement   P:  Repeat echo    Stop spirinolactine    Check bmp , a1c, lipid panel    Referral to cardiology. Follow up in 2 weeks. Attending Attestation   I have discussed the case, including pertinent history and exam findings with the resident. I agree with the documented assessment and plan.

## 2020-07-10 NOTE — PROGRESS NOTES
Subjective:      Patient ID: Belinda Hilton is a 61 y.o. male. 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 Present to the office with chief complaint of bilateral breast swelling. Patient states that over the course of time gradually since past 3 months approximately he has noticed that both of his breast are getting enlarged and feels heavy with the feeling that there is fluid accumulation. He states he has not noticed any pain or skin changes or redness or puckering or mass or induration or any discharge. He states he has dyspnea which has been at baseline. He also states his orthopnea has been at baseline. Patient states he does not use his CPAP most nights. He has been taking spironolactone. Patient believes that his imatinib might be contributing to bilateral breast swelling and he worries about that. Review of Systems   Constitutional: Negative for activity change, appetite change, fatigue and fever. HENT: Negative for sore throat. Respiratory: Negative for choking, chest tightness, shortness of breath (stable ) and wheezing. Cardiovascular: Negative for chest pain, palpitations and leg swelling. Gastrointestinal: Negative for abdominal distention, abdominal pain, blood in stool, constipation, diarrhea, nausea and vomiting. Genitourinary: Negative for dysuria. Musculoskeletal: Negative for arthralgias. Psychiatric/Behavioral: Negative for decreased concentration. Objective:   Physical Exam  Constitutional:       General: He is not in acute distress. Appearance: Normal appearance. He is obese. He is not ill-appearing or toxic-appearing. HENT:      Head: Normocephalic and atraumatic. Cardiovascular:      Rate and Rhythm: Normal rate and regular rhythm. Pulses: Normal pulses.       Heart sounds: Normal heart MD

## 2020-07-13 ASSESSMENT — ENCOUNTER SYMPTOMS
ABDOMINAL PAIN: 0
WHEEZING: 0
SHORTNESS OF BREATH: 0
SORE THROAT: 0
DIARRHEA: 0
ABDOMINAL DISTENTION: 0
CONSTIPATION: 0
BLOOD IN STOOL: 0
NAUSEA: 0
VOMITING: 0
CHEST TIGHTNESS: 0
CHOKING: 0

## 2020-07-14 ENCOUNTER — TELEPHONE (OUTPATIENT)
Dept: FAMILY MEDICINE CLINIC | Age: 60
End: 2020-07-14

## 2020-07-14 NOTE — TELEPHONE ENCOUNTER
Patient is scheduled for his echo on  AUgust 13 at [address] [place] [address]   Patient was informed of appointment date and time

## 2020-07-15 ENCOUNTER — NURSE ONLY (OUTPATIENT)
Dept: FAMILY MEDICINE CLINIC | Age: 60
End: 2020-07-15
Payer: MEDICARE

## 2020-07-15 ENCOUNTER — HOSPITAL ENCOUNTER (OUTPATIENT)
Age: 60
Discharge: HOME | End: 2020-07-17
Payer: MEDICARE

## 2020-07-15 LAB
ANION GAP SERPL CALCULATED.3IONS-SCNC: 12 MMOL/L (ref 7–16)
BUN BLDV-MCNC: 11 MG/DL (ref 8–23)
CALCIUM SERPL-MCNC: 8.8 MG/DL (ref 8.6–10.2)
CHLORIDE BLD-SCNC: 98 MMOL/L (ref 98–107)
CHOLESTEROL, TOTAL: 109 MG/DL (ref 0–199)
CO2: 27 MMOL/L (ref 22–29)
CREAT SERPL-MCNC: 0.8 MG/DL (ref 0.7–1.2)
GFR AFRICAN AMERICAN: >60
GFR NON-AFRICAN AMERICAN: >60 ML/MIN/1.73
GLUCOSE BLD-MCNC: 166 MG/DL (ref 74–99)
HDLC SERPL-MCNC: 43 MG/DL
LDL CHOLESTEROL CALCULATED: 23 MG/DL (ref 0–99)
POTASSIUM SERPL-SCNC: 4.1 MMOL/L (ref 3.5–5)
SODIUM BLD-SCNC: 137 MMOL/L (ref 132–146)
TRIGL SERPL-MCNC: 215 MG/DL (ref 0–149)
VLDLC SERPL CALC-MCNC: 43 MG/DL

## 2020-07-15 PROCEDURE — 36415 COLL VENOUS BLD VENIPUNCTURE: CPT

## 2020-07-15 PROCEDURE — 80061 LIPID PANEL: CPT

## 2020-07-15 PROCEDURE — 80048 BASIC METABOLIC PNL TOTAL CA: CPT

## 2020-07-21 ENCOUNTER — TELEPHONE (OUTPATIENT)
Dept: FAMILY MEDICINE CLINIC | Age: 60
End: 2020-07-21

## 2020-08-04 ENCOUNTER — OFFICE VISIT (OUTPATIENT)
Dept: CARDIOLOGY CLINIC | Age: 60
End: 2020-08-04
Payer: MEDICARE

## 2020-08-04 ENCOUNTER — TELEPHONE (OUTPATIENT)
Dept: CARDIOLOGY CLINIC | Age: 60
End: 2020-08-04

## 2020-08-04 VITALS
WEIGHT: 300 LBS | HEIGHT: 68 IN | BODY MASS INDEX: 45.47 KG/M2 | RESPIRATION RATE: 28 BRPM | DIASTOLIC BLOOD PRESSURE: 72 MMHG | SYSTOLIC BLOOD PRESSURE: 138 MMHG | HEART RATE: 69 BPM | OXYGEN SATURATION: 94 %

## 2020-08-04 PROCEDURE — 99203 OFFICE O/P NEW LOW 30 MIN: CPT | Performed by: INTERNAL MEDICINE

## 2020-08-04 PROCEDURE — 93000 ELECTROCARDIOGRAM COMPLETE: CPT | Performed by: INTERNAL MEDICINE

## 2020-08-04 RX ORDER — IMATINIB MESYLATE 400 MG/1
400 TABLET, FILM COATED ORAL DAILY
COMMUNITY
End: 2020-10-15

## 2020-08-04 NOTE — PROGRESS NOTES
[facility]   Heart and Vascular [place]   Clinic Note     Date:8/4/20   Patient Name:Pipe Collins  YOB: 1960  Age: 61 y.o. Primary Care Provider: Christian Crzu MD    Subjective     This is a pleasant 61-year-old -American male who comes in for new patient visit. He has had lower extremity edema on and off for years but now it has been more persistent. He is prescribed Bumex 2 mg p.o. twice daily but does not take that as prescribed unless he is staying at home for the day so that he does not have to urinate all the time. He has not been able to lie flat for a long time but he has no PND. He has not had any syncope or presyncope or palpitations. He has not had any chest discomfort with exertion or dyspnea with exertion. He walks around the block and that is about all his physical activity       A focused history review includes:   1. Borderline inferior infarct pattern on EKG  2. Normal LV systolic function and severe concentric LVH (IVS/PW 1.8/2.0) on TTE (2018)  3. HTN  4. T2DM  5. He is a smoker of 20 to 40 pack years  6. Drinks 2 beers twice a week  7. ACEI-associated angioedema  8. Gynecomastia   9. Morbid obesity  10. MARIA  11. 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 Carpal tunnel syndrome  13.  Hypertriglyceridemia       Past History    Past Medical History:         Diagnosis Date    Angiotensin converting enzyme inhibitor-aggravated angioedema 3/29/2018    Arthritis     Carpal tunnel syndrome     Cecum mass     CHF (congestive heart failure) (HCC)     Diabetes mellitus (HCC)     Gastrointestinal stromal tumor (GIST) ([facility] [address] 6/3/2019    Hyperlipidemia     Hypertension     Hypokalemia     Neuropathy     Obesity     bmi 43.1 weight 283 #    Sleep apnea     c pap          Social History:    Social History     Tobacco History     Smoking Status  Current Every Day Smoker Smoking Frequency  0.5 packs/day for 30 years (15 pk yrs) Smoking Tobacco Type  Cigarettes    Smokeless Tobacco Use  Never Used          Alcohol History     Alcohol Use Status  Yes Drinks/Week  2 Shots of liquor per week Amount  2.0 standard drinks of alcohol/wk Comment  2x week          Drug Use     Drug Use Status  No          Sexual Activity     Sexually Active  Not Asked                    Family History:   No family history on file. Review of Systems   General ROS: No weight loss fevers or chills  Psychological ROS: No new depression or anxiety or altered mood  Ophthalmic ROS: No new vision changes or diplopia  Respiratory ROS: No cough, wheezing, shortness of breath, or hemoptysis  Cardiovascular ROS: See HPI  Gastrointestinal ROS: No nausea, vomiting, constipation, diarrhea, hematemesis, melena, or hematochezia  Genito-Urinary ROS: No dysuria, hematuria, or new incontinence  Musculoskeletal ROS: No new muscle pain, joint pain, joint swelling, or back pain  Neurological ROS: No new numbness or paresthesias, no focal weakness, no altered speech, no new memory loss  Dermatological ROS: No new rashes, no pruritus, no skin masses        Medications     Current Outpatient Medications   Medication Sig Dispense Refill    imatinib (GLEEVEC) 400 MG chemo tablet Take 400 mg by mouth daily      docusate sodium (COLACE) 100 MG capsule Take 1 capsule by mouth 2 times daily 90 capsule 1    losartan (COZAAR) 25 MG tablet TAKE 1 TABLET BY MOUTH EVERY DAY 90 tablet 1    insulin glargine (LANTUS SOLOSTAR) 100 UNIT/ML injection pen Inject 38 Units into the skin nightly 12 pen 0    atorvastatin (LIPITOR) 40 MG tablet Take 1 tablet by mouth nightly 90 tablet 0    bumetanide (BUMEX) 1 MG tablet TAKE 1 TABLET BY MOUTH TWICE DAILY (Patient taking differently: Take by mouth daily Does not take it when hes going out) 180 tablet 0    gabapentin (NEURONTIN) 300 MG capsule Take 300 mg by mouth as needed.        magnesium chloride (MAG DELAY) 64 MG TBEC extended release tablet Take 1 tablet by mouth 2 times daily (with meals) (Patient taking differently: Take 1 tablet by mouth daily (with breakfast) ) 180 tablet 0    acetaminophen (TYLENOL) 500 MG tablet Take 500 mg by mouth every 6 hours as needed       albuterol sulfate HFA (VENTOLIN HFA) 108 (90 Base) MCG/ACT inhaler Inhale 2 puffs into the lungs every 4 hours as needed for Wheezing 1 Inhaler 3    amLODIPine (NORVASC) 10 MG tablet Take 1 tablet by mouth daily 90 tablet 0    Cholecalciferol (VITAMIN D3) 2000 units CAPS Take 1 capsule by mouth daily      traMADol (ULTRAM) 50 MG tablet Take 50 mg by mouth every 6 hours as needed for Pain. Jesus Loss metoprolol tartrate (LOPRESSOR) 50 MG tablet Take 50 mg by mouth 2 times daily      sildenafil (VIAGRA) 100 MG tablet Take 100 mg by mouth as needed. Take an hour before sex. Med info obtained from [place] records.  B-D ULTRAFINE III SHORT PEN 31G X 8 MM MISC Inject 1 each into the skin nightly 100 each 3    nicotine (NICODERM CQ) 14 MG/24HR Place 1 patch onto the skin daily for 14 days 14 patch 2    Blood Glucose Monitoring Suppl (ONETOUCH VERIO FLEX SYSTEM) w/Device KIT AS DIRECTED  0    Compression Stockings MISC by Does not apply route 1 each 0     No current facility-administered medications for this visit. Physical Examination      /72   Pulse 69   Resp 28   Ht 5' 8\" (1.727 m)   Wt 300 lb (136.1 kg)   SpO2 94%   BMI 45.61 kg/m²     General: No acute distress, appears as stated age, nonicteric  Head: Atraumatic, no gross abnormalities or bruises.   No glossal megaly  Neck: Supple and nontender, no carotid bruits, JVP is mildly elevated  Lungs: Clear to auscultation bilaterally, no wheezes, rales, or rhonchi  Heart: Regular rate and rhythm, no murmurs, rubs, or gallops  Abdomen: Soft, nontender, nondistended, normal bowel sounds  Extremities: No obvious deformities, no cyanosis, 1+ edema  Neurological: Alert and oriented x3, EOMI, moving all extremities x4  Psychological: Normal mood and affect, cooperative  Skin: Color, texture, and turgor normal for age         Labs/Imaging/Diagnostics     Lab Results   Component Value Date     07/15/2020    K 4.1 07/15/2020    K 3.7 03/10/2018    CL 98 07/15/2020    CO2 27 07/15/2020    BUN 11 07/15/2020    CREATININE 0.8 07/15/2020    GLUCOSE 166 07/15/2020    CALCIUM 8.8 07/15/2020        Estimated Creatinine Clearance: 133 mL/min (based on SCr of 0.8 mg/dL). Lab Results   Component Value Date    WBC 8.0 01/28/2020    HGB 13.8 01/28/2020    HCT 42.1 01/28/2020    .4 (H) 01/28/2020     01/28/2020       Lab Results   Component Value Date    ALT 14 01/28/2020    AST 19 01/28/2020    ALKPHOS 56 01/28/2020    BILITOT 0.3 01/28/2020       Lab Results   Component Value Date    LABPROT 1.7 (H) 08/17/2018    LABPROT 1.7 08/17/2018    LABALBU 3.5 01/28/2020       Lab Results   Component Value Date    CHOL 109 07/15/2020    CHOL 150 06/14/2019    CHOL 115 08/18/2018     Lab Results   Component Value Date    TRIG 215 (H) 07/15/2020    TRIG 247 (H) 06/14/2019    TRIG 297 (H) 08/18/2018     Lab Results   Component Value Date    HDL 43 07/15/2020    HDL 48 06/14/2019    HDL 34 08/18/2018     Lab Results   Component Value Date    LDLCALC 23 07/15/2020    LDLCALC 53 06/14/2019    LDLCALC 22 08/18/2018     Lab Results   Component Value Date    LABVLDL 43 07/15/2020    LABVLDL 49 06/14/2019    LABVLDL 59 08/18/2018     No results found for: [facility]    Lab Results   Component Value Date    CKTOTAL 493 (H) 08/17/2018    CKMB 5.2 08/17/2018    TROPONINI <0.01 01/28/2020       No results found for: BNP      Lab Results   Component Value Date    LABA1C 7.5 07/10/2020     No results found for: EAG     Pertinent Cardiovascular Studies:  EKG: Normal sinus rhythm.   Otherwise normal        Assessment and Plan:        26-year-old -American male, pleasant, morbidly obese, with history of hypertension, chronic diastolic heart failure with

## 2020-08-04 NOTE — PATIENT INSTRUCTIONS
1. Bumex 1 mg twice daily for swelling  2. Watch your sodium intake  3. Echocardiogram August 13  4. Blood work  5. Cardiac MRI at [facility]  6.  Return in 6 months

## 2020-08-04 NOTE — TELEPHONE ENCOUNTER
[id number]   Ayaka Nicolasr   1960   [address]   No PA needed. Called [facility] to set Cardiac MRI w/ w/o contrast    [facility]    [facility]   Sept 1 2020 at 3:40pm      [facility] preference for [facility] regarding driving distance and also requests schedule out further date. Therefore sept 1 at [address]. Chacha Coates [[id number]]     Plan: Peewee STEPHENS* Payor: [facility] MEDICARE [[facility]]       Coverage Information    Coverage information:     Subscriber: [id number] Chacha Coates     Rel to sub: 01 - Self     Member ID: [id number]     Payor: [address] [place] [address] MEDICARE     Benefit plan: [id number] DUAL COMPLETE Ph: [phone]     Group number: Sheba Brand     Member effective dates: from 01/01/18      Per [address]  No PA is needed. REF # [id number]  Cardiac MRI w/ wo contrast.      Current Outpatient Medications   Medication Sig Dispense Refill    imatinib (GLEEVEC) 400 MG chemo tablet Take 400 mg by mouth daily      docusate sodium (COLACE) 100 MG capsule Take 1 capsule by mouth 2 times daily 90 capsule 1    losartan (COZAAR) 25 MG tablet TAKE 1 TABLET BY MOUTH EVERY DAY 90 tablet 1    insulin glargine (LANTUS SOLOSTAR) 100 UNIT/ML injection pen Inject 38 Units into the skin nightly 12 pen 0    atorvastatin (LIPITOR) 40 MG tablet Take 1 tablet by mouth nightly 90 tablet 0    bumetanide (BUMEX) 1 MG tablet TAKE 1 TABLET BY MOUTH TWICE DAILY (Patient taking differently: Take by mouth daily Does not take it when hes going out) 180 tablet 0    B-D ULTRAFINE III SHORT PEN 31G X 8 MM MISC Inject 1 each into the skin nightly 100 each 3    gabapentin (NEURONTIN) 300 MG capsule Take 300 mg by mouth as needed.        magnesium chloride (MAG DELAY) 64 MG TBEC extended release tablet Take 1 tablet by mouth 2 times daily (with meals) (Patient taking differently: Take 1 tablet by mouth daily (with breakfast) ) 180 tablet 0    nicotine (NICODERM CQ) 14 MG/24HR Place 1 patch onto the skin daily for 14 days 14 patch 2    acetaminophen (TYLENOL) 500 MG tablet Take 500 mg by mouth every 6 hours as needed       Blood Glucose Monitoring Suppl (ONETOUCH VERIO FLEX SYSTEM) w/Device KIT AS DIRECTED  0    albuterol sulfate HFA (VENTOLIN HFA) 108 (90 Base) MCG/ACT inhaler Inhale 2 puffs into the lungs every 4 hours as needed for Wheezing 1 Inhaler 3    amLODIPine (NORVASC) 10 MG tablet Take 1 tablet by mouth daily 90 tablet 0    Cholecalciferol (VITAMIN D3) 2000 units CAPS Take 1 capsule by mouth daily      Compression Stockings MISC by Does not apply route 1 each 0    traMADol (ULTRAM) 50 MG tablet Take 50 mg by mouth every 6 hours as needed for Pain. Astrid Sears metoprolol tartrate (LOPRESSOR) 50 MG tablet Take 50 mg by mouth 2 times daily      sildenafil (VIAGRA) 100 MG tablet Take 100 mg by mouth as needed. Take an hour before sex. Med info obtained from [place] records. No current facility-administered medications for this visit. Letter Mailed 8/5/2020   Included MRI script for him to take to [facility]. See media tab for [facility] [employer].

## 2020-08-04 NOTE — LETTER
[facility]  [address] [place] [address] [address]  Phone: [phone]  Fax: [phone]    Cosme Cohen MD        August 5, 2020    [facility] [address]      Dear Jaswinder Goins:    · You are scheduled for Cardiac MRI with and without contrast at [facility]  9-1-2020 3:40pm   With arrival at  3:20pm  · Please have BUN/Cr labs done (scripts enclosed)  Aug 24th week. · Instructions:  Wear mask, Shelter in place 14 days prior to testing. Report any signs or symptoms of COVID    Go to [email or web address]  (COVID 19 section for important information). Bring list of current medications, insurance cards, photo ID. Remove any medication patches. Wear NO metal or jewelery (button zippers metallic fibers etc)  Eat and drink and take meds as you normally would. There is NO diet restriction. Address:  [facility]     Go to the first floor of the Heart and Vascular Center Entrance. · [facility]TI[facility] will call you 1 day prior to your testing. If you should need to cancel for any reason please call [phone] to reschedule with [facility]. Also notify our local office [phone] if testing is changed or not preformed. Call [facility] to verify registration information is correct  at [phone]    If you have any questions or concerns, please don't hesitate to call.     Sincerely,    Cosme Cohen MD

## 2020-08-10 ENCOUNTER — HOSPITAL ENCOUNTER (OUTPATIENT)
Age: 60
Discharge: HOME | End: 2020-08-10
Payer: MEDICAID

## 2020-08-10 PROCEDURE — 36415 COLL VENOUS BLD VENIPUNCTURE: CPT

## 2020-08-10 PROCEDURE — 83883 ASSAY NEPHELOMETRY NOT SPEC: CPT

## 2020-08-10 PROCEDURE — 84166 PROTEIN E-PHORESIS/URINE/CSF: CPT

## 2020-08-10 PROCEDURE — 84165 PROTEIN E-PHORESIS SERUM: CPT

## 2020-08-10 PROCEDURE — 86334 IMMUNOFIX E-PHORESIS SERUM: CPT

## 2020-08-12 LAB
ADDENDUM ELECTROPHORESIS URINE RANDOM: NORMAL
ALBUMIN SERPL-MCNC: 2.8 G/DL (ref 3.5–4.7)
ALPHA-1-GLOBULIN: 0.3 G/DL (ref 0.2–0.4)
ALPHA-2-GLOBULIN: 0.9 G/FL (ref 0.5–1)
BETA GLOBULIN: 1 G/DL (ref 0.8–1.3)
ELECTROPHORESIS: ABNORMAL
GAMMA GLOBULIN: 1.2 G/DL (ref 0.7–1.6)
IMMUNOFIXATION RESULT, SERUM: NORMAL
IMMUNOFIXATION URINE: NORMAL
TOTAL PROTEIN: 6.2 G/DL (ref 6.4–8.3)

## 2020-08-13 ENCOUNTER — HOSPITAL ENCOUNTER (OUTPATIENT)
Dept: NON INVASIVE DIAGNOSTICS | Age: 60
Discharge: HOME | End: 2020-08-13
Payer: MEDICAID

## 2020-08-13 ENCOUNTER — TELEPHONE (OUTPATIENT)
Dept: FAMILY MEDICINE CLINIC | Age: 60
End: 2020-08-13

## 2020-08-13 LAB
KAPPA FREE LIGHT CHAINS QNT: 31.49 MG/L (ref 3.3–19.4)
KAPPA/LAMBDA FREE LIGHT CHAIN RATIO: 1.19 (ref 0.26–1.65)
LAMBDA FREE LIGHT CHAINS QNT: 26.39 MG/L (ref 5.71–26.3)
LV EF: 60 %
LVEF MODALITY: NORMAL

## 2020-08-13 PROCEDURE — 6360000004 HC RX CONTRAST MEDICATION: Performed by: STUDENT IN AN ORGANIZED HEALTH CARE EDUCATION/TRAINING PROGRAM

## 2020-08-13 PROCEDURE — 93306 TTE W/DOPPLER COMPLETE: CPT

## 2020-08-13 RX ADMIN — PERFLUTREN 1.65 MG: 6.52 INJECTION, SUSPENSION INTRAVENOUS at 10:50

## 2020-08-25 ENCOUNTER — HOSPITAL ENCOUNTER (OUTPATIENT)
Age: 60
Discharge: HOME | End: 2020-08-25
Payer: MEDICAID

## 2020-08-25 LAB
BUN BLDV-MCNC: 9 MG/DL (ref 8–23)
CREAT SERPL-MCNC: 0.8 MG/DL (ref 0.7–1.2)
GFR AFRICAN AMERICAN: >60
GFR NON-AFRICAN AMERICAN: >60 ML/MIN/1.73

## 2020-08-25 PROCEDURE — 36415 COLL VENOUS BLD VENIPUNCTURE: CPT

## 2020-08-25 PROCEDURE — 82565 ASSAY OF CREATININE: CPT

## 2020-08-25 PROCEDURE — 84520 ASSAY OF UREA NITROGEN: CPT

## 2020-09-10 ENCOUNTER — TELEPHONE (OUTPATIENT)
Dept: CARDIOLOGY CLINIC | Age: 60
End: 2020-09-10

## 2020-09-10 ENCOUNTER — HOSPITAL ENCOUNTER (OUTPATIENT)
Dept: CT IMAGING | Age: 60
Discharge: HOME | End: 2020-09-12
Payer: MEDICAID

## 2020-09-10 PROCEDURE — 2580000003 HC RX 258: Performed by: RADIOLOGY

## 2020-09-10 PROCEDURE — 6360000004 HC RX CONTRAST MEDICATION: Performed by: RADIOLOGY

## 2020-09-10 PROCEDURE — 74177 CT ABD & PELVIS W/CONTRAST: CPT

## 2020-09-10 RX ORDER — SODIUM CHLORIDE 0.9 % (FLUSH) 0.9 %
10 SYRINGE (ML) INJECTION ONCE
Status: COMPLETED | OUTPATIENT
Start: 2020-09-10 | End: 2020-09-10

## 2020-09-10 RX ADMIN — IOPAMIDOL 110 ML: 755 INJECTION, SOLUTION INTRAVENOUS at 08:16

## 2020-09-10 RX ADMIN — Medication 10 ML: at 08:17

## 2020-09-10 RX ADMIN — IOHEXOL 50 ML: 240 INJECTION, SOLUTION INTRATHECAL; INTRAVASCULAR; INTRAVENOUS; ORAL at 08:17

## 2020-09-10 NOTE — TELEPHONE ENCOUNTER
Dr David Collins wasunable to complete testing  Cardiac MRI w/ w/o contrast.  Requesting something for anxiety and then will reattempts to reschedule with [facility]  If has Something for claustrophobia. This needs to be a printed script. Won't be able to e-scribe.        Thank you     Romayne Precise

## 2020-09-10 NOTE — TELEPHONE ENCOUNTER
We can give him Ativan 1 mg POx1 to take 30-60 minutes before procedure; no refills. I can sign it when in the office on Friday.

## 2020-09-10 NOTE — TELEPHONE ENCOUNTER
Order pended. Needs printed and signed please tomorrow    Thank you    Susanne Ruano can call him to  script after signed.

## 2020-09-11 ENCOUNTER — TELEPHONE (OUTPATIENT)
Dept: CARDIOLOGY CLINIC | Age: 60
End: 2020-09-11

## 2020-09-11 RX ORDER — LORAZEPAM 1 MG/1
1 TABLET ORAL SEE ADMIN INSTRUCTIONS
Qty: 1 TABLET | Refills: 0 | Status: SHIPPED | OUTPATIENT
Start: 2020-09-11 | End: 2020-10-11

## 2020-09-17 ENCOUNTER — TELEPHONE (OUTPATIENT)
Dept: CARDIOLOGY CLINIC | Age: 60
End: 2020-09-17

## 2020-09-17 NOTE — TELEPHONE ENCOUNTER
Spoke with pt Monday and Tuesday to remind him of the script and he doesn't know when he'll get it, but he is aware that it's here.

## 2020-09-29 ENCOUNTER — TELEPHONE (OUTPATIENT)
Dept: CARDIOLOGY CLINIC | Age: 60
End: 2020-09-29

## 2020-09-29 RX ORDER — ATORVASTATIN CALCIUM 40 MG/1
TABLET, FILM COATED ORAL
Qty: 90 TABLET | Refills: 0 | Status: SHIPPED
Start: 2020-09-29 | End: 2021-01-19

## 2020-09-29 NOTE — TELEPHONE ENCOUNTER
Spoke with pt to remind him of his script for ativan. He said he hasn't heard back from [facility] on when it's scheduled.

## 2020-10-14 RX ORDER — INSULIN GLARGINE 100 [IU]/ML
INJECTION, SOLUTION SUBCUTANEOUS
Qty: 36 ML | Refills: 3 | Status: SHIPPED | OUTPATIENT
Start: 2020-10-14 | End: 2021-07-15 | Stop reason: SDUPTHER

## 2020-10-15 RX ORDER — IMATINIB MESYLATE 400 MG/1
TABLET, FILM COATED ORAL
Qty: 28 TABLET | Refills: 2 | Status: SHIPPED | OUTPATIENT
Start: 2020-10-15

## 2020-10-15 NOTE — TELEPHONE ENCOUNTER
Spoke with patient informed prescription was approved and sent to pharmacy. Patient stated he will call back and schedule an appointment.

## 2020-10-22 ENCOUNTER — TELEPHONE (OUTPATIENT)
Dept: FAMILY MEDICINE CLINIC | Age: 60
End: 2020-10-22

## 2020-10-29 ENCOUNTER — NURSE ONLY (OUTPATIENT)
Dept: FAMILY MEDICINE CLINIC | Age: 60
End: 2020-10-29
Payer: MEDICAID

## 2020-10-29 ENCOUNTER — HOSPITAL ENCOUNTER (OUTPATIENT)
Age: 60
Discharge: HOME | End: 2020-10-31
Payer: MEDICAID

## 2020-10-29 LAB — HBA1C MFR BLD: 8.1 % (ref 4–5.6)

## 2020-10-29 PROCEDURE — 6360000002 HC RX W HCPCS

## 2020-10-29 PROCEDURE — 83036 HEMOGLOBIN GLYCOSYLATED A1C: CPT

## 2020-10-29 PROCEDURE — 90686 IIV4 VACC NO PRSV 0.5 ML IM: CPT

## 2020-10-29 PROCEDURE — G0008 ADMIN INFLUENZA VIRUS VAC: HCPCS

## 2020-10-29 PROCEDURE — 36415 COLL VENOUS BLD VENIPUNCTURE: CPT

## 2020-10-29 RX ORDER — DOCUSATE SODIUM 100 MG/1
100 CAPSULE, LIQUID FILLED ORAL 2 TIMES DAILY
Qty: 90 CAPSULE | Refills: 1 | Status: SHIPPED | OUTPATIENT
Start: 2020-10-29 | End: 2021-10-08 | Stop reason: SDUPTHER

## 2020-10-29 NOTE — TELEPHONE ENCOUNTER
Last Appointment:  7/10/2020  Future Appointments   Date Time Provider Alek Goldman   11/5/2020  9:20 AM MD Moe De [facility]

## 2020-11-01 ENCOUNTER — TELEPHONE (OUTPATIENT)
Dept: FAMILY MEDICINE CLINIC | Age: 60
End: 2020-11-01

## 2020-11-03 ENCOUNTER — TELEPHONE (OUTPATIENT)
Dept: FAMILY MEDICINE CLINIC | Age: 60
End: 2020-11-03

## 2020-12-17 ENCOUNTER — HOSPITAL ENCOUNTER (OUTPATIENT)
Dept: ULTRASOUND IMAGING | Age: 60
Discharge: HOME | End: 2020-12-19
Payer: MEDICAID

## 2020-12-17 PROCEDURE — 93971 EXTREMITY STUDY: CPT

## 2021-01-13 RX ORDER — BUMETANIDE 1 MG/1
TABLET ORAL
Qty: 180 TABLET | Refills: 0 | Status: SHIPPED
Start: 2021-01-13 | End: 2021-02-16

## 2021-01-19 ENCOUNTER — TELEPHONE (OUTPATIENT)
Dept: ADMINISTRATIVE | Age: 61
End: 2021-01-19

## 2021-01-19 ENCOUNTER — OFFICE VISIT (OUTPATIENT)
Dept: FAMILY MEDICINE CLINIC | Age: 61
End: 2021-01-19
Payer: MEDICARE

## 2021-01-19 ENCOUNTER — NURSE TRIAGE (OUTPATIENT)
Dept: OTHER | Facility: CLINIC | Age: 61
End: 2021-01-19

## 2021-01-19 VITALS
SYSTOLIC BLOOD PRESSURE: 155 MMHG | WEIGHT: 296 LBS | BODY MASS INDEX: 44.86 KG/M2 | DIASTOLIC BLOOD PRESSURE: 88 MMHG | TEMPERATURE: 98.2 F | HEART RATE: 78 BPM | RESPIRATION RATE: 22 BRPM | HEIGHT: 68 IN | OXYGEN SATURATION: 97 %

## 2021-01-19 DIAGNOSIS — Z00.00 HEALTHCARE MAINTENANCE: ICD-10-CM

## 2021-01-19 DIAGNOSIS — I50.22 CHRONIC SYSTOLIC CONGESTIVE HEART FAILURE (HCC): Primary | ICD-10-CM

## 2021-01-19 DIAGNOSIS — I50.22 CHRONIC SYSTOLIC CONGESTIVE HEART FAILURE (HCC): ICD-10-CM

## 2021-01-19 DIAGNOSIS — R60.0 BILATERAL LEG EDEMA: ICD-10-CM

## 2021-01-19 DIAGNOSIS — I10 ESSENTIAL HYPERTENSION: ICD-10-CM

## 2021-01-19 LAB
BASOPHILS ABSOLUTE: 0.04 E9/L (ref 0–0.2)
BASOPHILS RELATIVE PERCENT: 0.6 % (ref 0–2)
CREATININE URINE: 158 MG/DL (ref 40–278)
EOSINOPHILS ABSOLUTE: 0.18 E9/L (ref 0.05–0.5)
EOSINOPHILS RELATIVE PERCENT: 2.5 % (ref 0–6)
HCT VFR BLD CALC: 44.4 % (ref 37–54)
HEMOGLOBIN: 15.3 G/DL (ref 12.5–16.5)
IMMATURE GRANULOCYTES #: 0.03 E9/L
IMMATURE GRANULOCYTES %: 0.4 % (ref 0–5)
LYMPHOCYTES ABSOLUTE: 2.3 E9/L (ref 1.5–4)
LYMPHOCYTES RELATIVE PERCENT: 31.6 % (ref 20–42)
MCH RBC QN AUTO: 34.4 PG (ref 26–35)
MCHC RBC AUTO-ENTMCNC: 34.5 % (ref 32–34.5)
MCV RBC AUTO: 99.8 FL (ref 80–99.9)
MICROALBUMIN UR-MCNC: 1920.4 MG/L
MICROALBUMIN/CREAT UR-RTO: 1215.4 (ref 0–30)
MONOCYTES ABSOLUTE: 0.56 E9/L (ref 0.1–0.95)
MONOCYTES RELATIVE PERCENT: 7.7 % (ref 2–12)
NEUTROPHILS ABSOLUTE: 4.16 E9/L (ref 1.8–7.3)
NEUTROPHILS RELATIVE PERCENT: 57.2 % (ref 43–80)
PDW BLD-RTO: 14.4 FL (ref 11.5–15)
PLATELET # BLD: 217 E9/L (ref 130–450)
PMV BLD AUTO: 10.7 FL (ref 7–12)
RBC # BLD: 4.45 E12/L (ref 3.8–5.8)
WBC # BLD: 7.3 E9/L (ref 4.5–11.5)

## 2021-01-19 PROCEDURE — 99212 OFFICE O/P EST SF 10 MIN: CPT | Performed by: FAMILY MEDICINE

## 2021-01-19 PROCEDURE — G8427 DOCREV CUR MEDS BY ELIG CLIN: HCPCS | Performed by: FAMILY MEDICINE

## 2021-01-19 PROCEDURE — 4004F PT TOBACCO SCREEN RCVD TLK: CPT | Performed by: FAMILY MEDICINE

## 2021-01-19 PROCEDURE — G8482 FLU IMMUNIZE ORDER/ADMIN: HCPCS | Performed by: FAMILY MEDICINE

## 2021-01-19 PROCEDURE — G8417 CALC BMI ABV UP PARAM F/U: HCPCS | Performed by: FAMILY MEDICINE

## 2021-01-19 PROCEDURE — 99214 OFFICE O/P EST MOD 30 MIN: CPT | Performed by: FAMILY MEDICINE

## 2021-01-19 PROCEDURE — 3017F COLORECTAL CA SCREEN DOC REV: CPT | Performed by: FAMILY MEDICINE

## 2021-01-19 RX ORDER — METOLAZONE 2.5 MG/1
2.5 TABLET ORAL DAILY
Qty: 30 TABLET | Refills: 0 | Status: SHIPPED
Start: 2021-01-19 | End: 2021-02-16 | Stop reason: SDUPTHER

## 2021-01-19 RX ORDER — MAGNESIUM CHLORIDE 64 MG
1 TABLET, DELAYED RELEASE (ENTERIC COATED) ORAL 2 TIMES DAILY WITH MEALS
Qty: 180 TABLET | Refills: 0 | Status: SHIPPED
Start: 2021-01-19 | End: 2021-02-08

## 2021-01-19 RX ORDER — ATORVASTATIN CALCIUM 40 MG/1
TABLET, FILM COATED ORAL
Qty: 90 TABLET | Refills: 0 | Status: SHIPPED
Start: 2021-01-19 | End: 2021-04-05

## 2021-01-19 SDOH — ECONOMIC STABILITY: INCOME INSECURITY: HOW HARD IS IT FOR YOU TO PAY FOR THE VERY BASICS LIKE FOOD, HOUSING, MEDICAL CARE, AND HEATING?: NOT VERY HARD

## 2021-01-19 SDOH — ECONOMIC STABILITY: FOOD INSECURITY: WITHIN THE PAST 12 MONTHS, THE FOOD YOU BOUGHT JUST DIDN'T LAST AND YOU DIDN'T HAVE MONEY TO GET MORE.: NEVER TRUE

## 2021-01-19 SDOH — ECONOMIC STABILITY: TRANSPORTATION INSECURITY
IN THE PAST 12 MONTHS, HAS LACK OF TRANSPORTATION KEPT YOU FROM MEETINGS, WORK, OR FROM GETTING THINGS NEEDED FOR DAILY LIVING?: NOT ASKED

## 2021-01-19 ASSESSMENT — ENCOUNTER SYMPTOMS
CHEST TIGHTNESS: 0
COUGH: 0
DIARRHEA: 0
ABDOMINAL PAIN: 0
CONSTIPATION: 0

## 2021-01-19 ASSESSMENT — PATIENT HEALTH QUESTIONNAIRE - PHQ9
1. LITTLE INTEREST OR PLEASURE IN DOING THINGS: 0
SUM OF ALL RESPONSES TO PHQ9 QUESTIONS 1 & 2: 1

## 2021-01-19 NOTE — PATIENT INSTRUCTIONS
Patient Education        Learning About Benefits From Quitting Smoking  How does quitting smoking make you healthier? If you're thinking about quitting smoking, you may have a few reasons to be smoke-free. Your health may be one of them. · When you quit smoking, you lower your risks for cancer, lung disease, heart attack, stroke, blood vessel disease, and blindness from macular degeneration. · When you're smoke-free, you get sick less often, and you heal faster. You are less likely to get colds, flu, bronchitis, and pneumonia. · As a nonsmoker, you may find that your mood is better and you are less stressed. When and how will you feel healthier? Quitting has real health benefits that start from day 1 of being smoke-free. And the longer you stay smoke-free, the healthier you get and the better you feel. The first hours  · After just 20 minutes, your blood pressure and heart rate go down. That means there's less stress on your heart and blood vessels. · Within 12 hours, the level of carbon monoxide in your blood drops back to normal. That makes room for more oxygen. With more oxygen in your body, you may notice that you have more energy than when you smoked. After 2 weeks  · Your lungs start to work better. · Your risk of heart attack starts to drop. After 1 month  · When your lungs are clear, you cough less and breathe deeper, so it's easier to be active. · Your sense of taste and smell return. That means you can enjoy food more than you have since you started smoking. Over the years  · Over the years, your risks of heart disease, heart attack, and stroke are lower. · After 10 years, your risk of dying from lung cancer is cut by about half. And your risk for many other types of cancer is lower too. How would quitting help others in your life? When you quit smoking, you improve the health of everyone who now breathes in your smoke.   · Their heart, lung, and cancer risks drop, much like yours.  · They are sick less. For babies and small children, living smoke-free means they're less likely to have ear infections, pneumonia, and bronchitis. · If you're a woman who is or will be pregnant someday, quitting smoking means a healthier . · Children who are close to you are less likely to become adult smokers. Where can you learn more? Go to https://[employer]icew[employer] Forward.Lexy. org and sign in to your Charge[employer] account. Enter [phone] in the HeadSprout box to learn more about \"Learning About Benefits From Quitting Smoking. \"     If you do not have an account, please click on the \"Sign Up Now\" link. Current as of: 2020               Content Version: 12.6  © 4647-8188 [employer]. Care instructions adapted under license by [facility] ([facility]). If you have questions about a medical condition or this instruction, always ask your healthcare professional. [place] [address] any warranty or liability for your use of this information.

## 2021-01-19 NOTE — PROGRESS NOTES
CC:  Same day visit: severe swelling in ankles, feet and legs    HPI:  61 y.o. male with complicated pmh including DM II (on insulin - lantus), HTN (losartan), HFpEF (Lopressor, Bumex), and MARIA who presents for same day visit due to swelling in ankles, feet and legs. U/S 12/17 - popliteal fossa cyst of left lower extremity    Patient has had left lower extremity pain for approximately 2 months  Was taking bumex the entire time but it ran out 2 days ago, just received refill and has restarted taking the medication    Swelling has been there apart from bumex and has not decreased. Patient came for a visit this time around because the selling has gotten worse and he is feeling more pain in left lower extremity. Describes swelling in anterior medial aspect of knee, to popliteal fossa. Ranks pain 8-10/10. Associated symptoms include increased numbness, tingling and increased heat to touch (anteriomedial aspect of calf/knee)    Last office visit 7/10/2020. Has been trying to use over the counter aspirin cream with minimal relief. Last echo EF: 60%, indeterminate diastolic function    No other complaints at this time.     Patient Active Problem List    Diagnosis Date Noted    GIST (gastrointestinal stroma tumor), malignant, colon ([address] 01/05/2020    Chronic heart failure with preserved ejection fraction ([address] 01/05/2020    Primary osteoarthritis of right knee 04/26/2019    Synovial cyst of right popliteal space 11/19/2018    Hypomagnesemia 08/17/2018    Bilateral leg edema 08/17/2018    Tobacco abuse     Uncontrolled type 2 diabetes mellitus with hyperglycemia (HCC)     Mixed hyperlipidemia 11/14/2016    Recurrent major depressive disorder, in remission ([address] 11/14/2016    Carpal tunnel syndrome 11/14/2016    Obstructive sleep apnea syndrome 11/14/2016    Essential hypertension 11/01/2016    Type 2 diabetes mellitus ([address] 11/01/2016       Past Medical History:   Diagnosis Date    Angiotensin for activity change, appetite change, chills and diaphoresis. Respiratory: Negative for cough and chest tightness. Cardiovascular: Positive for leg swelling. Negative for chest pain. Gastrointestinal: Negative for abdominal pain, constipation and diarrhea. Genitourinary: Negative for difficulty urinating and dysuria. Musculoskeletal: Positive for gait problem and joint swelling. Neurological: Positive for numbness. Negative for seizures. Objective:    VS:  Blood pressure (!) 155/88, pulse 78, temperature 98.2 °F (36.8 °C), temperature source Temporal, resp. rate 22, height 5' 8\" (1.727 m), weight 296 lb (134.3 kg), SpO2 97 %. Physical Exam   Constitutional: He is oriented to person, place, and time. He appears well-developed and well-nourished. No distress. Cardiovascular: Normal rate, regular rhythm and intact distal pulses. Exam reveals no gallop and no friction rub. No murmur heard. Pulmonary/Chest: Effort normal and breath sounds normal. No respiratory distress. He has no wheezes. He has no rales. He exhibits no tenderness. Abdominal: Soft. Bowel sounds are normal. He exhibits no distension. There is no abdominal tenderness. Musculoskeletal: Normal range of motion. General: Tenderness and edema present. No deformity. Comments: Lower extremity pitting edema up to knees bilaterally. 2+ near ankle bilaterally  Sensation/Proprioception intact bilaterally  Strength 5/5 bilaterally]  Bilateral Calf diameter = 20 cm  Left Lower Extremity:  Negative Lachman's test  Negative valgus and varus stress testing   Neurological: He is alert and oriented to person, place, and time. Skin: Skin is warm and dry. No rash noted. He is not diaphoretic. No erythema. No pallor. Assessment/ Plan    1.  Chronic systolic congestive heart failure (HCC)   - CBC, CMP  - Magnesium levels  - Start Metolazone 2.5 mg for edema   [facility]    2. Bilateral leg edema  - likely 2/2 to Chronic CHF  - Compression Stockings reordered     3. HTN  - monitor HTN for now, think of increasing metoprolol    4. Health Maintenance  - Microalbumin/ Creatinine urine test     RTO 1 week or sooner prn for any persistent, new, or worsening symptoms. Please see Patient Instructions for further counseling and information given. Advised to please be adherent to the treatment plans discussed today, and please call with any questions or concerns, letting the office know of any reasons that the plans may not be followed. The risks of untreated conditions include worsening illness, injury, disability, and possibly, death. Please call if symptoms change in any way, worsen, or fail to completely resolve, as this could necessitate a change to treatment plans. Patient and/or caregiver expressed understanding. Indications and proper use of medication(s) reviewed. Potential side-effects and risks of medication(s) also explained. Patient and/or caregiver was instructed to call if any new symptoms develop prior to next visit. Health risk factors discussed and addressed.      Electronically signed by Yair Simpson @  [email or web address]-1 on 1/19/2021 at 11:02 AM  This case was discussed with attending physician: Dr. Nicholas Bowman

## 2021-01-19 NOTE — PROGRESS NOTES
S: 61 y.o. male here with left knee pain and swelling x 2 months. Numbness and tingling. Taking bumex 1mg bid. Last a1c was 8.1. O: VS: BP (!) 155/88   Pulse 78   Temp 98.2 °F (36.8 °C) (Temporal)   Resp 22   Ht 5' 8\" (1.727 m)   Wt 296 lb (134.3 kg)   SpO2 97%   BMI 45.01 kg/m²    General: NAD   CV:  RRR   Resp: CTAB no R/R/W   Abd:  Soft, nontender, no masses    Ext:  no C/C; 2+ edema b/l legs, equal, ttp over taught leg skin, leg circumference equal 19 7/8 inches. Left knee-effusion anteriorly, anterior shin ttp  Impression/Plan:   1. HFpEF exacerbation-bumex 1 bid, add metalzone, referral to heart failure clinic, labs  2. LEg edema-compression stockings  3. HTN-could increase metoprolol    rto in 1-2 weeks    Attending Physician Statement  I have discussed the case, including pertinent history and exam findings with the resident. I also have seen the patient and performed key portions of the examination. I agree with the documented assessment and plan.         Cipriano Herrera MD

## 2021-01-19 NOTE — TELEPHONE ENCOUNTER
Patient called in for appt w/Dr Markel Villela, legs swollen for past few months, taking water pills, not helping; scheduled next available 1/28; transferred to [facility], Nurse Access

## 2021-01-19 NOTE — TELEPHONE ENCOUNTER
[facility]. Brief description of triage: Swelling in both legs from knee down, warm to the tough, is taking his edema meds but it is getting worse. Triage indicates for patient to be seen today     Care advice provided, patient verbalizes understanding; denies any other questions or concerns; instructed to call back for any new or worsening symptoms. Writer provided warm transfer to Esperanza Cristobal at [facility] for appointment scheduling. Attention Provider: Thank you for allowing me to participate in the care of your patient. The patient was connected to triage in response to information provided to the [facility]. Please do not respond through this encounter as the response is not directed to a shared pool. Reason for Disposition   MODERATE swelling of both ankles (e.g., swelling extends up to the knees) AND new onset or worsening    Answer Assessment - Initial Assessment Questions  1. ONSET: \"When did the swelling start? \" (e.g., minutes, hours, days)      Months ago     2. LOCATION: \"What part of the leg is swollen? \"  \"Are both legs swollen or just one leg? \"      Both legs from knee down     3. SEVERITY: \"How bad is the swelling? \" (e.g., localized; mild, moderate, severe)   - Localized - small area of swelling localized to one leg   - MILD pedal edema - swelling limited to foot and ankle, pitting edema < 1/4 inch (6 mm) deep, rest and elevation eliminate most or all swelling   - MODERATE edema - swelling of lower leg to knee, pitting edema > 1/4 inch (6 mm) deep, rest and elevation only partially reduce swelling   - SEVERE edema - swelling extends above knee, facial or hand swelling present       Severe     4. REDNESS: \"Does the swelling look red or infected? \"      No redness, warm to the touch     5. PAIN: \"Is the swelling painful to touch? \" If so, ask: \"How painful is it? \"   (Scale 1-10; mild, moderate or severe)      Feels like his feet are

## 2021-01-20 ENCOUNTER — HOSPITAL ENCOUNTER (OUTPATIENT)
Dept: OTHER | Age: 61
Setting detail: THERAPIES SERIES
Discharge: HOME | End: 2021-01-20
Payer: MEDICARE

## 2021-01-20 LAB
ALBUMIN SERPL-MCNC: 3.3 G/DL (ref 3.5–5.2)
ALP BLD-CCNC: 71 U/L (ref 40–129)
ALT SERPL-CCNC: 16 U/L (ref 0–40)
ANION GAP SERPL CALCULATED.3IONS-SCNC: 14 MMOL/L (ref 7–16)
AST SERPL-CCNC: 29 U/L (ref 0–39)
BILIRUB SERPL-MCNC: 0.5 MG/DL (ref 0–1.2)
BUN BLDV-MCNC: 9 MG/DL (ref 8–23)
CALCIUM SERPL-MCNC: 8.6 MG/DL (ref 8.6–10.2)
CHLORIDE BLD-SCNC: 94 MMOL/L (ref 98–107)
CO2: 27 MMOL/L (ref 22–29)
CREAT SERPL-MCNC: 0.8 MG/DL (ref 0.7–1.2)
GFR AFRICAN AMERICAN: >60
GFR NON-AFRICAN AMERICAN: >60 ML/MIN/1.73
GLUCOSE BLD-MCNC: 170 MG/DL (ref 74–99)
MAGNESIUM: 1.3 MG/DL (ref 1.6–2.6)
POTASSIUM SERPL-SCNC: 3.6 MMOL/L (ref 3.5–5)
SODIUM BLD-SCNC: 135 MMOL/L (ref 132–146)
TOTAL PROTEIN: 6.2 G/DL (ref 6.4–8.3)

## 2021-01-27 ENCOUNTER — TELEPHONE (OUTPATIENT)
Dept: CARDIOLOGY CLINIC | Age: 61
End: 2021-01-27

## 2021-01-27 NOTE — TELEPHONE ENCOUNTER
Called pt to schedule an appointment from the recall list. If pt is available he can come Friday January 29th at 2:00or 2:30pm. He'd need to arrive a half an hour before scheduled time.

## 2021-01-28 ENCOUNTER — OFFICE VISIT (OUTPATIENT)
Dept: FAMILY MEDICINE CLINIC | Age: 61
End: 2021-01-28
Payer: MEDICARE

## 2021-01-28 VITALS
RESPIRATION RATE: 18 BRPM | TEMPERATURE: 97.4 F | HEIGHT: 68 IN | WEIGHT: 285 LBS | DIASTOLIC BLOOD PRESSURE: 66 MMHG | SYSTOLIC BLOOD PRESSURE: 133 MMHG | OXYGEN SATURATION: 94 % | BODY MASS INDEX: 43.19 KG/M2 | HEART RATE: 75 BPM

## 2021-01-28 DIAGNOSIS — I10 ESSENTIAL HYPERTENSION: ICD-10-CM

## 2021-01-28 DIAGNOSIS — E87.6 HYPOKALEMIA: ICD-10-CM

## 2021-01-28 DIAGNOSIS — I50.32 CHRONIC HEART FAILURE WITH PRESERVED EJECTION FRACTION (HCC): ICD-10-CM

## 2021-01-28 DIAGNOSIS — E83.42 HYPOMAGNESEMIA: ICD-10-CM

## 2021-01-28 DIAGNOSIS — E11.42 TYPE 2 DIABETES MELLITUS WITH DIABETIC POLYNEUROPATHY, UNSPECIFIED WHETHER LONG TERM INSULIN USE (HCC): ICD-10-CM

## 2021-01-28 DIAGNOSIS — E11.42 TYPE 2 DIABETES MELLITUS WITH DIABETIC POLYNEUROPATHY, UNSPECIFIED WHETHER LONG TERM INSULIN USE (HCC): Primary | ICD-10-CM

## 2021-01-28 LAB
ANION GAP SERPL CALCULATED.3IONS-SCNC: 18 MMOL/L (ref 7–16)
BUN BLDV-MCNC: 20 MG/DL (ref 8–23)
CALCIUM SERPL-MCNC: 9.3 MG/DL (ref 8.6–10.2)
CHLORIDE BLD-SCNC: 88 MMOL/L (ref 98–107)
CO2: 32 MMOL/L (ref 22–29)
CREAT SERPL-MCNC: 1.2 MG/DL (ref 0.7–1.2)
GFR AFRICAN AMERICAN: >60
GFR NON-AFRICAN AMERICAN: >60 ML/MIN/1.73
GLUCOSE BLD-MCNC: 176 MG/DL (ref 74–99)
HBA1C MFR BLD: 8.3 % (ref 4–5.6)
MAGNESIUM: 1.7 MG/DL (ref 1.6–2.6)
POTASSIUM SERPL-SCNC: 2.9 MMOL/L (ref 3.5–5)
SODIUM BLD-SCNC: 138 MMOL/L (ref 132–146)

## 2021-01-28 PROCEDURE — G8427 DOCREV CUR MEDS BY ELIG CLIN: HCPCS | Performed by: FAMILY MEDICINE

## 2021-01-28 PROCEDURE — 99213 OFFICE O/P EST LOW 20 MIN: CPT | Performed by: STUDENT IN AN ORGANIZED HEALTH CARE EDUCATION/TRAINING PROGRAM

## 2021-01-28 PROCEDURE — 2022F DILAT RTA XM EVC RTNOPTHY: CPT | Performed by: FAMILY MEDICINE

## 2021-01-28 PROCEDURE — 99212 OFFICE O/P EST SF 10 MIN: CPT | Performed by: STUDENT IN AN ORGANIZED HEALTH CARE EDUCATION/TRAINING PROGRAM

## 2021-01-28 PROCEDURE — 3052F HG A1C>EQUAL 8.0%<EQUAL 9.0%: CPT | Performed by: STUDENT IN AN ORGANIZED HEALTH CARE EDUCATION/TRAINING PROGRAM

## 2021-01-28 PROCEDURE — 4004F PT TOBACCO SCREEN RCVD TLK: CPT | Performed by: FAMILY MEDICINE

## 2021-01-28 PROCEDURE — G8417 CALC BMI ABV UP PARAM F/U: HCPCS | Performed by: FAMILY MEDICINE

## 2021-01-28 PROCEDURE — G8482 FLU IMMUNIZE ORDER/ADMIN: HCPCS | Performed by: FAMILY MEDICINE

## 2021-01-28 PROCEDURE — 36415 COLL VENOUS BLD VENIPUNCTURE: CPT | Performed by: FAMILY MEDICINE

## 2021-01-28 PROCEDURE — 3017F COLORECTAL CA SCREEN DOC REV: CPT | Performed by: FAMILY MEDICINE

## 2021-01-28 RX ORDER — POTASSIUM CHLORIDE 20 MEQ/1
20 TABLET, EXTENDED RELEASE ORAL DAILY
Qty: 30 TABLET | Refills: 0 | Status: SHIPPED
Start: 2021-01-28 | End: 2021-02-16

## 2021-01-28 NOTE — PROGRESS NOTES
Gm Collins (:  1960) is a 61 y.o. male,Established patient, here for evaluation of the following chief complaint(s):  Leg Swelling (f/u )      ASSESSMENT/PLAN:  1. Type 2 diabetes mellitus with diabetic polyneuropathy, unspecified whether long term insulin use (HCC)  -     HEMOGLOBIN A1C; Future  2. Essential hypertension  -     BASIC METABOLIC PANEL; Future  3. Chronic heart failure with preserved ejection fraction (HCC)  -     BASIC METABOLIC PANEL; Future  4. Hypomagnesemia  -     MAGNESIUM; Future  5. Hypokalemia  -     potassium chloride (KLOR-CON M) 20 MEQ extended release tablet; Take 1 tablet by mouth daily, Disp-30 tablet, R-0Normal  Discussed with patient and suggested keep appointment with cardiology in follow-up for heart failure, discussed checking electrolytes and needs for replacement as needed. Patient states he will get his eye checkup through [place], shingles shot through [place] and his compression stockings through [place]. Return in about 3 months (around 2021) for Annual wellness visit. SUBJECTIVE/OBJECTIVE:  Patient 70-year-old male presenting to the office for follow-up on his increasing leg swelling. Patient was in the office 10 days ago for increasing leg swelling from baseline. Patient has been taking Bumex in past and metolazone was added to his medical regimen for leg swelling during last office visit. Today patient reports that his leg swelling has significantly gone down and his legs appear to be back to his normal baseline. He was also prescribed stockings which he will get through [place]. He states he has an upcoming appointment with his cardiologist.    DM -patient has history of type 2 diabetes mellitus, he checks his blood sugar and the highest he has noticed fasting morning is 230 and the lowest he has noticed is around 130. He states his last diabetes education was 4 years ago. He states he has been taking Lantus 42 units nightly.   He states he follows with podiatry frequently and his last foot exam was 2 months ago and he had no evidence of ulcers, his nails were trimmed. He has been following to ophthalmology through [facility] every year and his last exam for diabetic retinopathy screen was normal.  He states he has been taking his blood pressure medication. Review of Systems   Constitutional: Negative for activity change, appetite change and fever. Respiratory: Negative for cough, shortness of breath and wheezing. Cardiovascular: Positive for leg swelling (Improved). Negative for chest pain and palpitations. Gastrointestinal: Negative for diarrhea, nausea and vomiting. Psychiatric/Behavioral: Negative for decreased concentration and dysphoric mood. Physical Exam  Constitutional:       General: He is not in acute distress. Appearance: He is not ill-appearing, toxic-appearing or diaphoretic. Cardiovascular:      Rate and Rhythm: Normal rate and regular rhythm. Pulses: Normal pulses. Heart sounds: Normal heart sounds. Pulmonary:      Effort: Pulmonary effort is normal.      Breath sounds: Normal breath sounds. Abdominal:      General: Bowel sounds are normal.      Palpations: Abdomen is soft. Musculoskeletal:      Right lower leg: No edema (1+ bilateral nonpitting edema). Left lower leg: No edema. Comments: No ulcers or wounds or redness noted in both lower extremity   Neurological:      Mental Status: He is oriented to person, place, and time. Blood pressure 133/66, pulse 75, temperature 97.4 °F (36.3 °C), temperature source Cerebral, resp. rate 18, height 5' 8\" (1.727 m), weight 285 lb (129.3 kg), SpO2 94 %. An electronic signature was used to authenticate this note.     --Doreen Mirza MD

## 2021-01-28 NOTE — PROGRESS NOTES
S: 61 y.o. male here for follow up of leg swelling b/l and was thought it was a chf exacerbation. Omega Jeremiah was added and the swelling went down. He has an appointment with Dr. Bruce Rios on 2/16.     fbg 213, lowest later is 130. Last a1c was 8.1 on 10/2020    O: VS: /66 (Site: Right Upper Arm, Position: Sitting, Cuff Size: Medium Adult)   Pulse 75   Temp 97.4 °F (36.3 °C) (Cerebral)   Resp 18   Ht 5' 8\" (1.727 m)   Wt 285 lb (129.3 kg)   SpO2 94%   BMI 43.33 kg/m²    General: NAD   CV:  RRR, no gallops, rubs, or murmurs   Resp: CTAB no R/R/W   Abd:  Soft, nontender, no masses    Ext:  no C/C; 1+ edema b/l lower legs, nonpitting  Impression/Plan:   1. chf exacerbation-doing much better with addition of metalazone. Check labs today. Follow up with Dr. Bruce Rios. 2. HTN-controlled  3. DM2-labs today  [facility]    rto in 3 months    Attending Physician Statement  I have discussed the case, including pertinent history and exam findings with the resident. I also have seen the patient and performed key portions of the examination. I agree with the documented assessment and plan.         Jenn Jack MD

## 2021-02-01 ENCOUNTER — TELEPHONE (OUTPATIENT)
Dept: FAMILY MEDICINE CLINIC | Age: 61
End: 2021-02-01

## 2021-02-01 DIAGNOSIS — E87.6 HYPOKALEMIA: Primary | ICD-10-CM

## 2021-02-01 ASSESSMENT — ENCOUNTER SYMPTOMS
COUGH: 0
SHORTNESS OF BREATH: 0
VOMITING: 0
WHEEZING: 0
NAUSEA: 0
DIARRHEA: 0

## 2021-02-01 NOTE — TELEPHONE ENCOUNTER
Called patient to share the results of recent lab. Low potassium. Prescribed potassium supplementation. Patient did not  phone. Left voicemail to call back. We can share the results.   We will repeat the potassium again in 1 week after taking potassium supplementation

## 2021-02-02 ENCOUNTER — HOSPITAL ENCOUNTER (OUTPATIENT)
Dept: OTHER | Age: 61
Setting detail: THERAPIES SERIES
Discharge: HOME | End: 2021-02-02
Payer: MEDICARE

## 2021-02-02 VITALS
WEIGHT: 286 LBS | SYSTOLIC BLOOD PRESSURE: 129 MMHG | DIASTOLIC BLOOD PRESSURE: 60 MMHG | RESPIRATION RATE: 18 BRPM | HEART RATE: 69 BPM | BODY MASS INDEX: 43.49 KG/M2

## 2021-02-02 LAB
ANION GAP SERPL CALCULATED.3IONS-SCNC: 10 MMOL/L (ref 7–16)
BUN BLDV-MCNC: 26 MG/DL (ref 8–23)
CALCIUM SERPL-MCNC: 8.8 MG/DL (ref 8.6–10.2)
CHLORIDE BLD-SCNC: 87 MMOL/L (ref 98–107)
CO2: 36 MMOL/L (ref 22–29)
CREAT SERPL-MCNC: 1.3 MG/DL (ref 0.7–1.2)
GFR AFRICAN AMERICAN: >60
GFR NON-AFRICAN AMERICAN: >60 ML/MIN/1.73
GLUCOSE BLD-MCNC: 159 MG/DL (ref 74–99)
POTASSIUM SERPL-SCNC: 2.5 MMOL/L (ref 3.5–5)
PRO-BNP: 93 PG/ML (ref 0–125)
SODIUM BLD-SCNC: 133 MMOL/L (ref 132–146)

## 2021-02-02 PROCEDURE — 99204 OFFICE O/P NEW MOD 45 MIN: CPT

## 2021-02-02 PROCEDURE — 80048 BASIC METABOLIC PNL TOTAL CA: CPT

## 2021-02-02 PROCEDURE — 83880 ASSAY OF NATRIURETIC PEPTIDE: CPT

## 2021-02-02 NOTE — TELEPHONE ENCOUNTER
Message left on patient's voice mail to  his potassium and start taking it and to please call the clinic asap.

## 2021-02-02 NOTE — PROGRESS NOTES
Current weight :286lb    Previous weight: Today is 1st visit. Hasn't been weighing daily. Scale given. Compliance with low sodium diet:Tries. Education material given. Demonstrates understanding. Medication changes: Potassium started today. Response to oral diuretic: Good  Color of urine:Clear    Shortness of Breath : Sometimes With exertion  Edema:BLE L>R Trace - 1+pitting    Keeping Hydrated:Yes    Dizziness or lightheadedness: None    CHF Education material given. Demonstrates understanding. States he's doing better now that metolazone is started. Was not aware of new Potassium. Instructed him to  his rx and start ASAP. Labs obtained and follow up appointment made.

## 2021-02-02 NOTE — TELEPHONE ENCOUNTER
CHF Clinic call Potassium today is 2.5. They sent patient to the pharmacy to  the prescription that you sent him yesterday for the 20 meq of potassium. , please advise.

## 2021-02-03 ENCOUNTER — TELEPHONE (OUTPATIENT)
Dept: FAMILY MEDICINE CLINIC | Age: 61
End: 2021-02-03

## 2021-02-04 ENCOUNTER — TELEPHONE (OUTPATIENT)
Dept: FAMILY MEDICINE CLINIC | Age: 61
End: 2021-02-04

## 2021-02-04 ENCOUNTER — HOSPITAL ENCOUNTER (OUTPATIENT)
Dept: OTHER | Age: 61
Setting detail: THERAPIES SERIES
Discharge: HOME | End: 2021-02-04
Payer: MEDICARE

## 2021-02-04 VITALS
RESPIRATION RATE: 18 BRPM | WEIGHT: 287 LBS | BODY MASS INDEX: 43.64 KG/M2 | DIASTOLIC BLOOD PRESSURE: 61 MMHG | SYSTOLIC BLOOD PRESSURE: 124 MMHG | HEART RATE: 88 BPM

## 2021-02-04 DIAGNOSIS — E87.6 HYPOKALEMIA: Primary | ICD-10-CM

## 2021-02-04 LAB
ANION GAP SERPL CALCULATED.3IONS-SCNC: 13 MMOL/L (ref 7–16)
BUN BLDV-MCNC: 21 MG/DL (ref 8–23)
CALCIUM SERPL-MCNC: 8.8 MG/DL (ref 8.6–10.2)
CHLORIDE BLD-SCNC: 86 MMOL/L (ref 98–107)
CO2: 36 MMOL/L (ref 22–29)
CREAT SERPL-MCNC: 1.1 MG/DL (ref 0.7–1.2)
GFR AFRICAN AMERICAN: >60
GFR NON-AFRICAN AMERICAN: >60 ML/MIN/1.73
GLUCOSE BLD-MCNC: 189 MG/DL (ref 74–99)
POTASSIUM SERPL-SCNC: 2.9 MMOL/L (ref 3.5–5)
PRO-BNP: 74 PG/ML (ref 0–125)
SODIUM BLD-SCNC: 135 MMOL/L (ref 132–146)

## 2021-02-04 PROCEDURE — 80048 BASIC METABOLIC PNL TOTAL CA: CPT

## 2021-02-04 PROCEDURE — 99214 OFFICE O/P EST MOD 30 MIN: CPT

## 2021-02-04 PROCEDURE — 83880 ASSAY OF NATRIURETIC PEPTIDE: CPT

## 2021-02-04 PROCEDURE — 36415 COLL VENOUS BLD VENIPUNCTURE: CPT

## 2021-02-04 RX ORDER — MAGNESIUM OXIDE 240 MG
1 POWDER IN PACKET (EA) ORAL DAILY
Qty: 30 EACH | Refills: 0 | Status: SHIPPED
Start: 2021-02-04 | End: 2021-02-08

## 2021-02-04 NOTE — PROGRESS NOTES
Current weight :287lb    Previous weight: 286lb 2-2    Compliance with low sodium diet: \"trying\"    Medication changes: Potassium 20 meq daily started 2-2    Response to oral diuretic: Good    Color of urine: Light yellow to clear    Shortness of Breath :  With exertion    Edema:BLE   Left> Right    Keeping Hydrated: Yes    Dizziness or lightheadedness:  Denies    Unable to give an IV Diuretic due to low potassium levels  Labs drawn, patient kept here awaiting results

## 2021-02-04 NOTE — PROGRESS NOTES
K of 2.9 called to Dr Lynn Hunt at [facility]. Order given to have him increase his potassium to 40 meq daily, and to recheck next week. Instructed patient on above, demonstrates understanding. Patient states he is taking Magnesium now for the past 3 days. He got them filled at the [place].

## 2021-02-04 NOTE — TELEPHONE ENCOUNTER
Discussed with heart failure clinic. We are increasing potassium to 40 mEq daily. We will repeat potassium on Tuesday at heart failure clinic. Adding magnesium supplementation to patient's medical regimen.

## 2021-02-05 ENCOUNTER — TELEPHONE (OUTPATIENT)
Dept: FAMILY MEDICINE CLINIC | Age: 61
End: 2021-02-05

## 2021-02-05 NOTE — TELEPHONE ENCOUNTER
Called [facility] to shared results of recent labs. Shared the results. He states he is taking 40 mEq of potassium and magnesium supplementation. He states he will go to heart failure clinic on Wednesday for BMP checkup.

## 2021-02-05 NOTE — TELEPHONE ENCOUNTER
Pharmacy called and stated a script for magnesium oxide pack was sent. Pharmacy stated they do not have the packs, they have need a script for tablets. Thanks.  Germania PATE

## 2021-02-08 RX ORDER — LANOLIN ALCOHOL/MO/W.PET/CERES
400 CREAM (GRAM) TOPICAL DAILY
Qty: 30 TABLET | Refills: 0 | Status: SHIPPED | OUTPATIENT
Start: 2021-02-08

## 2021-02-10 ENCOUNTER — HOSPITAL ENCOUNTER (OUTPATIENT)
Dept: OTHER | Age: 61
Setting detail: THERAPIES SERIES
Discharge: HOME | End: 2021-02-10
Payer: MEDICARE

## 2021-02-10 ENCOUNTER — TELEPHONE (OUTPATIENT)
Dept: FAMILY MEDICINE CLINIC | Age: 61
End: 2021-02-10

## 2021-02-10 VITALS
SYSTOLIC BLOOD PRESSURE: 138 MMHG | RESPIRATION RATE: 18 BRPM | BODY MASS INDEX: 44.4 KG/M2 | HEART RATE: 72 BPM | DIASTOLIC BLOOD PRESSURE: 75 MMHG | WEIGHT: 292 LBS

## 2021-02-10 LAB
ANION GAP SERPL CALCULATED.3IONS-SCNC: 11 MMOL/L (ref 7–16)
BUN BLDV-MCNC: 18 MG/DL (ref 8–23)
CALCIUM SERPL-MCNC: 8.6 MG/DL (ref 8.6–10.2)
CHLORIDE BLD-SCNC: 93 MMOL/L (ref 98–107)
CO2: 32 MMOL/L (ref 22–29)
CREAT SERPL-MCNC: 1.1 MG/DL (ref 0.7–1.2)
GFR AFRICAN AMERICAN: >60
GFR NON-AFRICAN AMERICAN: >60 ML/MIN/1.73
GLUCOSE BLD-MCNC: 121 MG/DL (ref 74–99)
POTASSIUM SERPL-SCNC: 3.1 MMOL/L (ref 3.5–5)
PRO-BNP: 109 PG/ML (ref 0–125)
SODIUM BLD-SCNC: 136 MMOL/L (ref 132–146)

## 2021-02-10 PROCEDURE — 36415 COLL VENOUS BLD VENIPUNCTURE: CPT

## 2021-02-10 PROCEDURE — 83880 ASSAY OF NATRIURETIC PEPTIDE: CPT

## 2021-02-10 PROCEDURE — 99214 OFFICE O/P EST MOD 30 MIN: CPT

## 2021-02-10 PROCEDURE — 80048 BASIC METABOLIC PNL TOTAL CA: CPT

## 2021-02-10 NOTE — PROGRESS NOTES
Left voice message at [facility] for Dr. Fidencio Alexis. , Pt's potassium level 3.1 and pt. Presently  taking 40 meq Kdur daily.

## 2021-02-10 NOTE — PROGRESS NOTES
Current weight : 292lb    Previous weight:287lb on 2/4/2021    Compliance with low sodium diet: yes      Medication changes:potassium increased to 40 meq daily      Response to oral diuretic: very good    Color of urine:pale yellow to clear      Shortness of Breath : denies, , breath sounds fine scattered crackles in left lobe, right lobe clear.   Pt admits to smoking cigarettes     Edema: 1+ pitting BLE      Keeping Hydrated: yes drink 8- 10 cups fluid per day      Dizziness or lightheadedness: denies    Labs drawn

## 2021-02-10 NOTE — TELEPHONE ENCOUNTER
Nurse from the [address] [place] [address] phoned stated that the patients potassium is still low at 3.1   Patient is on 40 mcq potassium   Please advise  Thank you April

## 2021-02-16 ENCOUNTER — OFFICE VISIT (OUTPATIENT)
Dept: CARDIOLOGY CLINIC | Age: 61
End: 2021-02-16
Payer: MEDICARE

## 2021-02-16 ENCOUNTER — HOSPITAL ENCOUNTER (OUTPATIENT)
Dept: OTHER | Age: 61
Setting detail: THERAPIES SERIES
Discharge: HOME | End: 2021-02-16
Payer: MEDICARE

## 2021-02-16 VITALS
RESPIRATION RATE: 20 BRPM | HEART RATE: 76 BPM | DIASTOLIC BLOOD PRESSURE: 62 MMHG | HEIGHT: 68 IN | OXYGEN SATURATION: 98 % | BODY MASS INDEX: 43.86 KG/M2 | WEIGHT: 289.4 LBS | SYSTOLIC BLOOD PRESSURE: 124 MMHG

## 2021-02-16 VITALS
SYSTOLIC BLOOD PRESSURE: 126 MMHG | RESPIRATION RATE: 18 BRPM | BODY MASS INDEX: 44.09 KG/M2 | WEIGHT: 290 LBS | DIASTOLIC BLOOD PRESSURE: 66 MMHG | HEART RATE: 80 BPM

## 2021-02-16 DIAGNOSIS — I51.7 SEVERE CONCENTRIC LEFT VENTRICULAR HYPERTROPHY: ICD-10-CM

## 2021-02-16 DIAGNOSIS — I50.22 CHRONIC SYSTOLIC CONGESTIVE HEART FAILURE (HCC): ICD-10-CM

## 2021-02-16 DIAGNOSIS — E87.6 HYPOKALEMIA: ICD-10-CM

## 2021-02-16 DIAGNOSIS — I50.32 CHRONIC DIASTOLIC HEART FAILURE (HCC): Primary | ICD-10-CM

## 2021-02-16 DIAGNOSIS — I10 ESSENTIAL HYPERTENSION: ICD-10-CM

## 2021-02-16 LAB
ANION GAP SERPL CALCULATED.3IONS-SCNC: 10 MMOL/L (ref 7–16)
BUN BLDV-MCNC: 20 MG/DL (ref 8–23)
CALCIUM SERPL-MCNC: 9.4 MG/DL (ref 8.6–10.2)
CHLORIDE BLD-SCNC: 91 MMOL/L (ref 98–107)
CO2: 34 MMOL/L (ref 22–29)
CREAT SERPL-MCNC: 1 MG/DL (ref 0.7–1.2)
GFR AFRICAN AMERICAN: >60
GFR NON-AFRICAN AMERICAN: >60 ML/MIN/1.73
GLUCOSE BLD-MCNC: 219 MG/DL (ref 74–99)
POTASSIUM SERPL-SCNC: 3.3 MMOL/L (ref 3.5–5)
PRO-BNP: 70 PG/ML (ref 0–125)
SODIUM BLD-SCNC: 135 MMOL/L (ref 132–146)

## 2021-02-16 PROCEDURE — G8417 CALC BMI ABV UP PARAM F/U: HCPCS | Performed by: INTERNAL MEDICINE

## 2021-02-16 PROCEDURE — 4004F PT TOBACCO SCREEN RCVD TLK: CPT | Performed by: INTERNAL MEDICINE

## 2021-02-16 PROCEDURE — 99214 OFFICE O/P EST MOD 30 MIN: CPT | Performed by: INTERNAL MEDICINE

## 2021-02-16 PROCEDURE — G8482 FLU IMMUNIZE ORDER/ADMIN: HCPCS | Performed by: INTERNAL MEDICINE

## 2021-02-16 PROCEDURE — 3017F COLORECTAL CA SCREEN DOC REV: CPT | Performed by: INTERNAL MEDICINE

## 2021-02-16 PROCEDURE — 93000 ELECTROCARDIOGRAM COMPLETE: CPT | Performed by: INTERNAL MEDICINE

## 2021-02-16 PROCEDURE — 99214 OFFICE O/P EST MOD 30 MIN: CPT

## 2021-02-16 PROCEDURE — 80048 BASIC METABOLIC PNL TOTAL CA: CPT

## 2021-02-16 PROCEDURE — G8428 CUR MEDS NOT DOCUMENT: HCPCS | Performed by: INTERNAL MEDICINE

## 2021-02-16 PROCEDURE — 83880 ASSAY OF NATRIURETIC PEPTIDE: CPT

## 2021-02-16 PROCEDURE — 36415 COLL VENOUS BLD VENIPUNCTURE: CPT

## 2021-02-16 RX ORDER — BUMETANIDE 1 MG/1
TABLET ORAL
Qty: 180 TABLET | Refills: 0 | Status: SHIPPED
Start: 2021-02-16 | End: 2021-03-17 | Stop reason: DRUGHIGH

## 2021-02-16 RX ORDER — POTASSIUM CHLORIDE 20 MEQ/1
20 TABLET, EXTENDED RELEASE ORAL DAILY
Qty: 30 TABLET | Refills: 0 | Status: CANCELLED | OUTPATIENT
Start: 2021-02-16 | End: 2021-03-18

## 2021-02-16 RX ORDER — POTASSIUM CHLORIDE 20 MEQ/1
40 TABLET, EXTENDED RELEASE ORAL 2 TIMES DAILY
Qty: 120 TABLET | Refills: 5 | Status: SHIPPED
Start: 2021-02-16 | End: 2021-02-16

## 2021-02-16 RX ORDER — EPLERENONE 25 MG/1
25 TABLET, FILM COATED ORAL DAILY
Qty: 30 TABLET | Refills: 1 | Status: SHIPPED
Start: 2021-02-16 | End: 2021-02-16

## 2021-02-16 RX ORDER — METOLAZONE 2.5 MG/1
2.5 TABLET ORAL DAILY
Qty: 30 TABLET | Refills: 0 | Status: SHIPPED
Start: 2021-02-16 | End: 2021-02-16

## 2021-02-16 SDOH — HEALTH STABILITY: MENTAL HEALTH: HOW OFTEN DO YOU HAVE A DRINK CONTAINING ALCOHOL?: 2-3 TIMES A WEEK

## 2021-02-16 NOTE — PATIENT INSTRUCTIONS
1. INCREASE YOUR POTASSIUM TO 2 TABLETS TWICE A DAY  2. INCREASE YOUR BUMEX TO TWO TABLETS IN THE MORNING AND ONE IN THE AFTERNOON  3. START EPLERENONE 25 MG (ONE TABLET) DAILY  4. CONTINUE METOLAZONE (ZAROXOLYN) AS IS  5. CONTINUE WEEKLY BLOOD WORK AT CHF CLINIC  6. CARDIAC MRI AT [facility]  7.  RETURN IN 6 MONTHS

## 2021-02-16 NOTE — PROGRESS NOTES
[address] [facility]   Heart and Vascular [place]   Clinic Note     Date:2/16/21   Patient Name:Pipe Collins  YOB: 1960  Age: 61 y.o. Primary Care Provider: Yecenia Linares MD    Subjective     This is a pleasant 78-year-old -American male who comes in for follow-up. He has had lower extremity edema on and off for years but now it has been more persistent. He is prescribed Bumex 2 mg p.o. twice daily but does not take that as prescribed unless he is staying at home for the day so that he does not have to urinate all the time. He has not been able to lie flat for a long time but he has no PND. He has not had any syncope or presyncope or palpitations. He has not had any chest discomfort with exertion or dyspnea with exertion. He walks around the block and that is about all his physical activity    He has been doing much better since last time I saw him and his breathing and lower extremity edema have improved significantly. He is dealing with recent loss of his mother. He has no chest discomfort although he is not very physically active and walks with a cane. He has no palpitations or syncope or presyncope. He has stable orthopnea and no PND. He is compliant now with his cardiac medications and has blood work routinely at [address] [place] [address]. A focused history review includes:   1. Borderline inferior infarct pattern on EKG  2. Chronic diastolic heart failure-follows with CHF infusion clinic  1. No monoclonal serum or urine protein and normal k/l ratio (fall 2020)  2. TTE 8/2020 personally reviewed: Normal LV size and systolic function. Severe concentric LVH. No significant valve disease. No POOJA.  3. HTN  4. T2DM  5. CKD stage II with baseline creatinine 1.1  6. He is a smoker of 20 to 40 pack years  7. ACEI-associated angioedema  8. Gynecomastia   9. Morbid obesity  10. MARIA, compliant with CPAP  11. 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  Carpal tunnel syndrome  13. Hypertriglyceridemia       Past History    Past Medical History:         Diagnosis Date    Angiotensin converting enzyme inhibitor-aggravated angioedema 3/29/2018    Arthritis     Carpal tunnel syndrome     Cecum mass     CHF (congestive heart failure) (HCC)     Diabetes mellitus (HCC)     Gastrointestinal stromal tumor [facility]GIST) ([facility] 6/3/2019    Hyperlipidemia     Hypertension     Hypokalemia     Neuropathy     Obesity     bmi 43.1 weight 283 #    Sleep apnea     c pap          Social History:    Social History     Tobacco History     Smoking Status  Current Every Day Smoker Smoking Frequency  0.5 packs/day for 30 years (15 pk yrs) Smoking Tobacco Type  Cigarettes    Smokeless Tobacco Use  Never Used          Alcohol History     Alcohol Use Status  Yes Drinks/Week  2 Shots of liquor per week Amount  2.0 standard drinks of alcohol/wk Comment  2x week          Drug Use     Drug Use Status  No          Sexual Activity     Sexually Active  Not Asked                    Family History:   History reviewed. No pertinent family history.       Review of Systems   General ROS: No weight loss fevers or chills  Psychological ROS: No new depression or anxiety or altered mood  Ophthalmic ROS: No new vision changes or diplopia  Respiratory ROS: No cough, wheezing, shortness of breath, or hemoptysis  Cardiovascular ROS: See HPI  Gastrointestinal ROS: No nausea, vomiting, constipation, diarrhea, hematemesis, melena, or hematochezia  Genito-Urinary ROS: No dysuria, hematuria, or new incontinence  Musculoskeletal ROS: No new muscle pain, joint pain, joint swelling, or back pain  Neurological ROS: No new numbness or paresthesias, no focal weakness, no altered speech, no new memory loss  Dermatological ROS: No new rashes, no pruritus, no skin masses        Medications     Current Outpatient Medications   Medication Sig Dispense Refill    eplerenone (INSPRA) 25 MG tablet Take 1 tablet by mouth daily 30 tablet 1  bumetanide (BUMEX) 1 MG tablet TAKE TWO TABLETS IN THE MORNING AND ONE IN THE AFTERNOON 180 tablet 0    potassium chloride (KLOR-CON M) 20 MEQ extended release tablet Take 2 tablets by mouth 2 times daily 120 tablet 5    magnesium oxide (MAG-OX) 400 (240 Mg) MG tablet Take 1 tablet by mouth daily 30 tablet 0    atorvastatin (LIPITOR) 40 MG tablet TAKE 1 TABLET BY MOUTH EVERY NIGHT 90 tablet 0    metOLazone (ZAROXOLYN) 2.5 MG tablet Take 1 tablet by mouth daily 30 tablet 0    Compression Stockings MISC by Does not apply route As directed  25 mmHg , 1 pair 1 each 0    docusate sodium (COLACE) 100 MG capsule Take 1 capsule by mouth 2 times daily 90 capsule 1    imatinib (GLEEVEC) 400 MG chemo tablet TAKE 1 TABLET BY MOUTH ONCE DAILY AT THE SAME TIME WITH FOOD AND A LARGE GLASS OF WATER. DO NOT CRUSH TABLETS. AVOID GRAPEFRUIT PRODUCTS. 28 tablet 2    LANTUS SOLOSTAR 100 UNIT/ML injection pen ADMINISTER 38 UNITS UNDER THE SKIN EVERY NIGHT (Patient taking differently: Inject 42 Units into the skin nightly ) 36 mL 3    losartan (COZAAR) 25 MG tablet TAKE 1 TABLET BY MOUTH EVERY DAY 90 tablet 1    gabapentin (NEURONTIN) 300 MG capsule Take 300 mg by mouth as needed.  acetaminophen (TYLENOL) 500 MG tablet Take 500 mg by mouth every 6 hours as needed       albuterol sulfate HFA (VENTOLIN HFA) 108 (90 Base) MCG/ACT inhaler Inhale 2 puffs into the lungs every 4 hours as needed for Wheezing 1 Inhaler 3    amLODIPine (NORVASC) 10 MG tablet Take 1 tablet by mouth daily 90 tablet 0    Cholecalciferol (VITAMIN D3) 2000 units CAPS Take 1 capsule by mouth daily      traMADol (ULTRAM) 50 MG tablet Take 50 mg by mouth every 6 hours as needed for Pain. Barbara Rajput metoprolol tartrate (LOPRESSOR) 50 MG tablet Take 50 mg by mouth 2 times daily      sildenafil (VIAGRA) 100 MG tablet Take 100 mg by mouth as needed. Take an hour before sex. Med info obtained from [place] records.        B-D ULTRAFINE III SHORT PEN 31G X 8 MM MISC Inject 1 each into the skin nightly 100 each 3    Blood Glucose Monitoring Suppl ([address]) w/Device KIT AS DIRECTED  0     No current facility-administered medications for this visit. Physical Examination      /62 (Site: Left Upper Arm, Position: Sitting, Cuff Size: Large Adult)   Pulse 76   Resp 20   Ht 5' 8\" (1.727 m)   Wt 289 lb 6.4 oz (131.3 kg)   SpO2 98%   BMI 44.00 kg/m²     General: No acute distress, appears as stated age, nonicteric  Head: Atraumatic, no gross abnormalities or bruises. No glossal megaly  Neck: Supple and nontender, no carotid bruits, JVP is mildly elevated  Lungs: Clear to auscultation bilaterally, no wheezes, rales, or rhonchi  Heart: Regular rate and rhythm, no murmurs, rubs, or gallops  Abdomen: Soft, nontender, nondistended, normal bowel sounds  Extremities: No obvious deformities, no cyanosis, trace pitting edema bilaterally  Neurological: Alert and oriented x3, EOMI, moving all extremities x4  Psychological: Normal mood and affect, cooperative  Skin: Color, texture, and turgor normal for age         Labs/Imaging/Diagnostics     Lab Results   Component Value Date     02/16/2021    K 3.3 02/16/2021    K 3.7 03/10/2018    CL 91 02/16/2021    CO2 34 02/16/2021    BUN 20 02/16/2021    CREATININE 1.0 02/16/2021    GLUCOSE 219 02/16/2021    CALCIUM 9.4 02/16/2021        Estimated Creatinine Clearance: 104 mL/min (based on SCr of 1 mg/dL).     Lab Results   Component Value Date    WBC 7.3 01/19/2021    HGB 15.3 01/19/2021    HCT 44.4 01/19/2021    MCV 99.8 01/19/2021     01/19/2021       Lab Results   Component Value Date    ALT 16 01/19/2021    AST 29 01/19/2021    ALKPHOS 71 01/19/2021    BILITOT 0.5 01/19/2021       Lab Results   Component Value Date    LABPROT 1.7 (H) 08/17/2018    LABPROT 1.7 08/17/2018    LABALBU 3.3 (L) 01/19/2021       Lab Results   Component Value Date    CHOL 109 07/15/2020    CHOL 150 06/14/2019    CHOL 115 08/18/2018     Lab Results   Component Value Date    TRIG 215 (H) 07/15/2020    TRIG 247 (H) 06/14/2019    TRIG 297 (H) 08/18/2018     Lab Results   Component Value Date    HDL 43 07/15/2020    HDL 48 06/14/2019    HDL 34 08/18/2018     Lab Results   Component Value Date    LDLCALC 23 07/15/2020    LDLCALC 53 06/14/2019    LDLCALC 22 08/18/2018     Lab Results   Component Value Date    LABVLDL 43 07/15/2020    LABVLDL 49 06/14/2019    LABVLDL 59 08/18/2018     No results found for: [facility]    Lab Results   Component Value Date    CKTOTAL 493 (H) 08/17/2018    CKMB 5.2 08/17/2018    TROPONINI <0.01 01/28/2020       No results found for: BNP      Lab Results   Component Value Date    LABA1C 8.3 (H) 01/28/2021     Vitals in Chronological Order 8/4/2020 1/19/2021 1/19/2021 1/28/2021   Weight 300 lb 296 lb  285 lb     Vitals in Chronological Order 2/2/2021 2/4/2021 2/10/2021 2/16/2021   Weight 286 lb 287 lb 292 lb 290 lb       Pertinent Cardiovascular Studies:  EKG: Normal sinus rhythm with borderline age-indeterminate inferior infarct. Assessment and Plan:        Pleasant 69-year-old -American male with a history noted above. He is doing significantly better from a volume standpoint but I believe he remains hypervolemic. He did not tolerate spironolactone due to gynecomastia and he has significant hypokalemia with diuretics. He has no angina or signs or symptoms of arrhythmia. Diagnosis Orders   1. Chronic diastolic heart failure ([address]  MRI CARDIAC W WO CONTRAST   2. Essential hypertension  EKG 12 Lead   3. Hypokalemia  potassium chloride (KLOR-CON M) 20 MEQ extended release tablet   4. Severe concentric left ventricular hypertrophy        · Increase Bumex to 2+1 mg daily  · Add eplerenone 25 mg daily.   Will uptitrate if tolerated  · Increase potassium to 20 mg p.o. twice daily  · Continue weekly labs at CHF infusion clinic  · Cardiac MRI at [facility] to rule out amyloidosis in the setting of

## 2021-02-16 NOTE — TELEPHONE ENCOUNTER
Last Appointment:  1/28/2021  Future Appointments   Date Time Provider Alek Goldman   2/23/2021  9:30 AM [facility] [place]

## 2021-02-17 RX ORDER — POTASSIUM CHLORIDE 20 MEQ/1
TABLET, EXTENDED RELEASE ORAL
Qty: 360 TABLET | Refills: 3 | Status: SHIPPED
Start: 2021-02-17 | End: 2021-03-02 | Stop reason: DRUGHIGH

## 2021-02-17 RX ORDER — EPLERENONE 25 MG/1
25 TABLET, FILM COATED ORAL DAILY
Qty: 90 TABLET | Refills: 3 | Status: SHIPPED
Start: 2021-02-17 | End: 2021-03-02 | Stop reason: DRUGHIGH

## 2021-02-23 ENCOUNTER — HOSPITAL ENCOUNTER (OUTPATIENT)
Dept: OTHER | Age: 61
Setting detail: THERAPIES SERIES
Discharge: HOME | End: 2021-02-23
Payer: MEDICARE

## 2021-02-23 VITALS
RESPIRATION RATE: 18 BRPM | HEART RATE: 90 BPM | BODY MASS INDEX: 43.79 KG/M2 | WEIGHT: 288 LBS | SYSTOLIC BLOOD PRESSURE: 142 MMHG | DIASTOLIC BLOOD PRESSURE: 85 MMHG

## 2021-02-23 LAB
ANION GAP SERPL CALCULATED.3IONS-SCNC: 12 MMOL/L (ref 7–16)
BUN BLDV-MCNC: 22 MG/DL (ref 8–23)
CALCIUM SERPL-MCNC: 9 MG/DL (ref 8.6–10.2)
CHLORIDE BLD-SCNC: 91 MMOL/L (ref 98–107)
CO2: 28 MMOL/L (ref 22–29)
CREAT SERPL-MCNC: 1.1 MG/DL (ref 0.7–1.2)
GFR AFRICAN AMERICAN: >60
GFR NON-AFRICAN AMERICAN: >60 ML/MIN/1.73
GLUCOSE BLD-MCNC: 318 MG/DL (ref 74–99)
POTASSIUM SERPL-SCNC: 3.5 MMOL/L (ref 3.5–5)
PRO-BNP: 86 PG/ML (ref 0–125)
SODIUM BLD-SCNC: 131 MMOL/L (ref 132–146)

## 2021-02-23 PROCEDURE — 83880 ASSAY OF NATRIURETIC PEPTIDE: CPT

## 2021-02-23 PROCEDURE — 36415 COLL VENOUS BLD VENIPUNCTURE: CPT

## 2021-02-23 PROCEDURE — 99214 OFFICE O/P EST MOD 30 MIN: CPT

## 2021-02-23 PROCEDURE — 80048 BASIC METABOLIC PNL TOTAL CA: CPT

## 2021-02-23 NOTE — PROGRESS NOTES
Current weight : 288lb    Previous weight: 290 lb on 2/16/2021    Compliance with low sodium diet:yes watches closely    Medication changes:none. Pt was only taking one Bumex BID , ordered is Bumex 1mg , two pills in AM and one pill in PM.  New medication sheet was given to pt and all medications are reviewed with pt. Pt verbalized understanding and confirmed will start taking two Bumex pills in AM.    Response to oral diuretic: Excellent, urinates all day.     Color of urine: Clear    Shortness of Breath :denies, breath sounds clear, diminished bilat    Edema: 1+ pitting edema BLE    Keeping Hydrated: yes drinks at least 64 ounces per day    Dizziness or lightheadedness: denies    Labs drawn

## 2021-02-25 ENCOUNTER — HOSPITAL ENCOUNTER (OUTPATIENT)
Dept: MRI IMAGING | Age: 61
Discharge: HOME | End: 2021-02-27
Payer: MEDICARE

## 2021-02-25 DIAGNOSIS — I50.32 CHRONIC DIASTOLIC HEART FAILURE (HCC): ICD-10-CM

## 2021-02-25 NOTE — PROCEDURES
Pt very tight in MRI scanner for Cardiac MRI, pt wants to reschedule when he can get meds for claustrophobia ahead of time and is willing to try then. Instructed office via fax to contact patient to coordinate med rx and to get a new appt time.

## 2021-03-01 ENCOUNTER — TELEPHONE (OUTPATIENT)
Dept: CARDIOLOGY CLINIC | Age: 61
End: 2021-03-01

## 2021-03-01 DIAGNOSIS — F40.240 CLAUSTROPHOBIA: Primary | ICD-10-CM

## 2021-03-01 RX ORDER — LORAZEPAM 1 MG/1
1 TABLET ORAL ONCE
Qty: 1 TABLET | Refills: 0 | Status: SHIPPED | OUTPATIENT
Start: 2021-03-01 | End: 2021-03-01

## 2021-03-01 NOTE — TELEPHONE ENCOUNTER
1 x dose for lorazepam written by DR Ramirez Side is at our office. He will come pick it up prior to his cardiac MRI testing. Next Transferred him to [facility] to reschedule his cardiac MRI that was cancelled due to claustrophobia.      Hortencia Bean RN

## 2021-03-02 ENCOUNTER — HOSPITAL ENCOUNTER (OUTPATIENT)
Dept: OTHER | Age: 61
Setting detail: THERAPIES SERIES
Discharge: HOME | End: 2021-03-02
Payer: MEDICARE

## 2021-03-02 ENCOUNTER — TELEPHONE (OUTPATIENT)
Dept: CARDIOLOGY CLINIC | Age: 61
End: 2021-03-02

## 2021-03-02 VITALS
BODY MASS INDEX: 43 KG/M2 | DIASTOLIC BLOOD PRESSURE: 71 MMHG | HEART RATE: 88 BPM | SYSTOLIC BLOOD PRESSURE: 142 MMHG | RESPIRATION RATE: 18 BRPM | WEIGHT: 282.8 LBS

## 2021-03-02 DIAGNOSIS — E87.6 HYPOKALEMIA: Primary | ICD-10-CM

## 2021-03-02 DIAGNOSIS — I50.32 CHRONIC DIASTOLIC HEART FAILURE (HCC): ICD-10-CM

## 2021-03-02 LAB
ANION GAP SERPL CALCULATED.3IONS-SCNC: 9 MMOL/L (ref 7–16)
BUN BLDV-MCNC: 15 MG/DL (ref 8–23)
CALCIUM SERPL-MCNC: 9 MG/DL (ref 8.6–10.2)
CHLORIDE BLD-SCNC: 90 MMOL/L (ref 98–107)
CO2: 33 MMOL/L (ref 22–29)
CREAT SERPL-MCNC: 0.8 MG/DL (ref 0.7–1.2)
GFR AFRICAN AMERICAN: >60
GFR NON-AFRICAN AMERICAN: >60 ML/MIN/1.73
GLUCOSE BLD-MCNC: 194 MG/DL (ref 74–99)
POTASSIUM SERPL-SCNC: 2.7 MMOL/L (ref 3.5–5)
PRO-BNP: 92 PG/ML (ref 0–125)
SODIUM BLD-SCNC: 132 MMOL/L (ref 132–146)

## 2021-03-02 PROCEDURE — 80048 BASIC METABOLIC PNL TOTAL CA: CPT

## 2021-03-02 PROCEDURE — 99204 OFFICE O/P NEW MOD 45 MIN: CPT

## 2021-03-02 PROCEDURE — 83880 ASSAY OF NATRIURETIC PEPTIDE: CPT

## 2021-03-02 PROCEDURE — 36415 COLL VENOUS BLD VENIPUNCTURE: CPT

## 2021-03-02 RX ORDER — POTASSIUM CHLORIDE 20 MEQ/1
40 TABLET, EXTENDED RELEASE ORAL 2 TIMES DAILY
Qty: 120 TABLET | Refills: 1 | Status: SHIPPED
Start: 2021-03-02 | End: 2021-03-11

## 2021-03-02 RX ORDER — EPLERENONE 50 MG/1
50 TABLET, FILM COATED ORAL DAILY
Qty: 30 TABLET | Refills: 3 | Status: SHIPPED
Start: 2021-03-02 | End: 2021-03-04

## 2021-03-02 NOTE — TELEPHONE ENCOUNTER
I have reviewed the provider's instructions with the patient, answering all questions to his satisfaction. He will go Saturday to [facility] lab for bloodwork. Transferred him to [facility] to reschedule his MRI. He will call office to  (printed )  script for ativan once test is rescheduled.

## 2021-03-02 NOTE — TELEPHONE ENCOUNTER
----- Message from Tristen Jean MD sent at 3/2/2021  9:13 AM EST -----  Increase eplerenone to 50 mg daily. Increase potassium to 40 mg p.o. twice daily.   Make sure he gets his blood work within the next 5 days

## 2021-03-02 NOTE — TELEPHONE ENCOUNTER
Juan F  [phone] daughter #      · chf clinic called to report K+ level low. Patient says he is taking his K+ and inspra as directed. CHF Clinic had him take his 40meq dose as  Soon as he got home. · Please advise for any orders/ treatment :       Results for Pamela Avila (MRN [id number]) as of 3/2/2021 10:10   2/23/2021 07:30 3/2/2021 07:50   Sodium 131 (L) 132   Potassium 3.5 2.7 (LL)   Chloride 91 (L) 90 (L)   CO2 28 33 (H)   BUN 22 15   Creatinine 1.1 0.8   Anion Gap 12 9   GFR Non-African American >60 >60   GFR African American >60 >60   Glucose 318 (H) 194 (H)   Calcium 9.0 9.0   Pro-BNP 86 92       Current Outpatient Medications   Medication Sig Dispense Refill    potassium chloride (KLOR-CON M) 20 MEQ extended release tablet TAKE 2 TABLETS BY MOUTH TWICE DAILY 360 tablet 3    eplerenone (INSPRA) 25 MG tablet TAKE 1 TABLET BY MOUTH DAILY 90 tablet 3    bumetanide (BUMEX) 1 MG tablet TAKE TWO TABLETS IN THE MORNING AND ONE IN THE AFTERNOON 180 tablet 0    metOLazone (ZAROXOLYN) 2.5 MG tablet TAKE 1 TABLET BY MOUTH DAILY 90 tablet 0    magnesium oxide (MAG-OX) 400 (240 Mg) MG tablet Take 1 tablet by mouth daily 30 tablet 0    atorvastatin (LIPITOR) 40 MG tablet TAKE 1 TABLET BY MOUTH EVERY NIGHT 90 tablet 0    Compression Stockings MISC by Does not apply route As directed  25 mmHg , 1 pair 1 each 0    docusate sodium (COLACE) 100 MG capsule Take 1 capsule by mouth 2 times daily 90 capsule 1    imatinib (GLEEVEC) 400 MG chemo tablet TAKE 1 TABLET BY MOUTH ONCE DAILY AT THE SAME TIME WITH FOOD AND A LARGE GLASS OF WATER. DO NOT CRUSH TABLETS. AVOID GRAPEFRUIT PRODUCTS.  28 tablet 2    LANTUS SOLOSTAR 100 UNIT/ML injection pen ADMINISTER 38 UNITS UNDER THE SKIN EVERY NIGHT (Patient taking differently: Inject 42 Units into the skin nightly ) 36 mL 3    losartan (COZAAR) 25 MG tablet TAKE 1 TABLET BY MOUTH EVERY DAY 90 tablet 1    B-D ULTRAFINE III SHORT PEN 31G X 8 MM MISC Inject 1 each into the skin nightly 100 each 3    gabapentin (NEURONTIN) 300 MG capsule Take 300 mg by mouth as needed.  acetaminophen (TYLENOL) 500 MG tablet Take 500 mg by mouth every 6 hours as needed       Blood Glucose Monitoring Suppl (ONETOUCH VERIO FLEX SYSTEM) w/Device KIT AS DIRECTED  0    albuterol sulfate HFA (VENTOLIN HFA) 108 (90 Base) MCG/ACT inhaler Inhale 2 puffs into the lungs every 4 hours as needed for Wheezing 1 Inhaler 3    amLODIPine (NORVASC) 10 MG tablet Take 1 tablet by mouth daily 90 tablet 0    Cholecalciferol (VITAMIN D3) 2000 units CAPS Take 1 capsule by mouth daily      traMADol (ULTRAM) 50 MG tablet Take 50 mg by mouth every 6 hours as needed for Pain. Sophronia Welda metoprolol tartrate (LOPRESSOR) 50 MG tablet Take 50 mg by mouth 2 times daily      sildenafil (VIAGRA) 100 MG tablet Take 100 mg by mouth as needed. Take an hour before sex. Med info obtained from [place] records. No current facility-administered medications for this visit.

## 2021-03-03 ENCOUNTER — TELEPHONE (OUTPATIENT)
Dept: CARDIOLOGY CLINIC | Age: 61
End: 2021-03-03

## 2021-03-03 DIAGNOSIS — I50.32 CHRONIC DIASTOLIC HEART FAILURE (HCC): ICD-10-CM

## 2021-03-03 DIAGNOSIS — E87.6 HYPOKALEMIA: ICD-10-CM

## 2021-03-03 NOTE — TELEPHONE ENCOUNTER
Pt called and his MRI is scheduled for [facility]. He wanted to get a script for something to make him comfortable for the procedure.  Please advise [phone]

## 2021-03-04 RX ORDER — EPLERENONE 50 MG/1
50 TABLET, FILM COATED ORAL DAILY
Qty: 90 TABLET | Refills: 3 | Status: SHIPPED
Start: 2021-03-04 | End: 2021-03-17 | Stop reason: DRUGHIGH

## 2021-03-10 ENCOUNTER — HOSPITAL ENCOUNTER (OUTPATIENT)
Dept: OTHER | Age: 61
Setting detail: THERAPIES SERIES
Discharge: HOME | End: 2021-03-10
Payer: MEDICARE

## 2021-03-10 VITALS
RESPIRATION RATE: 18 BRPM | DIASTOLIC BLOOD PRESSURE: 81 MMHG | HEART RATE: 80 BPM | BODY MASS INDEX: 43.33 KG/M2 | SYSTOLIC BLOOD PRESSURE: 139 MMHG | WEIGHT: 285 LBS

## 2021-03-10 LAB
ANION GAP SERPL CALCULATED.3IONS-SCNC: 8 MMOL/L (ref 7–16)
BUN BLDV-MCNC: 17 MG/DL (ref 8–23)
CALCIUM SERPL-MCNC: 8.9 MG/DL (ref 8.6–10.2)
CHLORIDE BLD-SCNC: 92 MMOL/L (ref 98–107)
CO2: 32 MMOL/L (ref 22–29)
CREAT SERPL-MCNC: 1 MG/DL (ref 0.7–1.2)
GFR AFRICAN AMERICAN: >60
GFR NON-AFRICAN AMERICAN: >60 ML/MIN/1.73
GLUCOSE BLD-MCNC: 210 MG/DL (ref 74–99)
POTASSIUM SERPL-SCNC: 3.9 MMOL/L (ref 3.5–5)
PRO-BNP: 80 PG/ML (ref 0–125)
SODIUM BLD-SCNC: 132 MMOL/L (ref 132–146)

## 2021-03-10 PROCEDURE — 83880 ASSAY OF NATRIURETIC PEPTIDE: CPT

## 2021-03-10 PROCEDURE — 99214 OFFICE O/P EST MOD 30 MIN: CPT

## 2021-03-10 PROCEDURE — 36415 COLL VENOUS BLD VENIPUNCTURE: CPT

## 2021-03-10 PROCEDURE — 80048 BASIC METABOLIC PNL TOTAL CA: CPT

## 2021-03-10 NOTE — PROGRESS NOTES
[address] [facility]   CHF Clinic       Piedad Sauceda   1960  [phone]       Referring [facility]  Cardiologist: Isael Jimenez  Nephrologist:         History of Present Illness:     Piedad Sauceda is a 61 y.o. male with a history of HFpEF, most recent EF 60% 8/13/2020    Patient Story:    He does  have dyspnea with exertion, shortness of breath, or decline in overall functional capacity. He does not have orthopnea, PND, nocturnal cough or hemoptysis. He does have abdominal distention or bloating, satiety, . He does has a good urinary response to their oral diuretic. He has lower extremity edema. He denies lightheadedness, dizziness. He denies palpitations, syncope or near syncope. He does not complain of chest pain, pressure, discomfort. Allergies   Allergen Reactions    Lasix [Furosemide] Other (See Comments)     Per pt potassium was too low when on lasix    Lisinopril Swelling     Angioedema    Prunus Persica Anaphylaxis     Per report to [facility] manager, Dalton Lyn. [address], BS-NDTR    Atenolol Other (See Comments) and Swelling     Thins his blood    Nitrates, Organic Other (See Comments)     Is using Sildenafil. containdicated with Nitrates    Spironolactone      Swelling in the chest area         No outpatient medications have been marked as taking for the 3/10/21 encounter [facility] HOSPITAL Encounter) with [facility] CHF ROOM 1.           Guideline directed medical:  ARNI/ACE I/ARB: Yes  Beta blocker:   Yes  Aldosterone antagonist:  Yes        Physical Examination:     /81   Pulse 80   Resp 18   Wt 285 lb (129.3 kg)   BMI 43.33 kg/m²     Assessment  Charting Type: Shift assessment                   Respiratory  Respiratory Pattern: Regular  Respiratory Depth: Normal  Respiratory Quality/Effort: Unlabored  Chest Assessment: Chest expansion symmetrical  L Breath Sounds: Diminished, Clear(current smoker)  R Breath Sounds: Diminished Cardiac  Cardiac Rhythm: Normal sinus rhythm    Rhythm Interpretation  Pulse: 80         Gastrointestinal  Abdominal (WDL): Within Defined Limits               Peripheral Vascular  Peripheral Vascular (WDL): Exceptions to WDL  Edema: Right lower extremity, Left lower extremity  RLE Edema: +1, Pitting  LLE Edema: +1, Pitting                                                 Pulse: 80                   LAB DATA:    BNP  No results for input(s): BNP in the last 72 hours. proBNP  No results for input(s): PROBNP in the last 72 hours. BMP  No results for input(s): NA, K, CL, CO2, BUN, CREATININE, GLUCOSE, CALCIUM in the last 72 hours. WEIGHTS:  Wt Readings from Last 3 Encounters:   03/10/21 285 lb (129.3 kg)   03/02/21 282 lb 12.8 oz (128.3 kg)   02/23/21 288 lb (130.6 kg)         TELEMETRY:  Cardiac Regularity: Regular  Cardiac Rhythm/Interpretation: NSR    Pulse 80      ASSESSMENT:  Lane Collins is evolemic with stable weights while in CHF clinic today. Interventions completed this visit:  IV diuretics given no  Lab work obtained yes,    Reviewed continue current medications that patient as prescribed answered any questions   Educated on signs and symptoms of HF  Educated on low sodium diet    PLAN:  Scheduled to follow up in CHF clinic on March 17, 2021  Given clinic phone number and aware of signs and symptoms to call with any HF change in symptoms. [phone]        Pt admits to taking Potassium 40 meq TID.,  Eplerenone increased to 50 mg daily on 3/2/2021. Re instructed pt to take potassium 40 meq BID as ordered, pt verbalized understanding.

## 2021-03-11 DIAGNOSIS — E87.6 HYPOKALEMIA: ICD-10-CM

## 2021-03-11 DIAGNOSIS — I50.32 CHRONIC DIASTOLIC HEART FAILURE (HCC): ICD-10-CM

## 2021-03-12 RX ORDER — POTASSIUM CHLORIDE 20 MEQ/1
40 TABLET, EXTENDED RELEASE ORAL 2 TIMES DAILY
Qty: 360 TABLET | Refills: 3 | Status: SHIPPED
Start: 2021-03-12 | End: 2021-07-20 | Stop reason: DRUGHIGH

## 2021-03-17 ENCOUNTER — TELEPHONE (OUTPATIENT)
Dept: CARDIOLOGY CLINIC | Age: 61
End: 2021-03-17

## 2021-03-17 ENCOUNTER — HOSPITAL ENCOUNTER (OUTPATIENT)
Dept: OTHER | Age: 61
Setting detail: THERAPIES SERIES
Discharge: HOME | End: 2021-03-17
Payer: MEDICARE

## 2021-03-17 VITALS
SYSTOLIC BLOOD PRESSURE: 121 MMHG | WEIGHT: 284 LBS | DIASTOLIC BLOOD PRESSURE: 62 MMHG | RESPIRATION RATE: 18 BRPM | HEART RATE: 78 BPM | BODY MASS INDEX: 43.18 KG/M2

## 2021-03-17 DIAGNOSIS — I50.32 CHRONIC DIASTOLIC HEART FAILURE (HCC): Primary | ICD-10-CM

## 2021-03-17 DIAGNOSIS — E87.6 HYPOKALEMIA: ICD-10-CM

## 2021-03-17 LAB
ANION GAP SERPL CALCULATED.3IONS-SCNC: 14 MMOL/L (ref 7–16)
BUN BLDV-MCNC: 31 MG/DL (ref 8–23)
CALCIUM SERPL-MCNC: 8.8 MG/DL (ref 8.6–10.2)
CHLORIDE BLD-SCNC: 89 MMOL/L (ref 98–107)
CO2: 29 MMOL/L (ref 22–29)
CREAT SERPL-MCNC: 1.3 MG/DL (ref 0.7–1.2)
GFR AFRICAN AMERICAN: >60
GFR NON-AFRICAN AMERICAN: >60 ML/MIN/1.73
GLUCOSE BLD-MCNC: 217 MG/DL (ref 74–99)
POTASSIUM SERPL-SCNC: 3 MMOL/L (ref 3.5–5)
PRO-BNP: 47 PG/ML (ref 0–125)
SODIUM BLD-SCNC: 132 MMOL/L (ref 132–146)

## 2021-03-17 PROCEDURE — 83880 ASSAY OF NATRIURETIC PEPTIDE: CPT

## 2021-03-17 PROCEDURE — 80048 BASIC METABOLIC PNL TOTAL CA: CPT

## 2021-03-17 PROCEDURE — 99214 OFFICE O/P EST MOD 30 MIN: CPT

## 2021-03-17 PROCEDURE — 36415 COLL VENOUS BLD VENIPUNCTURE: CPT

## 2021-03-17 RX ORDER — EPLERENONE 50 MG/1
50 TABLET, FILM COATED ORAL DAILY
Qty: 30 TABLET | Refills: 3 | Status: SHIPPED
Start: 2021-03-17 | End: 2021-08-17 | Stop reason: SDUPTHER

## 2021-03-17 RX ORDER — BUMETANIDE 1 MG/1
1 TABLET ORAL 2 TIMES DAILY
Qty: 60 TABLET | Refills: 3 | Status: SHIPPED
Start: 2021-03-17 | End: 2021-08-17 | Stop reason: SDUPTHER

## 2021-03-17 NOTE — TELEPHONE ENCOUNTER
CHF Clinic called to report low K+ level again    Dose K+ is 40meq BID also on inspra 50mg daily    Also follows with nephrology    Please advise on K+     Current Outpatient Medications   Medication Sig Dispense Refill    potassium chloride (KLOR-CON M) 20 MEQ extended release tablet Take 2 tablets by mouth 2 times daily Pt instructed to take 40 meq extra  Daily starting 3/2/2021 360 tablet 3    eplerenone (INSPRA) 50 MG tablet TAKE 1 TABLET BY MOUTH DAILY 90 tablet 3    bumetanide (BUMEX) 1 MG tablet TAKE TWO TABLETS IN THE MORNING AND ONE IN THE AFTERNOON 180 tablet 0    metOLazone (ZAROXOLYN) 2.5 MG tablet TAKE 1 TABLET BY MOUTH DAILY 90 tablet 0    magnesium oxide (MAG-OX) 400 (240 Mg) MG tablet Take 1 tablet by mouth daily 30 tablet 0    atorvastatin (LIPITOR) 40 MG tablet TAKE 1 TABLET BY MOUTH EVERY NIGHT 90 tablet 0    Compression Stockings MISC by Does not apply route As directed  25 mmHg , 1 pair 1 each 0    docusate sodium (COLACE) 100 MG capsule Take 1 capsule by mouth 2 times daily 90 capsule 1    imatinib (GLEEVEC) 400 MG chemo tablet TAKE 1 TABLET BY MOUTH ONCE DAILY AT THE SAME TIME WITH FOOD AND A LARGE GLASS OF WATER. DO NOT CRUSH TABLETS. AVOID GRAPEFRUIT PRODUCTS. 28 tablet 2    LANTUS SOLOSTAR 100 UNIT/ML injection pen ADMINISTER 38 UNITS UNDER THE SKIN EVERY NIGHT (Patient taking differently: Inject 42 Units into the skin nightly ) 36 mL 3    losartan (COZAAR) 25 MG tablet TAKE 1 TABLET BY MOUTH EVERY DAY 90 tablet 1    B-D ULTRAFINE III SHORT PEN 31G X 8 MM MISC Inject 1 each into the skin nightly 100 each 3    gabapentin (NEURONTIN) 300 MG capsule Take 300 mg by mouth as needed.        acetaminophen (TYLENOL) 500 MG tablet Take 500 mg by mouth every 6 hours as needed       Blood Glucose Monitoring Suppl (ONETOUCH VERIO FLEX SYSTEM) w/Device KIT AS DIRECTED  0    albuterol sulfate HFA (VENTOLIN HFA) 108 (90 Base) MCG/ACT inhaler Inhale 2 puffs into the lungs every 4 hours as needed for Wheezing 1 Inhaler 3    amLODIPine (NORVASC) 10 MG tablet Take 1 tablet by mouth daily 90 tablet 0    Cholecalciferol (VITAMIN D3) 2000 units CAPS Take 1 capsule by mouth daily      traMADol (ULTRAM) 50 MG tablet Take 50 mg by mouth every 6 hours as needed for Pain. Connie Benavides metoprolol tartrate (LOPRESSOR) 50 MG tablet Take 50 mg by mouth 2 times daily      sildenafil (VIAGRA) 100 MG tablet Take 100 mg by mouth as needed. Take an hour before sex. Med info obtained from [place] records. No current facility-administered medications for this visit.          Wt Readings from Last 3 Encounters:   03/17/21 284 lb (128.8 kg)   03/10/21 285 lb (129.3 kg)   03/02/21 282 lb 12.8 oz (128.3 kg)     Next [facility] visit 3/31           Results for Ricki Johnston (MRN [id number]) as of 3/17/2021 14:32   2/23/2021 07:30 3/2/2021 07:50 3/10/2021 07:50 3/17/2021 07:30   Sodium 131 (L) 132 132 132   Potassium 3.5 2.7 (LL) 3.9 3.0 (L)   Chloride 91 (L) 90 (L) 92 (L) 89 (L)   CO2 28 33 (H) 32 (H) 29   BUN 22 15 17 31 (H)   Creatinine 1.1 0.8 1.0 1.3 (H)   Anion Gap 12 9 8 14   GFR Non- >60 >60 >60 >60   GFR  >60 >60 >60 >60   Glucose 318 (H) 194 (H) 210 (H) 217 (H)   Calcium 9.0 9.0 8.9 8.8   Pro-BNP 86 92 80 47

## 2021-03-17 NOTE — LETTER
[facility]  [address] [place] [address] [address] Chao  Phone: [phone]  Fax: [phone]    Lara Archer MD        March 17, 2021    [address]  [facility] [address]      Dear Chauncey Juan:    Voice message was left at [phone] (home)  3/17/2021 regarding to decrease bumex/ bumetanide 1mg twice daily. andIncrease eplerenone / inspra 50mg daily. Repeat labs in a week (BMP/BNP). No change to potassium 40meq twice daily. Call office and let my staff know you have received and are following these instructions. If you have any questions or concerns, please don't hesitate to call.     Sincerely,        Lara Archer MD

## 2021-03-17 NOTE — PROGRESS NOTES
[address] [facility]   CHF Clinic       Gerarda Klinefelter   1960  [phone]       Referring Doctor:   Cardiologist: Dr Ginger Leiva  Nephrologist: Dr Suze Jones        History of Present Illness:     Gerarda Klinefelter is a 61 y.o. male with a history of HFpEF, most recent EF on 8-13-20. Patient Story:    He does  have dyspnea with exertion, shortness of breath, or decline in overall functional capacity. He does not have orthopnea, PND, nocturnal cough or hemoptysis. He does not have abdominal distention or bloating, early satiety, anorexia/change in appetite. He does has a good urinary response to their oral diuretic. He does have  lower extremity edema. He denies lightheadedness, dizziness. He denies palpitations, syncope or near syncope. He does not complain of chest pain, pressure, discomfort. Allergies   Allergen Reactions    Lasix [Furosemide] Other (See Comments)     Per pt potassium was too low when on lasix    Lisinopril Swelling     Angioedema    Prunus Persica Anaphylaxis     Per report to [facility] manager, Cherri Hollis. [address], BS-NDTR    Atenolol Other (See Comments) and Swelling     Thins his blood    Nitrates, Organic Other (See Comments)     Is using Sildenafil. containdicated with Nitrates    Spironolactone      Swelling in the chest area         No outpatient medications have been marked as taking for the 3/17/21 encounter [facility] HOSPITAL Encounter) with [facility] CHF ROOM 1.           Guideline directed medical:  ARNI/ACE I/ARB: Yes  Beta blocker:   Yes  Aldosterone antagonist:  Yes        Physical Examination:     /62   Pulse 78   Resp 18   Wt 284 lb (128.8 kg)   BMI 43.18 kg/m²     Assessment  Charting Type: Shift assessment                   Respiratory  Respiratory Pattern: Regular  Respiratory Depth: Normal  Respiratory Quality/Effort: Unlabored  Chest Assessment: Chest expansion symmetrical  L Breath Sounds: Diminished, Clear(current smoker)  R Breath Sounds: Diminished              Cardiac  Cardiac Rhythm: Normal sinus rhythm    Rhythm Interpretation  Pulse: 78         Gastrointestinal  Abdominal (WDL): Within Defined Limits               Peripheral Vascular  Peripheral Vascular (WDL): Exceptions to WDL  Edema: Right lower extremity, Left lower extremity  RLE Edema: +1, Pitting  LLE Edema: +1, Pitting                                                 Pulse: 78                   LAB DATA:    BNP  No results for input(s): BNP in the last 72 hours. proBNP  No results for input(s): PROBNP in the last 72 hours. BMP  No results for input(s): NA, K, CL, CO2, BUN, CREATININE, GLUCOSE, CALCIUM in the last 72 hours. WEIGHTS:  Wt Readings from Last 3 Encounters:   03/17/21 284 lb (128.8 kg)   03/10/21 285 lb (129.3 kg)   03/02/21 282 lb 12.8 oz (128.3 kg)         TELEMETRY:  Cardiac Regularity: Regular  Cardiac Rhythm/Interpretation: NSR        ASSESSMENT:  Bambi Collins is evolemic with stable weights     Interventions completed this visit:  IV diuretics given no  Lab work obtained yes, BMP/BNP   Reviewed continue current medications that patient as prescribed answered any questions   Educated on signs and symptoms of HF  Educated on low sodium diet    PLAN:  Scheduled to follow up in CHF clinic on Wed. March 31  Given clinic phone number and aware of signs and symptoms to call with any HF change in symptoms.

## 2021-03-17 NOTE — TELEPHONE ENCOUNTER
Left voice message with providers instructions. Also mailed letter with instructions.     Mik Osorio RN

## 2021-03-17 NOTE — TELEPHONE ENCOUNTER
----- Message from London Marie MD sent at 3/17/2021  9:39 AM EDT -----  Decrease Bumex to 1+1. Increase eplerenone to 50 mg daily. Leave potassium as is. Repeat BMP in a week.   Thank you

## 2021-03-19 NOTE — TELEPHONE ENCOUNTER
That is the generic  eplerenone 50mg  daily    Unable to take spironolactone (swelling to chest)     DR Please advise.

## 2021-03-19 NOTE — TELEPHONE ENCOUNTER
Can we appeal his insurance then and proved them with documentation of gynecomastia with spironolactone?

## 2021-03-19 NOTE — TELEPHONE ENCOUNTER
Pt called and wanted to know if there is a generic brand of the eplerenone that he can take? The co pay is too much for him.  He can be reached at [phone]

## 2021-03-19 NOTE — TELEPHONE ENCOUNTER
Can prescription assistance help with this? Also please provide insurance with documentation that the patient has gynecomastia with spironolactone. Thank you.

## 2021-03-22 NOTE — TELEPHONE ENCOUNTER
Called [facility]    MCIHELVLADIMIR   Is tier 3 on his plan:   Tier reduction/ exception  request.   eplerenone 50mg daily     30 for 30     · Pended for clinical review by pharmacist  REF#     [id number]  In review

## 2021-03-24 ENCOUNTER — TELEPHONE (OUTPATIENT)
Dept: CARDIOLOGY CLINIC | Age: 61
End: 2021-03-24

## 2021-03-24 NOTE — TELEPHONE ENCOUNTER
3/2/21 filled 50mg eplerenone  at $0.00   Too soon to refill. 3/26 can refill again. There is NO NEED FOR TIER REDUCTION, he just tried to fill too soon.

## 2021-03-24 NOTE — TELEPHONE ENCOUNTER
Cardiac MRI:  · DR ordered Cardiac MRI at [facility] to rule out amyloidosis in the setting of severe concentric LVH (out of proportion to his EKG voltage) with history of carpal tunnel syndrome and malignancy. He did not have a monoclonal protein in serum or urine  · Octavio Parkinson 1960 says htat he couldn't do the cardiac MRI , didn't feel he could do it. Even with medication ativan script he has for x1 prior to the MRI. · He is Worried about feeling to tight and claustrophobia. ·  So he was no show and does not want to reschedule at this time.    · He is aware of why the test was ordered    Will send update DR Jose De Jesus Phipps  And advise client does not want cardiac MRI

## 2021-03-25 NOTE — TELEPHONE ENCOUNTER
He pays $0.00 for this med,   He tried to refill to soon therefore would have been charged over $100.    After 3/26 is $0.00 for inspira

## 2021-03-28 DIAGNOSIS — I42.8 INFILTRATIVE CARDIOMYOPATHY (HCC): Primary | ICD-10-CM

## 2021-03-28 NOTE — TELEPHONE ENCOUNTER
Ordered a cardiac PYP SPECT for him instead. Tell him this is very quick (just like a CT scan) and will give us the information we need but has to be at [facility] (or ) because we dont have it at [facility]. please tell him figuring out whether he has amyloidosis is very important and could change his management significantly.

## 2021-03-29 NOTE — TELEPHONE ENCOUNTER
PYP stands for pyrophosphate. A cardiac PYP scan uses a radioactive form of PYP called Tc99m-PYP. Why is the scan done? This scan can be used to find out if you have a rare disease called cardiac amyloidosis ([id number]).

## 2021-03-30 NOTE — TELEPHONE ENCOUNTER
[address] [facility] scheduling           [phone]  Nuclear med . Cardiac PYP spect  Test scheduling. I was On hold 25 min 31 sec. Not able to schedule today. Will attempt again tomorrow  3/30/2021 4:44 PM our office is now closed.

## 2021-03-30 NOTE — TELEPHONE ENCOUNTER
Called Carlton Krabbe Moorer with update from DR Tenisha Barnes RE PYP  Scan. He is agreeable would like to use  [facility] (he has been there before as patient)  .    Needs to know in advance at least a week so his daughter can get off to drive him

## 2021-03-31 ENCOUNTER — HOSPITAL ENCOUNTER (OUTPATIENT)
Dept: OTHER | Age: 61
Setting detail: THERAPIES SERIES
Discharge: HOME | End: 2021-03-31
Payer: MEDICARE

## 2021-03-31 VITALS
WEIGHT: 283 LBS | HEART RATE: 79 BPM | SYSTOLIC BLOOD PRESSURE: 128 MMHG | BODY MASS INDEX: 43.03 KG/M2 | DIASTOLIC BLOOD PRESSURE: 62 MMHG | RESPIRATION RATE: 18 BRPM

## 2021-03-31 LAB
ANION GAP SERPL CALCULATED.3IONS-SCNC: 11 MMOL/L (ref 7–16)
BUN BLDV-MCNC: 20 MG/DL (ref 8–23)
CALCIUM SERPL-MCNC: 8.3 MG/DL (ref 8.6–10.2)
CHLORIDE BLD-SCNC: 91 MMOL/L (ref 98–107)
CO2: 31 MMOL/L (ref 22–29)
CREAT SERPL-MCNC: 1.1 MG/DL (ref 0.7–1.2)
GFR AFRICAN AMERICAN: >60
GFR NON-AFRICAN AMERICAN: >60 ML/MIN/1.73
GLUCOSE BLD-MCNC: 164 MG/DL (ref 74–99)
POTASSIUM SERPL-SCNC: 3.3 MMOL/L (ref 3.5–5)
PRO-BNP: 93 PG/ML (ref 0–125)
SODIUM BLD-SCNC: 133 MMOL/L (ref 132–146)

## 2021-03-31 PROCEDURE — 80048 BASIC METABOLIC PNL TOTAL CA: CPT

## 2021-03-31 PROCEDURE — 99214 OFFICE O/P EST MOD 30 MIN: CPT

## 2021-03-31 PROCEDURE — 83880 ASSAY OF NATRIURETIC PEPTIDE: CPT

## 2021-03-31 PROCEDURE — 36415 COLL VENOUS BLD VENIPUNCTURE: CPT

## 2021-03-31 NOTE — PROGRESS NOTES
[address] [facility] GERALD Collins   1960  [phone]       Referring Doctor:   Cardiologist: Dr Ervin Del Valle  Nephrologist: Dr Vanessa Vincent        History of Present Illness:     Kim Cm is a 61 y.o. male with a history of HFpEF, most recent EF60% on 8-13-20. Patient Story:    He does  have dyspnea with exertion, shortness of breath, or decline in overall functional capacity. He does not have orthopnea, PND, nocturnal cough or hemoptysis. He does not have abdominal distention or bloating, early satiety, anorexia/change in appetite. He does has a good urinary response to their oral diuretic. He does have  slight lower extremity edema. He denies lightheadedness, dizziness. He denies palpitations, syncope or near syncope. He does not complain of chest pain, pressure, discomfort. Allergies   Allergen Reactions    Lasix [Furosemide] Other (See Comments)     Per pt potassium was too low when on lasix    Lisinopril Swelling     Angioedema    Prunus Persica Anaphylaxis     Per report to [facility] manager, Ortiz Miranda. [address], BS-NDTR    Atenolol Other (See Comments) and Swelling     Thins his blood    Nitrates, Organic Other (See Comments)     Is using Sildenafil. containdicated with Nitrates    Spironolactone      Swelling in the chest area         No outpatient medications have been marked as taking for the 3/31/21 encounter [facility] Encounter) with [facility] ROOM 1.           Guideline directed medical:  ARNI/ACE I/ARB: Yes  Beta blocker:   Yes  Aldosterone antagonist:  Yes        Physical Examination:     /62   Pulse 79   Resp 18   Wt 283 lb (128.4 kg)   BMI 43.03 kg/m²     Assessment  Charting Type: Shift assessment                   Respiratory  Respiratory Pattern: Regular  Respiratory Depth: Normal  Respiratory Quality/Effort: Unlabored  Chest Assessment: Chest expansion symmetrical  L Breath Sounds: Diminished, Clear(current smoker)  R Breath Sounds: Diminished              Cardiac  Cardiac Rhythm: Normal sinus rhythm    Rhythm Interpretation  Pulse: 79         Gastrointestinal  Abdominal (WDL): Within Defined Limits               Peripheral Vascular  Peripheral Vascular (WDL): Exceptions to WDL  Edema: Right lower extremity, Left lower extremity  RLE Edema: Pitting, Trace  LLE Edema: Pitting, Trace                                                 Pulse: 79                   LAB DATA:    BNP  No results for input(s): BNP in the last 72 hours. proBNP  No results for input(s): PROBNP in the last 72 hours. BMP  No results for input(s): NA, K, CL, CO2, BUN, CREATININE, GLUCOSE, CALCIUM in the last 72 hours. WEIGHTS:  Wt Readings from Last 3 Encounters:   03/31/21 283 lb (128.4 kg)   03/17/21 284 lb (128.8 kg)   03/10/21 285 lb (129.3 kg)         TELEMETRY:  Cardiac Regularity: Regular  Cardiac Rhythm/Interpretation: NSR        ASSESSMENT:  Roscoe Collins is evolemic with stable weights     Interventions completed this visit:  IV diuretics given no  Lab work obtained yes, BMP/BNP   Reviewed continue current medications that patient as prescribed answered any questions   Educated on signs and symptoms of HF  Educated on low sodium diet    PLAN:  Scheduled to follow up in CHF clinic on April 20  Given clinic phone number and aware of signs and symptoms to call with any HF change in symptoms.

## 2021-04-05 RX ORDER — ATORVASTATIN CALCIUM 40 MG/1
TABLET, FILM COATED ORAL
Qty: 90 TABLET | Refills: 0 | Status: SHIPPED | OUTPATIENT
Start: 2021-04-05 | End: 2021-08-17

## 2021-04-05 NOTE — PROGRESS NOTES
[phone]  4 1 2021 had called and was on hold again over 30 min for nuc med. No answer    4/5/2021 recalled nuc med at [facility] on hold again  9  Min 7 sec. Spoke with  Star  Send  Demographic, insurance and order. [phone] Fax:   Per Star they will review and reach out to schedule once receive the faxed order. Fax sent to nuc  Med [facility].

## 2021-04-06 DIAGNOSIS — I10 ESSENTIAL HYPERTENSION: ICD-10-CM

## 2021-04-06 RX ORDER — LOSARTAN POTASSIUM 25 MG/1
TABLET ORAL
Qty: 90 TABLET | Refills: 1 | Status: SHIPPED | OUTPATIENT
Start: 2021-04-06 | End: 2021-08-17

## 2021-04-20 ENCOUNTER — HOSPITAL ENCOUNTER (OUTPATIENT)
Dept: OTHER | Age: 61
Setting detail: THERAPIES SERIES
Discharge: HOME | End: 2021-04-20
Payer: MEDICARE

## 2021-05-03 DIAGNOSIS — I50.22 CHRONIC SYSTOLIC CONGESTIVE HEART FAILURE (HCC): ICD-10-CM

## 2021-05-04 ENCOUNTER — OFFICE VISIT (OUTPATIENT)
Dept: FAMILY MEDICINE CLINIC | Age: 61
End: 2021-05-04
Payer: MEDICARE

## 2021-05-04 VITALS
BODY MASS INDEX: 43.65 KG/M2 | DIASTOLIC BLOOD PRESSURE: 63 MMHG | SYSTOLIC BLOOD PRESSURE: 139 MMHG | TEMPERATURE: 98 F | HEART RATE: 78 BPM | OXYGEN SATURATION: 100 % | HEIGHT: 68 IN | RESPIRATION RATE: 18 BRPM | WEIGHT: 288 LBS

## 2021-05-04 DIAGNOSIS — R22.1 MASS OF RIGHT SIDE OF NECK: Primary | ICD-10-CM

## 2021-05-04 DIAGNOSIS — Z00.00 ROUTINE GENERAL MEDICAL EXAMINATION AT A HEALTH CARE FACILITY: ICD-10-CM

## 2021-05-04 DIAGNOSIS — Z87.891 PERSONAL HISTORY OF TOBACCO USE: ICD-10-CM

## 2021-05-04 PROCEDURE — G0296 VISIT TO DETERM LDCT ELIG: HCPCS | Performed by: STUDENT IN AN ORGANIZED HEALTH CARE EDUCATION/TRAINING PROGRAM

## 2021-05-04 PROCEDURE — G0438 PPPS, INITIAL VISIT: HCPCS | Performed by: STUDENT IN AN ORGANIZED HEALTH CARE EDUCATION/TRAINING PROGRAM

## 2021-05-04 PROCEDURE — 3017F COLORECTAL CA SCREEN DOC REV: CPT | Performed by: FAMILY MEDICINE

## 2021-05-04 PROCEDURE — 99212 OFFICE O/P EST SF 10 MIN: CPT | Performed by: STUDENT IN AN ORGANIZED HEALTH CARE EDUCATION/TRAINING PROGRAM

## 2021-05-04 ASSESSMENT — PATIENT HEALTH QUESTIONNAIRE - PHQ9
SUM OF ALL RESPONSES TO PHQ QUESTIONS 1-9: 0

## 2021-05-04 ASSESSMENT — LIFESTYLE VARIABLES
HAS A RELATIVE, FRIEND, DOCTOR, OR ANOTHER HEALTH PROFESSIONAL EXPRESSED CONCERN ABOUT YOUR DRINKING OR SUGGESTED YOU CUT DOWN: 0
HOW OFTEN DURING THE LAST YEAR HAVE YOU NEEDED AN ALCOHOLIC DRINK FIRST THING IN THE MORNING TO GET YOURSELF GOING AFTER A NIGHT OF HEAVY DRINKING: 0
AUDIT-C TOTAL SCORE: 3
HOW MANY STANDARD DRINKS CONTAINING ALCOHOL DO YOU HAVE ON A TYPICAL DAY: 0
HOW OFTEN DO YOU HAVE A DRINK CONTAINING ALCOHOL: 3
HOW OFTEN DURING THE LAST YEAR HAVE YOU BEEN UNABLE TO REMEMBER WHAT HAPPENED THE NIGHT BEFORE BECAUSE YOU HAD BEEN DRINKING: 0

## 2021-05-04 NOTE — PROGRESS NOTES
Medicare Annual Wellness Visit  Name: Lea Rosales Date: 2021   MRN: [id number] Sex: Male   Age: 61 y.o. Ethnicity: Non-/Non    : 1960 Race: Jeremias Becerra is here for Medicare AWV    Screenings for behavioral, psychosocial and functional/safety risks, and cognitive dysfunction are all negative except as indicated below. These results, as well as other patient data from the [address] [facility] [address] [address] form, are documented in Flowsheets linked to this Encounter. Patient 20-year-old male presenting for annual Medicare wellness visit. Patient states he has been doing fine following with cardiology and heart failure clinic. Recently medications were changed and he has lost 13 pounds of water weight. Eating drinking normally. .  No fever chills cough. Patient states he is independent to activities of daily living drives cooks. He does not have any vision problems. He states his memory is good and he is taking care of all his paperwork and finances. Taking all his medications following with all the doctors. Patient has no living will. His daughter is his healthcare power of . He is not sure about his CODE STATUS he states he needs to think about it. Patient has been a little anxious recently because he has noticed a swelling on the right side below his jaw. It started as a small swelling and has been gradually increasing in size and is uncomfortable. Patient smokes a pack of cigarette every day and is concerned about what it could be. Denies fever. Has normal appetite. Allergies   Allergen Reactions    Lasix [Furosemide] Other (See Comments)     Per pt potassium was too low when on lasix    Lisinopril Swelling     Angioedema    Prunus Persica Anaphylaxis     Per report to [facility] manager, Ozzy Hawk.   [address], BS-NDTR    Atenolol Other (See Comments) and Swelling     Thins his blood    Nitrates, Organic Other (See Comments)     Is using Sildenafil. containdicated with Nitrates    Spironolactone      Swelling in the chest area         Prior to Visit Medications    Medication Sig Taking? Authorizing Provider   losartan (COZAAR) 25 MG tablet TAKE 1 TABLET BY MOUTH EVERY DAY Yes Jf Rich MD   atorvastatin (LIPITOR) 40 MG tablet TAKE 1 TABLET BY MOUTH EVERY NIGHT Yes Jf Rich MD   bumetanide (BUMEX) 1 MG tablet Take 1 tablet by mouth 2 times daily Yes Artie Bravo MD   eplerenone (INSPRA) 50 MG tablet Take 1 tablet by mouth daily Yes Artie Bravo MD   potassium chloride (KLOR-CON M) 20 MEQ extended release tablet Take 2 tablets by mouth 2 times daily Pt instructed to take 40 meq extra  Daily starting 3/2/2021  Patient taking differently: Take 40 mEq by mouth 2 times daily  Yes Artie Bravo MD   metOLazone (ZAROXOLYN) 2.5 MG tablet TAKE 1 TABLET BY MOUTH DAILY Yes Jf Rich MD   magnesium oxide (MAG-OX) 400 (240 Mg) MG tablet Take 1 tablet by mouth daily Yes Jf Rich MD   Compression Stockings MISC by Does not apply route As directed  25 mmHg , 1 pair Yes Niyah Bond MD   docusate sodium (COLACE) 100 MG capsule Take 1 capsule by mouth 2 times daily Yes Jf Rich MD   imatinib (GLEEVEC) 400 MG chemo tablet TAKE 1 TABLET BY MOUTH ONCE DAILY AT THE SAME TIME WITH FOOD AND A LARGE GLASS OF WATER. DO NOT CRUSH TABLETS. AVOID GRAPEFRUIT PRODUCTS. Yes Daniel Calix, APRN - CNP   LANTUS SOLOSTAR 100 UNIT/ML injection pen ADMINISTER 38 UNITS UNDER THE SKIN EVERY NIGHT  Patient taking differently: Inject 42 Units into the skin nightly  Yes Jf Rich MD   B-D ULTRAFINE III SHORT PEN 31G X 8 MM MISC Inject 1 each into the skin nightly Yes Madison Neves MD   gabapentin (NEURONTIN) 300 MG capsule Take 300 mg by mouth as needed.   Yes Historical Provider, MD   acetaminophen (TYLENOL) 500 MG tablet Take 500 mg by mouth every 6 hours as needed  Yes Historical Provider, MD   Blood Glucose Monitoring Suppl ([address]) w/Device KIT AS (Afluria (5 yrs and older) 09/24/2018    Pneumococcal Polysaccharide ([id number]) 10/18/2016    Td, unspecified formulation 10/17/2012        Health Maintenance   Topic Date Due    Diabetic retinal exam  Never done    Shingles Vaccine (1 of 2) Never done    Low dose CT lung screening  04/24/2020    Diabetic foot exam  06/14/2020    Annual Wellness Visit (AWV)  Never done    Lipid screen  07/15/2021    Colon cancer screen colonoscopy  09/14/2021    Diabetic microalbuminuria test  01/19/2022    A1C test (Diabetic or Prediabetic)  01/28/2022    Potassium monitoring  03/31/2022    Creatinine monitoring  03/31/2022    DTaP/Tdap/Td vaccine (2 - Td) 03/23/2028    Flu vaccine  Completed    Pneumococcal 0-64 years Vaccine  Completed    COVID-19 Vaccine  Completed    Hepatitis C screen  Completed    HIV screen  Completed    Hepatitis A vaccine  Aged Out    Hib vaccine  Aged Out    Meningococcal (ACWY) vaccine  Aged Out     Recommendations for [employer] Due: see orders and patient instructions/AVS.  . Recommended screening schedule for the next 5-10 years is provided to the patient in written form: see Patient Instructions/AVS.    Luis Alberto Fournier was seen today for medicare awv.     Diagnoses and all orders for this visit:    Mass of right side of neck  -     Cancel: US BIOPSY SOFT TISSUE NECK/THORAX; Future  -     Elvia Marlow., DO, Ear, Nose, Throat, [place] SOFT TISSUE THYROID; Future    Routine general medical examination at a health care facility    Medicare annual visit  Discussed for lung cancer screening  Continue to take care of all chronic health conditions and medications  Patient not willing to make changes in his end of life care planning because he is not sure and is anxious about this neck swelling at present  Discussed the neck swelling with patient given the sudden onset wide range of differentials, submandibular salivary gland swelling, versus neck

## 2021-05-04 NOTE — PROGRESS NOTES
swelling, versus neck mass suspicious for growth, infectious etiology abscess. Attending Physician Statement  I have discussed the case, including pertinent history and exam findings with the resident. I agree with the documented assessment and plan. Discussed imaging with patient and referral to ENT  Will order ultrasound of the swelling and follow-up. Patient is tolerating food and appears stable we will continue to follow-up.

## 2021-05-07 NOTE — PATIENT INSTRUCTIONS
Personalized Preventive Plan for Bibi Collins - 5/4/2021  Medicare offers a range of preventive health benefits. Some of the tests and screenings are paid in full while other may be subject to a deductible, co-insurance, and/or copay. Some of these benefits include a comprehensive review of your medical history including lifestyle, illnesses that may run in your family, and various assessments and screenings as appropriate. After reviewing your medical record and screening and assessments performed today your provider may have ordered immunizations, labs, imaging, and/or referrals for you. A list of these orders (if applicable) as well as your Preventive Care list are included within your After Visit Summary for your review. Other Preventive Recommendations:    · A preventive eye exam performed by an eye specialist is recommended every 1-2 years to screen for glaucoma; cataracts, macular degeneration, and other eye disorders. · A preventive dental visit is recommended every 6 months. · Try to get at least 150 minutes of exercise per week or 10,000 steps per day on a pedometer . · Order or download the FREE \"Exercise & Physical Activity: Your Everyday Guide\" from [employer]. Call 4-488.994.3994 or search [employer] online. · You need 2900-3508 mg of calcium and 1248-0593 IU of vitamin D per day. It is possible to meet your calcium requirement with diet alone, but a vitamin D supplement is usually necessary to meet this goal.  · When exposed to the sun, use a sunscreen that protects against both UVA and UVB radiation with an SPF of 30 or greater. Reapply every 2 to 3 hours or after sweating, drying off with a towel, or swimming. · Always wear a seat belt when traveling in a car. Always wear a helmet when riding a bicycle or motorcycle. What is lung cancer screening?   Lung cancer screening is a way in which doctors check the lungs for early signs of cancer in people who have no symptoms of lung cancer. A low-dose CT scan uses much less radiation than a normal CT scan and shows a more detailed image of the lungs than a standard X-ray. The goal of lung cancer screening is to find cancer early, before it has a chance to grow, spread, or cause problems. One large study found that smokers who were screened with low-dose CT scans were less likely to die of lung cancer than those who were screened with standard X-ray. Below is a summary of the things you need to know regarding screening for lung cancer with low-dose computed tomography (LDCT). This is a screening program that involves routine annual screening with LDCT studies of the lung. The LDCTs are done using low-dose radiation that is not thought to increase your cancer risk. If you have other serious medical conditions (other cancers, congestive heart failure) that limit your life expectancy to less than 10 years, you should not undergo lung cancer screening with LDCT. The chance is 20%-60% that the LDCT result will show abnormalities. This would require additional testing which could include repeat imaging or even invasive procedures. Most (about 95%) of \"abnormal\" LDCT results are false in the sense that no lung cancer is ultimately found. Additionally, some (about 10%) of the cancers found would not affect your life expectancy, even if undetected and untreated. If you are still smoking, the single most important thing that you can do to reduce your risk of dying of lung cancer is to quit. For this screening to be covered by Medicare and most other insurers, strict criteria must be met. If you do not meet these criteria, but still wish to undergo LDCT testing, you will be required to sign a waiver indicating your willingness to pay for the scan.

## 2021-05-11 ENCOUNTER — HOSPITAL ENCOUNTER (OUTPATIENT)
Dept: ULTRASOUND IMAGING | Age: 61
Discharge: HOME | End: 2021-05-13
Payer: MEDICARE

## 2021-05-11 DIAGNOSIS — R22.1 MASS OF RIGHT SIDE OF NECK: ICD-10-CM

## 2021-05-11 PROCEDURE — 76536 US EXAM OF HEAD AND NECK: CPT

## 2021-05-12 ENCOUNTER — TELEPHONE (OUTPATIENT)
Dept: ADMINISTRATIVE | Age: 61
End: 2021-05-12

## 2021-05-12 ENCOUNTER — TELEPHONE (OUTPATIENT)
Dept: FAMILY MEDICINE CLINIC | Age: 61
End: 2021-05-12

## 2021-05-12 DIAGNOSIS — R59.1 LYMPHADENOPATHY OF HEAD AND NECK: Primary | ICD-10-CM

## 2021-05-12 NOTE — TELEPHONE ENCOUNTER
Call patient shared the results. Suggested he might receive phone call from radiology office to schedule fine-needle aspiration.

## 2021-05-12 NOTE — TELEPHONE ENCOUNTER
Dr. Migdalia Antoine is requesting the pt has an FNA done prior to scheduling him in his office due to pt US showing large lymph nodes. If you have any questions you can contact them at [phone].

## 2021-05-26 ENCOUNTER — HOSPITAL ENCOUNTER (OUTPATIENT)
Dept: ULTRASOUND IMAGING | Age: 61
Discharge: HOME | End: 2021-05-28
Payer: MEDICARE

## 2021-05-26 DIAGNOSIS — R59.1 LYMPHADENOPATHY OF HEAD AND NECK: ICD-10-CM

## 2021-05-26 DIAGNOSIS — R59.1 LYMPHADENOPATHY: ICD-10-CM

## 2021-05-26 PROCEDURE — 88184 FLOWCYTOMETRY/ TC 1 MARKER: CPT

## 2021-05-26 PROCEDURE — 88305 TISSUE EXAM BY PATHOLOGIST: CPT

## 2021-05-26 PROCEDURE — 10005 FNA BX W/US GDN 1ST LES: CPT

## 2021-05-26 PROCEDURE — 88185 FLOWCYTOMETRY/TC ADD-ON: CPT

## 2021-05-26 PROCEDURE — 99214 OFFICE O/P EST MOD 30 MIN: CPT | Performed by: RADIOLOGY

## 2021-05-26 PROCEDURE — 88173 CYTOPATH EVAL FNA REPORT: CPT

## 2021-05-26 PROCEDURE — 2709999900 US BIOPSY SOFT TISSUE NECK/THORAX

## 2021-05-26 PROCEDURE — 10005 FNA BX W/US GDN 1ST LES: CPT | Performed by: RADIOLOGY

## 2021-05-26 RX ORDER — LIDOCAINE HYDROCHLORIDE 10 MG/ML
1 INJECTION, SOLUTION EPIDURAL; INFILTRATION; INTRACAUDAL; PERINEURAL ONCE
Status: CANCELLED | OUTPATIENT
Start: 2021-05-26 | End: 2021-05-26

## 2021-05-26 NOTE — H&P
Interventional Radiology  Attending Pre-operative History and Physical    DIAGNOSIS:    Patient Active Problem List   Diagnosis    Essential hypertension    Type 2 [facility] mellitus [facility]    Mixed hyperlipidemia    Recurrent major depressive disorder, in remission ([facility]    Carpal tunnel syndrome    Obstructive sleep apnea syndrome    Tobacco abuse    Uncontrolled type 2 diabetes mellitus with hyperglycemia (HCC)    Hypomagnesemia    Bilateral leg edema    Synovial cyst of right popliteal space    Primary osteoarthritis of right knee    GIST (gastrointestinal stroma tumor), malignant, colon ([facility]    Chronic heart failure with preserved ejection fraction ([facility]       CHIEF COMPLAINT: 60 yo M with an enlarged right cervical lymph node on prior imaging. Here for ultrasound guided FNA of the enlarged right cervical lymph node.          Current Outpatient Medications:     losartan (COZAAR) 25 MG tablet, TAKE 1 TABLET BY MOUTH EVERY DAY, Disp: 90 tablet, Rfl: 1    atorvastatin (LIPITOR) 40 MG tablet, TAKE 1 TABLET BY MOUTH EVERY NIGHT, Disp: 90 tablet, Rfl: 0    bumetanide (BUMEX) 1 MG tablet, Take 1 tablet by mouth 2 times daily, Disp: 60 tablet, Rfl: 3    eplerenone (INSPRA) 50 MG tablet, Take 1 tablet by mouth daily, Disp: 30 tablet, Rfl: 3    potassium chloride (KLOR-CON M) 20 MEQ extended release tablet, Take 2 tablets by mouth 2 times daily Pt instructed to take 40 meq extra  Daily starting 3/2/2021 (Patient taking differently: Take 40 mEq by mouth 2 times daily ), Disp: 360 tablet, Rfl: 3    metOLazone (ZAROXOLYN) 2.5 MG tablet, TAKE 1 TABLET BY MOUTH DAILY, Disp: 90 tablet, Rfl: 0    magnesium oxide (MAG-OX) 400 (240 Mg) MG tablet, Take 1 tablet by mouth daily, Disp: 30 tablet, Rfl: 0    Compression Stockings MISC, by Does not apply route As directed 25 mmHg , 1 pair, Disp: 1 each, Rfl: 0    docusate sodium (COLACE) 100 MG capsule, Take 1 capsule by mouth 2 times daily, Disp: 90 capsule, Rfl: 1    imatinib (GLEEVEC) 400 MG chemo tablet, TAKE 1 TABLET BY MOUTH ONCE DAILY AT THE SAME TIME WITH FOOD AND A LARGE GLASS OF WATER. DO NOT CRUSH TABLETS. AVOID GRAPEFRUIT PRODUCTS., Disp: 28 tablet, Rfl: 2    LANTUS SOLOSTAR 100 UNIT/ML injection pen, ADMINISTER 38 UNITS UNDER THE SKIN EVERY NIGHT (Patient taking differently: Inject 42 Units into the skin nightly ), Disp: 36 mL, Rfl: 3    B-D ULTRAFINE III SHORT PEN 31G X 8 MM MISC, Inject 1 each into the skin nightly, Disp: 100 each, Rfl: 3    gabapentin (NEURONTIN) 300 MG capsule, Take 300 mg by mouth as needed. , Disp: , Rfl:     acetaminophen (TYLENOL) 500 MG tablet, Take 500 mg by mouth every 6 hours as needed , Disp: , Rfl:     Blood Glucose Monitoring Suppl (ONETOUCH VERIO FLEX SYSTEM) w/Device KIT, AS DIRECTED, Disp: , Rfl: 0    albuterol sulfate HFA (VENTOLIN HFA) 108 (90 Base) MCG/ACT inhaler, Inhale 2 puffs into the lungs every 4 hours as needed for Wheezing, Disp: 1 Inhaler, Rfl: 3    amLODIPine (NORVASC) 10 MG tablet, Take 1 tablet by mouth daily, Disp: 90 tablet, Rfl: 0    Cholecalciferol (VITAMIN D3) 2000 units CAPS, Take 1 capsule by mouth daily, Disp: , Rfl:     traMADol (ULTRAM) 50 MG tablet, Take 50 mg by mouth every 6 hours as needed for Pain. ., Disp: , Rfl:     metoprolol tartrate (LOPRESSOR) 50 MG tablet, Take 50 mg by mouth 2 times daily, Disp: , Rfl:     sildenafil (VIAGRA) 100 MG tablet, Take 100 mg by mouth as needed. Take an hour before sex. Med info obtained from [place] records. , Disp: , Rfl:     Allergies   Allergen Reactions    Lasix [Furosemide] Other (See Comments)     Per pt potassium was too low when on lasix    Lisinopril Swelling     Angioedema    Prunus Persica Anaphylaxis     Per report to [facility] manager, Starla Mullins. [address], BS-NDTR    Atenolol Other (See Comments) and Swelling     Thins his blood    Nitrates, Organic Other (See Comments)     Is using Sildenafil.   containdicated with Nitrates    Ran Out of Food in the Last Year: Never true   Transportation Needs:     Lack of Transportation (Medical):  Lack of Transportation (Non-Medical):    Physical Activity:     Days of Exercise per Week:     Minutes of Exercise per Session:    Stress:     Feeling of Stress :    Social Connections:     Frequency of Communication with Friends and Family:     Frequency of Social Gatherings with Friends and Family:     Attends Congregation Services:     Active Member of Clubs or Organizations:     Attends Club or Organization Meetings:     Marital Status:    Intimate Partner Violence:     Fear of Current or Ex-Partner:     Emotionally Abused:     Physically Abused:     Sexually Abused:        ROS: Non-contributory other than as noted above    PHYSICAL EXAM:      Heent: Alert and orientated. Heart:  Rapid regular rhythm    Lungs:  demonstrate no contraindications to proceed      Abdomen:  normal      DATA:  CBC:   Lab Results   Component Value Date    WBC 7.3 01/19/2021    RBC 4.45 01/19/2021    HGB 15.3 01/19/2021    HCT 44.4 01/19/2021    MCV 99.8 01/19/2021    MCH 34.4 01/19/2021    MCHC 34.5 01/19/2021    RDW 14.4 01/19/2021     01/19/2021    MPV 10.7 01/19/2021     CBC with Differential:    Lab Results   Component Value Date    WBC 7.3 01/19/2021    RBC 4.45 01/19/2021    HGB 15.3 01/19/2021    HCT 44.4 01/19/2021     01/19/2021    MCV 99.8 01/19/2021    MCH 34.4 01/19/2021    MCHC 34.5 01/19/2021    RDW 14.4 01/19/2021    LYMPHOPCT 31.6 01/19/2021    MONOPCT 7.7 01/19/2021    BASOPCT 0.6 01/19/2021    MONOSABS 0.56 01/19/2021    LYMPHSABS 2.30 01/19/2021    EOSABS 0.18 01/19/2021    BASOSABS 0.04 01/19/2021     Platelets:    Lab Results   Component Value Date     01/19/2021     BUN/Creatinine:    Lab Results   Component Value Date    BUN 20 03/31/2021    CREATININE 1.1 03/31/2021       ASSESSMENT AND PLAN:  3.  62 yo M with an enlarged right cervical lymph node on prior imaging. Here for ultrasound guided FNA of the enlarged right cervical lymph node. 2.  Procedure options, risks and benefits reviewed with patient. Patient expresses understanding.     Electronically signed by Brinda Bustillo MD on 5/26/2021 at 11:53 AM

## 2021-05-26 NOTE — BRIEF OP NOTE
Brief Postoperative Note    Jose Rod  YOB: 1960  [id number]    Pre-operative Diagnosis and Procedure: 62 yo M with an enlarged right cervical lymph node on prior imaging. Here for ultrasound guided FNA of the enlarged right cervical lymph node. Post-operative Diagnosis: Same    Anesthesia: Local    Estimated Blood Loss: < 10 cc    Surgeon: Octavia Newberry MD    Complications: none    Specimen obtained: 4 passes and 1 core specimen    Findings: Successful ultrasound guided FNA and core biopsy of an enlarged right cervical lymph node.      Octavia Newberry MD   5/26/2021 11:56 AM

## 2021-06-01 LAB
Lab: NORMAL
REPORT: NORMAL
THIS TEST SENT TO: NORMAL

## 2021-06-02 ENCOUNTER — TELEPHONE (OUTPATIENT)
Dept: FAMILY MEDICINE CLINIC | Age: 61
End: 2021-06-02

## 2021-06-02 DIAGNOSIS — R59.1 LYMPHADENOPATHY OF HEAD AND NECK: Primary | ICD-10-CM

## 2021-06-02 NOTE — TELEPHONE ENCOUNTER
Call patient showed results of recent FNA. Recommended to call ENT and schedule appointment for follow-up.

## 2021-06-23 NOTE — TELEPHONE ENCOUNTER
I called [facility] [phone]. Transferred to radiology dep of nuc med  [phone] option 3. Chivo Razo,   May 21 PYP was completed. I Requested the disc and report. That comes from Medical records. So I  Faxed  [facility] medical records and attached the original order that DR Abeba Ruvalcaba is ordering provider of this test.  Pending results.      Keenan Bullard RN

## 2021-06-24 DIAGNOSIS — I50.32 CHRONIC DIASTOLIC HEART FAILURE (HCC): ICD-10-CM

## 2021-06-24 DIAGNOSIS — R60.0 BILATERAL LEG EDEMA: Primary | ICD-10-CM

## 2021-06-24 DIAGNOSIS — E87.6 HYPOKALEMIA: ICD-10-CM

## 2021-06-24 DIAGNOSIS — I50.32 CHRONIC HEART FAILURE WITH PRESERVED EJECTION FRACTION (HCC): ICD-10-CM

## 2021-06-25 RX ORDER — POTASSIUM CHLORIDE 20 MEQ/1
TABLET, EXTENDED RELEASE ORAL
Qty: 360 TABLET | Refills: 3 | OUTPATIENT
Start: 2021-06-25

## 2021-07-02 ENCOUNTER — OFFICE VISIT (OUTPATIENT)
Dept: ENT CLINIC | Age: 61
End: 2021-07-02
Payer: MEDICARE

## 2021-07-02 VITALS
BODY MASS INDEX: 42.74 KG/M2 | SYSTOLIC BLOOD PRESSURE: 130 MMHG | WEIGHT: 282 LBS | OXYGEN SATURATION: 98 % | HEART RATE: 73 BPM | HEIGHT: 68 IN | DIASTOLIC BLOOD PRESSURE: 76 MMHG | TEMPERATURE: 98.3 F

## 2021-07-02 DIAGNOSIS — R59.0 LAD (LYMPHADENOPATHY) OF RIGHT CERVICAL REGION: ICD-10-CM

## 2021-07-02 DIAGNOSIS — K14.8 TONGUE LESION: Primary | ICD-10-CM

## 2021-07-02 PROCEDURE — 99204 OFFICE O/P NEW MOD 45 MIN: CPT | Performed by: OTOLARYNGOLOGY

## 2021-07-02 PROCEDURE — 3017F COLORECTAL CA SCREEN DOC REV: CPT | Performed by: OTOLARYNGOLOGY

## 2021-07-02 PROCEDURE — G8417 CALC BMI ABV UP PARAM F/U: HCPCS | Performed by: OTOLARYNGOLOGY

## 2021-07-02 PROCEDURE — 4004F PT TOBACCO SCREEN RCVD TLK: CPT | Performed by: OTOLARYNGOLOGY

## 2021-07-02 PROCEDURE — G8427 DOCREV CUR MEDS BY ELIG CLIN: HCPCS | Performed by: OTOLARYNGOLOGY

## 2021-07-02 ASSESSMENT — ENCOUNTER SYMPTOMS
SORE THROAT: 0
VOICE CHANGE: 0
COUGH: 0
SHORTNESS OF BREATH: 0
TROUBLE SWALLOWING: 0

## 2021-07-02 NOTE — PROGRESS NOTES
Viraj Hernandez DO on 7/2/2021 at 9:05 AM            Johnnye Schirmer Moore[place]  1960    I have discussed the case, including pertinent history and exam findings with the resident. I have seen and examined the patient and the key elements of the encounter have been performed by me. I agree with the assessment, plan and orders as documented by the  resident              Remainder of medical problems as per  resident note. Patient seen and examined. Agree with above exam, assessment and plan.       Electronically signed by Cholo Wolfe DO on 7/26/21 at 8:28 AM EDT

## 2021-07-06 ENCOUNTER — HOSPITAL ENCOUNTER (OUTPATIENT)
Age: 61
Discharge: HOME | End: 2021-07-06
Payer: MEDICARE

## 2021-07-06 DIAGNOSIS — R60.0 BILATERAL LEG EDEMA: ICD-10-CM

## 2021-07-06 DIAGNOSIS — I50.32 CHRONIC DIASTOLIC HEART FAILURE (HCC): ICD-10-CM

## 2021-07-06 DIAGNOSIS — I50.32 CHRONIC HEART FAILURE WITH PRESERVED EJECTION FRACTION (HCC): ICD-10-CM

## 2021-07-06 DIAGNOSIS — E87.6 HYPOKALEMIA: ICD-10-CM

## 2021-07-06 LAB
ALBUMIN SERPL-MCNC: 3.6 G/DL (ref 3.5–5.2)
ALP BLD-CCNC: 53 U/L (ref 40–129)
ALT SERPL-CCNC: 15 U/L (ref 0–40)
ANION GAP SERPL CALCULATED.3IONS-SCNC: 12 MMOL/L (ref 7–16)
AST SERPL-CCNC: 19 U/L (ref 0–39)
BILIRUB SERPL-MCNC: 0.3 MG/DL (ref 0–1.2)
BUN BLDV-MCNC: 8 MG/DL (ref 6–23)
CALCIUM SERPL-MCNC: 8.6 MG/DL (ref 8.6–10.2)
CHLORIDE BLD-SCNC: 97 MMOL/L (ref 98–107)
CO2: 26 MMOL/L (ref 22–29)
CREAT SERPL-MCNC: 0.8 MG/DL (ref 0.7–1.2)
GFR AFRICAN AMERICAN: >60
GFR NON-AFRICAN AMERICAN: >60 ML/MIN/1.73
GLUCOSE BLD-MCNC: 187 MG/DL (ref 74–99)
HCT VFR BLD CALC: 39.7 % (ref 37–54)
HEMOGLOBIN: 13.2 G/DL (ref 12.5–16.5)
MCH RBC QN AUTO: 33.9 PG (ref 26–35)
MCHC RBC AUTO-ENTMCNC: 33.2 % (ref 32–34.5)
MCV RBC AUTO: 102.1 FL (ref 80–99.9)
PDW BLD-RTO: 13.6 FL (ref 11.5–15)
PLATELET # BLD: 205 E9/L (ref 130–450)
PMV BLD AUTO: 8.9 FL (ref 7–12)
POTASSIUM SERPL-SCNC: 4.2 MMOL/L (ref 3.5–5)
PRO-BNP: 100 PG/ML (ref 0–125)
RBC # BLD: 3.89 E12/L (ref 3.8–5.8)
SODIUM BLD-SCNC: 135 MMOL/L (ref 132–146)
TOTAL PROTEIN: 6.5 G/DL (ref 6.4–8.3)
WBC # BLD: 7.9 E9/L (ref 4.5–11.5)

## 2021-07-06 PROCEDURE — 36415 COLL VENOUS BLD VENIPUNCTURE: CPT

## 2021-07-06 PROCEDURE — 83880 ASSAY OF NATRIURETIC PEPTIDE: CPT

## 2021-07-06 PROCEDURE — 85027 COMPLETE CBC AUTOMATED: CPT

## 2021-07-06 PROCEDURE — 80053 COMPREHEN METABOLIC PANEL: CPT

## 2021-07-15 ENCOUNTER — OFFICE VISIT (OUTPATIENT)
Dept: FAMILY MEDICINE CLINIC | Age: 61
End: 2021-07-15
Payer: MEDICARE

## 2021-07-15 ENCOUNTER — HOSPITAL ENCOUNTER (OUTPATIENT)
Dept: ULTRASOUND IMAGING | Age: 61
Discharge: HOME | End: 2021-07-17
Payer: MEDICARE

## 2021-07-15 VITALS
DIASTOLIC BLOOD PRESSURE: 80 MMHG | WEIGHT: 282 LBS | HEIGHT: 68 IN | SYSTOLIC BLOOD PRESSURE: 126 MMHG | TEMPERATURE: 97.6 F | HEART RATE: 78 BPM | OXYGEN SATURATION: 98 % | BODY MASS INDEX: 42.74 KG/M2

## 2021-07-15 DIAGNOSIS — E66.01 CLASS 3 SEVERE OBESITY WITH BODY MASS INDEX (BMI) OF 40.0 TO 44.9 IN ADULT, UNSPECIFIED OBESITY TYPE, UNSPECIFIED WHETHER SERIOUS COMORBIDITY PRESENT (HCC): ICD-10-CM

## 2021-07-15 DIAGNOSIS — Z76.0 MEDICATION REFILL: ICD-10-CM

## 2021-07-15 DIAGNOSIS — Z72.0 NICOTINE USE: ICD-10-CM

## 2021-07-15 DIAGNOSIS — Z91.89 AT HIGH RISK FOR DEEP VENOUS THROMBOSIS: ICD-10-CM

## 2021-07-15 DIAGNOSIS — M71.21 BAKER'S CYST OF KNEE, RIGHT: ICD-10-CM

## 2021-07-15 DIAGNOSIS — M79.89 SWELLING OF RIGHT LOWER EXTREMITY: ICD-10-CM

## 2021-07-15 DIAGNOSIS — M79.89 SWELLING OF RIGHT LOWER EXTREMITY: Primary | ICD-10-CM

## 2021-07-15 PROCEDURE — G8427 DOCREV CUR MEDS BY ELIG CLIN: HCPCS | Performed by: FAMILY MEDICINE

## 2021-07-15 PROCEDURE — G8417 CALC BMI ABV UP PARAM F/U: HCPCS | Performed by: FAMILY MEDICINE

## 2021-07-15 PROCEDURE — 93970 EXTREMITY STUDY: CPT | Performed by: RADIOLOGY

## 2021-07-15 PROCEDURE — 99213 OFFICE O/P EST LOW 20 MIN: CPT | Performed by: STUDENT IN AN ORGANIZED HEALTH CARE EDUCATION/TRAINING PROGRAM

## 2021-07-15 PROCEDURE — 3017F COLORECTAL CA SCREEN DOC REV: CPT | Performed by: FAMILY MEDICINE

## 2021-07-15 PROCEDURE — 93970 EXTREMITY STUDY: CPT

## 2021-07-15 PROCEDURE — 99212 OFFICE O/P EST SF 10 MIN: CPT | Performed by: STUDENT IN AN ORGANIZED HEALTH CARE EDUCATION/TRAINING PROGRAM

## 2021-07-15 PROCEDURE — 4004F PT TOBACCO SCREEN RCVD TLK: CPT | Performed by: FAMILY MEDICINE

## 2021-07-15 RX ORDER — INSULIN GLARGINE 100 [IU]/ML
42 INJECTION, SOLUTION SUBCUTANEOUS NIGHTLY
Qty: 37.8 ML | Refills: 0 | Status: SHIPPED
Start: 2021-07-15 | End: 2021-10-07 | Stop reason: SDUPTHER

## 2021-07-15 RX ORDER — MAGNESIUM OXIDE 420 MG
TABLET ORAL
COMMUNITY
Start: 2021-05-03 | End: 2021-08-17

## 2021-07-15 RX ORDER — AMMONIUM LACTATE 12 G/100G
CREAM TOPICAL
COMMUNITY
Start: 2021-03-08

## 2021-07-15 NOTE — PROGRESS NOTES
Zuly Stair Karina 64 y.o. male  here for relation of pain of the right knee and cyst of right knee. 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 However, at this point he complains of becoming symptomatic at this time. Pain may have been precipitated by a recent history of trauma (a recent fall x2 weeks). By history, patient reports that the right knee and right leg looks \"larger\" and has become increasingly painful. Denies any constitutional symptoms. Just purchased a knee sleeve today to try for pain management. O: VS: /80 (Site: Left Upper Arm, Position: Sitting, Cuff Size: Large Adult)   Pulse 78   Temp 97.6 °F (36.4 °C) (Temporal)   Ht 5' 8\" (1.727 m)   Wt 282 lb (127.9 kg)   SpO2 98%   BMI 42.88 kg/m²    General: NAD              Neck: WDL   CV:  RRR, no gallops, rubs, or murmurs   Resp: CTAB no R/R/W   Ext: Right lower extremity: Remarkable for palpable tender cyst palpated in the posterior right knee. No redness no fluctuance. Right calf appears larger than the left on inspection. Negative Homans' sign. No edema reported. 1. Baker's cyst of knee, right  C/W COMPRESSION with Knee Sleeve. Is following Pain Management. Rule out DVT Popliteal considering risk factors and overall swelling RLE  - US DUP LOWER EXTREMITY MAPPING BILAT VENOUS; Future     2. Swelling of right lower extremity  - US DUP LOWER EXTREMITY MAPPING BILAT VENOUS; Future     3. Class 3 severe obesity with body mass index (BMI) of 40.0 to 44.9 in adult, unspecified obesity type, unspecified whether serious comorbidity present ([facility] [address]  - US DUP LOWER EXTREMITY MAPPING BILAT VENOUS; Future     4. At high risk for deep venous thrombosis  - US DUP LOWER EXTREMITY MAPPING BILAT VENOUS; Future     5. Nicotine use  - US DUP LOWER EXTREMITY MAPPING BILAT VENOUS; Future     6. Medication refill  - insulin glargine (LANTUS SOLOSTAR) 100 UNIT/ML injection pen;  Inject 42 Units into the skin nightly  Dispense: 37.8 mL; Refill: 0     RTO in 1 week for follow-up Type 2 Diabetes Mellitus      Assessment/Plan:  1. Baker's cyst, right knee. With additional findings of right lower extremity pain and objective edema, need to rule out presence of DVT. Ultrasound of the right lower extremity. For the Baker's cyst: Conservative measures to include RICE; as needed NSAIDs. Also already under the care of pain management for neuropathy and is on gabapentin and tramadol; management per pain management to continue. Return in about 2 weeks (around 7/29/2021) for Diabetes/HTN follow-up. Follow-up in 2 weeks for for routine checkup, labs, med refills. Attending Physician Statement  I have discussed the case, including pertinent history and exam findings with the resident. I agree with the documented assessment and plan.          Soni Isaac MD

## 2021-07-15 NOTE — PROGRESS NOTES
[facility]:  Bakers Cyst on left knee    1. Baker's cyst of knee, right  C/W COMPRESSION with Knee Sleeve. Is following Pain Management. Rule out DVT Popliteal considering risk factors and overall swelling RLE  - US DUP LOWER EXTREMITY MAPPING BILAT VENOUS; Future    2. Swelling of right lower extremity  - US DUP LOWER EXTREMITY MAPPING BILAT VENOUS; Future    3. Class 3 severe obesity with body mass index (BMI) of 40.0 to 44.9 in adult, unspecified obesity type, unspecified whether serious comorbidity present ([facility] [address]  - US DUP LOWER EXTREMITY MAPPING BILAT VENOUS; Future    4. At high risk for deep venous thrombosis  - US DUP LOWER EXTREMITY MAPPING BILAT VENOUS; Future    5. Nicotine use  - US DUP LOWER EXTREMITY MAPPING BILAT VENOUS; Future    6. Medication refill  - insulin glargine (LANTUS SOLOSTAR) 100 UNIT/ML injection pen; Inject 42 Units into the skin nightly  Dispense: 37.8 mL; Refill: 0    RTO in 1 week for follow-up Type 2 Diabetes Mellitus      HPI:  64 y.o. man presents for a baker's cyst of right knee. Mart Cyst right knee  7-8 years onset  Recently worsening and pain 'unbearable'  +fell 2 weeks ago in his garage, his slipper got caught in his sock  Size is getting larger  Aggravating factors of the pain are movement and prolonged sitting  Got a Knee Compression Sleeve.    1-day ago got the sleeve, has not used it as of yet    +swelling of right lower extremity overall  No sob, chest pain, n/v, fevers, chills, warmth    Pain management following in [place]- gabapentin and tramadol    DM2 A1C: 8.3 (1/2021)  Needs refill of glargine  +polydispia      Patient Active Problem List    Diagnosis Date Noted    GIST (gastrointestinal stroma tumor), malignant, colon ([facility] 01/05/2020    Chronic heart failure with preserved ejection fraction ([facility] [facility] 01/05/2020    Primary osteoarthritis of right knee 04/26/2019    Synovial cyst of right popliteal space 11/19/2018    Hypomagnesemia 08/17/2018    Bilateral leg edema 08/17/2018    Tobacco abuse     Uncontrolled type 2 diabetes mellitus with hyperglycemia (HCC)     Mixed hyperlipidemia 11/14/2016    Recurrent major depressive disorder, in remission ([facility] 11/14/2016    Carpal tunnel syndrome 11/14/2016    Obstructive sleep apnea syndrome 11/14/2016    Essential hypertension 11/01/2016    Type 2 diabetes mellitus ([facility] 11/01/2016       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    Arthritis     Carpal tunnel syndrome     Cecum mass     CHF (congestive heart failure) (HCC)     Diabetes mellitus (HCC)     Gastrointestinal stromal tumor (GIST) ([facility] 6/3/2019    Hyperlipidemia     Hypertension     Hypokalemia     Neuropathy     Obesity     bmi 43.1 weight 283 #    Sleep apnea     c pap       Current Outpatient Medications on File Prior to Visit   Medication Sig Dispense Refill    Magnesium Oxide 420 MG TABS TAKE ONE TABLET BY MOUTH DAILY      ammonium lactate (AMLACTIN) 12 % cream APPLY A SUFFICIENT AMOUNT EXTERNALLY EVERY DAY FOR DRY SKIN ON FEET AND LEGS **DO NOT APPLY BETWEEN TOES**      losartan (COZAAR) 25 MG tablet TAKE 1 TABLET BY MOUTH EVERY DAY 90 tablet 1    atorvastatin (LIPITOR) 40 MG tablet TAKE 1 TABLET BY MOUTH EVERY NIGHT 90 tablet 0    bumetanide (BUMEX) 1 MG tablet Take 1 tablet by mouth 2 times daily 60 tablet 3    eplerenone (INSPRA) 50 MG tablet Take 1 tablet by mouth daily 30 tablet 3    potassium chloride (KLOR-CON M) 20 MEQ extended release tablet Take 2 tablets by mouth 2 times daily Pt instructed to take 40 meq extra  Daily starting 3/2/2021 (Patient taking differently: Take 40 mEq by mouth 2 times daily ) 360 tablet 3    metOLazone (ZAROXOLYN) 2.5 MG tablet TAKE 1 TABLET BY MOUTH DAILY 90 tablet 0    magnesium oxide (MAG-OX) 400 (240 Mg) MG tablet Take 1 tablet by mouth daily 30 tablet 0    Compression Stockings MISC by Does not apply route As directed  25 mmHg , 1 pair 1 each 0    docusate sodium (COLACE) 100 MG capsule Take 1 capsule by mouth 2 times daily 90 capsule 1    imatinib (GLEEVEC) 400 MG chemo tablet TAKE 1 TABLET BY MOUTH ONCE DAILY AT THE SAME TIME WITH FOOD AND A LARGE GLASS OF WATER. DO NOT CRUSH TABLETS. AVOID GRAPEFRUIT PRODUCTS. 28 tablet 2    LANTUS SOLOSTAR 100 UNIT/ML injection pen ADMINISTER 38 UNITS UNDER THE SKIN EVERY NIGHT (Patient taking differently: Inject 42 Units into the skin nightly ) 36 mL 3    B-D ULTRAFINE III SHORT PEN 31G X 8 MM MISC Inject 1 each into the skin nightly 100 each 3    gabapentin (NEURONTIN) 300 MG capsule Take 300 mg by mouth as needed.  acetaminophen (TYLENOL) 500 MG tablet Take 500 mg by mouth every 6 hours as needed       Blood Glucose Monitoring Suppl (ONETOUCH VERIO FLEX SYSTEM) w/Device KIT AS DIRECTED  0    albuterol sulfate HFA (VENTOLIN HFA) 108 (90 Base) MCG/ACT inhaler Inhale 2 puffs into the lungs every 4 hours as needed for Wheezing 1 Inhaler 3    amLODIPine (NORVASC) 10 MG tablet Take 1 tablet by mouth daily 90 tablet 0    Cholecalciferol (VITAMIN D3) 2000 units CAPS Take 1 capsule by mouth daily      traMADol (ULTRAM) 50 MG tablet Take 50 mg by mouth every 6 hours as needed for Pain. [place] metoprolol tartrate (LOPRESSOR) 50 MG tablet Take 50 mg by mouth 2 times daily      sildenafil (VIAGRA) 100 MG tablet Take 100 mg by mouth as needed. Take an hour before sex. Med info obtained from [place] records. No current facility-administered medications on file prior to visit. Allergies   Allergen Reactions    Lasix [Furosemide] Other (See Comments)     Per pt potassium was too low when on lasix    Lisinopril Swelling     Angioedema    Prunus Persica Anaphylaxis     Per report to [facility] manager, Judith Villarreal. [address], BS-NDTR    Atenolol Other (See Comments) and Swelling     Thins his blood    Nitrates, Organic Other (See Comments)     Is using Sildenafil. containdicated with Nitrates    Spironolactone      Swelling in the chest area       No family history on file. Past Surgical History:   Procedure Laterality Date    COLONOSCOPY  09/14/2018    polyp--eliane    NASAL SEPTUM SURGERY      NH COLONOSCOPY W/BIOPSY SINGLE/MULTIPLE N/A 9/14/2018    COLONOSCOPY WITH BIOPSY performed by Waleska Thompson MD at [facility] [address] TUTTLE W/RMVL OF TUMOR POLYP LESION SNARE TQ  9/14/2018    COLONOSCOPY POLYPECTOMY SNARE/COLD BIOPSY performed by Waleska Thompson MD at [place] [address] NECK/THORAX  5/26/2021    US BIOPSY SOFT TISSUE NECK/THORAX 5/26/2021 [facility]TRASOUND       Social History     Tobacco Use    Smoking status: Current Every Day Smoker     Packs/day: 1.00     Years: 31.00     Pack years: 31.00     Types: Cigarettes    Smokeless tobacco: Never Used   Vaping Use    Vaping Use: Never used   Substance Use Topics    Alcohol use: Yes    Drug use: Yes     Frequency: 5.0 times per week     Types: Marijuana       ROS:    Review of Systems   Constitutional: Negative for activity change, appetite change, chills and fatigue. HENT: Negative for congestion and sore throat. Respiratory: Negative for choking and chest tightness. Cardiovascular: Negative for chest pain, palpitations and leg swelling. Gastrointestinal: Negative for abdominal distention and abdominal pain. Genitourinary: Negative for difficulty urinating and dysuria. Musculoskeletal+ pain, +mass behind right knee  Skin: Negative for color change and pallor. Neurological: Negative for facial asymmetry and headaches. Psychiatric/Behavioral: Negative for behavioral problems. The patient is not nervous/anxious. Objective:    VS:  Blood pressure 126/80, pulse 78, temperature 97.6 °F (36.4 °C), temperature source Temporal, height 5' 8\" (1.727 m), weight 282 lb (127.9 kg), SpO2 98 %.      Physical Exam   Constitutional: Oriented to person, place, and time. Well-developed and well-nourished. HENT:   Head: Normocephalic and atraumatic. Eyes: EOM are normal.   Neck: Neck supple. Cardiovascular: Normal rate, regular rhythm and intact distal pulses. Exam reveals no gallop and no friction rub. No murmur heard. Pulmonary/Chest: Effort normal and breath sounds normal. No wheezes. No rhales. Abdominal: Soft. Bowel sounds are normal. No distension. No abdominal tenderness. Musculoskeletal: +bakers cyst behind right popliteal area. +TTP. No erythema or fluctuance immobile. RLE >LLE. Negative Babatunde Sign  Neurological: Alert and oriented to person, place, and time. Skin: Skin is warm and dry. No rash noted. No erythema. Psychiatric: Normal mood and affect. Behavior is normal.   Nursing note and vitals reviewed. Plans:  As above. RTO 1 week for follow-up TYPE 2 DM    Please see Patient Instructions for further counseling and information given. Advised to please be adherent to the treatment plans discussed today, and please call with any questions or concerns, letting the office know of any reasons that the plans may not be followed. The risks of untreated conditions include worsening illness, injury, disability, and possibly, death. Please call if symptoms change in any way, worsen, or fail to completely resolve, as this could necessitate a change to treatment plans. Patient and/or caregiver expressed understanding. Indications and proper use of medication(s) reviewed. Potential side-effects and risks of medication(s) also explained. Patient and/or caregiver was instructed to call if any new symptoms develop prior to next visit. Health risk factors discussed and addressed.

## 2021-07-20 RX ORDER — POTASSIUM CHLORIDE 20 MEQ/1
20 TABLET, EXTENDED RELEASE ORAL 2 TIMES DAILY
Qty: 180 TABLET | Refills: 1 | Status: SHIPPED
Start: 2021-07-20 | End: 2021-10-08 | Stop reason: SDUPTHER

## 2021-07-22 DIAGNOSIS — I50.22 CHRONIC SYSTOLIC CONGESTIVE HEART FAILURE (HCC): ICD-10-CM

## 2021-07-22 RX ORDER — METOLAZONE 2.5 MG/1
2.5 TABLET ORAL DAILY
Qty: 90 TABLET | Refills: 0 | Status: SHIPPED
Start: 2021-07-22 | End: 2021-08-17 | Stop reason: SDUPTHER

## 2021-07-27 ENCOUNTER — TELEPHONE (OUTPATIENT)
Dept: CARDIOLOGY CLINIC | Age: 61
End: 2021-07-27

## 2021-07-27 DIAGNOSIS — I42.8 INFILTRATIVE CARDIOMYOPATHY (HCC): ICD-10-CM

## 2021-07-27 NOTE — TELEPHONE ENCOUNTER
Pt called to let you know he had the procedure done at [facility].

## 2021-07-27 NOTE — TELEPHONE ENCOUNTER
NM CARDIAC AMYLOID SPECT /CT completed 7-23 at [facility] scanned and send to Dr. Leatha Ma for review.

## 2021-08-17 ENCOUNTER — OFFICE VISIT (OUTPATIENT)
Dept: CARDIOLOGY CLINIC | Age: 61
End: 2021-08-17
Payer: MEDICARE

## 2021-08-17 VITALS
HEIGHT: 68 IN | DIASTOLIC BLOOD PRESSURE: 68 MMHG | BODY MASS INDEX: 43.53 KG/M2 | OXYGEN SATURATION: 97 % | WEIGHT: 287.2 LBS | HEART RATE: 72 BPM | SYSTOLIC BLOOD PRESSURE: 136 MMHG | RESPIRATION RATE: 20 BRPM

## 2021-08-17 DIAGNOSIS — I10 ESSENTIAL HYPERTENSION: ICD-10-CM

## 2021-08-17 DIAGNOSIS — I50.32 CHRONIC HEART FAILURE WITH PRESERVED EJECTION FRACTION (HFPEF) (HCC): Primary | ICD-10-CM

## 2021-08-17 PROCEDURE — G8417 CALC BMI ABV UP PARAM F/U: HCPCS | Performed by: INTERNAL MEDICINE

## 2021-08-17 PROCEDURE — 4004F PT TOBACCO SCREEN RCVD TLK: CPT | Performed by: INTERNAL MEDICINE

## 2021-08-17 PROCEDURE — G8427 DOCREV CUR MEDS BY ELIG CLIN: HCPCS | Performed by: INTERNAL MEDICINE

## 2021-08-17 PROCEDURE — 93000 ELECTROCARDIOGRAM COMPLETE: CPT | Performed by: INTERNAL MEDICINE

## 2021-08-17 PROCEDURE — 99213 OFFICE O/P EST LOW 20 MIN: CPT | Performed by: INTERNAL MEDICINE

## 2021-08-17 PROCEDURE — 3017F COLORECTAL CA SCREEN DOC REV: CPT | Performed by: INTERNAL MEDICINE

## 2021-08-17 RX ORDER — METOLAZONE 2.5 MG/1
2.5 TABLET ORAL DAILY
Qty: 90 TABLET | Refills: 3 | Status: SHIPPED
Start: 2021-08-17 | End: 2021-09-03 | Stop reason: DRUGHIGH

## 2021-08-17 RX ORDER — GABAPENTIN 800 MG/1
800 TABLET ORAL 3 TIMES DAILY
COMMUNITY

## 2021-08-17 RX ORDER — BUMETANIDE 1 MG/1
1 TABLET ORAL 2 TIMES DAILY
Qty: 180 TABLET | Refills: 3 | Status: SHIPPED
Start: 2021-08-17 | End: 2021-10-08 | Stop reason: SDUPTHER

## 2021-08-17 RX ORDER — AMLODIPINE BESYLATE 10 MG/1
10 TABLET ORAL DAILY
Qty: 90 TABLET | Refills: 3 | Status: SHIPPED
Start: 2021-08-17 | End: 2021-10-08 | Stop reason: SDUPTHER

## 2021-08-17 RX ORDER — LOSARTAN POTASSIUM 50 MG/1
50 TABLET ORAL DAILY
Qty: 90 TABLET | Refills: 3 | Status: SHIPPED
Start: 2021-08-17 | End: 2021-10-08 | Stop reason: SDUPTHER

## 2021-08-17 RX ORDER — METOPROLOL TARTRATE 50 MG/1
50 TABLET, FILM COATED ORAL 2 TIMES DAILY
Qty: 180 TABLET | Refills: 3 | Status: SHIPPED
Start: 2021-08-17 | End: 2021-10-08 | Stop reason: SDUPTHER

## 2021-08-17 RX ORDER — EPLERENONE 50 MG/1
50 TABLET, FILM COATED ORAL DAILY
Qty: 90 TABLET | Refills: 3 | Status: SHIPPED
Start: 2021-08-17 | End: 2022-10-20

## 2021-08-17 NOTE — PATIENT INSTRUCTIONS
1. No changes to medications. Gave you refills for a full year for all your heart medications  2. Go back to the CHF clinic  3. See an orthopedic doctor about your right knee  4.  Return in 6 months

## 2021-08-17 NOTE — PROGRESS NOTES
[address] [facility]   Heart and Vascular [place]   Clinic Note     Date:8/17/21   Patient Name:Pipe Collins  YOB: 1960  Age: 64 y.o. Primary Care Provider: Adrianne Chapman MD    Subjective     This is a pleasant 60-year-old -American male who comes in for follow-up. He has been doing fairly well. He has not had any hospitalizations or ED visits for heart failure. His weight has slightly trended up. He has occasional swelling in his feet. He has no orthopnea or PND. He has no chest pain or syncope or presyncope. He is compliant with his medications aside from atorvastatin. His review of systems is pertinent for a painful right-sided Baker's cyst.     A focused history review includes:   1. Borderline inferior infarct pattern on EKG  2. Chronic HFpEF-follows with CHF infusion clinic  1. No monoclonal serum or urine protein and normal k/l ratio (fall 2020)  2. TTE 8/2020 personally reviewed: Normal LV size and systolic function. Severe concentric LVH. No significant valve disease. No POOJA.  3. PYP scan negative for TTR amyloidosis  3. HTN  4. T2DM  5. CKD stage II with baseline creatinine 1.1  6. He is a smoker of 20 to 40 pack years  7. ACEI-associated angioedema  8. Gynecomastia   9. Morbid obesity  10. MARIA, NOT compliant with CPAP  11. 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 Carpal tunnel syndrome  13.  Hypertriglyceridemia       Past History    Past Medical History:         Diagnosis Date    Angiotensin converting enzyme inhibitor-aggravated angioedema 3/29/2018    Arthritis     Carpal tunnel syndrome     Cecum mass     CHF (congestive heart failure) (HCC)     Diabetes mellitus (HCC)     Gastrointestinal stromal tumor (GIST) ([facility] [address] 6/3/2019    Hyperlipidemia     Hypertension     Hypokalemia     Neuropathy     Obesity     bmi 43.1 weight 283 #    Sleep apnea     c pap          Social History:    Social History     Tobacco History Smoking Status  Current Every Day Smoker Smoking Frequency  0.5 packs/day for 30 years (15 pk yrs) Smoking Tobacco Type  Cigarettes    Smokeless Tobacco Use  Never Used          Alcohol History     Alcohol Use Status  Yes Drinks/Week  2 Shots of liquor per week Amount  2.0 standard drinks of alcohol/wk Comment  2x week          Drug Use     Drug Use Status  No          Sexual Activity     Sexually Active  Not Asked                    Family History:   History reviewed. No pertinent family history. Review of Systems   As above        Medications     Current Outpatient Medications   Medication Sig Dispense Refill    gabapentin (NEURONTIN) 800 MG tablet Take 800 mg by mouth 3 times daily.       metOLazone (ZAROXOLYN) 2.5 MG tablet Take 1 tablet by mouth daily 90 tablet 3    bumetanide (BUMEX) 1 MG tablet Take 1 tablet by mouth 2 times daily 180 tablet 3    eplerenone (INSPRA) 50 MG tablet Take 1 tablet by mouth daily 90 tablet 3    losartan (COZAAR) 50 MG tablet Take 1 tablet by mouth daily 90 tablet 3    amLODIPine (NORVASC) 10 MG tablet Take 1 tablet by mouth daily 90 tablet 3    metoprolol tartrate (LOPRESSOR) 50 MG tablet Take 1 tablet by mouth 2 times daily 180 tablet 3    potassium chloride (KLOR-CON M) 20 MEQ extended release tablet Take 1 tablet by mouth 2 times daily 180 tablet 1    ammonium lactate (AMLACTIN) 12 % cream APPLY A SUFFICIENT AMOUNT EXTERNALLY EVERY DAY FOR DRY SKIN ON FEET AND LEGS **DO NOT APPLY BETWEEN TOES**      insulin glargine (LANTUS SOLOSTAR) 100 UNIT/ML injection pen Inject 42 Units into the skin nightly (Patient taking differently: Inject 42 Units into the skin every morning ) 37.8 mL 0    magnesium oxide (MAG-OX) 400 (240 Mg) MG tablet Take 1 tablet by mouth daily 30 tablet 0    Compression Stockings MISC by Does not apply route As directed  25 mmHg , 1 pair 1 each 0    docusate sodium (COLACE) 100 MG capsule Take 1 capsule by mouth 2 times daily 90 capsule 1    imatinib (GLEEVEC) 400 MG chemo tablet TAKE 1 TABLET BY MOUTH ONCE DAILY AT THE SAME TIME WITH FOOD AND A LARGE GLASS OF WATER. DO NOT CRUSH TABLETS. AVOID GRAPEFRUIT PRODUCTS. 28 tablet 2    acetaminophen (TYLENOL) 500 MG tablet Take 500 mg by mouth every 6 hours as needed       Cholecalciferol (VITAMIN D3) 2000 units CAPS Take 1 capsule by mouth daily      traMADol (ULTRAM) 50 MG tablet Take 50 mg by mouth every 6 hours as needed for Pain. [place]  sildenafil (VIAGRA) 100 MG tablet Take 100 mg by mouth as needed. Take an hour before sex. Med info obtained from [place] records.  B-D ULTRAFINE III SHORT PEN 31G X 8 MM MISC Inject 1 each into the skin nightly 100 each 3    Blood Glucose Monitoring Suppl ([address]) w/Device KIT AS DIRECTED  0     No current facility-administered medications for this visit. Physical Examination      /68 (Site: Right Upper Arm, Position: Sitting, Cuff Size: Medium Adult)   Pulse 72   Resp 20   Ht 5' 8\" (1.727 m)   Wt 287 lb 3.2 oz (130.3 kg)   SpO2 97%   BMI 43.67 kg/m²     General: No acute distress, appears as stated age, nonicteric  Head: Atraumatic, no gross abnormalities or bruises.   No glossal megaly  Neck: Supple and nontender, no carotid bruits, JVP is mildly elevated  Lungs: Clear to auscultation bilaterally, no wheezes, rales, or rhonchi  Heart: Regular rate and rhythm, no murmurs, rubs, or gallops  Abdomen: Soft, nontender, nondistended, normal bowel sounds  Extremities: No obvious deformities, no cyanosis, no pitting edema bilaterally  Neurological: Alert and oriented x3, EOMI, moving all extremities x4  Psychological: Normal mood and affect, cooperative  Skin: Color, texture, and turgor normal for age         Labs/Imaging/Diagnostics     Lab Results   Component Value Date     07/06/2021    K 4.2 07/06/2021    K 3.7 03/10/2018    CL 97 07/06/2021    CO2 26 07/06/2021    BUN 8 07/06/2021    CREATININE 0.8 07/06/2021    GLUCOSE 187 07/06/2021    CALCIUM 8.6 07/06/2021        Estimated Creatinine Clearance: 128 mL/min (based on SCr of 0.8 mg/dL). Lab Results   Component Value Date    WBC 7.9 07/06/2021    HGB 13.2 07/06/2021    HCT 39.7 07/06/2021    .1 (H) 07/06/2021     07/06/2021       Lab Results   Component Value Date    ALT 15 07/06/2021    AST 19 07/06/2021    ALKPHOS 53 07/06/2021    BILITOT 0.3 07/06/2021       Lab Results   Component Value Date    LABPROT 1.7 (H) 08/17/2018    LABPROT 1.7 08/17/2018    LABALBU 3.6 07/06/2021       Lab Results   Component Value Date    CHOL 109 07/15/2020    CHOL 150 06/14/2019    CHOL 115 08/18/2018     Lab Results   Component Value Date    TRIG 215 (H) 07/15/2020    TRIG 247 (H) 06/14/2019    TRIG 297 (H) 08/18/2018     Lab Results   Component Value Date    HDL 43 07/15/2020    HDL 48 06/14/2019    HDL 34 08/18/2018     Lab Results   Component Value Date    LDLCALC 23 07/15/2020    LDLCALC 53 06/14/2019    LDLCALC 22 08/18/2018     Lab Results   Component Value Date    LABVLDL 43 07/15/2020    LABVLDL 49 06/14/2019    LABVLDL 59 08/18/2018     No results found for: [facility]    Lab Results   Component Value Date    CKTOTAL 493 (H) 08/17/2018    CKMB 5.2 08/17/2018    TROPONINI <0.01 01/28/2020       No results found for: BNP      Lab Results   Component Value Date    LABA1C 8.3 (H) 01/28/2021     Results for Perlita Hunter (MRN [id number]) as of 8/17/2021 11:02   Ref. Range 7/6/2021 08:55   Pro-BNP Latest Ref Range: 0 - 125 pg/mL 100       Pertinent Cardiovascular Studies:  EKG: Normal sinus rhythm with borderline age-indeterminate inferior infarct. Assessment and Plan:        57-year-old -American male with a history noted above. He is minimally hypervolemic and has no angina although impaired functional capacity. He has no symptomatic arrhythmia. Diagnosis Orders   1. Chronic heart failure with preserved ejection fraction (HFpEF) ([address]     2.  Essential hypertension  losartan (COZAAR) 50 MG tablet      · Continue current regimen of eplerenone 50 mg daily, bumetanide 1 mg p.o. twice daily, losartan 50 mg daily, metolazone 2.5 mg daily, metoprolol tartrate 50 mg p.o. twice daily, losartan 50 mg daily  · Return to CHF clinic  · He was not open to addressing the statin today given his right knee pain. Will readdress next visit  · Reiterated the importance of compliance with CPAP for his heart failure and hypertension and MARIA     Follow up with me in 6 months    Thank you for the referral.  We appreciate the opportunity to participate in His care!     Abimbola Watson MD, [address] [facility]  Interventional Cardiology/Structural Heart Disease  Office: [phone]  Coordinator: Enriqueta Schmitt

## 2021-08-18 ENCOUNTER — TELEPHONE (OUTPATIENT)
Dept: FAMILY MEDICINE CLINIC | Age: 61
End: 2021-08-18

## 2021-08-18 NOTE — TELEPHONE ENCOUNTER
----- Message from Nakia Logan sent at 8/18/2021  9:31 AM EDT -----  Subject: Referral Request    QUESTIONS   Reason for referral request? orthopedic   Has the physician seen you for this condition before? Yes  Select a date? 2021-07-15  Select the Provider the patient wants to be referred to, if known (PCP or   Specialist)? Outside Physician - unknown   Preferred Specialist (if applicable)? Do you already have an appointment scheduled? No  Additional Information for Provider? bakers cyst behind rt. knee// Needs   referral before booking // preferably a provider on the [place] side   ---------------------------------------------------------------------------  --------------  [address]  What is the best way for the office to contact you? OK to leave message on   voicemail  Preferred Call Back Phone Number?  [phone]

## 2021-08-31 ENCOUNTER — TELEPHONE (OUTPATIENT)
Dept: CARDIOLOGY CLINIC | Age: 61
End: 2021-08-31

## 2021-08-31 ENCOUNTER — HOSPITAL ENCOUNTER (OUTPATIENT)
Dept: OTHER | Age: 61
Setting detail: THERAPIES SERIES
Discharge: HOME | End: 2021-08-31
Payer: MEDICARE

## 2021-08-31 VITALS
BODY MASS INDEX: 43.33 KG/M2 | WEIGHT: 285 LBS | RESPIRATION RATE: 18 BRPM | DIASTOLIC BLOOD PRESSURE: 63 MMHG | HEART RATE: 72 BPM | SYSTOLIC BLOOD PRESSURE: 132 MMHG

## 2021-08-31 DIAGNOSIS — Z79.899 MEDICATION DOSE CHANGED: ICD-10-CM

## 2021-08-31 DIAGNOSIS — E87.6 HYPOKALEMIA: Primary | ICD-10-CM

## 2021-08-31 DIAGNOSIS — I50.32 CHRONIC HEART FAILURE WITH PRESERVED EJECTION FRACTION (HFPEF) (HCC): ICD-10-CM

## 2021-08-31 LAB
ANION GAP SERPL CALCULATED.3IONS-SCNC: 8 MMOL/L (ref 7–16)
BUN BLDV-MCNC: 15 MG/DL (ref 6–23)
CALCIUM SERPL-MCNC: 9.4 MG/DL (ref 8.6–10.2)
CHLORIDE BLD-SCNC: 92 MMOL/L (ref 98–107)
CO2: 34 MMOL/L (ref 22–29)
CREAT SERPL-MCNC: 0.9 MG/DL (ref 0.7–1.2)
GFR AFRICAN AMERICAN: >60
GFR NON-AFRICAN AMERICAN: >60 ML/MIN/1.73
GLUCOSE BLD-MCNC: 209 MG/DL (ref 74–99)
POTASSIUM SERPL-SCNC: 3.1 MMOL/L (ref 3.5–5)
PRO-BNP: 66 PG/ML (ref 0–125)
SODIUM BLD-SCNC: 134 MMOL/L (ref 132–146)

## 2021-08-31 PROCEDURE — 80048 BASIC METABOLIC PNL TOTAL CA: CPT

## 2021-08-31 PROCEDURE — 36415 COLL VENOUS BLD VENIPUNCTURE: CPT

## 2021-08-31 PROCEDURE — 99214 OFFICE O/P EST MOD 30 MIN: CPT

## 2021-08-31 PROCEDURE — 83880 ASSAY OF NATRIURETIC PEPTIDE: CPT

## 2021-08-31 NOTE — PROGRESS NOTES
[address] [facility] GERALD Nicolasr   1960  [phone]       Referring Doctor:   Cardiologist: Dr Jimbo Kruger  Nephrologist: Dr Jose West        History of Present Illness:     Ru Arreola is a 64 y.o. male with a history of HFpEF, most recent EF60% on 8-13-20. Patient Story:    He does  have some dyspnea with exertion, shortness of breath, or decline in overall functional capacity. He does not have orthopnea, PND, nocturnal cough or hemoptysis. He does not have abdominal distention or bloating, early satiety, anorexia/change in appetite. He does has a good urinary response to their oral diuretic. He does have very slight lower extremity edema. He denies lightheadedness, dizziness. He denies palpitations, syncope or near syncope. He does not complain of chest pain, pressure, discomfort. Allergies   Allergen Reactions    Lasix [Furosemide] Other (See Comments)     Per pt potassium was too low when on lasix    Lisinopril Swelling     Angioedema    Prunus Persica Anaphylaxis     Per report to [facility] manager, Cornelio Lyles. [address], BS-NDTR    Atenolol Other (See Comments) and Swelling     Thins his blood    Nitrates, Organic Other (See Comments)     Is using Sildenafil. containdicated with Nitrates    Spironolactone      Swelling in the chest area         No outpatient medications have been marked as taking for the 8/31/21 [facility] ROOM 1.           Guideline directed medical:  ARNI/ACE I/ARB: Yes  Beta blocker:   Yes  Aldosterone antagonist:  Yes, States just started back taking Inspra about 2 days ago        Physical Examination:     /63   Pulse 72   Resp 18   Wt 285 lb (129.3 kg)   BMI 43.33 kg/m²     Assessment  Charting Type: Shift assessment                   Respiratory  Respiratory Pattern: Regular  Respiratory Depth: Normal  Respiratory Quality/Effort: Unlabored  Chest Assessment: Chest expansion symmetrical  L Breath Sounds: Diminished, Clear (current smoker)  R Breath Sounds: Diminished                   Rhythm Interpretation  Pulse: 72         Gastrointestinal  Abdominal (WDL): Within Defined Limits               Peripheral Vascular  Peripheral Vascular (WDL): Exceptions to WDL  Edema: Right lower extremity, Left lower extremity  RLE Edema: Pitting, Trace  LLE Edema: Pitting, Trace                                                 Pulse: 72                   LAB DATA:    BNP  No results for input(s): BNP in the last 72 hours. proBNP  No results for input(s): PROBNP in the last 72 hours. BMP  No results for input(s): NA, K, CL, CO2, BUN, CREATININE, GLUCOSE, CALCIUM in the last 72 hours. WEIGHTS:  Wt Readings from Last 3 Encounters:   08/31/21 285 lb (129.3 kg)   08/17/21 287 lb 3.2 oz (130.3 kg)   07/15/21 282 lb (127.9 kg)         TELEMETRY:  Cardiac Regularity: Regular  Cardiac Rhythm/Interpretation: NSR        ASSESSMENT:  Arash Collins is evolemic with stable weights     Interventions completed this visit:  IV diuretics given no  Lab work obtained yes, BMP/BNP   Reviewed continue current medications that patient as prescribed answered any questions   Educated on signs and symptoms of HF  Educated on low sodium diet    PLAN:  Scheduled to follow up in CHF clinic on Oct 5  Given clinic phone number and aware of signs and symptoms to call with any HF change in symptoms.

## 2021-08-31 NOTE — TELEPHONE ENCOUNTER
Pt's potassium is 3.1 and he said been consistant with taking his potassium  but he just started taking the inspra again a couple days ago. Please advise 330.

## 2021-08-31 NOTE — PROGRESS NOTES
Today's K of 3.1 called to Holden at Dr Dagmar Jeong office. Patient states he's been consistent taking his Potassium. States has just restarted taking Inspra a couple days ago.

## 2021-09-01 ENCOUNTER — TELEPHONE (OUTPATIENT)
Dept: CARDIOLOGY CLINIC | Age: 61
End: 2021-09-01

## 2021-09-01 NOTE — TELEPHONE ENCOUNTER
MD Bal Law MA; Seng Pereira RN Yesterday (10:04 AM)   RK  Recheck in a couple of days since he just restarted the eplerenone    Message text

## 2021-09-01 NOTE — TELEPHONE ENCOUNTER
Called to let pt know we want him to have his potassium checked today or tomorrow and  Was unable to reach him. Called the daughter and was not able to leave a message.

## 2021-09-03 ENCOUNTER — HOSPITAL ENCOUNTER (OUTPATIENT)
Age: 61
Discharge: HOME | End: 2021-09-03
Payer: MEDICARE

## 2021-09-03 ENCOUNTER — TELEPHONE (OUTPATIENT)
Dept: CARDIOLOGY CLINIC | Age: 61
End: 2021-09-03

## 2021-09-03 DIAGNOSIS — Z79.899 MEDICATION DOSE CHANGED: ICD-10-CM

## 2021-09-03 DIAGNOSIS — I50.32 CHRONIC HEART FAILURE WITH PRESERVED EJECTION FRACTION (HFPEF) (HCC): ICD-10-CM

## 2021-09-03 DIAGNOSIS — I50.32 CHRONIC HEART FAILURE WITH PRESERVED EJECTION FRACTION (HFPEF) (HCC): Primary | ICD-10-CM

## 2021-09-03 DIAGNOSIS — E87.6 HYPOKALEMIA: ICD-10-CM

## 2021-09-03 LAB
ANION GAP SERPL CALCULATED.3IONS-SCNC: 17 MMOL/L (ref 7–16)
BUN BLDV-MCNC: 20 MG/DL (ref 6–23)
CALCIUM SERPL-MCNC: 9 MG/DL (ref 8.6–10.2)
CHLORIDE BLD-SCNC: 90 MMOL/L (ref 98–107)
CO2: 27 MMOL/L (ref 22–29)
CREAT SERPL-MCNC: 1.1 MG/DL (ref 0.7–1.2)
GFR AFRICAN AMERICAN: >60
GFR NON-AFRICAN AMERICAN: >60 ML/MIN/1.73
GLUCOSE BLD-MCNC: 168 MG/DL (ref 74–99)
POTASSIUM SERPL-SCNC: 3.4 MMOL/L (ref 3.5–5)
SODIUM BLD-SCNC: 134 MMOL/L (ref 132–146)

## 2021-09-03 PROCEDURE — 36415 COLL VENOUS BLD VENIPUNCTURE: CPT

## 2021-09-03 PROCEDURE — 80048 BASIC METABOLIC PNL TOTAL CA: CPT

## 2021-09-03 RX ORDER — METOLAZONE 2.5 MG/1
2.5 TABLET ORAL EVERY OTHER DAY
Qty: 15 TABLET | Refills: 2 | Status: SHIPPED
Start: 2021-09-03 | End: 2021-09-07

## 2021-09-03 NOTE — TELEPHONE ENCOUNTER
Discuss Medications    Jarad Salmon MD  You 44 minutes ago (12:04 PM)   RK  Decrease the metolazone to once every other day (currently once daily). [employer] text       Called pt. With above instructions. I asked him to get the bottle and verify dosage and directions. Metolazone 2.5 mg PO daily is on the bottle. I instructed him to take it every other day. I will send correct Rx to pharmacy with a note to put on hold until pt. Calls for refill. Pt. Verbalized understanding.      Fausto Kirkpatrick RN What Type Of Note Output Would You Prefer (Optional)?: Standard Output Hpi Title: Evaluation of Skin Lesions How Severe Are Your Spot(S)?: mild Have Your Spot(S) Been Treated In The Past?: has not been treated

## 2021-09-03 NOTE — TELEPHONE ENCOUNTER
Called pt. Denies any dizziness/lightheadedness. Does not check BP/HR at home because \"I'm at the doctor twice a week now. \" He used to take it in the past. Encouraged pt. To take BP/HR daily and keep a record of it. He states Mata Centeno has a million things to do\" I asked if he could at least do it 2-3 times weekly. He stated he would try. He states he overall feeling good. Had blood work (BMP) done this morning.      David Marie RN

## 2021-09-05 DIAGNOSIS — I50.32 CHRONIC HEART FAILURE WITH PRESERVED EJECTION FRACTION (HFPEF) (HCC): ICD-10-CM

## 2021-09-07 RX ORDER — METOLAZONE 2.5 MG/1
2.5 TABLET ORAL EVERY OTHER DAY
Qty: 45 TABLET | Refills: 3 | Status: SHIPPED
Start: 2021-09-07 | End: 2021-12-13 | Stop reason: SDUPTHER

## 2021-10-04 NOTE — PROGRESS NOTES
Called pt. To remind him of appt. On Oct. 5th and [address] , appt changed to Astrid 18. 12, 2021.

## 2021-10-05 ENCOUNTER — HOSPITAL ENCOUNTER (OUTPATIENT)
Dept: OTHER | Age: 61
Setting detail: THERAPIES SERIES
End: 2021-10-05
Payer: MEDICARE

## 2021-10-07 DIAGNOSIS — I50.32 CHRONIC HEART FAILURE WITH PRESERVED EJECTION FRACTION (HFPEF) (HCC): ICD-10-CM

## 2021-10-07 DIAGNOSIS — Z79.4 TYPE 2 DIABETES MELLITUS WITH DIABETIC POLYNEUROPATHY, WITH LONG-TERM CURRENT USE OF INSULIN (HCC): Primary | ICD-10-CM

## 2021-10-07 DIAGNOSIS — Z76.0 MEDICATION REFILL: ICD-10-CM

## 2021-10-07 DIAGNOSIS — I10 ESSENTIAL HYPERTENSION: ICD-10-CM

## 2021-10-07 DIAGNOSIS — E11.42 TYPE 2 DIABETES MELLITUS WITH DIABETIC POLYNEUROPATHY, WITH LONG-TERM CURRENT USE OF INSULIN (HCC): Primary | ICD-10-CM

## 2021-10-08 RX ORDER — INSULIN GLARGINE 100 [IU]/ML
42 INJECTION, SOLUTION SUBCUTANEOUS EVERY MORNING
Qty: 37.8 ML | Refills: 0 | Status: SHIPPED
Start: 2021-10-08 | End: 2021-11-19

## 2021-10-08 RX ORDER — METOPROLOL TARTRATE 50 MG/1
50 TABLET, FILM COATED ORAL 2 TIMES DAILY
Qty: 180 TABLET | Refills: 3 | Status: SHIPPED
Start: 2021-10-08 | End: 2022-07-05 | Stop reason: ALTCHOICE

## 2021-10-08 RX ORDER — AMLODIPINE BESYLATE 10 MG/1
10 TABLET ORAL DAILY
Qty: 90 TABLET | Refills: 3 | Status: SHIPPED
Start: 2021-10-08 | End: 2022-09-20

## 2021-10-08 RX ORDER — POTASSIUM CHLORIDE 20 MEQ/1
20 TABLET, EXTENDED RELEASE ORAL 2 TIMES DAILY
Qty: 180 TABLET | Refills: 1 | Status: SHIPPED
Start: 2021-10-08 | End: 2022-10-20

## 2021-10-08 RX ORDER — DOCUSATE SODIUM 100 MG/1
100 CAPSULE, LIQUID FILLED ORAL 2 TIMES DAILY
Qty: 90 CAPSULE | Refills: 1 | Status: SHIPPED | OUTPATIENT
Start: 2021-10-08

## 2021-10-08 RX ORDER — BUMETANIDE 1 MG/1
1 TABLET ORAL 2 TIMES DAILY
Qty: 180 TABLET | Refills: 3 | Status: SHIPPED
Start: 2021-10-08 | End: 2022-07-05

## 2021-10-08 RX ORDER — PEN NEEDLE, DIABETIC 31 GX5/16"
1 NEEDLE, DISPOSABLE MISCELLANEOUS NIGHTLY
Qty: 100 EACH | Refills: 3 | Status: SHIPPED
Start: 2021-10-08 | End: 2022-11-04

## 2021-10-08 RX ORDER — LOSARTAN POTASSIUM 50 MG/1
50 TABLET ORAL DAILY
Qty: 90 TABLET | Refills: 3 | Status: SHIPPED
Start: 2021-10-08 | End: 2022-09-26

## 2021-10-12 ENCOUNTER — HOSPITAL ENCOUNTER (OUTPATIENT)
Dept: OTHER | Age: 61
Setting detail: THERAPIES SERIES
Discharge: HOME | End: 2021-10-12
Payer: MEDICARE

## 2021-10-12 VITALS
DIASTOLIC BLOOD PRESSURE: 86 MMHG | WEIGHT: 282 LBS | HEART RATE: 88 BPM | SYSTOLIC BLOOD PRESSURE: 148 MMHG | OXYGEN SATURATION: 98 % | RESPIRATION RATE: 18 BRPM | BODY MASS INDEX: 42.88 KG/M2

## 2021-10-12 LAB
ANION GAP SERPL CALCULATED.3IONS-SCNC: 9 MMOL/L (ref 7–16)
BUN BLDV-MCNC: 11 MG/DL (ref 6–23)
CALCIUM SERPL-MCNC: 10.1 MG/DL (ref 8.6–10.2)
CHLORIDE BLD-SCNC: 93 MMOL/L (ref 98–107)
CO2: 31 MMOL/L (ref 22–29)
CREAT SERPL-MCNC: 0.9 MG/DL (ref 0.7–1.2)
GFR AFRICAN AMERICAN: >60
GFR NON-AFRICAN AMERICAN: >60 ML/MIN/1.73
GLUCOSE BLD-MCNC: 223 MG/DL (ref 74–99)
POTASSIUM SERPL-SCNC: 3.7 MMOL/L (ref 3.5–5)
PRO-BNP: 93 PG/ML (ref 0–125)
SODIUM BLD-SCNC: 133 MMOL/L (ref 132–146)

## 2021-10-12 PROCEDURE — 83880 ASSAY OF NATRIURETIC PEPTIDE: CPT

## 2021-10-12 PROCEDURE — 80048 BASIC METABOLIC PNL TOTAL CA: CPT

## 2021-10-12 PROCEDURE — 36415 COLL VENOUS BLD VENIPUNCTURE: CPT

## 2021-10-12 PROCEDURE — 99214 OFFICE O/P EST MOD 30 MIN: CPT

## 2021-10-12 NOTE — PROGRESS NOTES
[address] [facility] [place] Collins   1960  [phone]       Referring Doctor:   Cardiologist: Dr Patricia Lees  Nephrologist: Dr Phyllis Lyons        History of Present Illness:     Andrea Cummings is a 64 y.o. male with a history of HFpEF, most recent EF60% on 8-13-20. Patient Story:    He does  have some dyspnea with exertion, shortness of breath, or decline in overall functional capacity. He does not have orthopnea, PND, nocturnal cough or hemoptysis. He does not have abdominal distention or bloating, early satiety, anorexia/change in appetite. He does has a good urinary response to their oral diuretic. He does have very slight lower extremity edema. He denies lightheadedness, dizziness. He denies palpitations, syncope or near syncope. He does not complain of chest pain, pressure, discomfort. Allergies   Allergen Reactions    Lasix [Furosemide] Other (See Comments)     Per pt potassium was too low when on lasix    Lisinopril Swelling     Angioedema    Prunus Persica Anaphylaxis     Per report to [facility] manager, Sunil Ny. [address], BS-NDTR    Atenolol Other (See Comments) and Swelling     Thins his blood    Nitrates, Organic Other (See Comments)     Is using Sildenafil.   containdicated with Nitrates    Spironolactone      Swelling in the chest area         Outpatient Medications Marked as Taking for the 10/12/21 encounter [facility] Encounter) with [facility] ROOM 1   Medication Sig Dispense Refill    amLODIPine (NORVASC) 10 MG tablet Take 1 tablet by mouth daily 90 tablet 3    bumetanide (BUMEX) 1 MG tablet Take 1 tablet by mouth 2 times daily 180 tablet 3    insulin glargine (LANTUS SOLOSTAR) 100 UNIT/ML injection pen Inject 42 Units into the skin every morning 37.8 mL 0    losartan (COZAAR) 50 MG tablet Take 1 tablet by mouth daily 90 tablet 3    metoprolol tartrate (LOPRESSOR) 50 MG tablet Take 1 tablet by mouth 2 times daily 180 tablet 3    potassium chloride (KLOR-CON M) 20 MEQ extended release tablet Take 1 tablet by mouth 2 times daily (Patient taking differently: Take 20 mEq by mouth 2 times daily States he takes 2 tabs BID) 180 tablet 1    metOLazone (ZAROXOLYN) 2.5 MG tablet TAKE 1 TABLET BY MOUTH EVERY OTHER DAY 45 tablet 3    gabapentin (NEURONTIN) 800 MG tablet Take 800 mg by mouth 3 times daily.  eplerenone (INSPRA) 50 MG tablet Take 1 tablet by mouth daily 90 tablet 3    magnesium oxide (MAG-OX) 400 (240 Mg) MG tablet Take 1 tablet by mouth daily 30 tablet 0    imatinib (GLEEVEC) 400 MG chemo tablet TAKE 1 TABLET BY MOUTH ONCE DAILY AT THE SAME TIME WITH FOOD AND A LARGE GLASS OF WATER. DO NOT CRUSH TABLETS. AVOID GRAPEFRUIT PRODUCTS. 28 tablet 2    Cholecalciferol (VITAMIN D3) 2000 units CAPS Take 1 capsule by mouth daily      traMADol (ULTRAM) 50 MG tablet Take 50 mg by mouth every 6 hours as needed for Pain. .             Guideline directed medical:  ARNI/ACE I/ARB: Yes  Beta blocker:   Yes  Aldosterone antagonist:  Yes, States just started back taking Inspra about 2 days ago        Physical Examination:     BP (!) 148/86 Comment: PT ID NOT TAKE MEDS THIS AM  Pulse 88   Resp 18   Wt 282 lb (127.9 kg)   SpO2 98%   BMI 42.88 kg/m²     Assessment  Charting Type: Shift assessment                   Respiratory  Respiratory Pattern: Regular  Respiratory Depth: Normal  Respiratory Quality/Effort: Unlabored  Chest Assessment: Chest expansion symmetrical  L Breath Sounds: Diminished, Clear (current smoker)  R Breath Sounds: Diminished              Cardiac  Cardiac Rhythm: Sinus rhythm    Rhythm Interpretation  Pulse: 88         Gastrointestinal  Abdominal (WDL): Within Defined Limits               Peripheral Vascular  Peripheral Vascular (WDL): Exceptions to WDL  Edema: Right lower extremity, Left lower extremity  RLE Edema: Pitting, Trace  LLE Edema: Pitting, Trace                                                 Pulse: 88 LAB DATA:    BNP  No results for input(s): BNP in the last 72 hours. proBNP  No results for input(s): PROBNP in the last 72 hours. BMP  No results for input(s): NA, K, CL, CO2, BUN, CREATININE, GLUCOSE, CALCIUM in the last 72 hours. WEIGHTS:  Wt Readings from Last 3 Encounters:   10/12/21 282 lb (127.9 kg)   08/31/21 285 lb (129.3 kg)   08/17/21 287 lb 3.2 oz (130.3 kg)         TELEMETRY:  Cardiac Regularity: Regular  Cardiac Rhythm/Interpretation: NSR        ASSESSMENT:  Joshua Collins is evolemic with stable weights     Interventions completed this visit:  IV diuretics given no  Lab work obtained yes, BMP/BNP   Reviewed continue current medications that patient as prescribed answered any questions   Educated on signs and symptoms of HF  Educated on low sodium diet    PLAN:  Scheduled to follow up in CHF clinic on [address] [facility] [place] [phone]  Given clinic phone number and aware of signs and symptoms to call with any HF change in symptoms.

## 2021-10-15 ENCOUNTER — OFFICE VISIT (OUTPATIENT)
Dept: FAMILY MEDICINE CLINIC | Age: 61
End: 2021-10-15
Payer: MEDICARE

## 2021-10-15 VITALS
HEART RATE: 80 BPM | DIASTOLIC BLOOD PRESSURE: 73 MMHG | BODY MASS INDEX: 45.01 KG/M2 | WEIGHT: 297 LBS | SYSTOLIC BLOOD PRESSURE: 119 MMHG | TEMPERATURE: 98.2 F | OXYGEN SATURATION: 96 % | HEIGHT: 68 IN

## 2021-10-15 DIAGNOSIS — M10.9 ACUTE GOUT INVOLVING TOE OF RIGHT FOOT, UNSPECIFIED CAUSE: ICD-10-CM

## 2021-10-15 DIAGNOSIS — E11.42 TYPE 2 DIABETES MELLITUS WITH DIABETIC POLYNEUROPATHY, UNSPECIFIED WHETHER LONG TERM INSULIN USE (HCC): ICD-10-CM

## 2021-10-15 DIAGNOSIS — I50.32 CHRONIC HEART FAILURE WITH PRESERVED EJECTION FRACTION (HFPEF) (HCC): ICD-10-CM

## 2021-10-15 DIAGNOSIS — M10.9 ACUTE GOUT INVOLVING TOE OF RIGHT FOOT, UNSPECIFIED CAUSE: Primary | ICD-10-CM

## 2021-10-15 LAB
HBA1C MFR BLD: 8 %
URIC ACID, SERUM: 11.4 MG/DL (ref 3.4–7)

## 2021-10-15 PROCEDURE — 3052F HG A1C>EQUAL 8.0%<EQUAL 9.0%: CPT | Performed by: FAMILY MEDICINE

## 2021-10-15 PROCEDURE — 99213 OFFICE O/P EST LOW 20 MIN: CPT | Performed by: FAMILY MEDICINE

## 2021-10-15 PROCEDURE — 83036 HEMOGLOBIN GLYCOSYLATED A1C: CPT | Performed by: FAMILY MEDICINE

## 2021-10-15 PROCEDURE — 3017F COLORECTAL CA SCREEN DOC REV: CPT | Performed by: FAMILY MEDICINE

## 2021-10-15 PROCEDURE — G8484 FLU IMMUNIZE NO ADMIN: HCPCS | Performed by: FAMILY MEDICINE

## 2021-10-15 PROCEDURE — 36415 COLL VENOUS BLD VENIPUNCTURE: CPT | Performed by: FAMILY MEDICINE

## 2021-10-15 PROCEDURE — 4004F PT TOBACCO SCREEN RCVD TLK: CPT | Performed by: FAMILY MEDICINE

## 2021-10-15 PROCEDURE — G8417 CALC BMI ABV UP PARAM F/U: HCPCS | Performed by: FAMILY MEDICINE

## 2021-10-15 PROCEDURE — 99212 OFFICE O/P EST SF 10 MIN: CPT | Performed by: FAMILY MEDICINE

## 2021-10-15 PROCEDURE — G8427 DOCREV CUR MEDS BY ELIG CLIN: HCPCS | Performed by: FAMILY MEDICINE

## 2021-10-15 PROCEDURE — 2022F DILAT RTA XM EVC RTNOPTHY: CPT | Performed by: FAMILY MEDICINE

## 2021-10-15 RX ORDER — COLCHICINE 0.6 MG/1
0.6 TABLET ORAL DAILY
Qty: 30 TABLET | Refills: 0 | Status: SHIPPED
Start: 2021-10-15 | End: 2021-11-29 | Stop reason: SDUPTHER

## 2021-10-15 NOTE — PROGRESS NOTES
This is a 69-year-old male with past medical history of gout, CHF, HTN, DM type II who presents for multiple complaints. Acute gout flare  Patient states that he has noticed symptoms for the past 2 to 3 days, with increased swelling of his right big toe  States that previously when he was following with the [address] he was on allopurinol, but because he had had no flares for the past couple of years he stopped. States that the last flare was likely 8 to 9 years ago  Tried Tylenol at this time with no improvement  States that he is drinking diluted vodka, approximately 1 bottle every few days over the past 2 to 3 weeks. Unable to quantify how much she is drinking otherwise    CHF  Patient with history of CHF, last ejection fraction 60% August 2020  Does follow with Dr. Lashonda Whitfield on results of BMP, showing elevated glucose. Presently compliant on norvasc, losartan and lopressor    DM type II   Last hemoglobin A1c 8.3 -January 2021  Controlled on Lantus 20 units daily  Checks blood sugars maybe once weekly, elevated at 200  Noticing increased episodes of polyuria, denies any episodes of hypoglycemia  Patient states he has been drinking caffeinated beverages, energy drinks almost every other day. Has not looked into the sugar content of this drink. Last energy shot/drink this a.m. Patient states that he no longer has any more. Diet patient states that he has been incorporating more salads into his diet, but does eat sugary snacks as well. Patient was previously on Metformin, did self discontinue because he was on too many medications.   Following up with Dr. Lata Keating for colonoscopy in the next few weeks  Vaccinated with maternal for COVID-19  Declining flu shot today  Refusing LDCT at this time    Blood pressure 119/73, pulse 80, temperature 98.2 °F (36.8 °C), temperature source Temporal, height 5' 8\" (1.727 m), weight 297 lb (134.7 kg), SpO2 96 %.     Heart regular    Lungs clear No edema   MSK: Right large metatarsal inflammation/erythema. TTP over metatarsal joint, with minimal movement secondary to pain. TTP over bilateral lower extremities  Pulses intact     A/P  Acute gout flare  Trial colchicine  Obtain uric acid level  Advice low purine diet  Advice limit alcohol use and energy drinks    CHF  Continue present management  Continue to follow with cardiology    DM type II  Repeat hemoglobin A1c today  Diabetic foot exam performed today ; polyneuropathy  Advised limited energy drinks, sugar intake  Advised checking sugars  Advised checking once to twice daily, with one premeal  Advise appropriate diet changes    Attending Physician Statement  I have discussed the case, including pertinent history and exam findings with the resident. I agree with the documented assessment and plan.

## 2021-10-15 NOTE — PROGRESS NOTES
CC:  Acute gout flare    HPI:  64 y.o. male with pmh of CHF , DM II, HTN who presents for multiple concerns. Acute gout flare  Patient states that he has noticed symptoms for the past 2 to 3 days, with increased swelling of his right big toe  States that previously when he was following with the [place] he was on allopurinol, but because he had had no flares for the past couple of years he stopped. States that the last flare was likely 8 to 9 years ago  Tried Tylenol at this time with no improvement  States that he is drinking diluted vodka, approximately 1 bottle every few days over the past 2 to 3 weeks. Unable to quantify how much he is drinking otherwise     CHF  Patient with history of CHF, last ejection fraction 60% August 2020  Does follow with Dr. Zaid Gomez on results of BMP, showing elevated glucose. Presently compliant on norvasc, losartan and lopressor     DM type II   Last hemoglobin A1c 8.3 -January 2021  Controlled on Lantus 20 units daily  Checks blood sugars maybe once weekly, elevated at 200  Noticing increased episodes of polyuria, denies any episodes of hypoglycemia  Patient states he has been drinking caffeinated beverages, energy drinks almost every other day. Has not looked into the sugar content of this drink. Last energy shot/drink this a.m. Patient states that he no longer has any more. Diet patient states that he has been incorporating more salads into his diet, but does eat sugary snacks as well.   Patient was previously on Metformin, did self discontinue because he was on too many medications.       Following up with Dr. Mica Hoffman for colonoscopy in the next few weeks  Vaccinated with maternal for COVID-19  Declining flu shot today  Refusing LDCT at this time    Patient Active Problem List    Diagnosis Date Noted    GIST (gastrointestinal stroma tumor), malignant, colon ([facility] [address] 01/05/2020    Chronic heart failure with preserved ejection fraction (HFpEF) ([facility] 01/05/2020    Primary osteoarthritis of right knee 04/26/2019    Synovial cyst of right popliteal space 11/19/2018    Hypomagnesemia 08/17/2018    Bilateral leg edema 08/17/2018    Tobacco abuse     Uncontrolled type 2 diabetes mellitus with hyperglycemia (HCC)     Mixed hyperlipidemia 11/14/2016    Recurrent major depressive disorder, in remission ([facility] 11/14/2016    Carpal tunnel syndrome 11/14/2016    Obstructive sleep apnea syndrome 11/14/2016    Essential hypertension 11/01/2016    Type 2 diabetes mellitus (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 6/3/2019    Hyperlipidemia     Hypertension     Hypokalemia     Neuropathy     Obesity     bmi 43.1 weight 283 #    Sleep apnea     c pap       Current Outpatient Medications on File Prior to Visit   Medication Sig Dispense Refill    amLODIPine (NORVASC) 10 MG tablet Take 1 tablet by mouth daily 90 tablet 3    bumetanide (BUMEX) 1 MG tablet Take 1 tablet by mouth 2 times daily 180 tablet 3    insulin glargine (LANTUS SOLOSTAR) 100 UNIT/ML injection pen Inject 42 Units into the skin every morning 37.8 mL 0    losartan (COZAAR) 50 MG tablet Take 1 tablet by mouth daily 90 tablet 3    metoprolol tartrate (LOPRESSOR) 50 MG tablet Take 1 tablet by mouth 2 times daily 180 tablet 3    potassium chloride (KLOR-CON M) 20 MEQ extended release tablet Take 1 tablet by mouth 2 times daily (Patient taking differently: Take 20 mEq by mouth 2 times daily States he takes 2 tabs BID) 180 tablet 1    B-D ULTRAFINE III SHORT PEN 31G X 8 MM MISC Inject 1 each into the skin nightly 100 each 3    docusate sodium (COLACE) 100 MG capsule Take 1 capsule by mouth 2 times daily 90 capsule 1    metOLazone (ZAROXOLYN) 2.5 MG tablet TAKE 1 TABLET BY MOUTH EVERY OTHER DAY 45 tablet 3    gabapentin (NEURONTIN) 800 MG tablet Take 800 mg by mouth 3 times daily.  eplerenone (INSPRA) 50 MG tablet Take 1 tablet by mouth daily 90 tablet 3    ammonium lactate (AMLACTIN) 12 % cream APPLY A SUFFICIENT AMOUNT EXTERNALLY EVERY DAY FOR DRY SKIN ON FEET AND LEGS **DO NOT APPLY BETWEEN TOES**      magnesium oxide (MAG-OX) 400 (240 Mg) MG tablet Take 1 tablet by mouth daily 30 tablet 0    Compression Stockings MISC by Does not apply route As directed  25 mmHg , 1 pair 1 each 0    imatinib (GLEEVEC) 400 MG chemo tablet TAKE 1 TABLET BY MOUTH ONCE DAILY AT THE SAME TIME WITH FOOD AND A LARGE GLASS OF WATER. DO NOT CRUSH TABLETS. AVOID GRAPEFRUIT PRODUCTS. 28 tablet 2    acetaminophen (TYLENOL) 500 MG tablet Take 500 mg by mouth every 6 hours as needed       Blood Glucose Monitoring Suppl (ONETOUCH VERIO FLEX SYSTEM) w/Device KIT AS DIRECTED  0    Cholecalciferol (VITAMIN D3) 2000 units CAPS Take 1 capsule by mouth daily      traMADol (ULTRAM) 50 MG tablet Take 50 mg by mouth every 6 hours as needed for Pain. Prakash Matthews  sildenafil (VIAGRA) 100 MG tablet Take 100 mg by mouth as needed. Take an hour before sex. Med info obtained from [facility] records. No current facility-administered medications on file prior to visit. Allergies   Allergen Reactions    Lasix [Furosemide] Other (See Comments)     Per pt potassium was too low when on lasix    Lisinopril Swelling     Angioedema    Prunus Persica Anaphylaxis     Per report to [facility] manager, [employer]. [address], BS-NDTR    Atenolol Other (See Comments) and Swelling     Thins his blood    Nitrates, Organic Other (See Comments)     Is using Sildenafil. containdicated with Nitrates    Spironolactone      Swelling in the chest area       No family history on file.     Past Surgical History:   Procedure Laterality Date    COLONOSCOPY  09/14/2018    polyp--eliane    NASAL SEPTUM SURGERY      PA COLONOSCOPY W/BIOPSY SINGLE/MULTIPLE N/A 9/14/2018    COLONOSCOPY WITH BIOPSY performed by Norma Gleason MD at [address] TUTTLE W/RMVL OF TUMOR POLYP LESION SNARE TQ  9/14/2018    COLONOSCOPY POLYPECTOMY SNARE/COLD BIOPSY performed by Norma Gleason MD at [address] TISSUE NECK/THORAX  5/26/2021    US BIOPSY SOFT TISSUE NECK/THORAX 5/26/2021 [facility]       Social History     Tobacco Use    Smoking status: Current Every Day Smoker     Packs/day: 1.00     Years: 31.00     Pack years: 31.00     Types: Cigarettes    Smokeless tobacco: Never Used   Vaping Use    Vaping Use: Never used   Substance Use Topics    Alcohol use: Yes     Comment: occ    Drug use: Yes     Frequency: 7.0 times per week     Types: Marijuana     Comment: daily use       ROS:    Review of Systems   Constitutional: Negative for appetite change and fever. HENT: Negative for sinus pressure and sinus pain. Respiratory: Negative for apnea and shortness of breath. Cardiovascular: Positive for leg swelling. Negative for chest pain. Gastrointestinal: Negative for abdominal pain, nausea and vomiting. Genitourinary: Negative for difficulty urinating and dysuria. Musculoskeletal: Positive for arthralgias, joint swelling and myalgias. Neurological: Negative for dizziness and weakness. Psychiatric/Behavioral: Negative for sleep disturbance. The patient is not nervous/anxious. Objective:    VS:  Blood pressure 119/73, pulse 80, temperature 98.2 °F (36.8 °C), temperature source Temporal, height 5' 8\" (1.727 m), weight 297 lb (134.7 kg), SpO2 96 %. Heart regular    Lungs clear No edema   MSK: Right large metatarsal inflammation/erythema. TTP over metatarsal joint, with minimal movement secondary to pain.  TTP over bilateral lower extremities  Neuro exam: monofilament exam with limited to no sensation bilaterally, impaired proprioception - patient with history of neuropathy. Pulses intact       Assessment/Plan    1. Acute gout involving toe of right foot, unspecified cause  - patient with right metatarsal erythema, tenderness and swelling ; increasing likelihood of gout  - due to patients comorbid medical conditions ; will trial colchicine. - obtain uric acid for confirmation    2. Type 2 diabetes mellitus with diabetic polyneuropathy, unspecified whether long term insulin use (HCC)  - repeat hemoglobin A1C of 8 today  - continue present management ; lantus 20 units dailt  - Diabetic Foot Exam performed today  - Advised limited energy drinks, sugar intake  - Advised checking sugars at least once-twice daily with one premeal blood sugar  - Advise appropriate diet changes    3. Chronic heart failure with preserved ejection fraction (HFpEF) ([facility] [address]  - continue present management  - continue to follow with cardiology      RTO 2-4 weeks for follow up gout or sooner prn for any persistent, new, or worsening symptoms. Please see Patient Instructions for further counseling and information given. Advised to please be adherent to the treatment plans discussed today, and please call with any questions or concerns, letting the office know of any reasons that the plans may not be followed. The risks of untreated conditions include worsening illness, injury, disability, and possibly, death. Please call if symptoms change in any way, worsen, or fail to completely resolve, as this could necessitate a change to treatment plans. Patient and/or caregiver expressed understanding. Indications and proper use of medication(s) reviewed. Potential side-effects and risks of medication(s) also explained. Patient and/or caregiver was instructed to call if any new symptoms develop prior to next visit. Health risk factors discussed and addressed.      Electronically signed by Laura Dwyer MD PGY-2 on 10/15/2021 at 9:42 AM  This case was discussed with attending physician: Dr. Mackenzie Massey

## 2021-10-17 ASSESSMENT — ENCOUNTER SYMPTOMS
SINUS PRESSURE: 0
APNEA: 0
SHORTNESS OF BREATH: 0
SINUS PAIN: 0
NAUSEA: 0
ABDOMINAL PAIN: 0
VOMITING: 0

## 2021-10-18 ENCOUNTER — OFFICE VISIT (OUTPATIENT)
Dept: SURGERY | Age: 61
End: 2021-10-18
Payer: MEDICARE

## 2021-10-18 VITALS
BODY MASS INDEX: 42.59 KG/M2 | OXYGEN SATURATION: 98 % | HEIGHT: 68 IN | HEART RATE: 80 BPM | WEIGHT: 281 LBS | DIASTOLIC BLOOD PRESSURE: 72 MMHG | RESPIRATION RATE: 16 BRPM | SYSTOLIC BLOOD PRESSURE: 114 MMHG

## 2021-10-18 DIAGNOSIS — Z86.010 HX OF ADENOMATOUS POLYP OF COLON: Primary | ICD-10-CM

## 2021-10-18 PROCEDURE — 4004F PT TOBACCO SCREEN RCVD TLK: CPT | Performed by: SURGERY

## 2021-10-18 PROCEDURE — G8427 DOCREV CUR MEDS BY ELIG CLIN: HCPCS | Performed by: SURGERY

## 2021-10-18 PROCEDURE — G8484 FLU IMMUNIZE NO ADMIN: HCPCS | Performed by: SURGERY

## 2021-10-18 PROCEDURE — 3017F COLORECTAL CA SCREEN DOC REV: CPT | Performed by: SURGERY

## 2021-10-18 PROCEDURE — 99214 OFFICE O/P EST MOD 30 MIN: CPT | Performed by: SURGERY

## 2021-10-18 PROCEDURE — G8417 CALC BMI ABV UP PARAM F/U: HCPCS | Performed by: SURGERY

## 2021-10-18 ASSESSMENT — ENCOUNTER SYMPTOMS
CHEST TIGHTNESS: 0
BACK PAIN: 0
SHORTNESS OF BREATH: 0
ABDOMINAL PAIN: 0
DIARRHEA: 0
ABDOMINAL DISTENTION: 0
COUGH: 0
CONSTIPATION: 0
EYES NEGATIVE: 1
WHEEZING: 0
ANAL BLEEDING: 0
CHOKING: 0
VOMITING: 0
BLOOD IN STOOL: 0
NAUSEA: 0
COLOR CHANGE: 0

## 2021-10-18 NOTE — PATIENT INSTRUCTIONS
Call [phone] for any questions/concerns. [facility]  MAGNESIUM CITRATE/DULCOLAX TABLETS  COLON PREP FOR COLONOSCOPY OR COLON SURGERY    It is very important that you follow all of the instructions listed on this sheet carefully (they may be slightly different than the directions on the product that you purchase at the pharmacy) to ensure that you colon is adequately cleaned out or you risk of complications could be increased. 2 Days or More Before Endoscopy:   Obtain two 8 to 10-ounce bottles of Magnesium Citrate and 1 bottle of Dulcolax tablets from the pharmacy.  Do not eat corn, tomatoes, peas or watermelon 3 to 5 days before procedure.  7:00pm Take 4 Dulcolax tablets and drink at least 8oz of clear liquids. 1 Day Before the Endoscopy:   No solid food - only clear liquids (soup, jello, or juice that you can see through with no solid food) for breakfast, lunch and supper. DO NOT drink or eat anything that is red as it will turn the inside of the colon red and look like blood. Nothing to eat or drink after midnight.  Have at least 8 oz or more of clear liquids for breakfast (7am to 8am) and lunch (11:30am to 12:30pm).  1:00pm Drink at least 8oz of clear liquids.  2:00pm Drink at least 8oz of clear liquids.  3:00pm Drink at least 8oz of clear liquids.  4:00pm Drink an 8 to 10oz bottle of Magnesium Citrate followed immediately by at least 8oz of clear liquids.  5:00pm Drink at least 8oz of clear liquids.  6:00pm Dinner - all clear liquids.  7:00pm Take 4 Dulcolax tablets and drink at least 8oz of clear liquids.  8:00pm Drink at least 8oz of clear liquids.  9:00pm Drink an 8 to 10oz bottle of Magnesium Citrate followed immediately by at least 8oz of clear liquids. Day of Endoscopy:   Nothing to eat or drink after midnight the night before the endoscopy.   However, if you are taking any blood pressure medications or heart medications, you should take them with a sip of water.  If you are on INSULIN or OTHER DIABETIC MEDICATIONS, then check with your primary care physician as to how to adjust your medication while on clear liquid diet and when nothing by mouth. Patient Information and Instructions for Colonoscopy         Definition of Colonoscopy   A colonoscopy is the visual exam of the rectum and colon (large intestine). The exam is done with a tool called a colonoscope. The colonoscope is a flexible tube with a tiny camera on the end. This instrument allows the doctor to view the inside of your rectum and colon. Sigmoidoscopy is a shorter scope that views only the last one third of the colon. Reasons for Colonoscopy   It is used to examine, diagnose, and treat problems in your large intestine. The procedure is most often done for the following reasons: To determine the cause of abdominal pain, rectal bleeding, or a change in bowel habits   To detect and treat colon cancer or colon polyps   To obtain tissue samples for testing   To stop intestinal bleeding   Monitor response to treatment if you have inflammatory bowel disease     Possible Complications   Complications are rare, but no procedure is completely free of risk. If you are planning to have a colonoscopy, your doctor will review a list of possible complications, which may include:   Bleeding   Reaction to the sedation causing drop in your blood pressure or problems breathing  Perforation or puncture of the bowel     Factors that may increase the risk of complications include:   Pre-existing heart or kidney condition   Treatment with certain medicines, including aspirin and other drugs with anticoagulant or blood-thinning properties   Prior abdominal surgery or radiation treatments   Active colitis , diverticulitis , or other acute bowel disease   Previous treatment with radiation therapy     Be sure to discuss these risks with your doctor before the procedure.      What to Expect   Prior to Procedure   Your doctor will likely do the following:   Physical exam   Health history   Review of medicines   Test your stool for hidden blood (called \"occult blood\")     Your colon must be completely clean before the procedure. Any stool left in the intestine will block the view. This preparation may start several days before the procedure. Follow your doctor's instructions. Leading up to your procedure:   Talk to your doctor about your medicines. You may be asked to stop taking some medicines up to one week before the procedure, like:   Anti-inflammatory drugs (e.g., aspirin )   Blood thinners like clopidogrel (Plavix) or warfarin (Coumadin)   Iron supplements or vitamins containing iron   The day or days before your procedure, go on a clear liquid diet (clear broth, clear juice, clear jello) with no red coloring  Do not eat or drink anything after midnight. Wear comfortable clothing. If you have diabetes, ask your doctor if you need to adjust your diabetes medicine on the day prior to your procedure and the day of your procedure. Arrange for a ride home after the procedure. Anesthesia   You will receive intravenous sedation medicine for the procedure so you will not feel anything during the procedure. Description of the Procedure   You will lie on your left side with knees bent and drawn up toward your chest. The colonoscope will be slowly inserted through the rectum and into the bowel. The colonoscope will inject air into the colon. A small attached video camera will allow the doctor to view the colon's lining on a screen. The doctor will continue guiding the tool through the bowel and assess the lining. A tissue sample or polyps may be removed during the procedure. How Long Will It Take? Usually it takes about 30 to 45 minutes     Will It Hurt? Most people do not feel anything during the procedure and will not remember the procedure.       After the procedure, gas pains and cramping are common. These pains should go away with the passing of gas. Post-procedure Care   If any tissue was removed: It will be sent to a lab to be examined. It may take 1-2 weeks for results. The doctor will usually give an initial report after the scope is removed. Other tests may be recommended. A small amount of bleeding may occur during the first few days after the procedure. When you return home after the procedure, be sure to follow your doctor's instructions, which may include:   Resume medicines as instructed by your doctor. Resume normal diet, unless directed otherwise by your doctor. The sedative will make you drowsy. Avoid driving, operating machinery, or making important decisions for the rest of the day. Rest for the remainder of the day. After arriving home, contact your doctor if any of the following occurs:   Bleeding from your rectum, notify your doctor if you pass a teaspoonful of blood or more. Black, tarry stools   Severe abdominal pain   Hard, swollen abdomen   Signs of infection, including fever or chills   Inability to pass gas or stool   Coughing, shortness of breath, chest pain, severe nausea or vomiting     In case of an emergency, CALL 911 .

## 2021-10-18 NOTE — PROGRESS NOTES
Subjective:      Patient ID: Eboni Britt is a 64 y.o. male. HPI  64 yr old male with hx of tubular adenoma on prior colonoscopy 9/2018. Pt denies abd pain, change in bowel habits, blood in stool, unintentional weight loss, or family hx of colon cancer.     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    Arthritis     Carpal tunnel syndrome     Cecum mass     CHF (congestive heart failure) (HCC)     Diabetes mellitus [facility]     Gastrointestinal stromal tumor (GIST) ([facility] 6/3/2019    Hyperlipidemia     Hypertension     Hypokalemia     Neuropathy     Obesity     bmi 43.1 weight 283 #    Sleep apnea     c pap       Past Surgical History:   Procedure Laterality Date    COLONOSCOPY  09/14/2018    polyp--eliane    NASAL SEPTUM SURGERY      OK COLONOSCOPY W/BIOPSY SINGLE/MULTIPLE N/A 9/14/2018    COLONOSCOPY WITH BIOPSY performed by Tello Ordonez MD at [address] TUTTLE W/RMVL OF TUMOR POLYP LESION [address] TQ  9/14/2018    COLONOSCOPY POLYPECTOMY SNARE/COLD BIOPSY performed by Tello Ordonez MD at [place] [address] NECK/THORAX  5/26/2021    US BIOPSY SOFT TISSUE NECK/THORAX 5/26/2021 [facility] ULTRASOUND       Current Outpatient Medications   Medication Sig Dispense Refill    colchicine (COLCRYS) 0.6 MG tablet Take 1 tablet by mouth daily 30 tablet 0    amLODIPine (NORVASC) 10 MG tablet Take 1 tablet by mouth daily 90 tablet 3    bumetanide (BUMEX) 1 MG tablet Take 1 tablet by mouth 2 times daily 180 tablet 3    insulin glargine (LANTUS SOLOSTAR) 100 UNIT/ML injection pen Inject 42 Units into the skin every morning 37.8 mL 0    losartan (COZAAR) 50 MG tablet Take 1 tablet by mouth daily 90 tablet 3    metoprolol tartrate (LOPRESSOR) 50 MG tablet Take 1 tablet by mouth 2 times daily 180 tablet 3    potassium chloride (KLOR-CON M) 20 MEQ extended release tablet Take 1 tablet by mouth 2 times daily (Patient taking differently: Take 20 mEq by mouth 2 times daily States he takes 2 tabs BID) 180 tablet 1    B-D ULTRAFINE III SHORT PEN 31G X 8 MM MISC Inject 1 each into the skin nightly 100 each 3    docusate sodium (COLACE) 100 MG capsule Take 1 capsule by mouth 2 times daily 90 capsule 1    metOLazone (ZAROXOLYN) 2.5 MG tablet TAKE 1 TABLET BY MOUTH EVERY OTHER DAY 45 tablet 3    gabapentin (NEURONTIN) 800 MG tablet Take 800 mg by mouth 3 times daily.  eplerenone (INSPRA) 50 MG tablet Take 1 tablet by mouth daily 90 tablet 3    ammonium lactate (AMLACTIN) 12 % cream APPLY A SUFFICIENT AMOUNT EXTERNALLY EVERY DAY FOR DRY SKIN ON FEET AND LEGS **DO NOT APPLY BETWEEN TOES**      magnesium oxide (MAG-OX) 400 (240 Mg) MG tablet Take 1 tablet by mouth daily 30 tablet 0    Compression Stockings MISC by Does not apply route As directed  25 mmHg , 1 pair 1 each 0    imatinib (GLEEVEC) 400 MG chemo tablet TAKE 1 TABLET BY MOUTH ONCE DAILY AT THE SAME TIME WITH FOOD AND A LARGE GLASS OF WATER. DO NOT CRUSH TABLETS. AVOID GRAPEFRUIT PRODUCTS. 28 tablet 2    acetaminophen (TYLENOL) 500 MG tablet Take 500 mg by mouth every 6 hours as needed       Blood Glucose Monitoring Suppl (ONETOUCH VERIO FLEX SYSTEM) w/Device KIT AS DIRECTED  0    Cholecalciferol (VITAMIN D3) 2000 units CAPS Take 1 capsule by mouth daily      traMADol (ULTRAM) 50 MG tablet Take 50 mg by mouth every 6 hours as needed for Pain. Margwiltte Capone  sildenafil (VIAGRA) 100 MG tablet Take 100 mg by mouth as needed. Take an hour before sex. Med info obtained from [place] records. No current facility-administered medications for this visit. Allergies   Allergen Reactions    Lasix [Furosemide] Other (See Comments)     Per pt potassium was too low when on lasix    Lisinopril Swelling     Angioedema    Prunus Persica Anaphylaxis     Per report to [facility] manager, Lucas Prieto.   Zoraida Henry, BS-NDTR    Atenolol Other (See Comments) and Swelling     Thins his blood    Nitrates, Organic Other (See Comments)     Is using Sildenafil. containdicated with Nitrates    Spironolactone      Swelling in the chest area       Family History   Problem Relation Age of Onset    Other Mother         COVID    Heart Failure Father        Social History     Socioeconomic History    Marital status:      Spouse name: Not on file    Number of children: Not on file    Years of education: Not on file    Highest education level: Not on file   Occupational History    Not on file   Tobacco Use    Smoking status: Current Every Day Smoker     Packs/day: 0.50     Years: 31.00     Pack years: 15.50     Types: Cigarettes    Smokeless tobacco: Never Used   Vaping Use    Vaping Use: Never used   Substance and Sexual Activity    Alcohol use: Yes     Comment: pint every 2 days    Drug use: Yes     Frequency: 7.0 times per week     Types: Marijuana     Comment: daily use    Sexual activity: Not Currently   Other Topics Concern    Not on file   Social History Narrative    Drinks 1 cup of decaf coffee/tea daily. Social Determinants of Health     Financial Resource Strain: Low Risk     Difficulty of Paying Living Expenses: Not very hard   Food Insecurity: No Food Insecurity    Worried About Running Out of Food in the Last Year: Never true    Roberto of Food in the Last Year: Never true   Transportation Needs:     Lack of Transportation (Medical):      Lack of Transportation (Non-Medical):    Physical Activity:     Days of Exercise per Week:     Minutes of Exercise per Session:    Stress:     Feeling of Stress :    Social Connections:     Frequency of Communication with Friends and Family:     Frequency of Social Gatherings with Friends and Family:     Attends [employer] Services:     Active Member of Clubs or Organizations:     Attends Club or Organization Meetings:     Marital Status:    Intimate Partner Violence:     Fear of Current or Ex-Partner:     Emotionally Abused:     Physically Abused:     Sexually Abused:      Review of Systems   Constitutional: Negative for activity change, appetite change, chills, fever and unexpected weight change. HENT: Negative. Eyes: Negative. Respiratory: Negative for cough, choking, chest tightness, shortness of breath and wheezing. Cardiovascular: Negative for chest pain, palpitations and leg swelling. Gastrointestinal: Negative for abdominal distention, abdominal pain, anal bleeding, blood in stool, constipation, diarrhea, nausea and vomiting. Endocrine: Positive for polydipsia and polyuria. Negative for cold intolerance and heat intolerance. Genitourinary: Positive for frequency and urgency. Negative for discharge, dysuria, hematuria, penile pain, penile swelling, scrotal swelling and testicular pain. Musculoskeletal: Negative for arthralgias, back pain, gait problem, joint swelling, myalgias, neck pain and neck stiffness. Skin: Negative for color change, pallor, rash and wound. Allergic/Immunologic: Negative for environmental allergies and food allergies. Neurological: Positive for numbness. Negative for dizziness, seizures, syncope, weakness, light-headedness and headaches. Hematological: Negative for adenopathy. Does not bruise/bleed easily. Psychiatric/Behavioral: Negative for agitation, confusion, decreased concentration, hallucinations, self-injury and suicidal ideas. The patient is not nervous/anxious and is not hyperactive. Objective:   Physical Exam  Constitutional:       General: He is not in acute distress. Appearance: Normal appearance. He is obese. He is not ill-appearing, toxic-appearing or diaphoretic. HENT:      Head: Normocephalic and atraumatic. Nose: Nose normal.      Mouth/Throat:      Mouth: Mucous membranes are moist.      Pharynx: Oropharynx is clear. Eyes:      General: No scleral icterus. Right eye: No discharge.          Left eye: No discharge. Extraocular Movements: Extraocular movements intact. Pupils: Pupils are equal, round, and reactive to light. Cardiovascular:      Rate and Rhythm: Normal rate and regular rhythm. Heart sounds: Normal heart sounds. No murmur heard. Pulmonary:      Effort: Pulmonary effort is normal. No respiratory distress. Breath sounds: Normal breath sounds. No stridor. No wheezing, rhonchi or rales. Abdominal:      General: Bowel sounds are normal. There is no distension. Palpations: Abdomen is soft. There is no mass. Tenderness: There is no abdominal tenderness. There is no guarding or rebound. Hernia: No hernia is present. Musculoskeletal:         General: Normal range of motion. Cervical back: Normal range of motion and neck supple. Skin:     General: Skin is warm and dry. Neurological:      General: No focal deficit present. Mental Status: He is alert and oriented to person, place, and time. Psychiatric:         Mood and Affect: Mood normal.         Behavior: Behavior normal.         Thought Content: Thought content normal.         Judgment: Judgment normal.         Assessment:      Hx of tubular adenoma      Plan:      Recommend colonoscopy. The patient was explained the risks/benefits/alternatives/expected outcomes of the procedure. The patient was explained the risks of the procedure, including, but not limited to, the risk of reaction to the anesthesia medicine and the risk of perforation requiring further surgery. The patient was informed that they may require biopsy or polypectomy. These procedures may increase the risk of complication. All questions were answered. The patient verbalized understanding and agreed to proceed.           Elver Butler MD

## 2021-10-22 ENCOUNTER — TELEPHONE (OUTPATIENT)
Dept: FAMILY MEDICINE CLINIC | Age: 61
End: 2021-10-22

## 2021-10-22 NOTE — TELEPHONE ENCOUNTER
----- Message from Niecy Almodovar sent at 10/22/2021 11:01 AM EDT -----  Subject: Results Request    QUESTIONS  Which lab or imaging result is the patient calling about? Bloodwork  Which provider ordered the test? Elizabeth Gearing   At what location was the test performed? Date the test was performed? 2021-10-15  Additional Information for Provider? Pt would like return call regarding   lab work to know what is A1C level is. Please return call  ---------------------------------------------------------------------------  --------------  CALL BACK INFO  What is the best way for the office to contact you? OK to leave message on   voicemail  Preferred Call Back Phone Number?  [phone]

## 2021-10-25 ENCOUNTER — TELEPHONE (OUTPATIENT)
Dept: FAMILY MEDICINE CLINIC | Age: 61
End: 2021-10-25

## 2021-10-25 NOTE — TELEPHONE ENCOUNTER
Called patient and informed of results  HbA1C 8.0% , no changes at this time  Patient doing well over all    Will follow up next visit

## 2021-11-18 ENCOUNTER — TELEPHONE (OUTPATIENT)
Dept: ENT CLINIC | Age: 61
End: 2021-11-18

## 2021-11-18 DIAGNOSIS — Z76.0 MEDICATION REFILL: ICD-10-CM

## 2021-11-18 NOTE — TELEPHONE ENCOUNTER
[facility] to authorize order with patient insurance. Patient is scheduled for thyroid ultrasound with radiology on 1/4/22 @ 8:30am. Patient has been notified of date and time and that they need to arrive at 8:00am. Patient was informed he has no prep prior to procedure. [facility] to [facility]. Detail voicemail left for patient.     Electronically signed by Luis Felipe Darby MA on 11/18/21 at 11:08 AM EST

## 2021-11-19 RX ORDER — INSULIN GLARGINE 100 [IU]/ML
INJECTION, SOLUTION SUBCUTANEOUS
Qty: 39 ML | Refills: 2 | Status: SHIPPED
Start: 2021-11-19 | End: 2022-02-01 | Stop reason: SDUPTHER

## 2021-11-19 NOTE — TELEPHONE ENCOUNTER
Last Appointment:  10/15/2021  Future Appointments   Date Time Provider Alek Goldman   12/7/2021  8:15 AM [facility] CHF ROOM 1 [facility]   1/4/2022  8:30 AM [facility] US RM 1 [facility]   1/7/2022  9:00 AM DO Dorene Salceod [facility]

## 2021-11-29 DIAGNOSIS — M10.9 ACUTE GOUT INVOLVING TOE OF RIGHT FOOT, UNSPECIFIED CAUSE: ICD-10-CM

## 2021-11-29 RX ORDER — COLCHICINE 0.6 MG/1
0.6 TABLET ORAL DAILY
Qty: 30 TABLET | Refills: 0 | Status: SHIPPED
Start: 2021-11-29 | End: 2022-01-24 | Stop reason: SDUPTHER

## 2021-11-29 NOTE — TELEPHONE ENCOUNTER
----- Message from [facility] sent at 11/26/2021  8:28 AM EST -----  Subject: Refill Request    QUESTIONS  Name of Medication? colchicine (COLCRYS) 0.6 MG tablet  Patient-reported dosage and instructions? Take 1 tablet by mouth daily  How many days do you have left? 0  [id number]  Pharmacy phone number (if available)? [phone]  Additional Information for Provider? Patient called on 11/26 stating that   the above medication had worked prior, last 2 days gout came back and   wanted to see if provider could refill the medication for him. Wanted to   see if could get a small amount of the med in the meantime of seeing her.   ---------------------------------------------------------------------------  --------------  [address]  What is the best way for the office to contact you? OK to leave message on   voicemail  Preferred Call Back Phone Number?  [phone]

## 2021-12-03 ENCOUNTER — TELEPHONE (OUTPATIENT)
Dept: SURGERY | Age: 61
End: 2021-12-03

## 2021-12-03 NOTE — TELEPHONE ENCOUNTER
Prior Authorization Form:      DEMOGRAPHICS:                     Patient Name:  Joshua Collins  Patient :  1960            Insurance:  Payor: [employer] / Plan: Kaila Hicks COMPLETE / Product Type: *No Product type* /   Insurance ID Number:    Payor/Plan Subscr  Sex Relation Sub. Ins. ID Effective Group Num   1.  [address] 1960 Male Self [id number]                                   [address] [address]         DIAGNOSIS & PROCEDURE:                       Procedure/Operation: COLONOSCOPY           CPT Code: 87451    Diagnosis:  HISTORY OFADENOMATOUS POLYP OF COLON    ICD10 Code: Z86.010    Location:  [facility]    Surgeon:  DR. Mayank Weber    SCHEDULING INFORMATION:                          Date: 22    Time: 7AM             Anesthesia:  LMAC                                                       Status:  OUTPATIENT       Special Comments:  N/A    Electronically signed by Anju Buck MA on 12/3/2021 at 10:11 AM

## 2021-12-03 NOTE — TELEPHONE ENCOUNTER
Scheduled pt for Colonoscopy 2/18/22 at 7am. Pt needs to arrive at [address] at [facility]. Patient confirmed date and time, address and directions given in office. Prep instructions given in office, patient understood.     Electronically signed by Jairo Rowland MA on 12/3/21 at 10:10 AM EST

## 2021-12-13 ENCOUNTER — OFFICE VISIT (OUTPATIENT)
Dept: FAMILY MEDICINE CLINIC | Age: 61
End: 2021-12-13
Payer: MEDICARE

## 2021-12-13 VITALS
WEIGHT: 286 LBS | TEMPERATURE: 97.5 F | OXYGEN SATURATION: 98 % | HEIGHT: 68 IN | HEART RATE: 86 BPM | SYSTOLIC BLOOD PRESSURE: 124 MMHG | RESPIRATION RATE: 20 BRPM | BODY MASS INDEX: 43.35 KG/M2 | DIASTOLIC BLOOD PRESSURE: 70 MMHG

## 2021-12-13 DIAGNOSIS — M1A.9XX0 CHRONIC GOUT INVOLVING TOE OF RIGHT FOOT WITHOUT TOPHUS, UNSPECIFIED CAUSE: Primary | ICD-10-CM

## 2021-12-13 DIAGNOSIS — Z76.0 MEDICATION REFILL: ICD-10-CM

## 2021-12-13 DIAGNOSIS — Z23 NEED FOR INFLUENZA VACCINATION: ICD-10-CM

## 2021-12-13 PROCEDURE — 3017F COLORECTAL CA SCREEN DOC REV: CPT | Performed by: FAMILY MEDICINE

## 2021-12-13 PROCEDURE — 99213 OFFICE O/P EST LOW 20 MIN: CPT | Performed by: FAMILY MEDICINE

## 2021-12-13 PROCEDURE — 99212 OFFICE O/P EST SF 10 MIN: CPT | Performed by: FAMILY MEDICINE

## 2021-12-13 PROCEDURE — G8427 DOCREV CUR MEDS BY ELIG CLIN: HCPCS | Performed by: FAMILY MEDICINE

## 2021-12-13 PROCEDURE — G8482 FLU IMMUNIZE ORDER/ADMIN: HCPCS | Performed by: FAMILY MEDICINE

## 2021-12-13 PROCEDURE — G8417 CALC BMI ABV UP PARAM F/U: HCPCS | Performed by: FAMILY MEDICINE

## 2021-12-13 PROCEDURE — 4004F PT TOBACCO SCREEN RCVD TLK: CPT | Performed by: FAMILY MEDICINE

## 2021-12-13 RX ORDER — METOLAZONE 2.5 MG/1
2.5 TABLET ORAL EVERY OTHER DAY
Qty: 45 TABLET | Refills: 3 | Status: SHIPPED | OUTPATIENT
Start: 2021-12-13

## 2021-12-13 RX ORDER — CLOTRIMAZOLE 1 %
CREAM (GRAM) TOPICAL
COMMUNITY
Start: 2021-08-24

## 2021-12-13 RX ORDER — GABAPENTIN 300 MG/1
CAPSULE ORAL 3 TIMES DAILY
COMMUNITY
Start: 2021-10-25 | End: 2022-02-01 | Stop reason: DRUGHIGH

## 2021-12-13 NOTE — PATIENT INSTRUCTIONS
Patient Education                     Learning About Benefits From Quitting Smoking  How does quitting smoking make you healthier? If you're thinking about quitting smoking, you may have a few reasons to be smoke-free. Your health may be one of them. · When you quit smoking, you lower your risks for cancer, lung disease, heart attack, stroke, blood vessel disease, and blindness from macular degeneration. · When you're smoke-free, you get sick less often, and you heal faster. You are less likely to get colds, flu, bronchitis, and pneumonia. · As a nonsmoker, you may find that your mood is better and you are less stressed. When and how will you feel healthier? Quitting has real health benefits that start from day 1 of being smoke-free. And the longer you stay smoke-free, the healthier you get and the better you feel. The first hours  · After just 20 minutes, your blood pressure and heart rate go down. That means there's less stress on your heart and blood vessels. · Within 12 hours, the level of carbon monoxide in your blood drops back to normal. That makes room for more oxygen. With more oxygen in your body, you may notice that you have more energy than when you smoked. After 2 weeks  · Your lungs start to work better. · Your risk of heart attack starts to drop. After 1 month  · When your lungs are clear, you cough less and breathe deeper, so it's easier to be active. · Your sense of taste and smell return. That means you can enjoy food more than you have since you started smoking. Over the years  · Over the years, your risks of heart disease, heart attack, and stroke are lower. · After 10 years, your risk of dying from lung cancer is cut by about half. And your risk for many other types of cancer is lower too. How would quitting help others in your life? When you quit smoking, you improve the health of everyone who now breathes in your smoke.   · Their heart, lung, and cancer risks drop, much like yours.  · They are sick less. For babies and small children, living smoke-free means they're less likely to have ear infections, pneumonia, and bronchitis. · If you're a woman who is or will be pregnant someday, quitting smoking means a healthier . · Children who are close to you are less likely to become adult smokers. Where can you learn more? Go to https://[employer] Interaction.[employer]. org and sign in to your [employer] account. Enter [phone] in the [employer] box to learn more about \"Learning About Benefits From Quitting Smoking. \"     If you do not have an account, please click on the \"Sign Up Now\" link. Current as of: 2021               Content Version: 13.0  © 9826-7743 Healthwise, Incorporated. Care instructions adapted under license by [facility] ([facility]). If you have questions about a medical condition or this instruction, always ask your healthcare professional. [place] [address] any warranty or liability for your use of this information.

## 2021-12-13 NOTE — PROGRESS NOTES
S: 64 y.o. male here for follow up of gout. Now taking colchicine. He was concerned about taking gleevac with colchryis. O: VS: /70   Pulse 86   Temp 97.5 °F (36.4 °C) (Temporal)   Resp 20   Ht 5' 8\" (1.727 m)   Wt 286 lb (129.7 kg)   SpO2 98%   BMI 43.49 kg/m²    General: NAD   CV:  RRR, no gallops, rubs, or murmurs   Resp: CTAB no R/R/W   Abd:  Soft, nontender, no masses    Ext:  no C/C/rt great toe with minimal edema, no erythema  Impression/Plan:   1. Gout-ok for colchrys and and gleevac. Recheck uric acid in next visit  2. HM-flu and covid booster    rto in 1 month      Attending Physician Statement  I have discussed the case, including pertinent history and exam findings with the resident. I agree with the documented assessment and plan.         Abdullahi Branch MD
Vaccine Information Sheet, \"Influenza - Inactivated\"  given to Libia Pimentel, or parent/legal guardian of  Libia Pimentel and verbalized understanding. Patient responses:    Have you ever had a reaction to a flu vaccine? No  Do you have any current illness? No  Have you ever had Gu[place] Syndrome? No  Do you have a serious allergy to any of the follow: Neomycin, Polymyxin, Thimerosal, eggs or egg products? No    Flu vaccine given per order. Please see immunization tab. Risks and benefits explained. Current VIS given.
vaccination  - INFLUENZA, QUADV, 3 YRS AND OLDER, IM PF, PREFILL SYR OR SDV, 0.5ML (AFLURIA QUADV, PF)     RTO 1-3 months or sooner prn for any persistent, new, or worsening symptoms. Please see Patient Instructions for further counseling and information given. Advised to please be adherent to the treatment plans discussed today, and please call with any questions or concerns, letting the office know of any reasons that the plans may not be followed. The risks of untreated conditions include worsening illness, injury, disability, and possibly, death. Please call if symptoms change in any way, worsen, or fail to completely resolve, as this could necessitate a change to treatment plans. Patient and/or caregiver expressed understanding. Indications and proper use of medication(s) reviewed. Potential side-effects and risks of medication(s) also explained. Patient and/or caregiver was instructed to call if any new symptoms develop prior to next visit. Health risk factors discussed and addressed.      Electronically signed by Aaron Gutierrez MD PGY-2 on 12/13/2021 at 11:01 AM  This case was discussed with attending physician: Dr. Lottie Linder

## 2022-01-04 ENCOUNTER — HOSPITAL ENCOUNTER (OUTPATIENT)
Dept: ULTRASOUND IMAGING | Age: 62
Discharge: HOME | End: 2022-01-06
Payer: MEDICARE

## 2022-01-04 DIAGNOSIS — R59.0 LAD (LYMPHADENOPATHY) OF RIGHT CERVICAL REGION: ICD-10-CM

## 2022-01-04 PROCEDURE — 76536 US EXAM OF HEAD AND NECK: CPT

## 2022-01-07 ENCOUNTER — OFFICE VISIT (OUTPATIENT)
Dept: ENT CLINIC | Age: 62
End: 2022-01-07
Payer: MEDICARE

## 2022-01-07 VITALS
HEIGHT: 68 IN | BODY MASS INDEX: 42.74 KG/M2 | SYSTOLIC BLOOD PRESSURE: 126 MMHG | HEART RATE: 68 BPM | DIASTOLIC BLOOD PRESSURE: 81 MMHG | TEMPERATURE: 97.4 F | WEIGHT: 282 LBS | OXYGEN SATURATION: 96 %

## 2022-01-07 DIAGNOSIS — R59.0 LAD (LYMPHADENOPATHY) OF RIGHT CERVICAL REGION: ICD-10-CM

## 2022-01-07 DIAGNOSIS — E66.01 OBESITY, CLASS III, BMI 40-49.9 (MORBID OBESITY) (HCC): ICD-10-CM

## 2022-01-07 DIAGNOSIS — K14.8 TONGUE LESION: Primary | ICD-10-CM

## 2022-01-07 PROCEDURE — 99213 OFFICE O/P EST LOW 20 MIN: CPT | Performed by: OTOLARYNGOLOGY

## 2022-01-07 PROCEDURE — 3017F COLORECTAL CA SCREEN DOC REV: CPT | Performed by: OTOLARYNGOLOGY

## 2022-01-07 PROCEDURE — G8427 DOCREV CUR MEDS BY ELIG CLIN: HCPCS | Performed by: OTOLARYNGOLOGY

## 2022-01-07 PROCEDURE — G8482 FLU IMMUNIZE ORDER/ADMIN: HCPCS | Performed by: OTOLARYNGOLOGY

## 2022-01-07 PROCEDURE — G8417 CALC BMI ABV UP PARAM F/U: HCPCS | Performed by: OTOLARYNGOLOGY

## 2022-01-07 PROCEDURE — 4004F PT TOBACCO SCREEN RCVD TLK: CPT | Performed by: OTOLARYNGOLOGY

## 2022-01-07 ASSESSMENT — ENCOUNTER SYMPTOMS
EYES NEGATIVE: 1
VOICE CHANGE: 0
VOMITING: 0
TROUBLE SWALLOWING: 0
COLOR CHANGE: 0
APNEA: 0
RESPIRATORY NEGATIVE: 1
EYE PAIN: 0
DIARRHEA: 0
GASTROINTESTINAL NEGATIVE: 1
COUGH: 0
SHORTNESS OF BREATH: 0
EYE DISCHARGE: 0
CHEST TIGHTNESS: 0
ABDOMINAL PAIN: 0
SORE THROAT: 0

## 2022-01-07 NOTE — PROGRESS NOTES
Subjective:      Patient ID:  Cheyenne Brock is a 64 y.o. male. HPI:    Pt returns for review of US right lymph node. There are not changes since last visit.      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    Arthritis     Carpal tunnel syndrome     Cecum mass     CHF (congestive heart failure) (HCC)     Diabetes mellitus (HCC)     Gastrointestinal stromal tumor (GIST) ([address] 6/3/2019    Hyperlipidemia     Hypertension     Hypokalemia     Neuropathy     Obesity     bmi 43.1 weight 283 #    Sleep apnea     c pap     Past Surgical History:   Procedure Laterality Date    COLONOSCOPY  09/14/2018    polyp--eliane    NASAL SEPTUM SURGERY      DE COLONOSCOPY W/BIOPSY SINGLE/MULTIPLE N/A 9/14/2018    COLONOSCOPY WITH BIOPSY performed by Spence Councilman, MD at [address] TUTTLE W/RMVL OF TUMOR POLYP LESION SNARE TQ  9/14/2018    COLONOSCOPY POLYPECTOMY SNARE/COLD BIOPSY performed by Spence Councilman, MD at [address] [facility] [place], [place] BIOPSY SOFT TISSUE NECK/THORAX  5/26/2021    US BIOPSY SOFT TISSUE NECK/THORAX 5/26/2021 [facility] ULTRASOUND     Family History   Problem Relation Age of Onset    Other Mother         COVID    Heart Failure Father      Social History     Socioeconomic History    Marital status:      Spouse name: None    Number of children: None    Years of education: None    Highest education level: None   Occupational History    None   Tobacco Use    Smoking status: Current Every Day Smoker     Packs/day: 0.50     Years: 31.00     Pack years: 15.50     Types: Cigarettes    Smokeless tobacco: Never Used   Vaping Use    Vaping Use: Never used   Substance and Sexual Activity    Alcohol use: Yes     Comment: pint every 2 days    Drug use: Yes     Frequency: 7.0 times per week     Types: Marijuana Lyndel Kiki)     Comment: daily use    Sexual activity: Not Currently   Other Topics Concern    None   Social History Narrative    Drinks 1 cup of decaf coffee/tea daily. Social Determinants of Health     Financial Resource Strain: Low Risk     Difficulty of Paying Living Expenses: Not very hard   Food Insecurity: No Food Insecurity    Worried About Running Out of Food in the Last Year: Never true    Roberto of Food in the Last Year: Never true   Transportation Needs:     Lack of Transportation (Medical): Not on file    Lack of Transportation (Non-Medical): Not on file   Physical Activity:     Days of Exercise per Week: Not on file    Minutes of Exercise per Session: Not on file   Stress:     Feeling of Stress : Not on file   Social Connections:     Frequency of Communication with Friends and Family: Not on file    Frequency of Social Gatherings with Friends and Family: Not on file    Attends [employer] Services: Not on file    Active Member of [address] [place] [address] [employer] or Organizations: Not on file    Attends Club or Organization Meetings: Not on file    Marital Status: Not on file   Intimate Partner Violence:     Fear of Current or Ex-Partner: Not on file    Emotionally Abused: Not on file    Physically Abused: Not on file    Sexually Abused: Not on file   Housing Stability:     Unable to Pay for Housing in the Last Year: Not on file    Number of [place] in the Last Year: Not on file    Unstable Housing in the Last Year: Not on file     Allergies   Allergen Reactions    Lasix [Furosemide] Other (See Comments)     Per pt potassium was too low when on lasix    Lisinopril Swelling     Angioedema    Prunus Persica Anaphylaxis     Per report to [facility] manager, [address], BS-NDTR    Atenolol Other (See Comments) and Swelling     Thins his blood    Nitrates, Organic Other (See Comments)     Is using Sildenafil. containdicated with Nitrates    Spironolactone      Swelling in the chest area           Review of Systems   Constitutional: Negative.   Negative for appetite change, chills and fever.   HENT: Negative for sore throat, trouble swallowing and voice change. Eyes: Negative. Negative for pain, discharge and visual disturbance. Respiratory: Negative. Negative for apnea, cough, chest tightness and shortness of breath. Cardiovascular: Negative. Negative for chest pain, palpitations and leg swelling. Gastrointestinal: Negative. Negative for abdominal pain, diarrhea and vomiting. Endocrine: Negative for cold intolerance, heat intolerance and polydipsia. Genitourinary: Negative. Negative for dysuria, flank pain and hematuria. Musculoskeletal: Negative. Negative for arthralgias, gait problem, neck pain and neck stiffness. Skin: Negative. Negative for color change, pallor and rash. Allergic/Immunologic: Negative for environmental allergies, food allergies and immunocompromised state. Neurological: Negative. Negative for dizziness, numbness and headaches. Hematological: Negative for adenopathy. Psychiatric/Behavioral: Negative. Negative for behavioral problems and hallucinations. All other systems reviewed and are negative. Objective:     Vitals:    01/07/22 0920   BP: 126/81   Pulse: 68   Temp: 97.4 °F (36.3 °C)   SpO2: 96%     Physical Exam  Constitutional:       General: He is not in acute distress. Appearance: Normal appearance. HENT:      Head: Normocephalic and atraumatic. Right Ear: External ear normal. There is impacted cerumen. Left Ear: External ear normal. There is impacted cerumen. Nose: Congestion present. Mouth/Throat:      Mouth: Mucous membranes are moist.      Pharynx: No oropharyngeal exudate. Comments: Posterior dorsal tongue with small raised soft lesion approximately 0.5 cm in length  Eyes:      Extraocular Movements: Extraocular movements intact. Pupils: Pupils are equal, round, and reactive to light.    Neck:      Comments: Right side level 1b with palpable lymph node soft and mobile  Cardiovascular:      Rate and Rhythm: Normal rate. Pulmonary:      Effort: Pulmonary effort is normal.   Musculoskeletal:         General: Normal range of motion. Cervical back: Normal range of motion and neck supple. Lymphadenopathy:      Cervical: Cervical adenopathy present. Skin:     General: Skin is warm and dry. Neurological:      General: No focal deficit present. Mental Status: He is alert and oriented to person, place, and time. Psychiatric:         Mood and Affect: Mood normal.         Behavior: Behavior normal.           US  Impression   The previously biopsied right cervical lymph node shows decreased cortical   thickening and is compatible with a benign reactive node.  The lymph node in   question maintains an oval shape and is similar in overall size compared to   prior.             Assessment:       Diagnosis Orders   1. Tongue lesion     2. LAD (lymphadenopathy) of right cervical region                Plan:      The tongue lesion is bothering the patient and wants it removed. I recommend:    Tongue lesion removal    The procedure risks and benefits were discussed with the patient and family. Pt and family understood and decided to proceed with the surgery. Risks of any anesthesia are:  Reactions to medicines   Breathing problems  Risks of any surgery are:  Bleeding   Infection     Specific risks of surgery:       Follow up in 1 week(s)    6 months US fu as well

## 2022-01-19 ENCOUNTER — TELEPHONE (OUTPATIENT)
Dept: ENT CLINIC | Age: 62
End: 2022-01-19

## 2022-01-19 NOTE — TELEPHONE ENCOUNTER
Prior Authorization Form:      DEMOGRAPHICS:                     Patient Name:  Francine Collins  Patient :  1960            Insurance:  Payor: Jenelle Dominguezer / Plan: Jerry Preciado COMPLETE / Product Type: *No Product type* /   Insurance ID Number:    Payor/Plan Subscr  Sex Relation Sub. Ins. ID Effective Group Num   1.  [address] [facility] 1960 Male Self [id number]                                   [address] [address]         DIAGNOSIS & PROCEDURE:                       Procedure/Operation: Excision Tongue Lesion           CPT Code: 97088     Diagnosis:  Tongue Lesion    ICD10 Code: K14.8    Location:  [facility]    Surgeon:  Daysi Singh    SCHEDULING INFORMATION:                          Date: 3/21/2022    Time: N/A              Anesthesia:  General                                                       Status:  Outpatient        Special Comments:  Include Dr. Shagufta Dubose, previous biopsy       Electronically signed by Yasmine Louise MA on 2022 at 10:34 AM

## 2022-01-24 ENCOUNTER — TELEPHONE (OUTPATIENT)
Dept: FAMILY MEDICINE CLINIC | Age: 62
End: 2022-01-24

## 2022-01-24 DIAGNOSIS — M10.9 ACUTE GOUT INVOLVING TOE OF RIGHT FOOT, UNSPECIFIED CAUSE: ICD-10-CM

## 2022-01-24 PROBLEM — R69 OTHER ILL-DEFINED AND UNKNOWN CAUSES OF MORBIDITY AND MORTALITY: Status: ACTIVE | Noted: 2022-01-24

## 2022-01-24 PROBLEM — I87.2 PERIPHERAL VENOUS INSUFFICIENCY: Status: ACTIVE | Noted: 2022-01-24

## 2022-01-24 PROBLEM — Q66.51 CONGENITAL PES PLANUS OF BOTH FEET: Status: ACTIVE | Noted: 2022-01-24

## 2022-01-24 PROBLEM — M54.50 LOW BACK PAIN: Status: ACTIVE | Noted: 2022-01-24

## 2022-01-24 PROBLEM — D10.1 BENIGN NEOPLASM OF TONGUE: Status: ACTIVE | Noted: 2022-01-24

## 2022-01-24 PROBLEM — R93.3 ABNORMAL COLONOSCOPY: Status: ACTIVE | Noted: 2018-09-14

## 2022-01-24 PROBLEM — D49.9 NEOPLASTIC DISEASE: Status: ACTIVE | Noted: 2022-01-24

## 2022-01-24 PROBLEM — I87.2 VENOUS INSUFFICIENCY: Status: ACTIVE | Noted: 2022-01-24

## 2022-01-24 PROBLEM — L60.0 INGROWING TOENAIL: Status: ACTIVE | Noted: 2022-01-24

## 2022-01-24 PROBLEM — Q66.52 CONGENITAL PES PLANUS OF BOTH FEET: Status: ACTIVE | Noted: 2022-01-24

## 2022-01-24 PROBLEM — E55.9 VITAMIN D DEFICIENCY: Status: ACTIVE | Noted: 2022-01-24

## 2022-01-24 PROBLEM — B35.1 ONYCHOMYCOSIS: Status: ACTIVE | Noted: 2022-01-24

## 2022-01-24 PROBLEM — G57.10 MERALGIA PARESTHETICA: Status: ACTIVE | Noted: 2022-01-24

## 2022-01-24 RX ORDER — COLCHICINE 0.6 MG/1
TABLET ORAL
Qty: 15 TABLET | Refills: 0 | Status: SHIPPED
Start: 2022-01-24 | End: 2022-03-14 | Stop reason: SDUPTHER

## 2022-01-24 NOTE — TELEPHONE ENCOUNTER
The patient is having a gout flare up. There are no openings in office   Can you please refill the Colchicine.

## 2022-02-01 ENCOUNTER — OFFICE VISIT (OUTPATIENT)
Dept: FAMILY MEDICINE CLINIC | Age: 62
End: 2022-02-01
Payer: MEDICARE

## 2022-02-01 VITALS
TEMPERATURE: 98 F | RESPIRATION RATE: 20 BRPM | OXYGEN SATURATION: 96 % | WEIGHT: 270 LBS | SYSTOLIC BLOOD PRESSURE: 136 MMHG | HEIGHT: 68 IN | HEART RATE: 110 BPM | BODY MASS INDEX: 40.92 KG/M2 | DIASTOLIC BLOOD PRESSURE: 76 MMHG

## 2022-02-01 DIAGNOSIS — M79.662 PAIN OF LEFT CALF: ICD-10-CM

## 2022-02-01 DIAGNOSIS — G62.9 PERIPHERAL POLYNEUROPATHY: ICD-10-CM

## 2022-02-01 DIAGNOSIS — M10.9 ACUTE GOUT INVOLVING TOE OF LEFT FOOT, UNSPECIFIED CAUSE: Primary | ICD-10-CM

## 2022-02-01 DIAGNOSIS — Z79.4 TYPE 2 DIABETES MELLITUS WITH HYPEROSMOLARITY WITHOUT COMA, WITH LONG-TERM CURRENT USE OF INSULIN (HCC): ICD-10-CM

## 2022-02-01 DIAGNOSIS — M70.22 OLECRANON BURSITIS OF LEFT ELBOW: ICD-10-CM

## 2022-02-01 DIAGNOSIS — E11.00 TYPE 2 DIABETES MELLITUS WITH HYPEROSMOLARITY WITHOUT COMA, WITH LONG-TERM CURRENT USE OF INSULIN (HCC): ICD-10-CM

## 2022-02-01 PROBLEM — B35.3 TINEA PEDIS: Status: ACTIVE | Noted: 2022-02-01

## 2022-02-01 LAB — HBA1C MFR BLD: 9.1 %

## 2022-02-01 PROCEDURE — G8417 CALC BMI ABV UP PARAM F/U: HCPCS | Performed by: FAMILY MEDICINE

## 2022-02-01 PROCEDURE — G8482 FLU IMMUNIZE ORDER/ADMIN: HCPCS | Performed by: FAMILY MEDICINE

## 2022-02-01 PROCEDURE — 83036 HEMOGLOBIN GLYCOSYLATED A1C: CPT | Performed by: FAMILY MEDICINE

## 2022-02-01 PROCEDURE — 3017F COLORECTAL CA SCREEN DOC REV: CPT | Performed by: FAMILY MEDICINE

## 2022-02-01 PROCEDURE — 99213 OFFICE O/P EST LOW 20 MIN: CPT | Performed by: FAMILY MEDICINE

## 2022-02-01 PROCEDURE — 99212 OFFICE O/P EST SF 10 MIN: CPT | Performed by: FAMILY MEDICINE

## 2022-02-01 PROCEDURE — 4004F PT TOBACCO SCREEN RCVD TLK: CPT | Performed by: FAMILY MEDICINE

## 2022-02-01 PROCEDURE — G8427 DOCREV CUR MEDS BY ELIG CLIN: HCPCS | Performed by: FAMILY MEDICINE

## 2022-02-01 PROCEDURE — 2022F DILAT RTA XM EVC RTNOPTHY: CPT | Performed by: FAMILY MEDICINE

## 2022-02-01 PROCEDURE — 3046F HEMOGLOBIN A1C LEVEL >9.0%: CPT | Performed by: FAMILY MEDICINE

## 2022-02-01 RX ORDER — IBUPROFEN 400 MG/1
400 TABLET ORAL EVERY 6 HOURS PRN
Qty: 120 TABLET | Refills: 3 | Status: SHIPPED
Start: 2022-02-01 | End: 2022-11-04

## 2022-02-01 RX ORDER — INSULIN GLARGINE 100 [IU]/ML
INJECTION, SOLUTION SUBCUTANEOUS
Qty: 7 PEN | Refills: 2 | Status: SHIPPED
Start: 2022-02-01 | End: 2022-07-15 | Stop reason: SDUPTHER

## 2022-02-01 SDOH — ECONOMIC STABILITY: FOOD INSECURITY: WITHIN THE PAST 12 MONTHS, THE FOOD YOU BOUGHT JUST DIDN'T LAST AND YOU DIDN'T HAVE MONEY TO GET MORE.: NEVER TRUE

## 2022-02-01 SDOH — ECONOMIC STABILITY: FOOD INSECURITY: WITHIN THE PAST 12 MONTHS, YOU WORRIED THAT YOUR FOOD WOULD RUN OUT BEFORE YOU GOT MONEY TO BUY MORE.: NEVER TRUE

## 2022-02-01 ASSESSMENT — LIFESTYLE VARIABLES
HOW OFTEN DO YOU HAVE A DRINK CONTAINING ALCOHOL: 2-4 TIMES A MONTH
HOW MANY STANDARD DRINKS CONTAINING ALCOHOL DO YOU HAVE ON A TYPICAL DAY: 1 OR 2

## 2022-02-01 ASSESSMENT — SOCIAL DETERMINANTS OF HEALTH (SDOH): HOW HARD IS IT FOR YOU TO PAY FOR THE VERY BASICS LIKE FOOD, HOUSING, MEDICAL CARE, AND HEATING?: SOMEWHAT HARD

## 2022-02-01 NOTE — PROGRESS NOTES
CC: Left elbow pain/lump, recurrence of gout    HPI:  64 y.o. male with PMH of gout, essential hypertension, CHF and venous insufficiency who presents for repeat gout flare and new onset left elbow swelling. Gout flare  Patient with history of gout, chronic gout flares  Patient states he does drink vodka throughout the week; a few shots \" here and there\"  Has noted recent left foot discoloration as well as swelling of the left big toe, difficult to move with increased tenderness  Has been taking tramadol and Tylenol for pain control  Usually uses colchicine for gouty flares as naproxen caused worsening acidity/GI symptoms  Tramadol for the pain, tylenol     Left elbow swelling  Patient states that he noticed increased left elbow swelling for the past week  He states he usually sleeps on his elbow, propped underneath him  States that he has had increased pain with the swelling, describes the pain as 7/10 on the pain scale  States that any of the medications he has taken : Tramadol, Tylenol are not helping with the pain  Does complain of increased sensitivity to the region  Denies any reduction in range of motion, numbness or tingling    Health maintenance  Patient with history of GIST (on [address])  -last colonoscopy in 2018 was abnormal, was due for a repeat in 3 years  Scheduled for colonoscopy on 2/18/2022 at 7 AM  Repeat A1c due today    ROS:  Pertinent as above otherwise negative    Objective:    VS:  Blood pressure 136/76, pulse 110, temperature 98 °F (36.7 °C), temperature source Temporal, resp. rate 20, height 5' 8\" (1.727 m), weight 270 lb (122.5 kg), SpO2 96 %. Physical Exam  Constitutional:       Appearance: He is obese. He is not ill-appearing. Cardiovascular:      Rate and Rhythm: Normal rate and regular rhythm. Pulses: Normal pulses. Heart sounds: Normal heart sounds. No murmur heard. No gallop. Pulmonary:      Effort: Pulmonary effort is normal. No respiratory distress. Breath sounds: Normal breath sounds. No wheezing. Musculoskeletal:         General: Swelling and tenderness present. Comments: Left upper extremity:  Swelling over olecranon, fluid-filled, tenderness to palpation over bursa  Normal range of motion at elbow joint  Normal strength, sensation left upper extremity    Left lower extremity and calf with some discoloration and tenderness to palpation over medial- posterior aspect of left calf  Homans' sign negative  No increase in calf diameter, no increased erythema     Skin:     General: Skin is warm. Findings: No erythema. Neurological:      Mental Status: He is alert. Assessment/Plan:    1. Acute gout involving toe of left foot  Patient does drink EtOH occasionally  Likely also diet induced  Have attempted to start allopurinol, but patient always in gout flare prior to starting medication  Colchicine works well for patient, consideration with dosing secondary to using [address]  Patient initially refusing naproxen/ibuprofen due to GI side effects, advised patient to trial lower dose  Prescribed 400 mg tablets ibuprofen to help with inflammation, amenable at this time    2. Olecranon bursitis of left elbow  Likely secondary to gout  Patient with no fevers, chills, or any recent illness  Refusing aspiration or injection at this time  Patient amenable to trial of ibuprofen 400 mg as needed  Compression with Ace bandage  Voltaren gel prescribed  - ibuprofen (ADVIL;MOTRIN) 400 MG tablet; Take 1 tablet by mouth every 6 hours as needed for Pain  Dispense: 120 tablet; Refill: 3    3. Pain of left calf  Will rule out DVT at this time  Patient has history of DVT in the past  - US DUP LOWER EXTREMITY LEFT NOEMÍ; Future    4.  Type 2 diabetes mellitus with hyperosmolarity without coma, with long-term current use of insulin ([facility])  - Refill insulin  - POCT glycosylated hemoglobin (Hb A1C) elevated today  Patient advised  -Follow-up in the next 1 to 2 weeks for DM type

## 2022-02-01 NOTE — PROGRESS NOTES
Attending Physician Statement    S:   Chief Complaint   Patient presents with    Joint Swelling     elbow left     Foot Swelling     left occational       One week of gout flare- taking colchicine with some improvement. Left great toe and some erythema of left foot and into calf   Left elbow over olecranon. Tender to palpation   Taking colchicine, tramadol, tylenol, and marijuana for pain  O: Blood pressure 136/76, pulse 110, temperature 98 °F (36.7 °C), temperature source Temporal, resp. rate 20, height 5' 8\" (1.727 m), weight 270 lb (122.5 kg), SpO2 96 %. Exam:   Heart - RRR   Lungs - clear   Discoloration of left foot and calf, with tenderness over medial calf. Babatunde's negative   Left elbow with swelling over olecranon, fluid-filled  A: Gout, olecranon bursitis  P:  US left leg to r/o DVT   voltaren gel   Not interested in aspiration   Consider steroid, but A1C high   Follow-up as ordered    I have discussed the case, including pertinent history and exam findings with the resident. I agree with the documented assessment and plan.

## 2022-02-13 ENCOUNTER — HOSPITAL ENCOUNTER (EMERGENCY)
Age: 62
Discharge: HOME | End: 2022-02-13
Payer: MEDICARE

## 2022-02-13 ENCOUNTER — APPOINTMENT (OUTPATIENT)
Dept: GENERAL RADIOLOGY | Age: 62
End: 2022-02-13
Payer: MEDICARE

## 2022-02-13 VITALS
HEART RATE: 92 BPM | TEMPERATURE: 98.3 F | RESPIRATION RATE: 16 BRPM | SYSTOLIC BLOOD PRESSURE: 152 MMHG | WEIGHT: 272 LBS | OXYGEN SATURATION: 95 % | BODY MASS INDEX: 41.22 KG/M2 | HEIGHT: 68 IN | DIASTOLIC BLOOD PRESSURE: 92 MMHG

## 2022-02-13 DIAGNOSIS — I10 ELEVATED BLOOD PRESSURE READING WITH DIAGNOSIS OF HYPERTENSION: ICD-10-CM

## 2022-02-13 DIAGNOSIS — S50.02XA CONTUSION OF LEFT ELBOW, INITIAL ENCOUNTER: ICD-10-CM
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
## 2022-02-13 DIAGNOSIS — S63.92XA HAND SPRAIN, LEFT, INITIAL ENCOUNTER: ICD-10-CM

## 2022-02-13 PROCEDURE — 73130 X-RAY EXAM OF HAND: CPT

## 2022-02-13 PROCEDURE — 99283 EMERGENCY DEPT VISIT LOW MDM: CPT

## 2022-02-13 PROCEDURE — 96372 THER/PROPH/DIAG INJ SC/IM: CPT

## 2022-02-13 PROCEDURE — 73080 X-RAY EXAM OF ELBOW: CPT

## 2022-02-13 PROCEDURE — 6360000002 HC RX W HCPCS: Performed by: NURSE PRACTITIONER

## 2022-02-13 RX ORDER — KETOROLAC TROMETHAMINE 30 MG/ML
30 INJECTION, SOLUTION INTRAMUSCULAR; INTRAVENOUS ONCE
Status: COMPLETED | OUTPATIENT
Start: 2022-02-13 | End: 2022-02-13

## 2022-02-13 RX ADMIN — KETOROLAC TROMETHAMINE 30 MG: 30 INJECTION, SOLUTION INTRAMUSCULAR at 11:21

## 2022-02-13 ASSESSMENT — PAIN SCALES - GENERAL: PAINLEVEL_OUTOF10: 10

## 2022-02-13 NOTE — ED PROVIDER NOTES
[address]  Department of Emergency Medicine   ED  Encounter Note  Admit Date/RoomTime: 2022 10:37 AM  ED Room:     NAME: Slim Collins  : 1960  MRN: [id number]     Chief Complaint:  Fall (slipped on ice and fell onto cement, landing on left arm) and Arm Injury (left arm pain near elbow)    History of Present Illness       Olaf Benjamin is a 64 y.o. old male who presents to the emergency department for evaluation of left elbow pain. Patient states he slipped on the ice getting his cane just prior to arrival.  He has longstanding bursitis and gout in his left elbow. He states his primary care provider is going to drain his elbow next week if it continues to be uncomfortable. He has been managing it with Tylenol and Voltaren gel. His pain is aggravated by palpation and movement since his fall. He is left-hand dominant and denies a history of fracture or surgical intervention of this area in the past.  He also complains of left hand pain and swelling since his fall without any open wound. He reports his last tetanus vaccination to be 2018. He denies any associated head injury, loss of consciousness, neck pain, back pain or other extremity injury associated with his fall. His symptoms are moderate in severity and persistent nature. ROS   Pertinent positives and negatives are stated within HPI, all other systems reviewed and are negative. Past Medical History:  has a past medical history of Angiotensin converting enzyme inhibitor-aggravated angioedema, Arthritis, Carpal tunnel syndrome, Cecum mass, CHF (congestive heart failure) ([address] Diabetes mellitus ([address] Gastrointestinal stromal tumor (GIST) ([address] Hyperlipidemia, Hypertension, Hypokalemia, Neuropathy, Obesity, and Sleep apnea. Surgical History:  has a past surgical history that includes Tonsillectomy; Nasal septum surgery;  Colonoscopy (2018); pr colonoscopy w/biopsy single/multiple (N/A, 9/14/2018); pr colsc flx w/rmvl of tumor polyp lesion snare tq (9/14/2018); and US BIOPSY SOFT TISSUE NECK/THORAX (5/26/2021). Social History:  reports that he has been smoking cigarettes. He has a 15.50 pack-year smoking history. He has never used smokeless tobacco. He reports current alcohol use. He reports current drug use. Frequency: 7.00 times per week. Drug: Marijuana Alberkatie Moses). Family History: family history includes Heart Failure in his father; Other in his mother. Allergies: Lasix [furosemide]; Lisinopril; Prunus persica; Atenolol; Nitrates, organic; and Spironolactone    Physical Exam   Oxygen Saturation Interpretation: Normal.        ED Triage Vitals   BP Temp Temp Source Pulse Resp SpO2 Height Weight   02/13/22 1034 02/13/22 1034 02/13/22 1034 02/13/22 1034 02/13/22 1034 02/13/22 1034 02/13/22 1042 02/13/22 1042   (!) 154/102 98.3 °F (36.8 °C) Oral 105 16 95 % 5' 8\" (1.727 m) 272 lb (123.4 kg)         · Constitutional:  Alert, development consistent with age. · HEENT:  NC/NT. No outward sign of trauma. Airway patent. · Neck:  Normal ROM. Supple. · Respiratory: No respiratory distress or increased work of breathing. · Cardiovascular: Regular rate and rhythm. Strong distal pulses. · Left Upper Extremity(s):                 Tenderness: There is moderate tenderness at the left olecranon with mild swelling without erythema or warmth. There is no bony tenderness to the left shoulder, humerus, forearm or wrist.  There is no anatomic snuffbox tenderness. There is moderate swelling to the left hand with no bony tenderness. Compartments are soft. Full sensation and motor movement along the radial, median and ulnar nerve distribution. Strong radial and ulnar pulses. Neurovascularly intact. Deformity: no deformity observed/palpated. ROM: Full range of motion at the shoulder, wrist and fingers. Painful supination and pronation.               Skin: no wounds, erythema, or ecchymosis. Neurovascular: Motor deficit: none. Sensory deficit: none. Pulse deficit: none. Capillary refill: normal.  · Neurological:  Alert and oriented. Motor and sensory functions intact unless otherwise described above. Lab / Imaging Results   (All laboratory and radiology results have been personally reviewed by myself)  Labs:  No results found for this visit on 02/13/22. Imaging: All Radiology results interpreted by Radiologist unless otherwise noted. XR ELBOW LEFT (MIN 3 VIEWS)   Final Result   No acute osseous findings of the left elbow or left hand         XR HAND LEFT (MIN 3 VIEWS)   Final Result   No acute osseous findings of the left elbow or left hand           ED Course / Medical Decision Making     Medications   ketorolac (TORADOL) injection 30 mg (30 mg IntraMUSCular Given 2/13/22 1121)          Re-examination:  2/13/22     Time: 1215  Patients symptoms are improving. Repeat physical examination has improved. Discussed results and treatment plan. Consult:   None    Procedure(s):  None    MDM:    Neurovascularly intact. Imaging was obtained based as per history and physical exam findings. Interpretation as per radiologist negative for acute findings as documented above. Patient's blood pressure was repeated and he states he fell before he took his blood pressure medication and will take it as soon as he gets home. Plan is subsequently for symptom control with continued over-the-counter Tylenol and his Voltaren gel, Ace wrap and with appropriate outpatient follow-up with his PCP as provided on their discharge handout. He was also given orthopedics as needed. Patient is educated on S/S to watch for indicative of re-evaluation in the ER setting including worsening of current symptoms not responding to the treatment plan.  Patient verbalized understanding of instructions and is amenable

## 2022-02-17 ENCOUNTER — TELEPHONE (OUTPATIENT)
Dept: SURGERY | Age: 62
End: 2022-02-17

## 2022-02-17 ENCOUNTER — TELEPHONE (OUTPATIENT)
Dept: FAMILY MEDICINE CLINIC | Age: 62
End: 2022-02-17

## 2022-02-17 ENCOUNTER — OFFICE VISIT (OUTPATIENT)
Dept: FAMILY MEDICINE CLINIC | Age: 62
End: 2022-02-17
Payer: MEDICARE

## 2022-02-17 VITALS
OXYGEN SATURATION: 97 % | DIASTOLIC BLOOD PRESSURE: 78 MMHG | TEMPERATURE: 97.5 F | WEIGHT: 277 LBS | BODY MASS INDEX: 41.98 KG/M2 | HEIGHT: 68 IN | HEART RATE: 91 BPM | RESPIRATION RATE: 20 BRPM | SYSTOLIC BLOOD PRESSURE: 117 MMHG

## 2022-02-17 DIAGNOSIS — M25.522 LEFT ELBOW PAIN: Primary | ICD-10-CM

## 2022-02-17 DIAGNOSIS — C49.A4 GIST (GASTROINTESTINAL STROMA TUMOR), MALIGNANT, COLON (HCC): ICD-10-CM

## 2022-02-17 PROCEDURE — 99213 OFFICE O/P EST LOW 20 MIN: CPT | Performed by: FAMILY MEDICINE

## 2022-02-17 PROCEDURE — 99212 OFFICE O/P EST SF 10 MIN: CPT | Performed by: FAMILY MEDICINE

## 2022-02-17 PROCEDURE — 4004F PT TOBACCO SCREEN RCVD TLK: CPT | Performed by: FAMILY MEDICINE

## 2022-02-17 PROCEDURE — G8417 CALC BMI ABV UP PARAM F/U: HCPCS | Performed by: FAMILY MEDICINE

## 2022-02-17 PROCEDURE — 3017F COLORECTAL CA SCREEN DOC REV: CPT | Performed by: FAMILY MEDICINE

## 2022-02-17 PROCEDURE — G8482 FLU IMMUNIZE ORDER/ADMIN: HCPCS | Performed by: FAMILY MEDICINE

## 2022-02-17 PROCEDURE — G8427 DOCREV CUR MEDS BY ELIG CLIN: HCPCS | Performed by: FAMILY MEDICINE

## 2022-02-17 NOTE — PROGRESS NOTES
Attending Physician Statement    S:   Chief Complaint   Patient presents with   Omaira Sol     sunday right elbow pain       64year old male history of HTN, GIST , gout had a fall on Sunday after slipping on ice. Had xray in ER with no fractures. Was given toradol shot and told to follow up with pcp. Continues to have pain in left elbow. Ibuprofen 400mg as needed. Last took yesterday. Has otherwise been using RICE. Has been resting and icing as necessary. No other acute issues. Has colonoscopy shceduled for tomorrow that he cancelled. O: Blood pressure 117/78, pulse 91, temperature 97.5 °F (36.4 °C), temperature source Temporal, resp. rate 20, height 5' 8\" (1.727 m), weight 277 lb (125.6 kg), SpO2 97 %. Exam:   Heart - RRR   Lungs - clear   MSK - shoulder exam within normal limits. Normal ROM. Mild pain with internal rotation. Large bruise above his left elbow. No swelling of olecranon bursa. Strength intact. A: elbow and shoulder pain 2/2 fall   P:  Physical therapy    Supportive care    Follow up in 1 month. Follow-up as ordered    Attending Attestation   I have discussed the case, including pertinent history and exam findings with the resident. I agree with the documented assessment and plan.

## 2022-02-17 NOTE — TELEPHONE ENCOUNTER
Patient calling to cancel Colonoscopy scheduled for 2/18/22. States he had a recent fall and still in quite a bit of pain.  [phone]

## 2022-02-17 NOTE — TELEPHONE ENCOUNTER
Physical therapy called to request you change the referral to physical therapy to occupational therapy for elbow pain  Please place new referral

## 2022-02-17 NOTE — PROGRESS NOTES
CC:  ED follow up s/p fall    HPI:  64 y.o. male with pmh of gout, HTN, presents as ER follow up 2/2 to mechanical fall. 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 States majority of the impact was to left elbow. Did go to [facility] ER after that for further follow up. Charts reviewed ; xrays negative for any acute fractures or contusions of elbows and distal upper extremity digits. Received a toradol shot in the ER which immensely improved his pain. Does have ibuprofen at home ; 400 mg which he has been taking PRN. Last dose yesterday. GIST tumor  Surgical removal 2019  Has been on chemotherapy medication since then   Tolerating well; Taking gleevec daily ; states will be following with Heme/Onc in the next month at [facility]. HM  Will be getting booster soon  Patient was to go for colonoscopy tomorrow , now secondary to fall, he states he is in too much pain to go and has cancelled. Will be calling to rebook at a later date. ROS:    Pertinent as above  Otherwise negative    Objective:    VS:  Blood pressure 117/78, pulse 91, temperature 97.5 °F (36.4 °C), temperature source Temporal, resp. rate 20, height 5' 8\" (1.727 m), weight 277 lb (125.6 kg), SpO2 97 %. Physical Exam  Cardiovascular:      Rate and Rhythm: Normal rate and regular rhythm. Pulses: Normal pulses. Heart sounds: Normal heart sounds. No murmur heard. No gallop. Pulmonary:      Effort: Pulmonary effort is normal. No respiratory distress. Breath sounds: Normal breath sounds. No wheezing. Abdominal:      General: Bowel sounds are normal.   Musculoskeletal:         General: Normal range of motion.       Comments: Normal ROM bilateral upper extremities  Olecranon bursitis left elbow resolved  Strength 5/5 bilateral upper extremities  Bear-hug test normal  Normal abduction, adduction, flexion, extension of the shoulder  Noted pain with internal rotation of elbow     Skin:     Findings: Bruising present. Comments: Large area of ecchymosis near elbow left upper extremity       Most recent labs and imaging reviewed. Findings include:     Xrays reviewed : no acute fractures or abnormalities revealed on xray    Assessment/Plan:    1. Left elbow pain 2/2 to mechanical  Patient has good range of motion, but extremely sensitive to movement 2/2 to pain  Would like patient to follow for some in person PT/OT in order to assure normal ROM of left elbow/shoulder  Advise use of voltaren gel  RICE protocol re-discussed. Continue with ibuprofen therapy    2. GIST (gastrointestinal stroma tumor), malignant, colon ([facility] [address]  Patient follows with Heme/ onc at [facility]  Currently taking gleevec  Well controlled on medication  Has heme/onc visit planned in the next month    RTO 2-4 weeks or sooner prn for any persistent, new, or worsening symptoms. Please see Patient Instructions for further counseling and information given. Advised to please be adherent to the treatment plans discussed today, and please call with any questions or concerns, letting the office know of any reasons that the plans may not be followed. The risks of untreated conditions include worsening illness, injury, disability, and possibly, death. Please call if symptoms change in any way, worsen, or fail to completely resolve, as this could necessitate a change to treatment plans. Patient and/or caregiver expressed understanding. Indications and proper use of medication(s) reviewed. Potential side-effects and risks of medication(s) also explained. Patient and/or caregiver was instructed to call if any new symptoms develop prior to next visit. Health risk factors discussed and addressed.      Electronically signed by Cuba Johnson MD PGY-2 on 2/17/2022 at 9:29 AM  This case was discussed with attending physician: Dr. Adela Brown

## 2022-02-17 NOTE — PATIENT INSTRUCTIONS
Elevation). \"     If you do not have an account, please click on the \"Sign Up Now\" link. Current as of: July 1, 2021               Content Version: 13.1 © 2006-2021 [employer]. Care instructions adapted under license by [employer] ([facility]). If you have questions about a medical condition or this instruction, always ask your healthcare professional. [employer] any warranty or liability for your use of this information. Patient Education         RICE: Rest, Ice, Compression, Elevation (01:48)  Your health professional recommends that you watch this short online health video. Learn steps you can take at home to reduce pain and swelling after a sprain or strain. Purpose:  Defines RICE and how to use it after an injury. Goal:  User will learn the acronym \"RICE\" and how to use it after an injury. How to watch the video    Scan the QR code   OR Visit the website    https://i. se/[email or web address]   Current as of: July 1, 2021               Content Version: 13.1  © 2006-2021 [employer]. Care instructions adapted under license by [employer] ([facility]). If you have questions about a medical condition or this instruction, always ask your healthcare professional. [employer] any warranty or liability for your use of this information.

## 2022-02-23 ENCOUNTER — TELEPHONE (OUTPATIENT)
Dept: ENT CLINIC | Age: 62
End: 2022-02-23

## 2022-02-24 ENCOUNTER — HOSPITAL ENCOUNTER (OUTPATIENT)
Dept: OCCUPATIONAL THERAPY | Age: 62
Setting detail: THERAPIES SERIES
Discharge: HOME | End: 2022-02-24

## 2022-02-24 NOTE — PROGRESS NOTES
Phone: [phone] Fax: [phone]    Occupational Therapy   Cancellation/No-show Note     Patient Name:  Maritza Li  : 1960  Date:  2022  MRN: [id number]    For today's appointment patient:       []  Cancelled   []  Rescheduled appointment   [x]  No-show     Reason given by patient:   []  Patient ill   []  Conflicting appointment   []  No transportation   []  Conflict with work   [x]  No reason given   []  Other:     Comments: Pt did not show for his initial evaluation for outpatient OT.      Electronically signed by: MARI Mcgraw/GERALD, Laurita Ajit [address], #[id number]

## 2022-03-14 DIAGNOSIS — M10.9 ACUTE GOUT INVOLVING TOE OF RIGHT FOOT, UNSPECIFIED CAUSE: ICD-10-CM

## 2022-03-14 RX ORDER — COLCHICINE 0.6 MG/1
TABLET ORAL
Qty: 15 TABLET | Refills: 0 | Status: SHIPPED
Start: 2022-03-14 | End: 2022-05-24

## 2022-03-14 NOTE — TELEPHONE ENCOUNTER
Last Appointment   2/17/2022  Next Appointment      Visit date not foundPatient informed that all medications can take up to 72 business hours, can check with their pharmacy in the meantime if they like

## 2022-03-16 ENCOUNTER — TELEPHONE (OUTPATIENT)
Dept: ENT CLINIC | Age: 62
End: 2022-03-16

## 2022-03-16 NOTE — PROGRESS NOTES
NICOLE with Dr. Noris Boggs office of request by anesthesiologist for medical clearance for DOS 3/21/2022

## 2022-03-16 NOTE — PROGRESS NOTES
[facility] PRE-ADMISSION TESTING INSTRUCTIONS    The Preadmission Testing patient is instructed accordingly using the following criteria (check applicable):    ARRIVAL INSTRUCTIONS:  [x] Parking the day of Surgery is located in the Main Entrance lot. Upon entering the door, make an immediate right to the surgery reception desk    [x] Bring photo ID and insurance card    [] Bring in a copy of Living will or Durable Power of  papers. [x] Please be sure to arrange for responsible adult to provide transportation to and from the hospital    [x] Please arrange for responsible adult to be with you for the 24 hour period post procedure due to having anesthesia      GENERAL INSTRUCTIONS:    [x] Nothing by mouth after midnight, including gum, candy, mints or water    [x] You may brush your teeth, but do not swallow any water    [x] Take medications as instructed with 1-2 oz of water    [x] Stop herbal supplements and vitamins 5 days prior to procedure    [] Follow preop dosing of blood thinners per physician instructions    [] Take 1/2 dose of evening insulin, but no insulin after midnight    [x] No oral diabetic medications after midnight    [x] If diabetic and have low blood sugar or feel symptomatic, take 1-2oz apple juice only    [] Bring inhalers day of surgery    [] Bring C-PAP/ Bi-Pap day of surgery    [] Bring urine specimen day of surgery    [x] Shower or bath with soap, lather and rinse well, AM of Surgery, no lotion, powders or creams    [] Follow bowel prep as instructed per surgeon    [x] No tobacco products within 24 hours of surgery     [x] No alcohol or illegal drug, Marijauna use within 24 hours of surgery.     [x] Jewelry, body piercing's, eyeglasses, contact lenses and dentures are not permitted into surgery (bring cases)      [] Please do not wear any nail polish, make up or hair products on the day of surgery    [x] You can expect a call the business day prior to procedure to notify you if your arrival time changes    [x] If you receive a survey after surgery we would greatly appreciate your comments    [] Parent/guardian of a minor must accompany their child and remain on the premises  the entire time they are under our care     [] Pediatric patients may bring favorite toy, blanket or comfort item with them    [] A caregiver or family member must remain with the patient during their stay if they are mentally handicapped, have dementia, disoriented or unable to use a call light or would be a safety concern if left unattended    [x] Please notify surgeon if you develop any illness between now and time of surgery (cold, cough, sore throat, fever, nausea, vomiting) or any signs of infections  including skin, wounds, and dental.    [x]  The Outpatient Pharmacy is available to fill your prescription here on your day of surgery, ask your preop nurse for details    [x] Other instructions: wear loose, comfort clothing    EDUCATIONAL MATERIALS PROVIDED:    [] PAT Preoperative Education Packet/Booklet     [] Medication List    [] Transfusion bracelet applied with instructions    [] Shower with soap, lather and rinse well, and use CHG wipes provided the evening before surgery as instructed    [] Incentive spirometer with instructions

## 2022-03-16 NOTE — PROGRESS NOTES
Have you been tested for COVID  No     vaccinated      Have you been told you were positive for COVID No  Have you had any known exposure to someone that is positive for COVID No  Do you have a cough                   No              Do you have shortness of breath No                 Do you have a sore throat            No                Are you having chills                    No                Are you having muscle aches. No                      Please come to the hospital wearing a mask and have your significant other wear a mask as well. Both of you should check your temperature before leaving to come here,  if it is 100 or higher please call [phone] for instruction.

## 2022-03-16 NOTE — PROGRESS NOTES
Dr. Christine Cassette updated on pts history. BW , EKG per order and requesting medical clearance prior to surgery. ..

## 2022-03-16 NOTE — TELEPHONE ENCOUNTER
Pre Admission called stating Crescencio Singh needs a new medical clearance for his surgery on 3/21/2022.

## 2022-03-16 NOTE — PROGRESS NOTES
Pt informed we need to draw BS and do an EKG prior to DOS and he needs to come to PAT to have this done. . Pt states he will call back after he checks with his daughter to see what day will work better for him to get a ride.        Pt called back and PAT appointment made for 3/17/2022

## 2022-03-17 ENCOUNTER — HOSPITAL ENCOUNTER (OUTPATIENT)
Dept: PREADMISSION TESTING | Age: 62
Discharge: HOME | End: 2022-03-17
Payer: MEDICARE

## 2022-03-17 LAB
ANION GAP SERPL CALCULATED.3IONS-SCNC: 9 MMOL/L (ref 7–16)
BUN BLDV-MCNC: 14 MG/DL (ref 6–23)
CALCIUM SERPL-MCNC: 9 MG/DL (ref 8.6–10.2)
CHLORIDE BLD-SCNC: 94 MMOL/L (ref 98–107)
CO2: 30 MMOL/L (ref 22–29)
CREAT SERPL-MCNC: 1.1 MG/DL (ref 0.7–1.2)
EKG ATRIAL RATE: 70 BPM
EKG P AXIS: 61 DEGREES
EKG P-R INTERVAL: 174 MS
EKG Q-T INTERVAL: 416 MS
EKG QRS DURATION: 92 MS
EKG QTC CALCULATION (BAZETT): 449 MS
EKG R AXIS: -22 DEGREES
EKG T AXIS: 16 DEGREES
EKG VENTRICULAR RATE: 70 BPM
GFR AFRICAN AMERICAN: >60
GFR NON-AFRICAN AMERICAN: >60 ML/MIN/1.73
GLUCOSE BLD-MCNC: 196 MG/DL (ref 74–99)
HCT VFR BLD CALC: 39.6 % (ref 37–54)
HEMOGLOBIN: 13.8 G/DL (ref 12.5–16.5)
MCH RBC QN AUTO: 34.3 PG (ref 26–35)
MCHC RBC AUTO-ENTMCNC: 34.8 % (ref 32–34.5)
MCV RBC AUTO: 98.5 FL (ref 80–99.9)
PDW BLD-RTO: 13.1 FL (ref 11.5–15)
PLATELET # BLD: 206 E9/L (ref 130–450)
PMV BLD AUTO: 9.2 FL (ref 7–12)
POTASSIUM SERPL-SCNC: 3.9 MMOL/L (ref 3.5–5)
RBC # BLD: 4.02 E12/L (ref 3.8–5.8)
SODIUM BLD-SCNC: 133 MMOL/L (ref 132–146)
WBC # BLD: 8.9 E9/L (ref 4.5–11.5)

## 2022-03-17 PROCEDURE — 80048 BASIC METABOLIC PNL TOTAL CA: CPT

## 2022-03-17 PROCEDURE — 36415 COLL VENOUS BLD VENIPUNCTURE: CPT

## 2022-03-17 PROCEDURE — 85027 COMPLETE CBC AUTOMATED: CPT

## 2022-03-17 PROCEDURE — 93005 ELECTROCARDIOGRAM TRACING: CPT

## 2022-03-17 NOTE — PROGRESS NOTES
Pt in for preadmission testing. Pt was unaware he needed medical clearance.  States he will call his pcp and try to get int today or tomorrow

## 2022-03-18 ENCOUNTER — OFFICE VISIT (OUTPATIENT)
Dept: FAMILY MEDICINE CLINIC | Age: 62
End: 2022-03-18
Payer: MEDICARE

## 2022-03-18 VITALS
RESPIRATION RATE: 22 BRPM | WEIGHT: 276 LBS | BODY MASS INDEX: 41.83 KG/M2 | HEART RATE: 76 BPM | HEIGHT: 68 IN | SYSTOLIC BLOOD PRESSURE: 118 MMHG | DIASTOLIC BLOOD PRESSURE: 74 MMHG | OXYGEN SATURATION: 97 % | TEMPERATURE: 98.8 F

## 2022-03-18 DIAGNOSIS — K14.8 TONGUE LESION: ICD-10-CM

## 2022-03-18 DIAGNOSIS — Z01.818 PREOP EXAMINATION: Primary | ICD-10-CM

## 2022-03-18 DIAGNOSIS — I50.32 CHRONIC HEART FAILURE WITH PRESERVED EJECTION FRACTION (HFPEF) (HCC): ICD-10-CM

## 2022-03-18 DIAGNOSIS — Z00.00 HEALTHCARE MAINTENANCE: ICD-10-CM

## 2022-03-18 PROCEDURE — 3017F COLORECTAL CA SCREEN DOC REV: CPT | Performed by: FAMILY MEDICINE

## 2022-03-18 PROCEDURE — G8482 FLU IMMUNIZE ORDER/ADMIN: HCPCS | Performed by: FAMILY MEDICINE

## 2022-03-18 PROCEDURE — 99213 OFFICE O/P EST LOW 20 MIN: CPT | Performed by: STUDENT IN AN ORGANIZED HEALTH CARE EDUCATION/TRAINING PROGRAM

## 2022-03-18 PROCEDURE — 99212 OFFICE O/P EST SF 10 MIN: CPT | Performed by: STUDENT IN AN ORGANIZED HEALTH CARE EDUCATION/TRAINING PROGRAM

## 2022-03-18 PROCEDURE — G8417 CALC BMI ABV UP PARAM F/U: HCPCS | Performed by: FAMILY MEDICINE

## 2022-03-18 PROCEDURE — 4004F PT TOBACCO SCREEN RCVD TLK: CPT | Performed by: FAMILY MEDICINE

## 2022-03-18 PROCEDURE — G8427 DOCREV CUR MEDS BY ELIG CLIN: HCPCS | Performed by: FAMILY MEDICINE

## 2022-03-18 SDOH — ECONOMIC STABILITY: TRANSPORTATION INSECURITY
IN THE PAST 12 MONTHS, HAS THE LACK OF TRANSPORTATION KEPT YOU FROM MEDICAL APPOINTMENTS OR FROM GETTING MEDICATIONS?: NO

## 2022-03-18 SDOH — ECONOMIC STABILITY: TRANSPORTATION INSECURITY
IN THE PAST 12 MONTHS, HAS LACK OF TRANSPORTATION KEPT YOU FROM MEETINGS, WORK, OR FROM GETTING THINGS NEEDED FOR DAILY LIVING?: NO

## 2022-03-18 ASSESSMENT — PATIENT HEALTH QUESTIONNAIRE - PHQ9
SUM OF ALL RESPONSES TO PHQ QUESTIONS 1-9: 0
1. LITTLE INTEREST OR PLEASURE IN DOING THINGS: 0
SUM OF ALL RESPONSES TO PHQ QUESTIONS 1-9: 0
2. FEELING DOWN, DEPRESSED OR HOPELESS: 0
SUM OF ALL RESPONSES TO PHQ QUESTIONS 1-9: 0
SUM OF ALL RESPONSES TO PHQ9 QUESTIONS 1 & 2: 0
SUM OF ALL RESPONSES TO PHQ QUESTIONS 1-9: 0

## 2022-03-18 ASSESSMENT — LIFESTYLE VARIABLES
HOW MANY STANDARD DRINKS CONTAINING ALCOHOL DO YOU HAVE ON A TYPICAL DAY: 1 OR 2
HOW OFTEN DO YOU HAVE A DRINK CONTAINING ALCOHOL: 2-4 TIMES A MONTH

## 2022-03-18 NOTE — PATIENT INSTRUCTIONS
Please take 1/2 insulin the night before----  Please follow up with Dr Arianna Tavarez- Cardiology  Needs to see Hem-Onc for GIST Tumor- has not made it.

## 2022-03-18 NOTE — PROGRESS NOTES
Linus Reddy,  Thank you for the referral.   Are there any special concerns you have for the patient? Did you discuss care coordination with him? I will reach out to him next week.   Di Andino

## 2022-03-18 NOTE — PROGRESS NOTES
CC:  PreOP  ------------------------------------------------------------------------------------------------------------------------  Assessment/Plan   Diagnosis Orders   1. Preop examination  Moderate risk- see RCardiac Index in note  Take 1/2 dose of lantus day before  Closely monitor BG   2. Chronic heart failure with preserved ejection fraction (HFpEF) (HCC)  Stable   3. Tongue lesion            RTO on 5/1 for follow-up type 2 diabetes mellitus and HTN  -------------------------------------------------------------------------------------------------------------------------    HPI:  64 y.o. man with a past medical history of MARIA, Obesity, Type 2 Diabetes Mellitus, HTN, HLD, aslo GIST presents for a pre-op exam. Following ENT Dr. Jennifer Valero for lesion on tongue and also right cervical lymph node- last ultrasound showing it was benign. Reason for excision is due to it bothers patient, not for malignancy. No increase in lesion, pain, discharge, swelling, difficulty chewing/swallowing as per patient.     Excision Tongue Lesion (3/21) planned    Revised Cardiac Index- 6.0 %  30-day risk of death, MI, or cardiac arrest    Mets >4, uses a cane to walk with gouty flare ups periodically  Able to walk >3 blocks without SOB    EKG - reviewed NSR  Echo 2020- preserved EF  Stress test ordered prior- not gone    Denying chest pain, tightness, sob, swelling in legs, fatigue, n/v, fevers, chills      Insulin Dependent Type 2 Diabetes Mellitus (A1C: 9.1)  Lantus 42 U  FBG unable to state today, no logs    Denying hypo/hyperglycemic episodes      HfpEF   Cardiology- Dr. Lee Nephew following  Due for check-up    MARIA  Non compliant with CPAP    Denying difficulty breathing, daytime sleeping, chewing/swallowing    GIST Tumor  Missed last specialist appointment  Needs help rescheduling        Patient Active Problem List    Diagnosis Date Noted    Thoracic or lumbosacral neuritis or radiculitis 02/01/2022    Tinea pedis 02/01/2022    Venous insufficiency 01/24/2022    Peripheral venous insufficiency 01/24/2022    Vitamin D deficiency 01/24/2022    Gout 01/24/2022    Ingrowing toenail 01/24/2022    Low back pain 01/24/2022    Meralgia paresthetica 01/24/2022    Neoplastic disease 01/24/2022    Other ill-defined and unknown causes of morbidity and mortality 01/24/2022    Onychomycosis 01/24/2022    Congenital pes planus of both feet 01/24/2022    Benign neoplasm of tongue 01/24/2022    GIST (gastrointestinal stroma tumor), malignant, colon ([facility] [address] 01/05/2020    Chronic heart failure with preserved ejection fraction (HFpEF) ([facility] [address] 01/05/2020    Primary osteoarthritis of right knee 04/26/2019    Synovial cyst of right popliteal space 11/19/2018    Abnormal colonoscopy 09/14/2018    Hypomagnesemia 08/17/2018    Bilateral leg edema 08/17/2018    Tobacco abuse     Uncontrolled type 2 diabetes mellitus with hyperglycemia (HCC)     Mixed hyperlipidemia 11/14/2016    Recurrent major depressive disorder, in remission ([facility] [address] 11/14/2016    Carpal tunnel syndrome 11/14/2016    Obstructive sleep apnea syndrome 11/14/2016    Essential hypertension 11/01/2016    Type 2 diabetes mellitus (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 [address] 6/3/2019    Gout     Hyperlipidemia     Hypertension     Hypokalemia     Mass of right submandibular region     Neuropathy     Obesity     bmi 43.1 weight 283 #    Sleep apnea     c pap       Current Outpatient Medications on File Prior to Visit   Medication Sig Dispense Refill    colchicine (COLCRYS) 0.6 MG tablet Use an initial dose of 1.2 mg for 1 dose, with next dose no sooner than 3 days later. Alternatively, take 1.2 mg (2 pills) on day 1 , then 0.6 mg on day 2. Continue with dose of 0.6 mg daily for 3-5 days or until flare resolves. 15 tablet 0    insulin glargine (LANTUS SOLOSTAR) 100 UNIT/ML injection pen ADMINISTER 42 UNITS UNDER THE SKIN EVERY MORNING 7 pen 2    diclofenac sodium (VOLTAREN) 1 % GEL Apply 2 g topically 4 times daily 350 g 0    ibuprofen (ADVIL;MOTRIN) 400 MG tablet Take 1 tablet by mouth every 6 hours as needed for Pain 120 tablet 3    metOLazone (ZAROXOLYN) 2.5 MG tablet Take 1 tablet by mouth every other day 45 tablet 3    clotrimazole (LOTRIMIN) 1 % cream APPLY A SUFFICIENT AMOUNT EXTERNALLY TWICE A DAY AS NEEDED ON BOTH FEET FOR ATHLETES FOOT INFECTION      amLODIPine (NORVASC) 10 MG tablet Take 1 tablet by mouth daily 90 tablet 3    bumetanide (BUMEX) 1 MG tablet Take 1 tablet by mouth 2 times daily 180 tablet 3    losartan (COZAAR) 50 MG tablet Take 1 tablet by mouth daily 90 tablet 3    metoprolol tartrate (LOPRESSOR) 50 MG tablet Take 1 tablet by mouth 2 times daily (Patient taking differently: Take 50 mg by mouth daily ) 180 tablet 3    potassium chloride (KLOR-CON M) 20 MEQ extended release tablet Take 1 tablet by mouth 2 times daily (Patient taking differently: Take 20 mEq by mouth daily Stat) 180 tablet 1    B-D ULTRAFINE III SHORT PEN 31G X 8 MM MISC Inject 1 each into the skin nightly 100 each 3    docusate sodium (COLACE) 100 MG capsule Take 1 capsule by mouth 2 times daily 90 capsule 1    gabapentin (NEURONTIN) 800 MG tablet Take 800 mg by mouth 3 times daily.       eplerenone (INSPRA) 50 MG tablet Take 1 tablet by mouth daily 90 tablet 3    ammonium lactate (AMLACTIN) 12 % cream APPLY A SUFFICIENT AMOUNT EXTERNALLY EVERY DAY FOR DRY SKIN ON FEET AND LEGS **DO NOT APPLY BETWEEN TOES**      magnesium oxide (MAG-OX) 400 (240 Mg) MG tablet Take 1 tablet by mouth daily 30 tablet 0    Compression Stockings MISC by Does not apply route As directed  25 mmHg , 1 pair 1 each 0    imatinib (GLEEVEC) 400 MG chemo tablet TAKE 1 TABLET BY MOUTH ONCE DAILY AT THE SAME TIME WITH FOOD AND A LARGE GLASS OF WATER. DO NOT CRUSH TABLETS. AVOID GRAPEFRUIT PRODUCTS. 28 tablet 2    acetaminophen (TYLENOL) 500 MG tablet Take 500 mg by mouth every 6 hours as needed       Blood Glucose Monitoring Suppl (ONETOUCH VERIO FLEX SYSTEM) w/Device KIT AS DIRECTED  0    Cholecalciferol (VITAMIN D3) 2000 units CAPS Take 1 capsule by mouth daily      traMADol (ULTRAM) 50 MG tablet Take 50 mg by mouth every 6 hours as needed for Pain. Clayborne Hoguet  sildenafil (VIAGRA) 100 MG tablet Take 100 mg by mouth as needed. Take an hour before sex. Med info obtained from [place] records. No current facility-administered medications on file prior to visit. Allergies   Allergen Reactions    Lasix [Furosemide] Other (See Comments)     Per pt potassium was too low when on lasix    Lisinopril Swelling     Angioedema    Prunus Persica Anaphylaxis     Per report to [facility] manager, Eliceo Singer. [address], BS-NDTR    Atenolol Other (See Comments) and Swelling     Thins his blood    Nitrates, Organic Other (See Comments)     Is using Sildenafil. containdicated with Nitrates    Spironolactone      Swelling in the chest area       Family History   Problem Relation Age of Onset    Other Mother         COVID    Heart Failure Father        Past Surgical History:   Procedure Laterality Date    COLON SURGERY  2019    GIST ileum . .. ile[facility]cece[facility] by Dr. Abdullahi Broussard.  Currently receiving oral chemo    COLONOSCOPY  09/14/2018    polyp--eliane    NASAL SEPTUM SURGERY      WA COLONOSCOPY W/BIOPSY SINGLE/MULTIPLE N/A 9/14/2018    COLONOSCOPY WITH BIOPSY performed by Eden Balderas MD at [address] TUTTLE W/RMVL OF TUMOR POLYP LESION SNARE TQ  9/14/2018    COLONOSCOPY POLYPECTOMY SNARE/COLD BIOPSY performed by Eden Balderas MD at [place] [address] NECK/THORAX  5/26/2021    US BIOPSY SOFT TISSUE NECK/THORAX 5/26/2021 [facility] ULTRASOUND       Social History     Tobacco Use    Smoking status: Current Every Day Smoker     Packs/day: 0.50     Years: 31.00     Pack years: 15.50     Types: Cigarettes    Smokeless tobacco: Never Used   Vaping Use    Vaping Use: Never used   Substance Use Topics    Alcohol use: Yes     Comment: pint every 2 days    Drug use: Yes     Frequency: 7.0 times per week     Types: Marijuana (Weed)     Comment: daily use       ROS:    Review of Systems   Constitutional: Negative for activity change, appetite change, chills and fatigue. HENT: Negative for congestion and sore throat. Respiratory: Negative for choking and chest tightness. Cardiovascular: Negative for chest pain, palpitations and leg swelling. Gastrointestinal: Negative for abdominal distention and abdominal pain. Genitourinary: Negative for difficulty urinating and dysuria. Musculoskeletal:Lesion on Tongue  Skin: Negative for color change and pallor. Neurological: Negative for facial asymmetry and headaches. Psychiatric/Behavioral: Negative for behavioral problems. The patient is not nervous/anxious. Objective:    VS:  Blood pressure 118/74, pulse 76, temperature 98.8 °F (37.1 °C), temperature source Temporal, resp. rate 22, height 5' 8\" (1.727 m), weight 276 lb (125.2 kg), SpO2 97 %. Physical Exam   Constitutional: Oriented to person, place, and time. Well-developed and well-nourished. HENT:   Head: Normocephalic and atraumatic. Eyes: EOM are normal.   Neck: Neck supple. Short neck girth. Right cervical adenopathy  Mouth: .5 cm lesion on midline of tongue. No crusting, bleeding, tenderness. Cardiovascular: Normal rate, regular rhythm and intact distal pulses. Exam reveals no gallop and no friction rub. No murmur heard. Pulmonary/Chest: Effort normal and breath sounds normal. No wheezes. No rhales. Abdominal: Soft.  Bowel sounds are normal. No distension. No abdominal tenderness. Musculoskeletal: Normal range of motion. General: No edema. Neurological: Alert and oriented to person, place, and time. Skin: Skin is warm and dry. No rash noted. No erythema. Psychiatric: Normal mood and affect. Behavior is normal.   Nursing note and vitals reviewed. Plans:  As above. Please see Patient Instructions for further counseling and information given. Advised to please be adherent to the treatment plans discussed today, and please call with any questions or concerns, letting the office know of any reasons that the plans may not be followed. The risks of untreated conditions include worsening illness, injury, disability, and possibly, death. Please call if symptoms change in any way, worsen, or fail to completely resolve, as this could necessitate a change to treatment plans. Patient and/or caregiver expressed understanding. Indications and proper use of medication(s) reviewed. Potential side-effects and risks of medication(s) also explained. Patient and/or caregiver was instructed to call if any new symptoms develop prior to next visit. Health risk factors discussed and addressed.

## 2022-03-18 NOTE — PROGRESS NOTES
S: 64 y.o. male with [address] HFpEF and DM2 with A1C 9.1, HTN, HLD, obesity, untreated MARIA, and GIST tumor. He is here for preoperative clearance. He is having a tongue lesion excision on monday by Dr. Amaris Catherine, ENT. Symptomatic. No recent increase in size, but has not been bleeding, there is also no difficulty with chewing or swallowing. Patient does follow up with cardiology. No CHF symptoms at present. O: VS: /74 (Site: Left Upper Arm, Position: Sitting, Cuff Size: Medium Adult)   Pulse 76   Temp 98.8 °F (37.1 °C) (Temporal)   Resp 22   Ht 5' 8\" (1.727 m)   Wt 276 lb (125.2 kg)   SpO2 97%   BMI 41.97 kg/m²    General: NAD, appropriate affect and grooming   CV:  RRR, no gallops, rubs, or murmurs   Resp: CTAB   Abd:  Soft, nontender   Ext:  No edema   Neck:  Significant thickness of the neck    RCSI risk score is 6%  EKG reviewed with resident  Echo in 2020-EF still stable    Impression/Plan:  1. Tongue lesion-for removal by ent as outpatient surgery  2. DM2-not controlled; half insulin on the night before the surgery  3. HTN-controlled; CBC and BMP are wnl    Attending Physician Statement  I have discussed the case, including pertinent history and exam findings with the resident. I agree with the documented assessment and plan.

## 2022-03-19 NOTE — PROGRESS NOTES
Hi!    I have discussed with him before, will do so again earlier next week. I received a message from a coresident today that the patient recently missed a few of his discussed appointments and had difficulty rescheduling. I was hoping to get care coordination involved for this reason. The patient has significant medical history including gastrointestinal tumors and recent;y cancelled his scope secondary to a fall. It continues to remain unscheduled. I was hoping we could synchronize his care further.     Thank you so much for your time    Sincerely    Kiki Holland

## 2022-03-20 ASSESSMENT — ENCOUNTER SYMPTOMS
CHEST TIGHTNESS: 0
VOICE CHANGE: 0
DIARRHEA: 0
COUGH: 0
VOMITING: 0
EYE DISCHARGE: 0
RESPIRATORY NEGATIVE: 1
SORE THROAT: 0
TROUBLE SWALLOWING: 0
GASTROINTESTINAL NEGATIVE: 1
SHORTNESS OF BREATH: 0
ABDOMINAL PAIN: 0
EYE PAIN: 0
APNEA: 0
COLOR CHANGE: 0
EYES NEGATIVE: 1

## 2022-03-21 ENCOUNTER — HOSPITAL ENCOUNTER (OUTPATIENT)
Age: 62
Setting detail: OUTPATIENT SURGERY
Discharge: HOME | End: 2022-03-21
Attending: OTOLARYNGOLOGY | Admitting: OTOLARYNGOLOGY
Payer: MEDICARE

## 2022-03-21 ENCOUNTER — ANESTHESIA EVENT (OUTPATIENT)
Dept: OPERATING ROOM | Age: 62
End: 2022-03-21
Payer: MEDICARE

## 2022-03-21 ENCOUNTER — ANESTHESIA (OUTPATIENT)
Dept: OPERATING ROOM | Age: 62
End: 2022-03-21
Payer: MEDICARE

## 2022-03-21 VITALS
SYSTOLIC BLOOD PRESSURE: 126 MMHG | DIASTOLIC BLOOD PRESSURE: 63 MMHG | RESPIRATION RATE: 18 BRPM | OXYGEN SATURATION: 100 % | BODY MASS INDEX: 40.92 KG/M2 | HEART RATE: 70 BPM | WEIGHT: 270 LBS | TEMPERATURE: 97.3 F | HEIGHT: 68 IN

## 2022-03-21 VITALS — DIASTOLIC BLOOD PRESSURE: 108 MMHG | SYSTOLIC BLOOD PRESSURE: 223 MMHG | OXYGEN SATURATION: 96 %

## 2022-03-21 DIAGNOSIS — G89.18 POST-OP PAIN: Primary | ICD-10-CM

## 2022-03-21 LAB — METER GLUCOSE: 177 MG/DL (ref 74–99)

## 2022-03-21 PROCEDURE — 3600000012 HC SURGERY LEVEL 2 ADDTL 15MIN: Performed by: OTOLARYNGOLOGY

## 2022-03-21 PROCEDURE — 2709999900 HC NON-CHARGEABLE SUPPLY: Performed by: OTOLARYNGOLOGY

## 2022-03-21 PROCEDURE — 88305 TISSUE EXAM BY PATHOLOGIST: CPT

## 2022-03-21 PROCEDURE — 94664 DEMO&/EVAL PT USE INHALER: CPT

## 2022-03-21 PROCEDURE — 7100000011 HC PHASE II RECOVERY - ADDTL 15 MIN: Performed by: OTOLARYNGOLOGY

## 2022-03-21 PROCEDURE — 41112 EXCISION OF TONGUE LESION: CPT | Performed by: OTOLARYNGOLOGY

## 2022-03-21 PROCEDURE — 2500000003 HC RX 250 WO HCPCS: Performed by: OTOLARYNGOLOGY

## 2022-03-21 PROCEDURE — 3700000000 HC ANESTHESIA ATTENDED CARE: Performed by: OTOLARYNGOLOGY

## 2022-03-21 PROCEDURE — 7100000000 HC PACU RECOVERY - FIRST 15 MIN: Performed by: OTOLARYNGOLOGY

## 2022-03-21 PROCEDURE — 3700000001 HC ADD 15 MINUTES (ANESTHESIA): Performed by: OTOLARYNGOLOGY

## 2022-03-21 PROCEDURE — 2500000003 HC RX 250 WO HCPCS: Performed by: NURSE ANESTHETIST, CERTIFIED REGISTERED

## 2022-03-21 PROCEDURE — 2580000003 HC RX 258: Performed by: STUDENT IN AN ORGANIZED HEALTH CARE EDUCATION/TRAINING PROGRAM

## 2022-03-21 PROCEDURE — 6360000002 HC RX W HCPCS: Performed by: NURSE ANESTHETIST, CERTIFIED REGISTERED

## 2022-03-21 PROCEDURE — 3600000002 HC SURGERY LEVEL 2 BASE: Performed by: OTOLARYNGOLOGY

## 2022-03-21 PROCEDURE — 6360000002 HC RX W HCPCS: Performed by: ANESTHESIOLOGY

## 2022-03-21 PROCEDURE — 6370000000 HC RX 637 (ALT 250 FOR IP): Performed by: NURSE ANESTHETIST, CERTIFIED REGISTERED

## 2022-03-21 PROCEDURE — 88312 SPECIAL STAINS GROUP 1: CPT

## 2022-03-21 PROCEDURE — 7100000001 HC PACU RECOVERY - ADDTL 15 MIN: Performed by: OTOLARYNGOLOGY

## 2022-03-21 PROCEDURE — 7100000010 HC PHASE II RECOVERY - FIRST 15 MIN: Performed by: OTOLARYNGOLOGY

## 2022-03-21 PROCEDURE — 82962 GLUCOSE BLOOD TEST: CPT

## 2022-03-21 PROCEDURE — 6370000000 HC RX 637 (ALT 250 FOR IP): Performed by: ANESTHESIOLOGY

## 2022-03-21 RX ORDER — ALBUTEROL SULFATE 2.5 MG/3ML
2.5 SOLUTION RESPIRATORY (INHALATION) ONCE
Status: COMPLETED | OUTPATIENT
Start: 2022-03-21 | End: 2022-03-21

## 2022-03-21 RX ORDER — LIDOCAINE HYDROCHLORIDE AND EPINEPHRINE 10; 10 MG/ML; UG/ML
INJECTION, SOLUTION INFILTRATION; PERINEURAL PRN
Status: DISCONTINUED | OUTPATIENT
Start: 2022-03-21 | End: 2022-03-21 | Stop reason: ALTCHOICE

## 2022-03-21 RX ORDER — MIDAZOLAM HYDROCHLORIDE 1 MG/ML
INJECTION INTRAMUSCULAR; INTRAVENOUS PRN
Status: DISCONTINUED | OUTPATIENT
Start: 2022-03-21 | End: 2022-03-21 | Stop reason: SDUPTHER

## 2022-03-21 RX ORDER — SODIUM CHLORIDE 9 MG/ML
25 INJECTION, SOLUTION INTRAVENOUS PRN
Status: DISCONTINUED | OUTPATIENT
Start: 2022-03-21 | End: 2022-03-21 | Stop reason: HOSPADM

## 2022-03-21 RX ORDER — DEXAMETHASONE SODIUM PHOSPHATE 4 MG/ML
INJECTION, SOLUTION INTRA-ARTICULAR; INTRALESIONAL; INTRAMUSCULAR; INTRAVENOUS; SOFT TISSUE PRN
Status: DISCONTINUED | OUTPATIENT
Start: 2022-03-21 | End: 2022-03-21 | Stop reason: SDUPTHER

## 2022-03-21 RX ORDER — CHLORHEXIDINE GLUCONATE 0.12 MG/ML
15 RINSE ORAL 2 TIMES DAILY
Qty: 420 ML | Refills: 0 | Status: SHIPPED | OUTPATIENT
Start: 2022-03-21 | End: 2022-04-04

## 2022-03-21 RX ORDER — SODIUM CHLORIDE 0.9 % (FLUSH) 0.9 %
5-40 SYRINGE (ML) INJECTION PRN
Status: DISCONTINUED | OUTPATIENT
Start: 2022-03-21 | End: 2022-03-21 | Stop reason: HOSPADM

## 2022-03-21 RX ORDER — SODIUM CHLORIDE 0.9 % (FLUSH) 0.9 %
5-40 SYRINGE (ML) INJECTION EVERY 12 HOURS SCHEDULED
Status: DISCONTINUED | OUTPATIENT
Start: 2022-03-21 | End: 2022-03-21 | Stop reason: HOSPADM

## 2022-03-21 RX ORDER — ALBUTEROL SULFATE 90 UG/1
AEROSOL, METERED RESPIRATORY (INHALATION) PRN
Status: DISCONTINUED | OUTPATIENT
Start: 2022-03-21 | End: 2022-03-21 | Stop reason: SDUPTHER

## 2022-03-21 RX ORDER — LIDOCAINE HYDROCHLORIDE 20 MG/ML
INJECTION, SOLUTION EPIDURAL; INFILTRATION; INTRACAUDAL; PERINEURAL PRN
Status: DISCONTINUED | OUTPATIENT
Start: 2022-03-21 | End: 2022-03-21 | Stop reason: SDUPTHER

## 2022-03-21 RX ORDER — FENTANYL CITRATE 50 UG/ML
50 INJECTION, SOLUTION INTRAMUSCULAR; INTRAVENOUS EVERY 5 MIN PRN
Status: DISCONTINUED | OUTPATIENT
Start: 2022-03-21 | End: 2022-03-21 | Stop reason: HOSPADM

## 2022-03-21 RX ORDER — SUCCINYLCHOLINE/SOD CL,ISO/PF 200MG/10ML
SYRINGE (ML) INTRAVENOUS PRN
Status: DISCONTINUED | OUTPATIENT
Start: 2022-03-21 | End: 2022-03-21 | Stop reason: SDUPTHER

## 2022-03-21 RX ORDER — SODIUM CHLORIDE 0.9 % (FLUSH) 0.9 %
10 SYRINGE (ML) INJECTION EVERY 12 HOURS SCHEDULED
Status: DISCONTINUED | OUTPATIENT
Start: 2022-03-21 | End: 2022-03-21 | Stop reason: HOSPADM

## 2022-03-21 RX ORDER — HYDROCODONE BITARTRATE AND ACETAMINOPHEN 5; 325 MG/1; MG/1
1 TABLET ORAL
Status: COMPLETED | OUTPATIENT
Start: 2022-03-21 | End: 2022-03-21

## 2022-03-21 RX ORDER — HYDROCODONE BITARTRATE AND ACETAMINOPHEN 7.5; 325 MG/1; MG/1
1 TABLET ORAL EVERY 6 HOURS PRN
Qty: 7 TABLET | Refills: 0 | Status: SHIPPED | OUTPATIENT
Start: 2022-03-21 | End: 2022-03-28

## 2022-03-21 RX ORDER — PROPOFOL 10 MG/ML
INJECTION, EMULSION INTRAVENOUS PRN
Status: DISCONTINUED | OUTPATIENT
Start: 2022-03-21 | End: 2022-03-21 | Stop reason: SDUPTHER

## 2022-03-21 RX ORDER — MEPERIDINE HYDROCHLORIDE 25 MG/ML
12.5 INJECTION INTRAMUSCULAR; INTRAVENOUS; SUBCUTANEOUS EVERY 10 MIN PRN
Status: DISCONTINUED | OUTPATIENT
Start: 2022-03-21 | End: 2022-03-21 | Stop reason: HOSPADM

## 2022-03-21 RX ORDER — FENTANYL CITRATE 50 UG/ML
INJECTION, SOLUTION INTRAMUSCULAR; INTRAVENOUS PRN
Status: DISCONTINUED | OUTPATIENT
Start: 2022-03-21 | End: 2022-03-21 | Stop reason: SDUPTHER

## 2022-03-21 RX ORDER — SODIUM CHLORIDE 0.9 % (FLUSH) 0.9 %
10 SYRINGE (ML) INJECTION PRN
Status: DISCONTINUED | OUTPATIENT
Start: 2022-03-21 | End: 2022-03-21 | Stop reason: HOSPADM

## 2022-03-21 RX ORDER — DIPHENHYDRAMINE HYDROCHLORIDE 50 MG/ML
12.5 INJECTION INTRAMUSCULAR; INTRAVENOUS
Status: DISCONTINUED | OUTPATIENT
Start: 2022-03-21 | End: 2022-03-21 | Stop reason: HOSPADM

## 2022-03-21 RX ORDER — ONDANSETRON 2 MG/ML
INJECTION INTRAMUSCULAR; INTRAVENOUS PRN
Status: DISCONTINUED | OUTPATIENT
Start: 2022-03-21 | End: 2022-03-21 | Stop reason: SDUPTHER

## 2022-03-21 RX ADMIN — LIDOCAINE HYDROCHLORIDE 100 MG: 20 INJECTION, SOLUTION EPIDURAL; INFILTRATION; INTRACAUDAL; PERINEURAL at 07:48

## 2022-03-21 RX ADMIN — Medication 180 MG: at 07:40

## 2022-03-21 RX ADMIN — HYDROCODONE BITARTRATE AND ACETAMINOPHEN 1 TABLET: 5; 325 TABLET ORAL at 09:38

## 2022-03-21 RX ADMIN — MIDAZOLAM 1 MG: 1 INJECTION INTRAMUSCULAR; INTRAVENOUS at 07:35

## 2022-03-21 RX ADMIN — ONDANSETRON 4 MG: 2 INJECTION INTRAMUSCULAR; INTRAVENOUS at 07:48

## 2022-03-21 RX ADMIN — ALBUTEROL SULFATE 2 PUFF: 108 AEROSOL, METERED RESPIRATORY (INHALATION) at 08:06

## 2022-03-21 RX ADMIN — ALBUTEROL SULFATE 2 PUFF: 108 AEROSOL, METERED RESPIRATORY (INHALATION) at 07:55

## 2022-03-21 RX ADMIN — MIDAZOLAM 1 MG: 1 INJECTION INTRAMUSCULAR; INTRAVENOUS at 07:30

## 2022-03-21 RX ADMIN — PROPOFOL 200 MG: 10 INJECTION, EMULSION INTRAVENOUS at 07:40

## 2022-03-21 RX ADMIN — FENTANYL CITRATE 50 MCG: 50 INJECTION, SOLUTION INTRAMUSCULAR; INTRAVENOUS at 07:40

## 2022-03-21 RX ADMIN — SODIUM CHLORIDE: 9 INJECTION, SOLUTION INTRAVENOUS at 07:12

## 2022-03-21 RX ADMIN — FENTANYL CITRATE 50 MCG: 50 INJECTION, SOLUTION INTRAMUSCULAR; INTRAVENOUS at 07:45

## 2022-03-21 RX ADMIN — DEXAMETHASONE SODIUM PHOSPHATE 8 MG: 4 INJECTION, SOLUTION INTRAMUSCULAR; INTRAVENOUS at 07:48

## 2022-03-21 RX ADMIN — ALBUTEROL SULFATE 2.5 MG: 2.5 SOLUTION RESPIRATORY (INHALATION) at 08:37

## 2022-03-21 ASSESSMENT — PULMONARY FUNCTION TESTS
PIF_VALUE: 35
PIF_VALUE: 2
PIF_VALUE: 12
PIF_VALUE: 29
PIF_VALUE: 35
PIF_VALUE: 8
PIF_VALUE: 35
PIF_VALUE: 2
PIF_VALUE: 35
PIF_VALUE: 21
PIF_VALUE: 24
PIF_VALUE: 35
PIF_VALUE: 0
PIF_VALUE: 2
PIF_VALUE: 0
PIF_VALUE: 1
PIF_VALUE: 2
PIF_VALUE: 33
PIF_VALUE: 25
PIF_VALUE: 50
PIF_VALUE: 35
PIF_VALUE: 33
PIF_VALUE: 35
PIF_VALUE: 2
PIF_VALUE: 3
PIF_VALUE: 33
PIF_VALUE: 20
PIF_VALUE: 0
PIF_VALUE: 32
PIF_VALUE: 0
PIF_VALUE: 40
PIF_VALUE: 33
PIF_VALUE: 29
PIF_VALUE: 35
PIF_VALUE: 8
PIF_VALUE: 19
PIF_VALUE: 0
PIF_VALUE: 30
PIF_VALUE: 29
PIF_VALUE: 11
PIF_VALUE: 7

## 2022-03-21 ASSESSMENT — PAIN - FUNCTIONAL ASSESSMENT: PAIN_FUNCTIONAL_ASSESSMENT: 0-10

## 2022-03-21 ASSESSMENT — PAIN SCALES - GENERAL
PAINLEVEL_OUTOF10: 8
PAINLEVEL_OUTOF10: 7
PAINLEVEL_OUTOF10: 0
PAINLEVEL_OUTOF10: 0

## 2022-03-21 ASSESSMENT — PAIN DESCRIPTION - PAIN TYPE: TYPE: ACUTE PAIN

## 2022-03-21 ASSESSMENT — PAIN DESCRIPTION - LOCATION: LOCATION: OTHER (COMMENT)

## 2022-03-21 ASSESSMENT — LIFESTYLE VARIABLES: SMOKING_STATUS: 1

## 2022-03-21 ASSESSMENT — PAIN DESCRIPTION - DESCRIPTORS: DESCRIPTORS: DISCOMFORT

## 2022-03-21 NOTE — ANESTHESIA PRE PROCEDURE
Department of Anesthesiology  Preprocedure Note       Name:  Carmen Joyce. Age:  64 y.o.  :  1960                                          MRN:  [id number]         Date:  3/21/2022      Surgeon: Ld Cueto):  Wilfrido Mera DO    Procedure: Procedure(s):  EXCISION TONGUE LESION   (PATHOLOGY)    Medications prior to admission:   Prior to Admission medications    Medication Sig Start Date End Date Taking? Authorizing Provider   colchicine (COLCRYS) 0.6 MG tablet Use an initial dose of 1.2 mg for 1 dose, with next dose no sooner than 3 days later. Alternatively, take 1.2 mg (2 pills) on day 1 , then 0.6 mg on day 2. Continue with dose of 0.6 mg daily for 3-5 days or until flare resolves.  3/14/22  Yes Radha Lema MD   insulin glargine (LANTUS SOLOSTAR) 100 UNIT/ML injection pen ADMINISTER 42 UNITS UNDER THE SKIN EVERY MORNING 22  Yes Radha Lema MD   diclofenac sodium (VOLTAREN) 1 % GEL Apply 2 g topically 4 times daily 22  Yes Radha Lema MD   ibuprofen (ADVIL;MOTRIN) 400 MG tablet Take 1 tablet by mouth every 6 hours as needed for Pain 22  Yes Radha Lema MD   metOLazone (ZAROXOLYN) 2.5 MG tablet Take 1 tablet by mouth every other day 21  Yes Radha Lema MD   clotrimazole (LOTRIMIN) 1 % cream APPLY A SUFFICIENT AMOUNT EXTERNALLY TWICE A DAY AS NEEDED ON BOTH FEET FOR ATHLETES FOOT INFECTION 21  Yes Historical Provider, MD   amLODIPine (NORVASC) 10 MG tablet Take 1 tablet by mouth daily 10/8/21  Yes Radha Lema MD   bumetanide (BUMEX) 1 MG tablet Take 1 tablet by mouth 2 times daily 10/8/21  Yes Radha Lema MD   losartan (COZAAR) 50 MG tablet Take 1 tablet by mouth daily 10/8/21  Yes Radha Lema MD   metoprolol tartrate (LOPRESSOR) 50 MG tablet Take 1 tablet by mouth 2 times daily  Patient taking differently: Take 50 mg by mouth daily  10/8/21  Yes Radha Lema MD   potassium chloride (KLOR-CON M) 20 MEQ extended release tablet Take 1 tablet by mouth 2 times daily  Patient taking differently: Take 20 mEq by mouth daily Stat 10/8/21  Yes Idalia Zelaya MD   docusate sodium (COLACE) 100 MG capsule Take 1 capsule by mouth 2 times daily 10/8/21  Yes Idalia Zelaya MD   gabapentin (NEURONTIN) 800 MG tablet Take 800 mg by mouth 3 times daily. Yes Historical Provider, MD   eplerenone (INSPRA) 50 MG tablet Take 1 tablet by mouth daily 8/17/21  Yes Beckie Douglas MD   ammonium lactate (AMLACTIN) 12 % cream APPLY A SUFFICIENT AMOUNT EXTERNALLY EVERY DAY FOR DRY SKIN ON FEET AND LEGS **DO NOT APPLY BETWEEN TOES** 3/8/21  Yes Historical Provider, MD   magnesium oxide (MAG-OX) 400 (240 Mg) MG tablet Take 1 tablet by mouth daily 2/8/21  Yes Devika Millan MD   imatinib (GLEEVEC) 400 MG chemo tablet TAKE 1 TABLET BY MOUTH ONCE DAILY AT THE SAME TIME WITH FOOD AND A LARGE GLASS OF WATER. DO NOT CRUSH TABLETS. AVOID GRAPEFRUIT PRODUCTS. 10/15/20  Yes JEAN Mendoza CNP   acetaminophen (TYLENOL) 500 MG tablet Take 500 mg by mouth every 6 hours as needed  5/14/19  Yes Historical Provider, MD   Cholecalciferol (VITAMIN D3) 2000 units CAPS Take 1 capsule by mouth daily   Yes Historical Provider, MD   traMADol (ULTRAM) 50 MG tablet Take 50 mg by mouth every 6 hours as needed for Pain. .   Yes Historical Provider, MD   sildenafil (VIAGRA) 100 MG tablet Take 100 mg by mouth as needed. Take an hour before sex. Med info obtained from [place] records.     Yes Historical Provider, MD WILL ULTRAFINE III SHORT PEN 31G X 8 MM MISC Inject 1 each into the skin nightly 10/8/21   Idalia Zelaya MD   Compression Stockings MISC by Does not apply route As directed  25 mmHg , 1 pair 1/19/21   Idalia Zelaya MD   Blood Glucose Monitoring Suppl ([address]) w/Device KIT AS DIRECTED 11/7/18   Historical Provider, MD       Current medications:    Current Facility-Administered Medications   Medication Dose Route Frequency Provider Last Rate Last Admin    sodium chloride flush 0.9 % injection 10 mL  10 mL IntraVENous 2 times per day Cang T Brandt, DO        sodium chloride flush 0.9 % injection 10 mL  10 mL IntraVENous PRN Cleophus Martin, DO        0.9 % sodium chloride infusion  25 mL IntraVENous PRN Cleophus Martin, DO           Allergies: Allergies   Allergen Reactions    Lasix [Furosemide] Other (See Comments)     Per pt potassium was too low when on lasix    Lisinopril Swelling     Angioedema    Prunus Persica Anaphylaxis     Per report to [facility] manager, Eloy Peterson. [address], BS-NDTR    Atenolol Other (See Comments) and Swelling     Thins his blood    Nitrates, Organic Other (See Comments)     Is using Sildenafil. containdicated with Nitrates    Spironolactone      Swelling in the chest area       Problem List:    Patient Active Problem List   Diagnosis Code    Essential hypertension I10    Type 2 diabetes mellitus ([facility] E11.9    Mixed hyperlipidemia E78.2    Recurrent major depressive disorder, in remission ([facility] F33.40    Carpal tunnel syndrome G56.00    Obstructive sleep apnea syndrome G47.33    Tobacco abuse Z72.0    Uncontrolled type 2 diabetes mellitus with hyperglycemia ([facility]) E11.65    Hypomagnesemia E83.42    Bilateral leg edema R60.0    Synovial cyst of right popliteal space M71.21    Primary osteoarthritis of right knee M17.11    GIST (gastrointestinal stroma tumor), malignant, 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    Abnormal colonoscopy R93.3    Thoracic or lumbosacral neuritis or radiculitis MBX3251    Tinea pedis B35.3       Past Medical History:        Diagnosis Date    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 .. [facility]ce[facility] by Dr. Luis Armando Leal.  Currently receiving oral chemo    COLONOSCOPY  09/14/2018    polyp--eliane    NASAL SEPTUM SURGERY      DC COLONOSCOPY W/BIOPSY SINGLE/MULTIPLE N/A 9/14/2018    COLONOSCOPY WITH BIOPSY performed by Anahi Espino MD at [address] [facility]  FLX W/RMVL OF TUMOR POLYP LESION SNARE TQ  9/14/2018    COLONOSCOPY POLYPECTOMY SNARE/COLD BIOPSY performed by Anahi Espino MD at [address] [facility] SOFT TISSUE NECK/THORAX  5/26/2021    US BIOPSY SOFT TISSUE NECK/THORAX 5/26/2021 [facility]       Social History:    Social History     Tobacco Use    Smoking status: Current Every Day Smoker     Packs/day: 0.50     Years: 31.00     Pack years: 15.50     Types: Cigarettes    Smokeless tobacco: Never Used   Substance Use Topics    Alcohol use: Yes     Comment: pint every 2 days                                Ready to quit: Not Answered  Counseling given: Not Answered      Vital Signs (Current):   Vitals:    03/16/22 1009   Weight: 270 lb (122.5 kg)   Height: 5' 8\" (1.727 m)                                              BP Readings from Last 3 Encounters:   03/18/22 118/74   02/17/22 117/78   02/13/22 (!) 152/92       NPO Status: Time of last liquid consumption: 0068 on day of surgery. Cardiovascular:  Exercise tolerance: good (>4 METS),   (+) hypertension:, CHF:,       ECG reviewed  Rhythm: regular    Echocardiogram reviewed         Beta Blocker:  Dose within 24 Hrs         Neuro/Psych:   (+) neuromuscular disease:, psychiatric history:            GI/Hepatic/Renal:             Endo/Other:    (+) DiabetesType II DM, using insulin, : arthritis:., malignancy/cancer. Abdominal:             Vascular: negative vascular ROS. Other Findings:           Anesthesia Plan      general     ASA 3       Induction: intravenous. Anesthetic plan and risks discussed with patient (clearance noted in EPIC). Donte Dawson MD   3/21/2022    Note reviewed and agree with plan.  Cheryl Green APRN - CRNA

## 2022-03-21 NOTE — ANESTHESIA POSTPROCEDURE EVALUATION
Department of Anesthesiology  Postprocedure Note    Patient: Faith Chang MRN: [id number]  YOB: 1960  Date of evaluation: 3/21/2022  Time:  8:21 AM     Procedure Summary     Date: 03/21/22 Room / Location: [facility]    Anesthesia Start: 1566 Anesthesia Stop: 9461    Procedure: EXCISION TONGUE LESION   (PATHOLOGY) (N/A Mouth) Diagnosis: (TONGUE LESION)    Surgeons: Fredy Aldana DO Responsible Provider: Margoth Vasquez MD    Anesthesia Type: general ASA Status: 3          Anesthesia Type: general    Tali Phase I: Tali Score: 10    Tali Phase II:      Last vitals: Reviewed and per EMR flowsheets.        Anesthesia Post Evaluation    Patient location during evaluation: PACU  Patient participation: complete - patient participated  Level of consciousness: awake and alert  Pain score: 0  Airway patency: patent  Nausea & Vomiting: no nausea and no vomiting  Complications: no  Cardiovascular status: blood pressure returned to baseline  Respiratory status: acceptable  Hydration status: euvolemic

## 2022-03-21 NOTE — OP NOTE
Operative Note      Patient: Abram Pereira. YOB: 1960  MRN: [id number]    Date of Procedure: 3/21/2022    Pre-Op Diagnosis: TONGUE LESION    Post-Op Diagnosis: Same       Procedure(s):  EXCISION TONGUE LESION   (PATHOLOGY)    Surgeon(s):  Rob Méndez DO    Assistant:   Surgical Assistant: Luciana Brown  Resident: Mik Tyson DO    Anesthesia: General    Estimated Blood Loss (mL): less than 50     Complications: None    Specimens:   ID Type Source Tests Collected by Time Destination   A : MIDLINE POSTERIOR TONGUE MASS Tissue Tissue SURGICAL PATHOLOGY Rob Méndez DO 3/21/2022 0753        Implants:  * No implants in log *      Drains: * No LDAs found *  indings: midline peduncuted mass at the posterior tongue     Indication: PT presented with a history of midline tongue lesion for the last several months.       Procedure:  Pt was consented preoperatively, taken to the OR and identified appropriately. Pt was placed in the supine position and given to anesthesia for induction. Once under anesthesia pt was prepped and draped in sterile fashion.     A dental cheek retractor was placed to expose the entire oral cavity. The entire oral cavity was inspected including the predetermined area of concern.          The midlinetongue area was first injected with 10 cc's of 1% lidocaine with epinephrine and allow to sit for about 5 minutes. After the lip was properly anesthetized, a #15 blade was then used to make an elliptical incision 4 cm in length. The incision was made to the muscular layer of the tongue. Bovie was then used to remove the lip tissue from the wound bed. The tissue was placed in formalin. A 3-0 Vicryl suture was then used to suture the lip closed primarily with simple sutures.   Pt tolerated the procedure well.       Patient was then turned back to anesthesia     Electronically signed by Mik Tyson DO on 3/21/2022 at 8:06 AM

## 2022-03-21 NOTE — H&P
Subjective:      Patient ID:  Abram Pereira. is a 64 y.o. male. HPI:    Pt returns for review of US right lymph node. There are not changes since last visit. 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 .. [facility] by [facility]. Gen Snider.  Currently receiving oral chemo    COLONOSCOPY  09/14/2018    polyp--eliane    NASAL SEPTUM SURGERY      OR COLONOSCOPY W/BIOPSY SINGLE/MULTIPLE N/A 9/14/2018    COLONOSCOPY WITH BIOPSY performed by Tenisha Mandel MD at [address]  FLSHERRI W/RMVL OF TUMOR POLYP LESION SNARE TQ  9/14/2018    COLONOSCOPY POLYPECTOMY SNARE/COLD BIOPSY performed by Tenisha Mandel MD at [address] [facility] BIOPSY SOFT TISSUE NECK/THORAX  5/26/2021    US BIOPSY SOFT TISSUE NECK/THORAX 5/26/2021 [facility]     Family History   Problem Relation Age of Onset    Other Mother         COVID    Heart Failure Father      Social History     Socioeconomic History    Marital status:      Spouse name: None    Number of children: None    Years of education: None    Highest education level: None   Occupational History    None   Tobacco Use    Smoking status: Current Every Day Smoker     Packs/day: 0.50     Years: 31.00     Pack years: 15.50     Types: Cigarettes    Smokeless tobacco: Never Used   Vaping Use    Vaping Use: Never used   Substance and Sexual Activity    Alcohol use: Yes     Comment: pint every 2 days    Drug use: Yes     Frequency: 7.0 times per week     Types: Marijuana [employer])     Comment: daily use    Sexual activity: Not Currently   Other Topics Concern    None   Social History Narrative    Drinks 1 cup of decaf coffee/tea daily. Social Determinants of Health     Financial Resource Strain: Medium Risk    Difficulty of Paying Living Expenses: Somewhat hard   Food Insecurity: No Food Insecurity    Worried About Running Out of Food in the Last Year: Never true    Roberto of Food in the Last Year: Never true   Transportation Needs: No Transportation Needs    Lack of Transportation (Medical): No    Lack of Transportation (Non-Medical): No   Physical Activity:     Days of Exercise per Week: Not on file    Minutes of Exercise per Session: Not on file   Stress:     Feeling of Stress : Not on file   Social Connections:     Frequency of Communication with Friends and Family: Not on file    Frequency of Social Gatherings with Friends and Family: Not on file    Attends Hinduism Services: Not on file    Active Member of [address] [place] [address] or Organizations: Not on file    Attends Club or Organization Meetings: Not on file    Marital Status: Not on file   Intimate Partner Violence:     Fear of Current or Ex-Partner: Not on file    Emotionally Abused: Not on file    Physically Abused: Not on file    Sexually Abused: Not on file   Housing Stability:     Unable to Pay for Housing in the Last Year: Not on file    Number of [place] in the Last Year: Not on file    Unstable Housing in the Last Year: Not on file     Allergies   Allergen Reactions    Lasix [Furosemide] Other (See Comments)     Per pt potassium was too low when on lasix    Lisinopril Swelling     Angioedema    Prunus Persica Anaphylaxis     Per report to [facility] manager, [employer].   [address], BS-NDTR    Atenolol Other (See Comments) and Swelling     Thins his blood    Nitrates, Organic Other (See Comments)     Is using Sildenafil. containdicated with Nitrates    Spironolactone      Swelling in the chest area           Review of Systems   Constitutional: Negative. Negative for appetite change, chills and fever. HENT: Negative for sore throat, trouble swallowing and voice change. Eyes: Negative. Negative for pain, discharge and visual disturbance. Respiratory: Negative. Negative for apnea, cough, chest tightness and shortness of breath. Cardiovascular: Negative. Negative for chest pain, palpitations and leg swelling. Gastrointestinal: Negative. Negative for abdominal pain, diarrhea and vomiting. Endocrine: Negative for cold intolerance, heat intolerance and polydipsia. Genitourinary: Negative. Negative for dysuria, flank pain and hematuria. Musculoskeletal: Negative. Negative for arthralgias, gait problem, neck pain and neck stiffness. Skin: Negative. Negative for color change, pallor and rash. Allergic/Immunologic: Negative for environmental allergies, food allergies and immunocompromised state. Neurological: Negative. Negative for dizziness, numbness and headaches. Hematological: Negative for adenopathy. Psychiatric/Behavioral: Negative. Negative for behavioral problems and hallucinations. All other systems reviewed and are negative. Objective: There were no vitals filed for this visit. Physical Exam  Constitutional:       General: He is not in acute distress. Appearance: Normal appearance. HENT:      Head: Normocephalic and atraumatic. Right Ear: External ear normal. There is impacted cerumen. Left Ear: External ear normal. There is impacted cerumen. Nose: Congestion present. Mouth/Throat:      Mouth: Mucous membranes are moist.      Pharynx: No oropharyngeal exudate.         Comments: Posterior dorsal tongue with small raised soft lesion approximately 0.5 cm in length  Eyes:      Extraocular Movements: Extraocular movements intact. Pupils: Pupils are equal, round, and reactive to light. Neck:      Comments: Right side level 1b with palpable lymph node soft and mobile  Cardiovascular:      Rate and Rhythm: Normal rate. Pulmonary:      Effort: Pulmonary effort is normal.   Musculoskeletal:         General: Normal range of motion. Cervical back: Normal range of motion and neck supple. Lymphadenopathy:      Cervical: Cervical adenopathy present. Skin:     General: Skin is warm and dry. Neurological:      General: No focal deficit present. Mental Status: He is alert and oriented to person, place, and time. Psychiatric:         Mood and Affect: Mood normal.         Behavior: Behavior normal.           US  Impression   The previously biopsied right cervical lymph node shows decreased cortical   thickening and is compatible with a benign reactive node.  The lymph node in   question maintains an oval shape and is similar in overall size compared to   prior.             Assessment:       Diagnosis Orders   1. Tongue lesion     2. LAD (lymphadenopathy) of right cervical region                Plan:      The tongue lesion is bothering the patient and wants it removed. I recommend:    Tongue lesion removal    The procedure risks and benefits were discussed with the patient and family. Pt and family understood and decided to proceed with the surgery. Risks of any anesthesia are:  Reactions to medicines   Breathing problems  Risks of any surgery are:  Bleeding   Infection     Specific risks of surgery:       Follow up in 1 week(s)    6 months US fu as well

## 2022-03-22 NOTE — PROGRESS NOTES
Hi Dr Edna Huang,  I see Niranjan Mancilla had tongue surgery yesterday, so I will wait to contact him later this week.   Thank you again for the referral.  Clint Acevedo

## 2022-03-24 ENCOUNTER — CARE COORDINATION (OUTPATIENT)
Dept: CARE COORDINATION | Age: 62
End: 2022-03-24

## 2022-03-24 NOTE — LETTER
March 29, 2022      [address] [facility]      Dear Marvin Bankskatie:      My name is Luis Alberto Carr RN and I am an [facility], who partners with Sarah Rothman MD to improve patients' health. I've been trying to reach you via phone to let you know that, as a member of your care team, I will work with other providers involved in your care, offer education for your specific health conditions, and connect you with more resources as needed. This program is designed to provide you with the opportunity to have a [address] [facility]/[address] care manager partner with you for the following situations:     1) if you come home from the hospital or emergency room   2) to help manage your disease   3) when you would like assistance coordinating services or appointments    This added support is provided at no additional cost to you. My primary focus is to help you achieve specific goals and improve your health. Please call me at [phone] to further discuss how I can support your health care needs. I look forward to working with you.     Sincerely,    Luis Alberto Carr RN  Ambulatory Care Manager

## 2022-03-24 NOTE — TELEPHONE ENCOUNTER
Department of Anesthesiology  Preprocedure Note       Name:  Lauren Guadarrama   Age:  66 y.o.  :  1944                                          MRN:  [id number]         Date:  3/24/2022      Surgeon: Carri Burton):  MD Rajiv Garcia 3/24/2022 3/30/22 4/2/22  Priscilla Handy APRN - CNP   docusate sodium (COLACE) 100 MG capsule Take 1 capsule by mouth daily as needed for Constipation  Patient not taking: Reported on 3/24/2022 3/24/22 4/23/22  Priscilla Handy APRN - CNP   HYDROcodone-acetaminophen (NORCO) 5-325 MG per tablet Take 1 tablet by mouth every 6 hours as needed for Pain for up to 5 days.   Patient not taking: Reported on 3/24/2022 3/23/22 3/28/22  Priscilla Handy APRN - CNP   [facility]) [facility]       Past Medical MA contacted [facility] Medicare for prior authorization. No prior authorization needed for colonoscopy with Dr. Starla Vasques at [address] [facility] in [facility] due to doctor and facility being in network. MA Spoke with NELLIE K[facility], authorization Specialist. Reference number [id number]. MA scanned authorization form in media tab.     Electronically signed by Sandhya Blair on 9/4/18 at 10:27 AM History:        Diagnosis Date    Elevated PSA     Inguinal hernia     right side    Prostate cancer ([address] 2020    Under care of team 03/11/2022    pcp-Dr [address] visit aug 2021   [facility] Fatemeh Pruett MD at [facility] Marisol Yao MD   3/24/2022

## 2022-03-25 ENCOUNTER — CARE COORDINATION (OUTPATIENT)
Dept: CARE COORDINATION | Age: 62
End: 2022-03-25

## 2022-03-25 NOTE — CARE COORDINATION
Ambulatory Care Coordination Note  3/25/2022  CM Risk Score: 3  Charlson 10 Year Mortality Risk Score: 98%     ACC: Damari Han, RN    Summary Note:   [facility] spoke with Lynnie Siemens to invite him to join care coordination. Program was explained and he is in agreement. He reports he is healing from his tongue surgery on 3/21/22. He admits he was not smoking after the surgery, but smoked \"a couple\" cigarettes yesterday and his tongue is irritated today. He reports he is using the perihexadine as prescribed. ACM encouraged him to quit smoking or if he did smoke, to rinse his mouth with water immediately after. He verbalized understanding. He reports he lives alone and manages his medication. Medications were reviewed and he reports he is taking them as prescribed. He admits he sometimes has difficulty keeping his medical appointments because he has many. He is currently taking Gleevec chemotherapy for gastrointestinal tumors. He reports he lives alone. He uses a straight cane for ambulation when his \"gout is acting up\". Emergency contact was reviewed. He does not have a living will or advanced directive. [facility] discussed an [address] [place] [address] referral and he declined assistance completing ACP at this time. He admits he checks his blood sugar \"a couple times a week\". He tries to avoid sweets and carbohydrates and eats a low salt diet. He does not weigh himself daily and denies any shortness of breath, chest pain or leg swelling. Future appointments were reviewed and he does not have a PCP appointment scheduled. He has a post-op appointment 3/29/22 with Dr Justo Menchaca. PLAN  Send diabetes and CHF zones handouts, daily weight and blood sugar logs. Complete enrollment, review blood sugars, medications and appointments with next interaction.     Ambulatory Care Coordination Assessment    Care Coordination Protocol  Program Enrollment: Complex Care  Referral from Primary Care Provider: No  Week 1 - Initial Assessment Do you have all of your prescriptions and are they filled?: Yes  Barriers to medication adherence: None  Are you able to afford your medications?: Yes  How often do you have trouble taking your medications the way you have been told to take them?: I always take them as prescribed. Do you have Home O2 Therapy?: No               Per the Fall Risk Screening, did the patient have 2 or more falls or 1 fall with injury in the past year?: Yes  How often do you think you are about to fall and you do NOT fall? For example, you grab something to stabilize yourself or hold onto a wall/furniture?: Rarely  Use of a Mobility Aid: Yes (Comment: at times)  Difficulty walking/impaired gait: No  Issues with feet or shoes like numbness, edema, shoes not fitting: Yes  Changes in vision, poor vision or poor lighting in environment: No  Dizziness: No  Other Fall Risk: No           Suggested Interventions and Community Resources  Diabetes Education: Not Started   Fall Risk Prevention: In Process Pharmacist: Not Started   Senior Services: Not Started   Smoking Cessation: Not Started   Zone Management Tools: In Process         Set up/Review an Education Plan, Set up/Review Goals              Prior to Admission medications    Medication Sig Start Date End Date Taking? Authorizing Provider   chlorhexidine (PERIDEX) 0.12 % solution Take 15 mLs by mouth 2 times daily for 14 days Swish and spit. 3/21/22 4/4/22 Yes Brunilda Bojorquez, DO   HYDROcodone-acetaminophen (NORCO) 7.5-325 MG per tablet Take 1 tablet by mouth every 6 hours as needed for Pain for up to 7 days. Intended supply: 5 days. Take lowest dose possible to manage pain 3/21/22 3/28/22 Yes Brunilda Olmos, DO   Magic Mouthwash (MIRACLE MOUTHWASH) Swish and spit 5 mLs 4 times daily as needed for Irritation 3/21/22 4/15/22 Yes Brunilda Brandt, DO   colchicine (COLCRYS) 0.6 MG tablet Use an initial dose of 1.2 mg for 1 dose, with next dose no sooner than 3 days later.  Alternatively, take 1.2 mg (2 pills) on day 1 , then 0.6 mg on day 2. Continue with dose of 0.6 mg daily for 3-5 days or until flare resolves. 3/14/22  Yes Roddy Kwan MD   insulin glargine (LANTUS SOLOSTAR) 100 UNIT/ML injection pen ADMINISTER 42 UNITS UNDER THE SKIN EVERY MORNING 2/1/22  Yes Roddy Kwan MD   diclofenac sodium (VOLTAREN) 1 % GEL Apply 2 g topically 4 times daily 2/1/22  Yes Roddy Kwan MD   ibuprofen (ADVIL;MOTRIN) 400 MG tablet Take 1 tablet by mouth every 6 hours as needed for Pain 2/1/22  Yes Roddy Kwan MD   metOLazone (ZAROXOLYN) 2.5 MG tablet Take 1 tablet by mouth every other day 12/13/21  Yes Roddy Kwan MD   clotrimazole (LOTRIMIN) 1 % cream APPLY A SUFFICIENT AMOUNT EXTERNALLY TWICE A DAY AS NEEDED ON BOTH FEET FOR ATHLETES FOOT INFECTION 8/24/21  Yes Historical Provider, MD   amLODIPine (NORVASC) 10 MG tablet Take 1 tablet by mouth daily 10/8/21  Yes Roddy Kwan MD   bumetanide (BUMEX) 1 MG tablet Take 1 tablet by mouth 2 times daily 10/8/21  Yes Roddy Kwan MD   losartan (COZAAR) 50 MG tablet Take 1 tablet by mouth daily 10/8/21  Yes Roddy Kwan MD   metoprolol tartrate (LOPRESSOR) 50 MG tablet Take 1 tablet by mouth 2 times daily  Patient taking differently: Take 50 mg by mouth daily  10/8/21  Yes Roddy Kwan MD   potassium chloride (KLOR-CON M) 20 MEQ extended release tablet Take 1 tablet by mouth 2 times daily  Patient taking differently: Take 20 mEq by mouth daily Stat 10/8/21  Yes Roddy Kwan MD   docusate sodium (COLACE) 100 MG capsule Take 1 capsule by mouth 2 times daily 10/8/21  Yes Roddy Kwan MD   gabapentin (NEURONTIN) 800 MG tablet Take 800 mg by mouth 3 times daily.    Yes Historical Provider, MD   eplerenone (INSPRA) 50 MG tablet Take 1 tablet by mouth daily 8/17/21  Yes Trung Mares MD   ammonium lactate (AMLACTIN) 12 % cream APPLY A SUFFICIENT AMOUNT EXTERNALLY EVERY DAY FOR DRY SKIN ON FEET AND LEGS **DO NOT APPLY BETWEEN TOES** 3/8/21  Yes Historical Provider, MD magnesium oxide (MAG-OX) 400 (240 Mg) MG tablet Take 1 tablet by mouth daily 2/8/21  Yes Laury Lynn MD   imatinib (GLEEVEC) 400 MG chemo tablet TAKE 1 TABLET BY MOUTH ONCE DAILY AT THE SAME TIME WITH FOOD AND A LARGE GLASS OF WATER. DO NOT CRUSH TABLETS. AVOID GRAPEFRUIT PRODUCTS. 10/15/20  Yes Tamir Kumar, APRN - CNP   acetaminophen (TYLENOL) 500 MG tablet Take 500 mg by mouth every 6 hours as needed  5/14/19  Yes Historical Provider, MD   Cholecalciferol (VITAMIN D3) 2000 units CAPS Take 1 capsule by mouth daily   Yes Historical Provider, MD   traMADol (ULTRAM) 50 MG tablet Take 50 mg by mouth every 6 hours as needed for Pain. .   Yes Historical Provider, MD   sildenafil (VIAGRA) 100 MG tablet Take 100 mg by mouth as needed. Take an hour before sex. Med info obtained from [place] records. Yes Historical Provider, MD   B-D ULTRAFINE III SHORT PEN 31G X 8 MM MISC Inject 1 each into the skin nightly 10/8/21   Vera Casillas MD   Compression Stockings MISC by Does not apply route As directed  25 mmHg , 1 pair 1/19/21   Vera Casillas MD   Blood Glucose Monitoring Suppl ([address]) w/Device KIT AS DIRECTED 11/7/18   Historical Provider, MD       Future Appointments   Date Time Provider Alek Cannoni   3/29/2022 10:00 AM [facility]     ,   Diabetes Assessment    Medic Alert ID: No  Meal Planning: Avoidance of concentrated sweets, Carb counting   Do you have barriers with adherence to non-pharmacologic self-management interventions?  (Nutrition/Exercise/Self-Monitoring): No   Have you ever had to go to the ED for symptoms of low blood sugar?: No       Do you have hyperglycemia symptoms?: No   Do you have hypoglycemia symptoms?: No   Last Blood Sugar Value: 170   Blood Sugar Monitoring Regimen: Once a Day      ,   Congestive Heart Failure Assessment    Are you currently restricting fluids?: No Restriction  Do you understand a low sodium diet?: Yes  Do you understand how to read food labels?: Yes  How many restaurant meals do you eat per week?: 1-2  Do you salt your food before tasting it?: No         Symptoms:  None: Yes      Symptom course: stable  Patient-reported weight (lb):  (Comment: does not weigh self daily)      and   General Assessment    Do you have any symptoms that are causing concern?: Yes  Progression since Onset: Gradually Improving  Reported Symptoms: Other (Comment: tongue irritation)

## 2022-03-29 ENCOUNTER — OFFICE VISIT (OUTPATIENT)
Dept: ENT CLINIC | Age: 62
End: 2022-03-29

## 2022-03-29 VITALS
DIASTOLIC BLOOD PRESSURE: 72 MMHG | TEMPERATURE: 97.9 F | WEIGHT: 270 LBS | OXYGEN SATURATION: 98 % | SYSTOLIC BLOOD PRESSURE: 130 MMHG | HEIGHT: 68 IN | BODY MASS INDEX: 40.92 KG/M2 | HEART RATE: 72 BPM

## 2022-03-29 DIAGNOSIS — R59.1 LYMPHADENOPATHY: ICD-10-CM

## 2022-03-29 DIAGNOSIS — K14.8 TONGUE LESION: Primary | ICD-10-CM

## 2022-03-29 DIAGNOSIS — R59.0 LAD (LYMPHADENOPATHY) OF RIGHT CERVICAL REGION: ICD-10-CM

## 2022-03-29 PROCEDURE — 99024 POSTOP FOLLOW-UP VISIT: CPT | Performed by: OTOLARYNGOLOGY

## 2022-03-29 ASSESSMENT — ENCOUNTER SYMPTOMS
TROUBLE SWALLOWING: 0
ABDOMINAL PAIN: 0
SORE THROAT: 0
GASTROINTESTINAL NEGATIVE: 1
DIARRHEA: 0
COUGH: 0
EYES NEGATIVE: 1
APNEA: 0
COLOR CHANGE: 0
RESPIRATORY NEGATIVE: 1
VOICE CHANGE: 0
EYE PAIN: 0
VOMITING: 0
SHORTNESS OF BREATH: 0
EYE DISCHARGE: 0
CHEST TIGHTNESS: 0

## 2022-03-29 NOTE — PROGRESS NOTES
Subjective:      Patient ID:  Sheela Mir. is a 64 y.o. male. HPI:  here for post op tongue biopsy surgery 1 week ago. There are not changes since last visit. 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 .. [facility] by Dr. Kirsten Andre.  Currently receiving oral chemo    COLONOSCOPY  09/14/2018    polyp--eliane    MOUTH SURGERY N/A 3/21/2022    EXCISION TONGUE LESION [facility] Vij[facility]ina DO [facility] OR    NASAL SEPTUM SURGERY      [facility] COLONOSCOPY W/BIOPSY SINGLE/MULTIPLE N/A 9/14/2018    COLONOSCOPY WITH BIOPSY performed by Walter Sanz MD at [address] TUTTLE W/RMVL OF TUMOR POLYP LESION SNARE TQ  9/14/2018    COLONOSCOPY POLYPECTOMY SNARE/COLD BIOPSY performed by Walter Sanz MD at [address] [facility] [place] BIOPSY SOFT TISSUE NECK/THORAX  5/26/2021     BIOPSY SOFT TISSUE NECK/THORAX 5/26/2021 [facility] ULTRASOUND     Family History   Problem Relation Age of Onset    Other Mother         COVID    Heart Failure Father      Social History     Socioeconomic History    Marital status:      Spouse name: None    Number of children: None    Years of education: None    Highest education level: None   Occupational History    None   Tobacco Use    Smoking status: Current Every Day Smoker     Packs/day: 0.50     Years: 31.00     Pack years: 15. 50     Types: Cigarettes    Smokeless tobacco: Never Used   Vaping Use    Vaping Use: Never used   Substance and Sexual Activity    Alcohol use: Yes     Comment: pint every 2 days    Drug use: Yes     Frequency: 7.0 times per week     Types: Marijuana Yaritza Swanson)     Comment: daily use    Sexual activity: Not Currently   Other Topics Concern    None   Social History Narrative    Drinks 1 cup of decaf coffee/tea daily. Social Determinants of Health     Financial Resource Strain: Medium Risk    Difficulty of Paying Living Expenses: Somewhat hard   Food Insecurity: No Food Insecurity    Worried About Running Out of Food in the Last Year: Never true    Roberto of Food in the Last Year: Never true   Transportation Needs: No Transportation Needs    Lack of Transportation (Medical): No    Lack of Transportation (Non-Medical):  No   Physical Activity:     Days of Exercise per Week: Not on file    Minutes of Exercise per Session: Not on file   Stress:     Feeling of Stress : Not on file   Social Connections:     Frequency of Communication with Friends and Family: Not on file    Frequency of Social Gatherings with Friends and Family: Not on file    Attends Jewish Services: Not on file    Active Member of [address] [place] [address] [employer] or Organizations: Not on file    Attends Club or Organization Meetings: Not on file    Marital Status: Not on file   Intimate Partner Violence:     Fear of Current or Ex-Partner: Not on file    Emotionally Abused: Not on file    Physically Abused: Not on file    Sexually Abused: Not on file   Housing Stability:     Unable to Pay for Housing in the Last Year: Not on file    Number of [place] in the Last Year: Not on file    Unstable Housing in the Last Year: Not on file     Allergies   Allergen Reactions    Lasix [Furosemide] Other (See Comments)     Per pt potassium was too low when on lasix    Lisinopril Swelling     Angioedema    Prunus Persica Anaphylaxis     Per report to PFS manager, Eloy Peterson. [address], BS-NDTR    Atenolol Other (See Comments) and Swelling     Thins his blood    Nitrates, Organic Other (See Comments)     Is using Sildenafil. containdicated with Nitrates    Spironolactone      Swelling in the chest area           Review of Systems   Constitutional: Negative. Negative for appetite change, chills and fever. HENT: Negative for sore throat, trouble swallowing and voice change. Eyes: Negative. Negative for pain, discharge and visual disturbance. Respiratory: Negative. Negative for apnea, cough, chest tightness and shortness of breath. Cardiovascular: Negative. Negative for chest pain, palpitations and leg swelling. Gastrointestinal: Negative. Negative for abdominal pain, diarrhea and vomiting. Endocrine: Negative for cold intolerance, heat intolerance and polydipsia. Genitourinary: Negative. Negative for dysuria, flank pain and hematuria. Musculoskeletal: Negative. Negative for arthralgias, gait problem, neck pain and neck stiffness. Skin: Negative. Negative for color change, pallor and rash. Allergic/Immunologic: Negative for environmental allergies, food allergies and immunocompromised state. Neurological: Negative. Negative for dizziness, numbness and headaches. Hematological: Negative for adenopathy. Psychiatric/Behavioral: Negative. Negative for behavioral problems and hallucinations. All other systems reviewed and are negative. Objective:   Physical Exam  Constitutional:       General: He is not in acute distress. Appearance: Normal appearance. HENT:      Head: Normocephalic and atraumatic. Right Ear: External ear normal. There is impacted cerumen. Left Ear: External ear normal. There is impacted cerumen. Nose: Congestion present. Mouth/Throat:      Mouth: Mucous membranes are moist.      Tongue: No lesions. Tongue does not deviate from midline. Pharynx: Oropharynx is clear. No oropharyngeal exudate. Comments: Posterior dorsal tongue with healing area   Eyes:      Extraocular Movements: Extraocular movements intact. Pupils: Pupils are equal, round, and reactive to light. Neck:      Comments: Right side level 1b with palpable lymph node soft and mobile  Cardiovascular:      Rate and Rhythm: Normal rate. Pulmonary:      Effort: Pulmonary effort is normal.   Musculoskeletal:         General: Normal range of motion. Cervical back: Normal range of motion and neck supple. Lymphadenopathy:      Cervical: Cervical adenopathy present. Skin:     General: Skin is warm and dry. Neurological:      General: No focal deficit present. Mental Status: He is alert and oriented to person, place, and time. Psychiatric:         Mood and Affect: Mood normal.         Behavior: Behavior normal.             Path:  Diagnosis:   Midline posterior tongue: Oral candidiasis with verrucoid hyperplasia,   acute inflammation and fungal organisms consistent with Candida species   on PAS stained sections.  Negative for dysplasia. Assessment:       Diagnosis Orders   1. Tongue lesion     2. Lymphadenopathy  US HEAD NECK SOFT TISSUE THYROID   3. LAD (lymphadenopathy) of right cervical region                Plan:      Tongue is doing well continue to heal     I will recheck the US of the neck one more time.   1 year from previous US  Follow up in 10 month(s)

## 2022-04-04 ENCOUNTER — CARE COORDINATION (OUTPATIENT)
Dept: CARE COORDINATION | Age: 62
End: 2022-04-04

## 2022-04-04 NOTE — CARE COORDINATION
ACZAIRA spoke with Kris Alicea for CHF/diabetes follow up. He admits he is having mouth pain today from his biopsy. He reports he followed up with Dr Ely Cruz and was told he is progressing well. He states he is taking ibuprofen for pain relief. He states he is taking his medications as prescribed. Kris Alicea requested to complete our call at a later time. PLAN  Complete enrollment, review medications, blood sugars and appointments with next interaction. Continue to follow for care coordination.

## 2022-04-07 ENCOUNTER — TELEPHONE (OUTPATIENT)
Dept: ENT CLINIC | Age: 62
End: 2022-04-07

## 2022-04-07 NOTE — TELEPHONE ENCOUNTER
Patient called concerned that his tongue is still sore from his surgery. He is doing rinses but concerned about infection. Please advise.

## 2022-04-08 NOTE — TELEPHONE ENCOUNTER
Dr Weems Ohm said no further recommendations. Patient will need time for tongue to heal. Patient notified.

## 2022-04-11 ENCOUNTER — TELEPHONE (OUTPATIENT)
Dept: ENT CLINIC | Age: 62
End: 2022-04-11

## 2022-04-11 DIAGNOSIS — K14.8 TONGUE LESION: Primary | ICD-10-CM

## 2022-04-11 NOTE — TELEPHONE ENCOUNTER
Patient LVM requesting return call regarding healing process from surgery and mouth wash. Please advise.     Electronically signed by Tammy Shrestha on 4/11/22 at 9:09 AM EDT

## 2022-04-11 NOTE — TELEPHONE ENCOUNTER
Patient called back to discuss post surgery questions patient has pain back of tongue,coughs would like more mouth wash.

## 2022-04-12 RX ORDER — CHLORHEXIDINE GLUCONATE 0.12 MG/ML
15 RINSE ORAL 2 TIMES DAILY
Qty: 420 ML | Refills: 0 | Status: SHIPPED
Start: 2022-04-12 | End: 2022-06-08

## 2022-04-14 ENCOUNTER — CARE COORDINATION (OUTPATIENT)
Dept: CARE COORDINATION | Age: 62
End: 2022-04-14

## 2022-04-14 NOTE — CARE COORDINATION
Spoke with Jose Delarosa at the [facility].  Appointment scheduled for 4/28/22 at 11:15 am.

## 2022-04-14 NOTE — CARE COORDINATION
Spoke with Neri Vyas at [facility].  Appointment scheduled for  5/20/22 at 10:30 am.

## 2022-04-14 NOTE — CARE COORDINATION
Ambulatory Care Coordination Note  4/14/2022  CM Risk Score: 3  Charlson 10 Year Mortality Risk Score: 98%     ACC: aMrci Burt RN    Summary Note:   AC spoke with Matt Leiva for diabetes/CHF follow up. Chief complaint today is difficulty swallowing food and discomfort from his tongue surgical incision. He states he has been in contact with his surgeon and he was told that the healing process takes time. He admits he is losing weight because he can't eat. ACM discussed going to urgent care to be evaluated and he declined. Matt Leiva requested assistance scheduling an appointment with his PCP office. ACM spoke with Kenrick Ramírez and scheduled a same day appointment for 4/15/22 for 2:20 pm.  He denies any changes to his medications and states he is taking them as prescribed. ACM discussed that Matt Leiva is due for appointments with cardiology, oncology and surgery. He requested assistance scheduling and states that he prefers morning appointments. He admits that after his fall and injuring his arm in February he fell behind in his health maintenance. PLAN  Assist with scheduling cardiology, oncology and surgery appointments. Complete enrollment, review medications and appointments with next interaction. Continue to follow for care coordination. Care Coordination Interventions    Program Enrollment: Complex Care  Referral from Primary Care Provider: No  Suggested Interventions and Community Resources  Diabetes Education: Not Started  Fall Risk Prevention: Completed  Pharmacist: Not Started  Senior Services: Not Started  Smoking Cessation: In Process  Zone Management Tools: Completed (Comment: diabetes/CHF)         Goals Addressed                 This Visit's Progress     Conditions and Symptoms   Improving     I will schedule office visits, as directed by my provider. I will keep my appointment or reschedule if I have to cancel. I will notify my provider of any barriers to my plan of care.   I will follow my Zone Management tool to seek urgent or emergent care. I will notify my provider of any symptoms that indicate a worsening of my condition. Barriers: overwhelmed by complexity of regimen, stress, and lack of education  Plan for overcoming my barriers: diabetes/CHF zone handout, care coordination  Confidence: 8/10  Anticipated Goal Completion Date: 6/25/22         Wellness Goal   No change     Patient Self-Management Goal for Health Maintenance  Goal: I will chose a goal related to tobacco cessation:  I will think about my triggers for smoking, I will think about reasons why I should quit smoking, and I will try a nicotine replacement product or medication. Barriers: fear of failure and overwhelmed by complexity of regimen  Plan for overcoming my barriers: smoking cessation education, care coordination, decrease daily cigarette use  Confidence: 7/10  Anticipated Goal Completion Date: 6/25/22            Prior to Admission medications    Medication Sig Start Date End Date Taking? Authorizing Provider   chlorhexidine (PERIDEX) 0.12 % solution Take 15 mLs by mouth 2 times daily for 14 days 4/12/22 4/26/22  Ermelinda Mera, DO   Magic Mouthwash (MIRACLE MOUTHWASH) Swish and spit 5 mLs 4 times daily as needed for Irritation 4/12/22   Dyesseric Bethguspato Pascoleoni, DO   Magic Mouthwash (MIRACLE MOUTHWASH) Swish and spit 5 mLs 4 times daily as needed for Irritation 3/21/22 4/15/22  Brunilda Scott, DO   colchicine (COLCRYS) 0.6 MG tablet Use an initial dose of 1.2 mg for 1 dose, with next dose no sooner than 3 days later. Alternatively, take 1.2 mg (2 pills) on day 1 , then 0.6 mg on day 2. Continue with dose of 0.6 mg daily for 3-5 days or until flare resolves.  3/14/22   Zachery Leyden, MD   insulin glargine (LANTUS SOLOSTAR) 100 UNIT/ML injection pen ADMINISTER 42 UNITS UNDER THE SKIN EVERY MORNING 2/1/22   Zachery Leyden, MD   diclofenac sodium (VOLTAREN) 1 % GEL Apply 2 g topically 4 times daily 2/1/22   Zachery Leyden, MD ibuprofen (ADVIL;MOTRIN) 400 MG tablet Take 1 tablet by mouth every 6 hours as needed for Pain 2/1/22   Racquel Giron MD   metOLazone (ZAROXOLYN) 2.5 MG tablet Take 1 tablet by mouth every other day 12/13/21   Racquel Giron MD   clotrimazole (LOTRIMIN) 1 % cream APPLY A SUFFICIENT AMOUNT EXTERNALLY TWICE A DAY AS NEEDED ON BOTH FEET FOR ATHLETES FOOT INFECTION 8/24/21   Historical Provider, MD   amLODIPine (NORVASC) 10 MG tablet Take 1 tablet by mouth daily 10/8/21   Racquel Giron MD   bumetanide (BUMEX) 1 MG tablet Take 1 tablet by mouth 2 times daily 10/8/21   Racquel Giron MD   losartan (COZAAR) 50 MG tablet Take 1 tablet by mouth daily 10/8/21   Racquel Giron MD   metoprolol tartrate (LOPRESSOR) 50 MG tablet Take 1 tablet by mouth 2 times daily  Patient taking differently: Take 50 mg by mouth daily  10/8/21   Racquel Giron MD   potassium chloride (KLOR-CON M) 20 MEQ extended release tablet Take 1 tablet by mouth 2 times daily  Patient taking differently: Take 20 mEq by mouth daily Stat 10/8/21   Racquel Giron MD   B-D ULTRAFINE III SHORT PEN 31G X 8 MM MISC Inject 1 each into the skin nightly 10/8/21   Racquel Giron MD   docusate sodium (COLACE) 100 MG capsule Take 1 capsule by mouth 2 times daily 10/8/21   Racquel Giron MD   gabapentin (NEURONTIN) 800 MG tablet Take 800 mg by mouth 3 times daily.     Historical Provider, MD   eplerenone (INSPRA) 50 MG tablet Take 1 tablet by mouth daily 8/17/21   Myranda Parmar MD   ammonium lactate (AMLACTIN) 12 % cream APPLY A SUFFICIENT AMOUNT EXTERNALLY EVERY DAY FOR DRY SKIN ON FEET AND LEGS **DO NOT APPLY BETWEEN TOES** 3/8/21   Historical Provider, MD   magnesium oxide (MAG-OX) 400 (240 Mg) MG tablet Take 1 tablet by mouth daily 2/8/21   Veronica Adams MD   Compression Stockings MISC by Does not apply route As directed  25 mmHg , 1 pair 1/19/21   Racquel Giron MD   imatinib (GLEEVEC) 400 MG chemo tablet TAKE 1 TABLET BY MOUTH ONCE DAILY AT THE SAME TIME WITH FOOD AND A LARGE GLASS OF WATER. DO NOT CRUSH TABLETS. AVOID GRAPEFRUIT PRODUCTS. 10/15/20   Sedrick Young APRN - CNP   acetaminophen (TYLENOL) 500 MG tablet Take 500 mg by mouth every 6 hours as needed  5/14/19   Historical Provider, MD   Blood Glucose Monitoring Suppl ([address]) w/Device KIT AS DIRECTED 11/7/18   Historical Provider, MD   Cholecalciferol (VITAMIN D3) 2000 units CAPS Take 1 capsule by mouth daily    Historical Provider, MD   traMADol (ULTRAM) 50 MG tablet Take 50 mg by mouth every 6 hours as needed for Pain. Javier Hernandez Historical Provider, MD   sildenafil (VIAGRA) 100 MG tablet Take 100 mg by mouth as needed. Take an hour before sex. Med info obtained from [place] records. Historical Provider, MD       Future Appointments   Date Time Provider Alek Cannoni   4/15/2022  2:20 PM MD Natasha Jauregui [facility]   5/20/2022 11:00 AM Desirae Rucker [facility]   [facility]/24/2023  9:00 [facility]     ,   Diabetes Assessment    Medic Alert ID: No  Meal Planning: Avoidance of concentrated sweets, Carb counting   Do you have barriers with adherence to non-pharmacologic self-management interventions?  (Nutrition/Exercise/Self-Monitoring): No   Have you ever had to go to the ED for symptoms of low blood sugar?: No       Do you have hyperglycemia symptoms?: No   Do you have hypoglycemia symptoms?: No   Blood Sugar Monitoring Regimen: Not Testing      ,   Congestive Heart Failure Assessment    Are you currently restricting fluids?: No Restriction  Do you understand a low sodium diet?: Yes  Do you understand how to read food labels?: Yes  How many restaurant meals do you eat per week?: 1-2  Do you salt your food before tasting it?: No         Symptoms:         and   General Assessment    Do you have any symptoms that are causing concern?: Yes  Progression since Onset: Unchanged  Reported Symptoms: Pain, Other (Comment: tongue pain and difficulty swallowing)

## 2022-04-15 ENCOUNTER — OFFICE VISIT (OUTPATIENT)
Dept: FAMILY MEDICINE CLINIC | Age: 62
End: 2022-04-15
Payer: MEDICARE

## 2022-04-15 ENCOUNTER — HOSPITAL ENCOUNTER (EMERGENCY)
Age: 62
Discharge: HOME | End: 2022-04-15
Attending: STUDENT IN AN ORGANIZED HEALTH CARE EDUCATION/TRAINING PROGRAM
Payer: MEDICARE

## 2022-04-15 VITALS
TEMPERATURE: 98 F | OXYGEN SATURATION: 94 % | RESPIRATION RATE: 18 BRPM | SYSTOLIC BLOOD PRESSURE: 109 MMHG | WEIGHT: 257 LBS | HEART RATE: 89 BPM | DIASTOLIC BLOOD PRESSURE: 73 MMHG | BODY MASS INDEX: 38.95 KG/M2 | HEIGHT: 68 IN

## 2022-04-15 VITALS
HEART RATE: 90 BPM | SYSTOLIC BLOOD PRESSURE: 125 MMHG | DIASTOLIC BLOOD PRESSURE: 101 MMHG | OXYGEN SATURATION: 98 % | TEMPERATURE: 98.1 F | RESPIRATION RATE: 18 BRPM

## 2022-04-15 DIAGNOSIS — K14.8 TONGUE LESION: ICD-10-CM

## 2022-04-15 DIAGNOSIS — J02.9 ACUTE PHARYNGITIS, UNSPECIFIED ETIOLOGY: Primary | ICD-10-CM

## 2022-04-15 LAB
ANION GAP SERPL CALCULATED.3IONS-SCNC: 18 MMOL/L (ref 7–16)
BASOPHILS ABSOLUTE: 0.05 E9/L (ref 0–0.2)
BASOPHILS RELATIVE PERCENT: 0.5 % (ref 0–2)
BUN BLDV-MCNC: 13 MG/DL (ref 6–23)
CALCIUM SERPL-MCNC: 9.2 MG/DL (ref 8.6–10.2)
CHLORIDE BLD-SCNC: 93 MMOL/L (ref 98–107)
CO2: 25 MMOL/L (ref 22–29)
CREAT SERPL-MCNC: 1.2 MG/DL (ref 0.7–1.2)
EOSINOPHILS ABSOLUTE: 0.22 E9/L (ref 0.05–0.5)
EOSINOPHILS RELATIVE PERCENT: 2.3 % (ref 0–6)
GFR AFRICAN AMERICAN: >60
GFR NON-AFRICAN AMERICAN: >60 ML/MIN/1.73
GLUCOSE BLD-MCNC: 165 MG/DL (ref 74–99)
HCT VFR BLD CALC: 38.3 % (ref 37–54)
HEMOGLOBIN: 13.2 G/DL (ref 12.5–16.5)
IMMATURE GRANULOCYTES #: 0.04 E9/L
IMMATURE GRANULOCYTES %: 0.4 % (ref 0–5)
LACTIC ACID: 1.2 MMOL/L (ref 0.5–2.2)
LYMPHOCYTES ABSOLUTE: 2.65 E9/L (ref 1.5–4)
LYMPHOCYTES RELATIVE PERCENT: 28 % (ref 20–42)
MCH RBC QN AUTO: 33.6 PG (ref 26–35)
MCHC RBC AUTO-ENTMCNC: 34.5 % (ref 32–34.5)
MCV RBC AUTO: 97.5 FL (ref 80–99.9)
MONOCYTES ABSOLUTE: 0.75 E9/L (ref 0.1–0.95)
MONOCYTES RELATIVE PERCENT: 7.9 % (ref 2–12)
NEUTROPHILS ABSOLUTE: 5.77 E9/L (ref 1.8–7.3)
NEUTROPHILS RELATIVE PERCENT: 60.9 % (ref 43–80)
PDW BLD-RTO: 13.2 FL (ref 11.5–15)
PLATELET # BLD: 291 E9/L (ref 130–450)
PMV BLD AUTO: 9.1 FL (ref 7–12)
POTASSIUM SERPL-SCNC: 3.3 MMOL/L (ref 3.5–5)
RBC # BLD: 3.93 E12/L (ref 3.8–5.8)
SODIUM BLD-SCNC: 136 MMOL/L (ref 132–146)
WBC # BLD: 9.5 E9/L (ref 4.5–11.5)

## 2022-04-15 PROCEDURE — 83605 ASSAY OF LACTIC ACID: CPT

## 2022-04-15 PROCEDURE — 6370000000 HC RX 637 (ALT 250 FOR IP): Performed by: STUDENT IN AN ORGANIZED HEALTH CARE EDUCATION/TRAINING PROGRAM

## 2022-04-15 PROCEDURE — 99212 OFFICE O/P EST SF 10 MIN: CPT | Performed by: STUDENT IN AN ORGANIZED HEALTH CARE EDUCATION/TRAINING PROGRAM

## 2022-04-15 PROCEDURE — 87040 BLOOD CULTURE FOR BACTERIA: CPT

## 2022-04-15 PROCEDURE — 99283 EMERGENCY DEPT VISIT LOW MDM: CPT

## 2022-04-15 PROCEDURE — G8427 DOCREV CUR MEDS BY ELIG CLIN: HCPCS | Performed by: STUDENT IN AN ORGANIZED HEALTH CARE EDUCATION/TRAINING PROGRAM

## 2022-04-15 PROCEDURE — 85025 COMPLETE CBC W/AUTO DIFF WBC: CPT

## 2022-04-15 PROCEDURE — 3017F COLORECTAL CA SCREEN DOC REV: CPT | Performed by: STUDENT IN AN ORGANIZED HEALTH CARE EDUCATION/TRAINING PROGRAM

## 2022-04-15 PROCEDURE — 99213 OFFICE O/P EST LOW 20 MIN: CPT | Performed by: STUDENT IN AN ORGANIZED HEALTH CARE EDUCATION/TRAINING PROGRAM

## 2022-04-15 PROCEDURE — 4004F PT TOBACCO SCREEN RCVD TLK: CPT | Performed by: STUDENT IN AN ORGANIZED HEALTH CARE EDUCATION/TRAINING PROGRAM

## 2022-04-15 PROCEDURE — 99284 EMERGENCY DEPT VISIT MOD MDM: CPT | Performed by: OTOLARYNGOLOGY

## 2022-04-15 PROCEDURE — 80048 BASIC METABOLIC PNL TOTAL CA: CPT

## 2022-04-15 PROCEDURE — G8417 CALC BMI ABV UP PARAM F/U: HCPCS | Performed by: STUDENT IN AN ORGANIZED HEALTH CARE EDUCATION/TRAINING PROGRAM

## 2022-04-15 RX ORDER — PREDNISONE 20 MG/1
40 TABLET ORAL DAILY
Qty: 10 TABLET | Refills: 0 | Status: SHIPPED | OUTPATIENT
Start: 2022-04-15 | End: 2022-04-20

## 2022-04-15 RX ORDER — DOXYCYCLINE HYCLATE 100 MG/1
100 CAPSULE ORAL ONCE
Status: COMPLETED | OUTPATIENT
Start: 2022-04-15 | End: 2022-04-15

## 2022-04-15 RX ORDER — POTASSIUM CHLORIDE 20 MEQ/1
40 TABLET, EXTENDED RELEASE ORAL ONCE
Status: COMPLETED | OUTPATIENT
Start: 2022-04-15 | End: 2022-04-15

## 2022-04-15 RX ORDER — DOXYCYCLINE HYCLATE 100 MG
100 TABLET ORAL 2 TIMES DAILY
Qty: 14 TABLET | Refills: 0 | Status: SHIPPED | OUTPATIENT
Start: 2022-04-15 | End: 2022-04-22

## 2022-04-15 RX ORDER — PREDNISONE 20 MG/1
60 TABLET ORAL ONCE
Status: COMPLETED | OUTPATIENT
Start: 2022-04-15 | End: 2022-04-15

## 2022-04-15 RX ADMIN — DOXYCYCLINE HYCLATE 100 MG: 100 CAPSULE ORAL at 18:17

## 2022-04-15 RX ADMIN — POTASSIUM CHLORIDE 40 MEQ: 20 TABLET, EXTENDED RELEASE ORAL at 17:37

## 2022-04-15 RX ADMIN — PREDNISONE 60 MG: 20 TABLET ORAL at 18:17

## 2022-04-15 ASSESSMENT — ENCOUNTER SYMPTOMS
RHINORRHEA: 0
SINUS PAIN: 0
ANAL BLEEDING: 0
CONSTIPATION: 0
SHORTNESS OF BREATH: 0
VOMITING: 0
EYE DISCHARGE: 0
SINUS PRESSURE: 0
FACIAL SWELLING: 1
BACK PAIN: 0
ABDOMINAL PAIN: 0
NAUSEA: 0
ABDOMINAL DISTENTION: 0
DIARRHEA: 0
CHEST TIGHTNESS: 0
COUGH: 0

## 2022-04-15 NOTE — ED NOTES
CT soft tissue neck unnecessary as per Dr Jose De Jesus Mays.  This RN to advise Dr Lynn Orellana, RN  04/15/22 [phone]

## 2022-04-15 NOTE — ED NOTES
Reviewed discharge instructions with patient, all questions answered. Patient verbalized understanding and provided signature on paperwork. Patient ambulated on own towards exit.        Baljit Peres RN  04/15/22 [id number]

## 2022-04-15 NOTE — ED NOTES
FIRST PROVIDER CONTACT ASSESSMENT NOTE           Department of Emergency Medicine                 First Provider Note            4/15/22  3:33 PM EDT    Date of Encounter: No admission date for patient encounter. Patient Name: Fadumo Yung : 1960  MRN: [id number]    Chief Complaint: Oral Swelling (had biopsy 3/21 on throat and now having swelling in throat. also having hard time swallowing )      History of Present Illness:   Fadumo Yung is a 64 y.o. male who presents to the ED for 2 weeks ago had base of the tongue biopsy, worsening swelling, pain w/swallowing. Focused Physical Exam:  VS:    ED Triage Vitals   BP Temp Temp src Pulse Resp SpO2 Height Weight   04/15/22 1532 -- -- 04/15/22 1512 04/15/22 1532 04/15/22 151 -- --   (!) 125/101   95 18 96 %          Physical Ex: Constitutional: Alert and non-toxic. Medical History:  has a past medical history of Angiotensin converting enzyme inhibitor-aggravated angioedema, Arthritis, Carpal tunnel syndrome, Cecum mass, CHF (congestive heart failure) ([facility] [address] CKD (chronic kidney disease), Diabetes mellitus ([facility] [address] Gastrointestinal stromal tumor (GIST) ([facility] [address] Gout, Hyperlipidemia, Hypertension, Hypokalemia, Mass of right submandibular region, Neuropathy, Obesity, and Sleep apnea. Surgical History:  has a past surgical history that includes Tonsillectomy; Nasal septum surgery; Colonoscopy (2018); pr colonoscopy w/biopsy single/multiple (N/A, 2018); pr colsc flx w/rmvl of tumor polyp lesion snare tq (2018); US BIOPSY SOFT TISSUE NECK/THORAX (2021); Colon surgery (); and Mouth surgery (N/A, 3/21/2022). Social History:  reports that he has been smoking cigarettes. He has a 15.50 pack-year smoking history. He has never used smokeless tobacco. He reports current alcohol use. He reports current drug use. Frequency: 7.00 times per week. Drug: Marijuana Garon Kettle).   Family History: family history includes Heart Failure in his father; Other in his mother. Allergies: Lasix [furosemide]; Lisinopril; Prunus persica; Atenolol;  Nitrates, organic; and Spironolactone     Initial Plan of Care: Initiate Treatment-Testing, Proceed toTreatment Area When Bed Available for ED Attending/MLP to Continue Care      ---END OF FIRST PROVIDER CONTACT ASSESSMENT NOTE---  Electronically signed by ROEL Lemus   DD: 4/15/22       Moni Lemus  04/15/22 1534

## 2022-04-15 NOTE — PROGRESS NOTES
S: 64 y.o. male here for tongue lesions. 3/21/22 attempt at lesion removal. Since then having some trouble swallowing. O: VS: /73 (Site: Right Upper Arm, Position: Sitting, Cuff Size: Large Adult)   Pulse 89   Temp 98 °F (36.7 °C) (Temporal)   Resp 18   Ht 5' 8\" (1.727 m)   Wt 257 lb (116.6 kg)   SpO2 94%   BMI 39.08 kg/m²    General: NAD, alert and interacting appropriately. ENT: purulent looking discharge from posterior tongue. Muffled voice. Impression: tongue infection, post op, dysphagia. Plan:   Send to ER    Attending Physician Statement  I have discussed the case, including pertinent history and exam findings with the resident. I also have seen the patient and performed key portions of the examination. I agree with the documented assessment and plan.

## 2022-04-15 NOTE — CONSULTS
DEPARTMENT OF OTOLARYNGOLOGY -- HEAD & NECK SURGERY   CONSULT NOTE    PATIENT: Haydee Dia. : 1960 (64 y.o.)   DATE: April 15, 2022  ROOM[facility]BED: [facility]      REQUESTING PHYSICIAN: Soham Lancaster MD    REASON FOR CONSULT: Possible tongue infection     HISTORY OBTAINED FROM:  patient    HISTORY OF PRESENT ILLNESS:                                                                                        HPI  64 y.o. Sueanne Hence a significant past medical history presents as a consult for tongue infection. Patient well known to the ENT service, had a biopsy of tongue lesion on 3/21 which was negative for dysplasia but positive for candidiasis. He reports using the magic mouth wash but starting earlier this week began having pain and difficulty eating and drinking. Denies fevers or chills, denies difficulty breathing. 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    Arthritis     Carpal tunnel syndrome     Cecum mass     CHF (congestive heart failure) (HCC)     CKD (chronic kidney disease)     Diabetes mellitus (HCC)     Gastrointestinal stromal tumor (GIST) ([facility] [address] 6/3/2019    Gout     Hyperlipidemia     Hypertension     Hypokalemia     Mass of right submandibular region     Neuropathy     Obesity     bmi 43.1 weight 283 #    Sleep apnea     c pap     Past Surgical History:   Procedure Laterality Date    COLON SURGERY      GIST ileum . .. ileocecectomy @[facility] by Dr. Annmarie Martínez.  Currently receiving oral chemo    COLONOSCOPY  2018    polyp--eliane    MOUTH SURGERY N/A 3/21/2022    EXCISION TONGUE LESION performed by Keerthi Jasso DO at [address] [place] COLONOSCOPY W/BIOPSY SINGLE/MULTIPLE N/A 2018    COLONOSCOPY WITH BIOPSY performed by Brian Palomo MD at [address] TUTTLE W/RMVL OF TUMOR POLYP LESION SNARE TQ  2018    COLONOSCOPY POLYPECTOMY SNARE/COLD BIOPSY performed by Janel Jorge MD at [address] [place] TISSUE NECK/THORAX  5/26/2021    US BIOPSY SOFT TISSUE NECK/THORAX 5/26/2021 [facility]     Prior to Admission medications    Medication Sig Start Date End Date Taking? Authorizing Provider   chlorhexidine (PERIDEX) 0.12 % solution Take 15 mLs by mouth 2 times daily for 14 days 4/12/22 4/26/22  Raymond Mera, DO   Magic Mouthwash (MIRACLE MOUTHWASH) Swish and spit 5 mLs 4 times daily as needed for Irritation 4/12/22   Raymond Mera, DO   Magic Mouthwash (MIRACLE MOUTHWASH) Swish and spit 5 mLs 4 times daily as needed for Irritation 3/21/22 4/15/22  Brunilda Hensley, DO   colchicine (COLCRYS) 0.6 MG tablet Use an initial dose of 1.2 mg for 1 dose, with next dose no sooner than 3 days later. Alternatively, take 1.2 mg (2 pills) on day 1 , then 0.6 mg on day 2. Continue with dose of 0.6 mg daily for 3-5 days or until flare resolves.  3/14/22   Germaine Arnold MD   insulin glargine (LANTUS SOLOSTAR) 100 UNIT/ML injection pen ADMINISTER 42 UNITS UNDER THE SKIN EVERY MORNING 2/1/22   Germaine Arnold MD   diclofenac sodium (VOLTAREN) 1 % GEL Apply 2 g topically 4 times daily 2/1/22   Germaine Arnold MD   ibuprofen (ADVIL;MOTRIN) 400 MG tablet Take 1 tablet by mouth every 6 hours as needed for Pain 2/1/22   Germaine Arnold MD   metOLazone (ZAROXOLYN) 2.5 MG tablet Take 1 tablet by mouth every other day 12/13/21   Germaine Arnold MD   clotrimazole (LOTRIMIN) 1 % cream APPLY A SUFFICIENT AMOUNT EXTERNALLY TWICE A DAY AS NEEDED ON BOTH FEET FOR ATHLETES FOOT INFECTION 8/24/21   Historical Provider, MD   amLODIPine (NORVASC) 10 MG tablet Take 1 tablet by mouth daily 10/8/21   Germaine Arnold MD   bumetanide (BUMEX) 1 MG tablet Take 1 tablet by mouth 2 times daily 10/8/21   Germaine Arnold MD   losartan (COZAAR) 50 MG tablet Take 1 tablet by mouth daily 10/8/21   Germaine Arnold MD   metoprolol tartrate (LOPRESSOR) 50 MG tablet Take 1 tablet by mouth 2 times daily  Patient taking differently: Take 50 mg by mouth daily  10/8/21   Maya Soriano MD   potassium chloride (KLOR-CON M) 20 MEQ extended release tablet Take 1 tablet by mouth 2 times daily  Patient taking differently: Take 20 mEq by mouth daily Stat 10/8/21   Maya Soriano MD   B-D ULTRAFINE III SHORT PEN 31G X 8 MM MISC Inject 1 each into the skin nightly 10/8/21   Maya Soriano MD   docusate sodium (COLACE) 100 MG capsule Take 1 capsule by mouth 2 times daily 10/8/21   Maya Soriano MD   gabapentin (NEURONTIN) 800 MG tablet Take 800 mg by mouth 3 times daily. Historical Provider, MD   eplerenone (INSPRA) 50 MG tablet Take 1 tablet by mouth daily 8/17/21   Mihai Thompson MD   ammonium lactate (AMLACTIN) 12 % cream APPLY A SUFFICIENT AMOUNT EXTERNALLY EVERY DAY FOR DRY SKIN ON FEET AND LEGS **DO NOT APPLY BETWEEN TOES** 3/8/21   Historical Provider, MD   magnesium oxide (MAG-OX) 400 (240 Mg) MG tablet Take 1 tablet by mouth daily 2/8/21   Jocy Richard MD   Compression Stockings MISC by Does not apply route As directed  25 mmHg , 1 pair 1/19/21   Maya Soriano MD   imatinib (GLEEVEC) 400 MG chemo tablet TAKE 1 TABLET BY MOUTH ONCE DAILY AT THE SAME TIME WITH FOOD AND A LARGE GLASS OF WATER. DO NOT CRUSH TABLETS. AVOID GRAPEFRUIT PRODUCTS. 10/15/20   JEAN Lopez - CNP   acetaminophen (TYLENOL) 500 MG tablet Take 500 mg by mouth every 6 hours as needed   Patient not taking: Reported on 4/15/2022 5/14/19   Historical Provider, MD   Blood Glucose Monitoring Suppl ([address]) w/Device KIT AS DIRECTED 11/7/18   Historical Provider, MD   Cholecalciferol (VITAMIN D3) 2000 units CAPS Take 1 capsule by mouth daily    Historical Provider, MD   traMADol (ULTRAM) 50 MG tablet Take 50 mg by mouth every 6 hours as needed for Pain. Lonny Iniguez Historical Provider, MD   sildenafil (VIAGRA) 100 MG tablet Take 100 mg by mouth as needed. Take an hour before sex.   Med info obtained from [place] records. Historical Provider, MD     Scheduled Medications:    Continuous Infusion Medications: AsNeeded Medications: Allergies   Allergen Reactions    Lasix [Furosemide] Other (See Comments)     Per pt potassium was too low when on lasix    Lisinopril Swelling     Angioedema    Prunus Persica Anaphylaxis     Per report to [facility] manager, Jessica Bey. [address], BS-NDTR    Atenolol Other (See Comments) and Swelling     Thins his blood    Nitrates, Organic Other (See Comments)     Is using Sildenafil. containdicated with Nitrates    Spironolactone      Swelling in the chest area     Social History     Socioeconomic History    Marital status:      Spouse name: Not on file    Number of children: Not on file    Years of education: Not on file    Highest education level: Not on file   Occupational History    Not on file   Tobacco Use    Smoking status: Current Every Day Smoker     Packs/day: 0.50     Years: 31.00     Pack years: 15.50     Types: Cigarettes    Smokeless tobacco: Never Used   Vaping Use    Vaping Use: Never used   Substance and Sexual Activity    Alcohol use: Yes     Comment: pint every 2 days    Drug use: Yes     Frequency: 7.0 times per week     Types: Marijuana Darliss Meeter)     Comment: daily use    Sexual activity: Not Currently   Other Topics Concern    Not on file   Social History Narrative    Drinks 1 cup of decaf coffee/tea daily. Social Determinants of Health     Financial Resource Strain: Medium Risk    Difficulty of Paying Living Expenses: Somewhat hard   Food Insecurity: No Food Insecurity    Worried About Running Out of Food in the Last Year: Never true    Roberto of Food in the Last Year: Never true   Transportation Needs: No Transportation Needs    Lack of Transportation (Medical): No    Lack of Transportation (Non-Medical):  No   Physical Activity:     Days of Exercise per Week: Not on file    Minutes of Exercise per Session: Not on symmetric, not enlarged and no tenderness, skin normal   Respiratory: No increased work of breathing.  No stridor or wheezes   Cardiovascular: Regular rate   Skin: Warm and dry   Neurologic:  Alert and oriented, CN II-XII grossly intact               DATA:                                                                                                                                      CT SOFT TISSUE NECK W CONTRAST    (Results Pending)     CBC with Differential:    Lab Results   Component Value Date    WBC 9.5 04/15/2022    RBC 3.93 04/15/2022    HGB 13.2 04/15/2022    HCT 38.3 04/15/2022     04/15/2022    MCV 97.5 04/15/2022    MCH 33.6 04/15/2022    MCHC 34.5 04/15/2022    RDW 13.2 04/15/2022    LYMPHOPCT 28.0 04/15/2022    MONOPCT 7.9 04/15/2022    BASOPCT 0.5 04/15/2022    MONOSABS 0.75 04/15/2022    LYMPHSABS 2.65 04/15/2022    EOSABS 0.22 04/15/2022    BASOSABS 0.05 04/15/2022     Platelets:    Lab Results   Component Value Date     04/15/2022     Hemoglobin/Hematocrit:    Lab Results   Component Value Date    HGB 13.2 04/15/2022    HCT 38.3 04/15/2022     CMP:    Lab Results   Component Value Date     04/15/2022    K 3.3 04/15/2022    K 3.7 03/10/2018    CL 93 04/15/2022    CO2 25 04/15/2022    BUN 13 04/15/2022    CREATININE 1.2 04/15/2022    GFRAA >60 04/15/2022    LABGLOM >60 04/15/2022    GLUCOSE 165 04/15/2022    PROT 6.5 07/06/2021    LABALBU 3.6 07/06/2021    CALCIUM 9.2 04/15/2022    BILITOT 0.3 07/06/2021    ALKPHOS 53 07/06/2021    AST 19 07/06/2021    ALT 15 07/06/2021     BUN/Creatinine:    Lab Results   Component Value Date    BUN 13 04/15/2022    CREATININE 1.2 04/15/2022     Albumin:    Lab Results   Component Value Date    LABALBU 3.6 07/06/2021     Calcium:    Lab Results   Component Value Date    CALCIUM 9.2 04/15/2022     Ionized Calcium:  No results found for: IONCA  PT/INR:    Lab Results   Component Value Date    PROTIME 11.4 08/17/2018    INR 1.0 08/17/2018 Warfarin PT/INR:  No components found for: PTPATWAR, PTINRWAR  PTT:    Lab Results   Component Value Date    APTT 28.7 08/17/2018   [APTT}    ASSESSMENTAND PLAN:                                                                                                   Patient Active Problem List   Diagnosis    Essential hypertension    Type 2 diabetes mellitus (HCC)    Mixed hyperlipidemia    Recurrent major depressive disorder, in remission ([facility] [address]    Carpal tunnel syndrome    Obstructive sleep apnea syndrome    Tobacco abuse    Uncontrolled type 2 diabetes mellitus with hyperglycemia (HCC)    Hypomagnesemia    Bilateral leg edema    Synovial cyst of right popliteal space    Primary osteoarthritis of right knee    GIST (gastrointestinal stroma tumor), malignant, colon (HCC)    Chronic heart failure with preserved ejection fraction (HFpEF) (HCC)    Venous insufficiency    Peripheral venous insufficiency    Vitamin D deficiency    Gout    Ingrowing toenail    Low back pain    Meralgia paresthetica    Neoplastic disease    Other ill-defined and unknown causes of morbidity and mortality    Onychomycosis    Congenital pes planus of both feet    Benign neoplasm of tongue    Abnormal colonoscopy    Thoracic or lumbosacral neuritis or radiculitis    Tinea pedis    Tongue lesion     64 y.o. male with candidiasis of the tongue, post op changes s/p excisional biopsy     · Patient seen and examined. No need for CT. · Recommend Clindamycin x 10 days. · Recommend prednisone 40mg x 5 days. · Continue with magic mouth wash. · Patient to follow up with Dr. Khoi Sheppard as an outpatient    Case, assessment and plans discussed with attending physician.     Tigre Sánchez DO  Resident Physician  Otolaryngology -- [facility] Porter 1943   4/15/2022  5:58 PM

## 2022-04-15 NOTE — ED PROVIDER NOTES
HPI     Patient is a 64 y.o. male presents with a chief complaint of oral swelling  This has been occurring for a few days. Patient states that it gets better with nothing. Patient states that it gets worse with nothing. Patient states that it is moderate in severity. Patient states it was gradual in onset. Patient had a biopsy done on on 3/21. Patient stated that he went to his provider's office today and they were concerned that it was infected. Patient stated earlier today he developed a mild headache and feelings of chills. Patient denies any fevers. Patient stated that he has been having difficulty swallowing for few weeks now. Patient previously on Magic mouthwash but ran out. Patient denies any fevers, chills, difficulty breathing, shortness of breath, chest pain, changes in urinary or bowel habits  Review of Systems   Constitutional: Negative for activity change, appetite change, fatigue and fever. HENT: Positive for facial swelling. Negative for congestion, ear discharge, ear pain, rhinorrhea, sinus pressure and sinus pain. Eyes: Negative for discharge. Respiratory: Negative for cough, chest tightness and shortness of breath. Cardiovascular: Negative for chest pain and palpitations. Gastrointestinal: Negative for abdominal distention, abdominal pain, anal bleeding, constipation, diarrhea, nausea and vomiting. Endocrine: Negative for polydipsia and polyuria. Genitourinary: Negative for decreased urine volume, difficulty urinating, enuresis, flank pain, frequency and urgency. Musculoskeletal: Negative for arthralgias, back pain and neck stiffness. Skin: Negative for rash and wound. Neurological: Negative for dizziness, weakness, light-headedness and headaches. Psychiatric/Behavioral: Negative for agitation, behavioral problems and confusion. Physical Exam  Vitals and nursing note reviewed. Constitutional:       Appearance: He is well-developed.    HENT: Head: Normocephalic and atraumatic. Mouth/Throat:      Pharynx: No oropharyngeal exudate or posterior oropharyngeal erythema. Comments: Patient has a large healing wound on the back of the tongue. Eyes:      Conjunctiva/sclera: Conjunctivae normal.      Pupils: Pupils are equal, round, and reactive to light. Cardiovascular:      Rate and Rhythm: Normal rate and regular rhythm. Heart sounds: Normal heart sounds. No murmur heard. Pulmonary:      Effort: Pulmonary effort is normal. No respiratory distress. Breath sounds: Normal breath sounds. No wheezing or rales. Abdominal:      General: Bowel sounds are normal.      Palpations: Abdomen is soft. Tenderness: There is no abdominal tenderness. There is no guarding or rebound. Musculoskeletal:         General: No tenderness or deformity. Cervical back: Normal range of motion and neck supple. Skin:     General: Skin is warm and dry. Neurological:      Mental Status: He is alert and oriented to person, place, and time. Cranial Nerves: No cranial nerve deficit. Coordination: Coordination normal.          Procedures     [facility]     ED Course as of 04/15/22 2329   Fri Apr 15, 2022   1811 Discussed case with ENT and patient will be discharged home after receiving antibiotics and steroids. [JM]      ED Course User Index  [JM] Jerald Leon MD      Patient is a 64 y.o. male presenting with oral swelling after biopsy. ENT was consulted. [facility] came [facility] and evaluated patient. Patient was afebrile. Patient was able to tolerate p.o. while in the emergency room. Patient's white count was normal.  ENT came down and evaluated patient. Patient was seen in the office 3 days ago. Patient has Magic mouthwash at home. Patient will be discharged home on doxycycline and prednisone. Discussed this with ENT who has agreeable with this plan. Patient no airway compromise. Patient was given return precautions.  Patient will follow up with their primary care provider. Patient is agreeable to this plan. Patient has remained stable throughout their stay in the ED. Patient was seen and evaluated by myself and my attending Keturah Don MD. Assessment and Plan discussed with attending provider, please see attestation for final plan of care. This note was done using dictation software and there may be some grammatical errors associated with this. Flavia Gosselin, MD       ED Course as of 04/15/22 2330   Papo August Apr 15, 2022   1811 Discussed case with ENT and patient will be discharged home after receiving antibiotics and steroids. [JM]      ED Course User Index  [JM] Flavia Gosselin, MD       --------------------------------------------- PAST HISTORY ---------------------------------------------  Past Medical History:  has a past medical history of Angiotensin converting enzyme inhibitor-aggravated angioedema, Arthritis, Carpal tunnel syndrome, Cecum mass, CHF (congestive heart failure) ([facility] [address] CKD (chronic kidney disease), Diabetes mellitus ([facility] [address] Gastrointestinal stromal tumor (GIST) ([facility] [address] Gout, Hyperlipidemia, Hypertension, Hypokalemia, Mass of right submandibular region, Neuropathy, Obesity, and Sleep apnea. Past Surgical History:  has a past surgical history that includes Tonsillectomy; Nasal septum surgery; Colonoscopy (09/14/2018); pr colonoscopy w/biopsy single/multiple (N/A, 9/14/2018); pr colsc flx w/rmvl of tumor polyp lesion snare tq (9/14/2018); US BIOPSY SOFT TISSUE NECK/THORAX (5/26/2021); Colon surgery (2019); and Mouth surgery (N/A, 3/21/2022). Social History:  reports that he has been smoking cigarettes. He has a 15.50 pack-year smoking history. He has never used smokeless tobacco. He reports current alcohol use. He reports current drug use. Frequency: 7.00 times per week. Drug: Marijuana Marly Galloway). Family History: family history includes Heart Failure in his father; Other in his mother.      The patients home medications have been reviewed. Allergies: Lasix [furosemide]; Lisinopril; Prunus persica; Atenolol; Nitrates, organic; and Spironolactone    -------------------------------------------------- RESULTS -------------------------------------------------  Labs:  Results for orders placed or performed during the hospital encounter of 04/15/22   CBC with Auto Differential   Result Value Ref Range    WBC 9.5 4.5 - 11.5 E9/L    RBC 3.93 3.80 - 5.80 E12/L    Hemoglobin 13.2 12.5 - 16.5 g/dL    Hematocrit 38.3 37.0 - 54.0 %    MCV 97.5 80.0 - 99.9 fL    MCH 33.6 26.0 - 35.0 pg    MCHC 34.5 32.0 - 34.5 %    RDW 13.2 11.5 - 15.0 fL    Platelets 001 083 - 690 E9/L    MPV 9.1 7.0 - 12.0 fL    Neutrophils % 60.9 43.0 - 80.0 %    Immature Granulocytes % 0.4 0.0 - 5.0 %    Lymphocytes % 28.0 20.0 - 42.0 %    Monocytes % 7.9 2.0 - 12.0 %    Eosinophils % 2.3 0.0 - 6.0 %    Basophils % 0.5 0.0 - 2.0 %    Neutrophils Absolute 5.77 1.80 - 7.30 E9/L    Immature Granulocytes # 0.04 E9/L    Lymphocytes Absolute 2.65 1.50 - 4.00 E9/L    Monocytes Absolute 0.75 0.10 - 0.95 E9/L    Eosinophils Absolute 0.22 0.05 - 0.50 E9/L    Basophils Absolute 0.05 0.00 - 0.20 Q0/F   Basic Metabolic Panel   Result Value Ref Range    Sodium 136 132 - 146 mmol/L    Potassium 3.3 (L) 3.5 - 5.0 mmol/L    Chloride 93 (L) 98 - 107 mmol/L    CO2 25 22 - 29 mmol/L    Anion Gap 18 (H) 7 - 16 mmol/L    Glucose 165 (H) 74 - 99 mg/dL    BUN 13 6 - 23 mg/dL    CREATININE 1.2 0.7 - 1.2 mg/dL    GFR Non-African American >60 >=60 mL/min/1.73    GFR African American >60     Calcium 9.2 8.6 - 10.2 mg/dL   Lactic Acid   Result Value Ref Range    Lactic Acid 1.2 0.5 - 2.2 mmol/L       Radiology:  No orders to display       ------------------------- NURSING NOTES AND VITALS REVIEWED ---------------------------  Date / Time Roomed:  4/15/2022  4:17 PM  ED Bed Assignment:  37/37    The nursing notes within the ED encounter and vital signs as below have been reviewed.    BP (!) 125/101   Pulse 90   Temp 98.1 °F (36.7 °C) (Oral)   Resp 18   SpO2 98%   Oxygen Saturation Interpretation: Normal      ------------------------------------------ PROGRESS NOTES ------------------------------------------  11:30 PM EDT  I have spoken with the patient and family if present and discussed todays results, in addition to providing specific details for the plan of care and counseling regarding the diagnosis and prognosis. Their questions are answered at this time and they are agreeable with the plan. I discussed at length with them reasons for immediate return here for re evaluation. They will followup with their primary care provider and ENT by calling their office as soon as possible.      --------------------------------- ADDITIONAL PROVIDER NOTES ---------------------------------  At this time the patient is without objective evidence of an acute process requiring hospitalization or inpatient management. They have remained hemodynamically stable throughout their entire ED visit and are stable for discharge with outpatient follow-up. The plan has been discussed in detail and they are aware of the specific conditions for emergent return, as well as the importance of follow-up. Discharge Medication List as of 4/15/2022  6:01 PM      START taking these medications    Details   doxycycline hyclate (VIBRA-TABS) 100 MG tablet Take 1 tablet by mouth 2 times daily for 7 days, Disp-14 tablet, R-0Normal      predniSONE (DELTASONE) 20 MG tablet Take 2 tablets by mouth daily for 5 days, Disp-10 tablet, R-0Normal             Diagnosis:  1. Acute pharyngitis, unspecified etiology        Disposition:  Patient's disposition: Discharge to home  Patient's condition is stable.        Philip Cabello MD  Resident  04/15/22 0298

## 2022-04-18 ENCOUNTER — CARE COORDINATION (OUTPATIENT)
Dept: CARE COORDINATION | Age: 62
End: 2022-04-18

## 2022-04-18 ENCOUNTER — TELEPHONE (OUTPATIENT)
Dept: ENT CLINIC | Age: 62
End: 2022-04-18

## 2022-04-18 ASSESSMENT — ENCOUNTER SYMPTOMS
COUGH: 0
ABDOMINAL PAIN: 0
VOMITING: 0
VOICE CHANGE: 0
NAUSEA: 0
TROUBLE SWALLOWING: 1
COLOR CHANGE: 0
FACIAL SWELLING: 0
SHORTNESS OF BREATH: 0

## 2022-04-18 NOTE — TELEPHONE ENCOUNTER
Patient LVM requesting return call regarding upcoming surgery. Please advise.     Electronically signed by Anthony Suero on 4/18/22 at 3:50 PM EDT

## 2022-04-18 NOTE — CARE COORDINATION
Ambulatory Care Coordination Note  4/18/2022  CM Risk Score: 3  Charlson 10 Year Mortality Risk Score: 98%     ACC: Анна Curran RN    Summary Note:   [facility] spoke with Ariana Torrez for CHF/diabetes follow up. He reports he went to his same day appointment on Friday and was sent to ED for an infection of his tongue incision. He reports he was prescribed an antibiotic and steroid and is feeling better. He denies any fever, or drainage from the incision. He states he is also using the magic mouthwash. He denies any other changes to his medications and states he is taking them as prescribed. [facility] updated Priscillabriseyda Lore on upcoming appointments and he requested that [facility] text dates and times to him because he was currently driving and could not write them down. He states he attempted to contact Dr Mera's office to schedule an ED follow up and will try again this afternoon. PLAN  Text upcoming cardiology and oncology dates and times. Review medications, blood sugars and appointments with next interaction. Continue to follow for care coordination. Care Coordination Interventions    Program Enrollment: Complex Care  Referral from Primary Care Provider: No  Suggested Interventions and Community Resources  Diabetes Education: Not Started  Fall Risk Prevention: Completed  Pharmacist: Not Started  Senior Services: Not Started  Smoking Cessation: In Process  Zone Management Tools: Completed (Comment: diabetes/CHF)         Goals Addressed                 This Visit's Progress     Conditions and Symptoms   On track     I will schedule office visits, as directed by my provider. I will keep my appointment or reschedule if I have to cancel. I will notify my provider of any barriers to my plan of care. I will follow my Zone Management tool to seek urgent or emergent care. I will notify my provider of any symptoms that indicate a worsening of my condition.     Barriers: overwhelmed by complexity of regimen, stress, and lack of education  Plan for overcoming my barriers: diabetes/CHF zone handout, care coordination  Confidence: 8/10  Anticipated Goal Completion Date: 6/25/22         Wellness Goal   No change     Patient Self-Management Goal for Health Maintenance  Goal: I will chose a goal related to tobacco cessation:  I will think about my triggers for smoking, I will think about reasons why I should quit smoking, and I will try a nicotine replacement product or medication. Barriers: fear of failure and overwhelmed by complexity of regimen  Plan for overcoming my barriers: smoking cessation education, care coordination, decrease daily cigarette use  Confidence: 7/10  Anticipated Goal Completion Date: 6/25/22            Prior to Admission medications    Medication Sig Start Date End Date Taking? Authorizing Provider   doxycycline hyclate (VIBRA-TABS) 100 MG tablet Take 1 tablet by mouth 2 times daily for 7 days 4/15/22 4/22/22  Meagan Rendon MD   predniSONE (DELTASONE) 20 MG tablet Take 2 tablets by mouth daily for 5 days 4/15/22 4/20/22  Meagan Rendon MD   chlorhexidine (PERIDEX) 0.12 % solution Take 15 mLs by mouth 2 times daily for 14 days 4/12/22 4/26/22  Beti Mera DO   Magic Mouthwash (MIRACLE MOUTHWASH) Swish and spit 5 mLs 4 times daily as needed for Irritation 4/12/22   Beti Linedonald Mera DO   colchicine (COLCRYS) 0.6 MG tablet Use an initial dose of 1.2 mg for 1 dose, with next dose no sooner than 3 days later. Alternatively, take 1.2 mg (2 pills) on day 1 , then 0.6 mg on day 2. Continue with dose of 0.6 mg daily for 3-5 days or until flare resolves.  3/14/22   Sulema Carias MD   insulin glargine (LANTUS SOLOSTAR) 100 UNIT/ML injection pen ADMINISTER 42 UNITS UNDER THE SKIN EVERY MORNING 2/1/22   Sulema Carias MD   diclofenac sodium (VOLTAREN) 1 % GEL Apply 2 g topically 4 times daily 2/1/22   Sulema Carias MD   ibuprofen (ADVIL;MOTRIN) 400 MG tablet Take 1 tablet by mouth every 6 hours as needed for Pain 2/1/22   Germaine Arnold MD   metOLazone (ZAROXOLYN) 2.5 MG tablet Take 1 tablet by mouth every other day 12/13/21   Germaine Arnold MD   clotrimazole (LOTRIMIN) 1 % cream APPLY A SUFFICIENT AMOUNT EXTERNALLY TWICE A DAY AS NEEDED ON BOTH FEET FOR ATHLETES FOOT INFECTION 8/24/21   Historical Provider, MD   amLODIPine (NORVASC) 10 MG tablet Take 1 tablet by mouth daily 10/8/21   Germaine Arnold MD   bumetanide (BUMEX) 1 MG tablet Take 1 tablet by mouth 2 times daily 10/8/21   Germaine Arnold MD   losartan (COZAAR) 50 MG tablet Take 1 tablet by mouth daily 10/8/21   Germaine Arnold MD   metoprolol tartrate (LOPRESSOR) 50 MG tablet Take 1 tablet by mouth 2 times daily  Patient taking differently: Take 50 mg by mouth daily  10/8/21   Germaine Arnold MD   potassium chloride (KLOR-CON M) 20 MEQ extended release tablet Take 1 tablet by mouth 2 times daily  Patient taking differently: Take 20 mEq by mouth daily Stat 10/8/21   Germaine Arnold MD   B-D ULTRAFINE III SHORT PEN 31G X 8 MM MISC Inject 1 each into the skin nightly 10/8/21   Germaine Arnold MD   docusate sodium (COLACE) 100 MG capsule Take 1 capsule by mouth 2 times daily 10/8/21   Germaine Arnold MD   gabapentin (NEURONTIN) 800 MG tablet Take 800 mg by mouth 3 times daily. Historical Provider, MD   eplerenone (INSPRA) 50 MG tablet Take 1 tablet by mouth daily 8/17/21   Jani Fishman MD   ammonium lactate (AMLACTIN) 12 % cream APPLY A SUFFICIENT AMOUNT EXTERNALLY EVERY DAY FOR DRY SKIN ON FEET AND LEGS **DO NOT APPLY BETWEEN TOES** 3/8/21   Historical Provider, MD   magnesium oxide (MAG-OX) 400 (240 Mg) MG tablet Take 1 tablet by mouth daily 2/8/21   Ninoska Bustillo MD   Compression Stockings MISC by Does not apply route As directed  25 mmHg , 1 pair 1/19/21   Germaine Arnold MD   imatinib (GLEEVEC) 400 MG chemo tablet TAKE 1 TABLET BY MOUTH ONCE DAILY AT THE SAME TIME WITH FOOD AND A LARGE GLASS OF WATER. DO NOT CRUSH TABLETS.  AVOID GRAPEFRUIT PRODUCTS. 10/15/20 Miranda Hall, APRN - CNP   acetaminophen (TYLENOL) 500 MG tablet Take 500 mg by mouth every 6 hours as needed   Patient not taking: Reported on 4/15/2022 5/14/19   Historical Provider, MD   Blood Glucose Monitoring Suppl ([address]) w/Device KIT AS DIRECTED 11/7/18   Historical Provider, MD   Cholecalciferol (VITAMIN D3) 2000 units CAPS Take 1 capsule by mouth daily    Historical Provider, MD   traMADol (ULTRAM) 50 MG tablet Take 50 mg by mouth every 6 hours as needed for Pain. Brandy Money Historical Provider, MD   sildenafil (VIAGRA) 100 MG tablet Take 100 mg by mouth as needed. Take an hour before sex. Med info obtained from [place] records. Historical Provider, MD       Future Appointments   Date Time Provider Alek Susi   5/20/2022 11:00 AM Milena Hart MD [facility]   1/24/2023  9:00 AM [facility]     ,   Diabetes Assessment    Medic Alert ID: No  Meal Planning: Avoidance of concentrated sweets, Carb counting   Do you have barriers with adherence to non-pharmacologic self-management interventions?  (Nutrition/Exercise/Self-Monitoring): No   Have you ever had to go to the ED for symptoms of low blood sugar?: No       No patient-reported symptoms      ,   Congestive Heart Failure Assessment    Are you currently restricting fluids?: No Restriction  Do you understand a low sodium diet?: Yes  Do you understand how to read food labels?: Yes  How many restaurant meals do you eat per week?: 1-2  Do you salt your food before tasting it?: No     No patient-reported symptoms      Symptoms:         and   General Assessment    Do you have any symptoms that are causing concern?: Yes  Progression since Onset: Gradually Improving  Reported Symptoms: Pain, Other (Comment: tongue pain, difficulty swallowing)

## 2022-04-19 NOTE — TELEPHONE ENCOUNTER
Called patient to discuss current symptoms piece of tissue was on tongue bothering tongue patient cut off tissue and went to hospital received antibiotics and steroids.

## 2022-04-20 LAB
BLOOD CULTURE, ROUTINE: NORMAL
CULTURE, BLOOD 2: NORMAL

## 2022-04-26 ENCOUNTER — CARE COORDINATION (OUTPATIENT)
Dept: CARE COORDINATION | Age: 62
End: 2022-04-26

## 2022-04-26 NOTE — CARE COORDINATION
Ambulatory Care Coordination Note  4/26/2022  CM Risk Score: 7  Charlson 10 Year Mortality Risk Score: 98%     ACC: Cait Spivey RN    Summary Note:   ACM spoke with Keiry Smith for diabetes/CHF follow up. He reports his blood sugars have been in th 200's. He states he is eating better, but his appetite is poor because he lost his sense of taste since his surgery. He reports some tissue has been shed from his tongue and the incision is healing. He continues to smoke but states he has cut down. ACM discussed smoking cessation and he reports he has nicotine patches he received from the [facility]. He reports he does not weigh himself daily. He denies any leg swelling or shortness of breath. He denies any changes to his medications and states he is taking them as prescribed. Future appointments were reviewed. ACM discussed that Keiry Smith has not re-scheduled his colonoscopy and he states he may be ready to schedule in mid May 2022. PLAN  Review medications, blood sugars and appointments with next interaction. Continue to follow for care coordination. Care Coordination Interventions    Program Enrollment: Complex Care  Referral from Primary Care Provider: No  Suggested Interventions and Community Resources  Diabetes Education: Declined  Fall Risk Prevention: Completed  Pharmacist: Declined  Senior Services: Declined  Smoking Cessation: Declined  Zone Management Tools: Completed (Comment: diabetes/CHF)         Goals Addressed    None         Prior to Admission medications    Medication Sig Start Date End Date Taking?  Authorizing Provider   chlorhexidine (PERIDEX) 0.12 % solution Take 15 mLs by mouth 2 times daily for 14 days 4/12/22 4/26/22  Ermelinda Mera DO   Magic Mouthwash (MIRACLE MOUTHWASH) Swish and spit 5 mLs 4 times daily as needed for Irritation 4/12/22   Ermelinda Mera DO   colchicine (COLCRYS) 0.6 MG tablet Use an initial dose of 1.2 mg for 1 dose, with next dose no sooner than 3 days later. Alternatively, take 1.2 mg (2 pills) on day 1 , then 0.6 mg on day 2. Continue with dose of 0.6 mg daily for 3-5 days or until flare resolves. 3/14/22   Gregoria Suero MD   insulin glargine (LANTUS SOLOSTAR) 100 UNIT/ML injection pen ADMINISTER 42 UNITS UNDER THE SKIN EVERY MORNING 2/1/22   Gregoria Suero MD   diclofenac sodium (VOLTAREN) 1 % GEL Apply 2 g topically 4 times daily 2/1/22   Gregoria Suero MD   ibuprofen (ADVIL;MOTRIN) 400 MG tablet Take 1 tablet by mouth every 6 hours as needed for Pain 2/1/22   Gregoria Suero MD   metOLazone (ZAROXOLYN) 2.5 MG tablet Take 1 tablet by mouth every other day 12/13/21   Gregoria Suero MD   clotrimazole (LOTRIMIN) 1 % cream APPLY A SUFFICIENT AMOUNT EXTERNALLY TWICE A DAY AS NEEDED ON BOTH FEET FOR ATHLETES FOOT INFECTION 8/24/21   Historical Provider, MD   amLODIPine (NORVASC) 10 MG tablet Take 1 tablet by mouth daily 10/8/21   Gregoria MD Nimo   bumetanide (BUMEX) 1 MG tablet Take 1 tablet by mouth 2 times daily 10/8/21   Gregoria MD Nimo   losartan (COZAAR) 50 MG tablet Take 1 tablet by mouth daily 10/8/21   Gregoria MD Nimo   metoprolol tartrate (LOPRESSOR) 50 MG tablet Take 1 tablet by mouth 2 times daily  Patient taking differently: Take 50 mg by mouth daily  10/8/21   Gregoria Suero MD   potassium chloride (KLOR-CON M) 20 MEQ extended release tablet Take 1 tablet by mouth 2 times daily  Patient taking differently: Take 20 mEq by mouth daily Stat 10/8/21   Gregoria MD SUMAN Suero ULTRAFINE III SHORT PEN 31G X 8 MM MISC Inject 1 each into the skin nightly 10/8/21   Gregoria Suero MD   docusate sodium (COLACE) 100 MG capsule Take 1 capsule by mouth 2 times daily 10/8/21   Gregoria Suero MD   gabapentin (NEURONTIN) 800 MG tablet Take 800 mg by mouth 3 times daily.     Historical Provider, MD   eplerenone (INSPRA) 50 MG tablet Take 1 tablet by mouth daily 8/17/21   Rendall Magnet, MD   ammonium lactate (AMLACTIN) 12 % cream APPLY A SUFFICIENT AMOUNT EXTERNALLY EVERY DAY FOR DRY SKIN ON FEET AND LEGS **DO NOT APPLY BETWEEN TOES** 3/8/21   Historical Provider, MD   magnesium oxide (MAG-OX) 400 (240 Mg) MG tablet Take 1 tablet by mouth daily 2/8/21   Veronica Adams MD   Compression Stockings MISC by Does not apply route As directed  25 mmHg , 1 pair 1/19/21   Racquel Girno MD   imatinib (GLEEVEC) 400 MG chemo tablet TAKE 1 TABLET BY MOUTH ONCE DAILY AT THE SAME TIME WITH FOOD AND A LARGE GLASS OF WATER. DO NOT CRUSH TABLETS. AVOID GRAPEFRUIT PRODUCTS. 10/15/20   Satira Body, APRN - CNP   acetaminophen (TYLENOL) 500 MG tablet Take 500 mg by mouth every 6 hours as needed   Patient not taking: Reported on 4/15/2022 5/14/19   Historical Provider, MD   Blood Glucose Monitoring Suppl ([address]) w/Device KIT AS DIRECTED 11/7/18   Historical Provider, MD   Cholecalciferol (VITAMIN D3) 2000 units CAPS Take 1 capsule by mouth daily    Historical Provider, MD   traMADol (ULTRAM) 50 MG tablet Take 50 mg by mouth every 6 hours as needed for Pain. Boyd Ordonez Historical Provider, MD   sildenafil (VIAGRA) 100 MG tablet Take 100 mg by mouth as needed. Take an hour before sex. Med info obtained from [place] records. Historical Provider, MD       Future Appointments   Date Time Provider Alek Goldman   [facility]2022 11[facility]   1/24/2023  9:00 AM DO Lori Bishop [facility]     ,   Diabetes Assessment    Medic Alert ID: No  Meal Planning: Avoidance of concentrated sweets, Carb counting   How often do you test your blood sugar?: Other (Comment: weekly)   Do you have barriers with adherence to non-pharmacologic self-management interventions?  (Nutrition/Exercise/Self-Monitoring): No   Have you ever had to go to the ED for symptoms of low blood sugar?: No       Do you have hyperglycemia symptoms?: No   Do you have hypoglycemia symptoms?: No   Last Blood Sugar Value: 260   Blood Sugar Monitoring Regimen: Morning Fasting   Blood Sugar Trends: No Change ,   Congestive Heart Failure Assessment    Are you currently restricting fluids?: No Restriction  Do you understand a low sodium diet?: Yes  Do you understand how to read food labels?: Yes  How many restaurant meals do you eat per week?: 1-2  Do you salt your food before tasting it?: No     No patient-reported symptoms      Symptoms:         and   General Assessment    Do you have any symptoms that are causing concern?: Yes  Progression since Onset: Gradually Improving  Reported Symptoms: Other (Comment: tongue discomfort)

## 2022-05-03 ENCOUNTER — CARE COORDINATION (OUTPATIENT)
Dept: CARE COORDINATION | Age: 62
End: 2022-05-03

## 2022-05-10 ENCOUNTER — CARE COORDINATION (OUTPATIENT)
Dept: CARE COORDINATION | Age: 62
End: 2022-05-10

## 2022-05-10 NOTE — CARE COORDINATION
Ambulatory Care Coordination Note  5/10/2022  CM Risk Score: 7  Charlson 10 Year Mortality Risk Score: 98%     ACC: Isael Lay RN    Summary Note:     ACM spoke with Kimberly Ly for CHF/diabetes follow up. He admits he has not checked his blood sugar \"for a couple days\". He states he will check it sometime today. He reports a poor appetite because he has lost his sense of taste since his surgery. He reports the incision has healed and he denies any fever or pain. He reports pain in his legs from his neuropathy. He continues to smoke but states it is much less than before his surgery. He denies any swelling in his legs or chest pain. He states he did not weigh himself today. He denies any changes to his medications and states he is taking them as prescribed. He reports he is scheduled for CT scans on 22. Future appointments were reviewed. Heart Failure Education outreach Date/Time: 5/10/2022 9:22 AM    Ambulatory Care Manager (FRANSICO) contacted the patient by telephone to perform Ambulatory Care Coordination. Verified name and  with patient as identifiers. ACM reviewed that a Health Healthy tips for the Summer packet has been sent to [facility]. ACM reviewed CHF zones, the importance of low sodium diet and healthy tips packet with the patient. Instructed patient to call their PCP if they have a weight gain of 3 lbs in 2 days or 5 lbs in a week. Patient reminded that there is a physician on call 24 hours a day / 7 days a week should the patient have questions or concerns. The patient verbalized understanding.         Care Coordination Interventions    Program Enrollment: Complex Care  Referral from Primary Care Provider: No  Suggested Interventions and Community Resources  Diabetes Education: Declined  Fall Risk Prevention: Completed  Pharmacist: Declined  Senior Services: Declined  Smoking Cessation: Declined  Zone Management Tools: Completed (Comment: diabetes/CHF)         Goals Addressed This Visit's Progress     Conditions and Symptoms   On track     I will schedule office visits, as directed by my provider. I will keep my appointment or reschedule if I have to cancel. I will notify my provider of any barriers to my plan of care. I will follow my Zone Management tool to seek urgent or emergent care. I will notify my provider of any symptoms that indicate a worsening of my condition. Barriers: overwhelmed by complexity of regimen, stress, and lack of education  Plan for overcoming my barriers: diabetes/CHF zone handout, care coordination  Confidence: 8/10  Anticipated Goal Completion Date: 6/25/22         Wellness Goal   No change     Patient Self-Management Goal for Health Maintenance  Goal: I will chose a goal related to tobacco cessation:  I will think about my triggers for smoking, I will think about reasons why I should quit smoking, and I will try a nicotine replacement product or medication. Barriers: fear of failure and overwhelmed by complexity of regimen  Plan for overcoming my barriers: smoking cessation education, care coordination, decrease daily cigarette use  Confidence: 7/10  Anticipated Goal Completion Date: 6/25/22            Prior to Admission medications    Medication Sig Start Date End Date Taking? Authorizing Provider   Magic Mouthwash (MIRACLE MOUTHWASH) Swish and spit 5 mLs 4 times daily as needed for Irritation 4/12/22   Beti Mera DO   colchicine (COLCRYS) 0.6 MG tablet Use an initial dose of 1.2 mg for 1 dose, with next dose no sooner than 3 days later. Alternatively, take 1.2 mg (2 pills) on day 1 , then 0.6 mg on day 2. Continue with dose of 0.6 mg daily for 3-5 days or until flare resolves.  3/14/22   Sulema Carias MD   insulin glargine (LANTUS SOLOSTAR) 100 UNIT/ML injection pen ADMINISTER 42 UNITS UNDER THE SKIN EVERY MORNING 2/1/22   Sulema Carias MD   diclofenac sodium (VOLTAREN) 1 % GEL Apply 2 g topically 4 times daily 2/1/22 Kristel Orellana MD   ibuprofen (ADVIL;MOTRIN) 400 MG tablet Take 1 tablet by mouth every 6 hours as needed for Pain 2/1/22   Kristel Orellana MD   metOLazone (ZAROXOLYN) 2.5 MG tablet Take 1 tablet by mouth every other day 12/13/21   Kristel Orellana MD   clotrimazole (LOTRIMIN) 1 % cream APPLY A SUFFICIENT AMOUNT EXTERNALLY TWICE A DAY AS NEEDED ON BOTH FEET FOR ATHLETES FOOT INFECTION 8/24/21   Historical Provider, MD   amLODIPine (NORVASC) 10 MG tablet Take 1 tablet by mouth daily 10/8/21   Kristel Orellana MD   bumetanide (BUMEX) 1 MG tablet Take 1 tablet by mouth 2 times daily 10/8/21   Kristel Orellana MD   losartan (COZAAR) 50 MG tablet Take 1 tablet by mouth daily 10/8/21   Kristel Orellana MD   metoprolol tartrate (LOPRESSOR) 50 MG tablet Take 1 tablet by mouth 2 times daily  Patient taking differently: Take 50 mg by mouth daily  10/8/21   Kristel Orellana MD   potassium chloride (KLOR-CON M) 20 MEQ extended release tablet Take 1 tablet by mouth 2 times daily  Patient taking differently: Take 20 mEq by mouth daily Stat 10/8/21   Kristel Orellana MD   B-D ULTRAFINE III SHORT PEN 31G X 8 MM MISC Inject 1 each into the skin nightly 10/8/21   Kristel Orellana MD   docusate sodium (COLACE) 100 MG capsule Take 1 capsule by mouth 2 times daily 10/8/21   Kristel Orellana MD   gabapentin (NEURONTIN) 800 MG tablet Take 800 mg by mouth 3 times daily.     Historical Provider, MD   eplerenone (INSPRA) 50 MG tablet Take 1 tablet by mouth daily 8/17/21   Adam Rose MD   ammonium lactate (AMLACTIN) 12 % cream APPLY A SUFFICIENT AMOUNT EXTERNALLY EVERY DAY FOR DRY SKIN ON FEET AND LEGS **DO NOT APPLY BETWEEN TOES** 3/8/21   Historical Provider, MD   magnesium oxide (MAG-OX) 400 (240 Mg) MG tablet Take 1 tablet by mouth daily 2/8/21   Shan Schneider MD   Compression Stockings MISC by Does not apply route As directed  25 mmHg , 1 pair 1/19/21   Kristel Orellana MD   imatinib (GLEEVEC) 400 MG chemo tablet TAKE 1 TABLET BY MOUTH ONCE DAILY AT THE SAME TIME WITH FOOD AND A LARGE GLASS OF WATER. DO NOT CRUSH TABLETS. AVOID GRAPEFRUIT PRODUCTS. 10/15/20   JEAN Benitez - CNP   acetaminophen (TYLENOL) 500 MG tablet Take 500 mg by mouth every 6 hours as needed   Patient not taking: Reported on 4/15/2022 5/14/19   Historical Provider, MD   Blood Glucose Monitoring Suppl ([address]) w/Device KIT AS DIRECTED 11/7/18   Historical Provider, MD   Cholecalciferol (VITAMIN D3) 2000 units CAPS Take 1 capsule by mouth daily    Historical Provider, MD   traMADol (ULTRAM) 50 MG tablet Take 50 mg by mouth every 6 hours as needed for Pain. Don Rios Historical Provider, MD   sildenafil (VIAGRA) 100 MG tablet Take 100 mg by mouth as needed. Take an hour before sex. Med info obtained from [place] records. Historical Provider, MD       Future Appointments   Date Time Provider Alek Goldman   5/12/2022 10:30 AM [facility] SCAN 3 SEY[facility] CT [facility]   5/12/2022 11:00 AM [facility] US RM 3 SEYZ US [facility]   5/20/2022 11:00 AM Pilo Coyne MD [facility]   1/24/2023  9:00 AM Bruce Sever, DO [facility]     ,   Diabetes Assessment    Medic Alert ID: No  Meal Planning: Avoidance of concentrated sweets, Carb counting   How often do you test your blood sugar?: Other (Comment: weekly)   Do you have barriers with adherence to non-pharmacologic self-management interventions?  (Nutrition/Exercise/Self-Monitoring): No   Have you ever had to go to the ED for symptoms of low blood sugar?: No       Do you have hyperglycemia symptoms?: No   Do you have hypoglycemia symptoms?: No   Blood Sugar Monitoring Regimen: Not Testing      ,   Congestive Heart Failure Assessment    Are you currently restricting fluids?: No Restriction  Do you understand a low sodium diet?: Yes  Do you understand how to read food labels?: Yes  How many restaurant meals do you eat per week?: 1-2  Do you salt your food before tasting it?: No     No patient-reported symptoms      Symptoms:  None: Yes      Patient-reported weight (lb):  (Comment: did not weigh self)      and   General Assessment    Do you have any symptoms that are causing concern?: Yes  Progression since Onset: Intermittent - Waxing/Waning  Reported Symptoms: Pain (Comment: leg neuropathy)

## 2022-05-12 ENCOUNTER — HOSPITAL ENCOUNTER (OUTPATIENT)
Dept: CT IMAGING | Age: 62
Discharge: HOME | End: 2022-05-14
Payer: MEDICARE

## 2022-05-12 ENCOUNTER — HOSPITAL ENCOUNTER (OUTPATIENT)
Dept: ULTRASOUND IMAGING | Age: 62
Discharge: HOME | End: 2022-05-14
Payer: MEDICARE

## 2022-05-12 DIAGNOSIS — R59.1 LYMPHADENOPATHY: ICD-10-CM

## 2022-05-12 DIAGNOSIS — C18.0 MALIGNANT NEOPLASM OF CECUM (HCC): ICD-10-CM

## 2022-05-12 PROCEDURE — 74177 CT ABD & PELVIS W/CONTRAST: CPT

## 2022-05-12 PROCEDURE — 6360000004 HC RX CONTRAST MEDICATION: Performed by: RADIOLOGY

## 2022-05-12 PROCEDURE — 2580000003 HC RX 258: Performed by: RADIOLOGY

## 2022-05-12 RX ORDER — SODIUM CHLORIDE 0.9 % (FLUSH) 0.9 %
10 SYRINGE (ML) INJECTION
Status: COMPLETED | OUTPATIENT
Start: 2022-05-12 | End: 2022-05-12

## 2022-05-12 RX ADMIN — Medication 10 ML: at 10:14

## 2022-05-12 RX ADMIN — IOPAMIDOL 90 ML: 755 INJECTION, SOLUTION INTRAVENOUS at 10:14

## 2022-05-12 RX ADMIN — IOHEXOL 50 ML: 240 INJECTION, SOLUTION INTRATHECAL; INTRAVASCULAR; INTRAVENOUS; ORAL at 10:14

## 2022-05-24 ENCOUNTER — CARE COORDINATION (OUTPATIENT)
Dept: CARE COORDINATION | Age: 62
End: 2022-05-24

## 2022-05-24 DIAGNOSIS — M10.9 ACUTE GOUT INVOLVING TOE OF RIGHT FOOT, UNSPECIFIED CAUSE: ICD-10-CM

## 2022-05-24 RX ORDER — COLCHICINE 0.6 MG/1
TABLET ORAL
Qty: 15 TABLET | Refills: 0 | Status: SHIPPED
Start: 2022-05-24 | End: 2022-07-15 | Stop reason: SDUPTHER

## 2022-05-24 ASSESSMENT — SOCIAL DETERMINANTS OF HEALTH (SDOH)
HOW OFTEN DO YOU ATTEND CHURCH OR RELIGIOUS SERVICES?: NEVER
HOW OFTEN DO YOU ATTENT MEETINGS OF THE CLUB OR ORGANIZATION YOU BELONG TO?: NEVER
IN A TYPICAL WEEK, HOW MANY TIMES DO YOU TALK ON THE PHONE WITH FAMILY, FRIENDS, OR NEIGHBORS?: MORE THAN THREE TIMES A WEEK
HOW OFTEN DO YOU GET TOGETHER WITH FRIENDS OR RELATIVES?: ONCE A WEEK
DO YOU BELONG TO ANY CLUBS OR ORGANIZATIONS SUCH AS CHURCH GROUPS UNIONS, FRATERNAL OR ATHLETIC GROUPS, OR SCHOOL GROUPS?: NO

## 2022-05-24 NOTE — TELEPHONE ENCOUNTER
Last Appointment:  2/17/2022  Future Appointments   Date Time Provider Alek Cannoni   6/21/2022  9:30 [facility]   1/24/2023  9:00 AM DO Dorene Fulton ENT [facility]

## 2022-05-24 NOTE — CARE COORDINATION
Ambulatory Care Coordination Note  5/24/2022  CM Risk Score: 7  Charlson 10 Year Mortality Risk Score: 98%     ACC: Анна Curran RN    Summary Note:   ACM spoke with Ariana Torrez for CHF/diabetes follow up. He admits he has not been checking his blood sugar regularly. He reports a nurse from [facility] made a home visit and his A1C is 7.1 down from 9.1 on 2/11/22. He continues to have a poor appetite because of his inability to taste. He states he had a \"gout flare up\" and was having pain in his right arm. He states he was taking colchicine for several days and finally has some relief. He reports his feet are \"a little swollen\". He states he does not weigh himself daily. He reports he continues to smoke less than he did before his surgery. ACM encouraged him to think about quitting. He declined smoking cessation classes. He denies any changes to his medications and admits he was not taking \"all of them\" because of the gout and that he takes so many pills. He states he will \"get back on track\". He reports his cardiologists appointment was changed because of street construction. He does not have a PCP appointment and states he will call to schedule an appointment. PLAN  Review medications, blood sugars, appointments and assess care coordination needs with next interaction. Continue to follow for care coordination. Lab Results     None          Care Coordination Interventions    Program Enrollment: Complex Care  Referral from Primary Care Provider: No  Suggested Interventions and Community Resources  Diabetes Education: Declined  Fall Risk Prevention: Completed  Pharmacist: Declined  Senior Services: Declined  Smoking Cessation: Declined  Zone Management Tools: Completed (Comment: diabetes/CHF)         Goals Addressed                 This Visit's Progress     Conditions and Symptoms   On track     I will schedule office visits, as directed by my provider.   I will keep my appointment or reschedule if I have to cancel. I will notify my provider of any barriers to my plan of care. I will follow my Zone Management tool to seek urgent or emergent care. I will notify my provider of any symptoms that indicate a worsening of my condition. Barriers: overwhelmed by complexity of regimen, stress, and lack of education  Plan for overcoming my barriers: diabetes/CHF zone handout, care coordination  Confidence: 8/10  Anticipated Goal Completion Date: 6/25/22         Wellness Goal   Improving     Patient Self-Management Goal for Health Maintenance  Goal: I will chose a goal related to tobacco cessation:  I will think about my triggers for smoking, I will think about reasons why I should quit smoking, and I will try a nicotine replacement product or medication. Barriers: fear of failure and overwhelmed by complexity of regimen  Plan for overcoming my barriers: smoking cessation education, care coordination, decrease daily cigarette use  Confidence: 7/10  Anticipated Goal Completion Date: 6/25/22 5/24/2022 patient reports he is smoking less              Prior to Admission medications    Medication Sig Start Date End Date Taking? Authorizing Provider   Magic Mouthwash (MIRACLE MOUTHWASH) Swish and spit 5 mLs 4 times daily as needed for Irritation 4/12/22   Idania Mera,    colchicine (COLCRYS) 0.6 MG tablet Use an initial dose of 1.2 mg for 1 dose, with next dose no sooner than 3 days later. Alternatively, take 1.2 mg (2 pills) on day 1 , then 0.6 mg on day 2. Continue with dose of 0.6 mg daily for 3-5 days or until flare resolves.  3/14/22   Bill Chamberlain MD   insulin glargine (LANTUS SOLOSTAR) 100 UNIT/ML injection pen ADMINISTER 42 UNITS UNDER THE SKIN EVERY MORNING 2/1/22   Bill Chamberlain MD   diclofenac sodium (VOLTAREN) 1 % GEL Apply 2 g topically 4 times daily 2/1/22   Bill Chamberlain MD   ibuprofen (ADVIL;MOTRIN) 400 MG tablet Take 1 tablet by mouth every 6 hours as needed for Pain 2/1/22   Bill Chamberlain MD metOLazone (ZAROXOLYN) 2.5 MG tablet Take 1 tablet by mouth every other day 12/13/21   Edwin Rodriguez MD   clotrimazole (LOTRIMIN) 1 % cream APPLY A SUFFICIENT AMOUNT EXTERNALLY TWICE A DAY AS NEEDED ON BOTH FEET FOR ATHLETES FOOT INFECTION 8/24/21   Historical Provider, MD   amLODIPine (NORVASC) 10 MG tablet Take 1 tablet by mouth daily 10/8/21   Edwin Rodriguez MD   bumetanide (BUMEX) 1 MG tablet Take 1 tablet by mouth 2 times daily 10/8/21   Edwin Rodriguez MD   losartan (COZAAR) 50 MG tablet Take 1 tablet by mouth daily 10/8/21   Edwin Rodriguez MD   metoprolol tartrate (LOPRESSOR) 50 MG tablet Take 1 tablet by mouth 2 times daily  Patient taking differently: Take 50 mg by mouth daily  10/8/21   Edwin Rodriguez MD   potassium chloride (KLOR-CON M) 20 MEQ extended release tablet Take 1 tablet by mouth 2 times daily  Patient taking differently: Take 20 mEq by mouth daily Stat 10/8/21   Edwin Rodriguez MD   B-D ULTRAFINE III SHORT PEN 31G X 8 MM MISC Inject 1 each into the skin nightly 10/8/21   Edwin Rodriguez MD   docusate sodium (COLACE) 100 MG capsule Take 1 capsule by mouth 2 times daily 10/8/21   Edwin Rodriguez MD   gabapentin (NEURONTIN) 800 MG tablet Take 800 mg by mouth 3 times daily. Historical Provider, MD   eplerenone (INSPRA) 50 MG tablet Take 1 tablet by mouth daily 8/17/21   Starla Eden MD   ammonium lactate (AMLACTIN) 12 % cream APPLY A SUFFICIENT AMOUNT EXTERNALLY EVERY DAY FOR DRY SKIN ON FEET AND LEGS **DO NOT APPLY BETWEEN TOES** 3/8/21   Historical Provider, MD   magnesium oxide (MAG-OX) 400 (240 Mg) MG tablet Take 1 tablet by mouth daily 2/8/21   Erick Dickens MD   Compression Stockings MISC by Does not apply route As directed  25 mmHg , 1 pair 1/19/21   Edwin Rodriguez MD   imatinib (GLEEVEC) 400 MG chemo tablet TAKE 1 TABLET BY MOUTH ONCE DAILY AT THE SAME TIME WITH FOOD AND A LARGE GLASS OF WATER. DO NOT CRUSH TABLETS.  AVOID GRAPEFRUIT PRODUCTS. 10/15/20   Bradley Luis, APRN - CNP   acetaminophen (TYLENOL) 500 MG tablet Take 500 mg by mouth every 6 hours as needed   Patient not taking: Reported on 4/15/2022 5/14/19   Historical Provider, MD   Blood Glucose Monitoring Suppl ([address]) w/Device KIT AS DIRECTED 11/7/18   Historical Provider, MD   Cholecalciferol (VITAMIN D3) 2000 units CAPS Take 1 capsule by mouth daily    Historical Provider, MD   traMADol (ULTRAM) 50 MG tablet Take 50 mg by mouth every 6 hours as needed for Pain. Boyd Ordonez Historical Provider, MD   sildenafil (VIAGRA) 100 MG tablet Take 100 mg by mouth as needed. Take an hour before sex. Med info obtained from [facility] records. Historical Provider, MD       Future Appointments   Date Time Provider Alek Goldman   6/21/2022  9:30 AM Myranda Parmar MD [facility]   1/24/2023  9:00 AM DO Lori Bishop ENT [facility]     ,   Diabetes Assessment    Medic Alert ID: No  Meal Planning: Avoidance of concentrated sweets, Carb counting   How often do you test your blood sugar?: Other (Comment: weekly)   Do you have barriers with adherence to non-pharmacologic self-management interventions?  (Nutrition/Exercise/Self-Monitoring): No   Have you ever had to go to the ED for symptoms of low blood sugar?: No       No patient-reported symptoms      ,   Congestive Heart Failure Assessment    Are you currently restricting fluids?: No Restriction  Do you understand a low sodium diet?: Yes  Do you understand how to read food labels?: Yes  How many restaurant meals do you eat per week?: 1-2  Do you salt your food before tasting it?: No     No patient-reported symptoms      Symptoms:     Patient-reported weight (lb):  (Comment: did not weigh self)      and   General Assessment    Do you have any symptoms that are causing concern?: Yes  Progression since Onset: Gradually Improving  Reported Symptoms: Pain (Comment: Gout)

## 2022-06-07 ENCOUNTER — CARE COORDINATION (OUTPATIENT)
Dept: CARE COORDINATION | Age: 62
End: 2022-06-07

## 2022-06-08 DIAGNOSIS — K14.8 TONGUE LESION: Primary | ICD-10-CM

## 2022-06-10 RX ORDER — CHLORHEXIDINE GLUCONATE 0.12 MG/ML
RINSE ORAL
Qty: 473 ML | Refills: 1 | Status: SHIPPED | OUTPATIENT
Start: 2022-06-10

## 2022-06-22 NOTE — TELEPHONE ENCOUNTER
Hemoglobin A1c ordered. Patient can have labs drawn per his preference. Please advice to follow up in the office.
THe patient called stating he would like to have his A1C drawn at the office  He has not had his labs done.   Please place appropriate orders and Ill schedule patient out to go over labs
22-Jun-2022 15:50

## 2022-06-23 ENCOUNTER — CARE COORDINATION (OUTPATIENT)
Dept: CARE COORDINATION | Age: 62
End: 2022-06-23

## 2022-06-23 NOTE — CARE COORDINATION
Ambulatory Care Coordination Note  6/23/2022  CM Risk Score: 7  Charlson 10 Year Mortality Risk Score: 98%     ACC: Carolyn Jeffery, RN    Summary Note:   ACM spoke with Kris Alicea for CHF/diabetes follow up. He admits he missed his [facility] appointment because he did not secure transportation. ACM encouraged him to reschedule. He reports he has not been checking his blood sugar regularly and he is having difficulty with his glucometer. He states he can possibly get a new one through the [facility] and ACM encouraged him to inquire at his next appointment. He admits he did not weigh himself today. He states he has been wearing his compression stockings and he denies any leg swelling. He reports he is wearing a knee brace. He denies any changes to his medications and states he is taking them as prescribed. Future appointments were reviewed and he does not have a PCP appointment scheduled. PLAN  Review blood sugars, medications and appointments with next interaction. Continue to follow for care coordination. Lab Results     None          Care Coordination Interventions    Program Enrollment: Complex Care  Referral from Primary Care Provider: No  Suggested Interventions and Community Resources  Diabetes Education: Declined  Fall Risk Prevention: Completed  Pharmacist: Declined  Senior Services: Declined  Smoking Cessation: Declined  Zone Management Tools: Completed (Comment: diabetes/CHF)         Goals Addressed                 This Visit's Progress     Conditions and Symptoms   No change     I will schedule office visits, as directed by my provider. I will keep my appointment or reschedule if I have to cancel. I will notify my provider of any barriers to my plan of care. I will follow my Zone Management tool to seek urgent or emergent care. I will notify my provider of any symptoms that indicate a worsening of my condition.     Barriers: overwhelmed by complexity of regimen, stress, and lack of education  Plan for overcoming my barriers: diabetes/CHF zone handout, care coordination  Confidence: 8/10  Anticipated Goal Completion Date: 6/25/22         Wellness Goal   No change     Patient Self-Management Goal for Health Maintenance  Goal: I will chose a goal related to tobacco cessation:  I will think about my triggers for smoking, I will think about reasons why I should quit smoking, and I will try a nicotine replacement product or medication. Barriers: fear of failure and overwhelmed by complexity of regimen  Plan for overcoming my barriers: smoking cessation education, care coordination, decrease daily cigarette use  Confidence: 7/10  Anticipated Goal Completion Date: 6/25/22 5/24/2022 patient reports he is smoking less              Prior to Admission medications    Medication Sig Start Date End Date Taking?  Authorizing Provider   chlorhexidine (PERIDEX) 0.12 % solution SWISH AND SPIT 15ML BY MOUTH TWICE DAILY FOR 14 DAYS 6/10/22   Magdachema Other DO Samaria   colchicine (COLCRYS) 0.6 MG tablet SEE ATTACHED DIRECTIONS 5/24/22   Anaid Muñoz MD   Magic Mouthwash (MIRACLE MOUTHWASH) Swish and spit 5 mLs 4 times daily as needed for Irritation 4/12/22   Magdalenbriseyda Other DO Samaria   insulin glargine (LANTUS SOLOSTAR) 100 UNIT/ML injection pen ADMINISTER 42 UNITS UNDER THE SKIN EVERY MORNING 2/1/22   Anaid Muñoz MD   diclofenac sodium (VOLTAREN) 1 % GEL Apply 2 g topically 4 times daily 2/1/22   Anaid Muñoz MD   ibuprofen (ADVIL;MOTRIN) 400 MG tablet Take 1 tablet by mouth every 6 hours as needed for Pain 2/1/22   Anaid Muñoz MD   metOLazone (ZAROXOLYN) 2.5 MG tablet Take 1 tablet by mouth every other day 12/13/21   Anaid Muñoz MD   clotrimazole (LOTRIMIN) 1 % cream APPLY A SUFFICIENT AMOUNT EXTERNALLY TWICE A DAY AS NEEDED ON BOTH FEET FOR ATHLETES FOOT INFECTION 8/24/21   Historical Provider, MD   amLODIPine (NORVASC) 10 MG tablet Take 1 tablet by mouth daily 10/8/21   Anaid Muñoz MD   bumetanide (BUMEX) 1 MG tablet Take 1 tablet by mouth 2 times daily 10/8/21   Maya Soriano MD   losartan (COZAAR) 50 MG tablet Take 1 tablet by mouth daily 10/8/21   Maya Soriano MD   metoprolol tartrate (LOPRESSOR) 50 MG tablet Take 1 tablet by mouth 2 times daily  Patient taking differently: Take 50 mg by mouth daily  10/8/21   Maya Soriano MD   potassium chloride (KLOR-CON M) 20 MEQ extended release tablet Take 1 tablet by mouth 2 times daily  Patient taking differently: Take 20 mEq by mouth daily Stat 10/8/21   Maya Soriano MD   B-D ULTRAFINE III SHORT PEN 31G X 8 MM MISC Inject 1 each into the skin nightly 10/8/21   Maya Soriano MD   docusate sodium (COLACE) 100 MG capsule Take 1 capsule by mouth 2 times daily 10/8/21   Maya Soriano MD   gabapentin (NEURONTIN) 800 MG tablet Take 800 mg by mouth 3 times daily. Historical Provider, MD   eplerenone (INSPRA) 50 MG tablet Take 1 tablet by mouth daily 8/17/21   Mihai Thompson MD   ammonium lactate (AMLACTIN) 12 % cream APPLY A SUFFICIENT AMOUNT EXTERNALLY EVERY DAY FOR DRY SKIN ON FEET AND LEGS **DO NOT APPLY BETWEEN TOES** 3/8/21   Historical Provider, MD   magnesium oxide (MAG-OX) 400 (240 Mg) MG tablet Take 1 tablet by mouth daily 2/8/21   Jocy Richard MD   Compression Stockings MISC by Does not apply route As directed  25 mmHg , 1 pair 1/19/21   Maya Soriano MD   imatinib (GLEEVEC) 400 MG chemo tablet TAKE 1 TABLET BY MOUTH ONCE DAILY AT THE SAME TIME WITH FOOD AND A LARGE GLASS OF WATER. DO NOT CRUSH TABLETS.  AVOID GRAPEFRUIT PRODUCTS. 10/15/20   JEAN Lopez - CNP   acetaminophen (TYLENOL) 500 MG tablet Take 500 mg by mouth every 6 hours as needed   Patient not taking: Reported on 4/15/2022 5/14/19   Historical Provider, MD   Blood Glucose Monitoring Suppl ([address]) w/Device KIT AS DIRECTED 11/7/18   Historical Provider, MD   Cholecalciferol (VITAMIN D3) 2000 units CAPS Take 1 capsule by mouth daily    Historical Provider, MD   traMADol Smita Poth) 50 MG tablet Take 50 mg by mouth every 6 hours as needed for Pain. Cecil Marcial Historical Provider, MD   sildenafil (VIAGRA) 100 MG tablet Take 100 mg by mouth as needed. Take an hour before sex. Med info obtained from [place] records. Historical Provider, MD       Future Appointments   Date Time Provider Alek Cannoni   7/19/2022 10:30 AM Britta Wen MD [facility]   1/24/2023  9:00 AM DO Lori Murrell ENT [facility]     ,   Diabetes Assessment    Medic Alert ID: No  Meal Planning: Avoidance of concentrated sweets, Carb counting   How often do you test your blood sugar?: Other (Comment: weekly)   Do you have barriers with adherence to non-pharmacologic self-management interventions?  (Nutrition/Exercise/Self-Monitoring): No   Have you ever had to go to the ED for symptoms of low blood sugar?: No          ,   Congestive Heart Failure Assessment    Are you currently restricting fluids?: No Restriction  Do you understand a low sodium diet?: Yes  Do you understand how to read food labels?: Yes  How many restaurant meals do you eat per week?: 1-2  Do you salt your food before tasting it?: No         Symptoms:         and   General Assessment    Do you have any symptoms that are causing concern?: Yes  Progression since Onset: Gradually Improving  Reported Symptoms: Pain (Comment: knee)

## 2022-07-03 NOTE — PROGRESS NOTES
[address] [facility]   Heart and Vascular [place]   Clinic Note     Date:7/5/22   Patient Nichelle George. YOB: 1960  Age: 58 y.o. Primary Care Provider: Spring Gordon MD    Subjective     This is a pleasant 22-year-old -American male who comes in for follow-up. Lost about 30 lbs since last year due to poor oral intake from tongue resection. No syncope or presyncope. Needs a colonoscopy. No chest pain or SOB. Walks slowly because of neuropathy. Taking bumetanide 1 mg once daily instead of twice; metolazone 2.5 mg once every other day; eplerenone daily and metoprolol only once daily. A focused history review includes:   1. Borderline inferior infarct pattern on EKG  2. Chronic HFpEF-follows with CHF infusion clinic  1. No monoclonal serum or urine protein and normal k/l ratio (fall 2020)  2. TTE 8/2020 personally reviewed: Normal LV size and systolic function. Severe concentric LVH. No significant valve disease. No POOJA.  3. PYP scan negative for TTR amyloidosis  3. HTN  4. T2DM  5. CKD stage II with baseline creatinine 1.1  6. He is a smoker of 20 to 40 pack years  7. ACEI-associated angioedema  8. Gynecomastia with spironolactone  9. Morbid obesity  10. MARIA, NOT compliant with CPAP  11. 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 Carpal tunnel syndrome  13. Hypertriglyceridemia   14. Gout  15.  Partial tongue resection 3/2022      Past History    Past Medical History:         Diagnosis Date    Angiotensin converting enzyme inhibitor-aggravated angioedema 3/29/2018    Arthritis     Carpal tunnel syndrome     Cecum mass     CHF (congestive heart failure) (HCC)     CKD (chronic kidney disease)     Diabetes mellitus (HCC)     Gastrointestinal stromal tumor (GIST) ([facility] [address] 6/3/2019    Gout     Hyperlipidemia     Hypertension     Hypokalemia     Mass of right submandibular region     Neuropathy     Obesity     bmi 43.1 weight 283 #    Sleep apnea     c pap          Social History:    Social History     Tobacco History     Smoking Status  Current Every Day Smoker Smoking Frequency  0.5 packs/day for 30 years (15 pk yrs) Smoking Tobacco Type  Cigarettes    Smokeless Tobacco Use  Never Used          Alcohol History     Alcohol Use Status  Yes Drinks/Week  2 Shots of liquor per week Amount  2.0 standard drinks of alcohol/wk Comment  2x week          Drug Use     Drug Use Status  No          Sexual Activity     Sexually Active  Not Asked                    Family History:       Problem Relation Age of Onset    Other Mother         COVID    Heart Failure Father          Review of Systems   As above        Medications     Current Outpatient Medications   Medication Sig Dispense Refill    metoprolol succinate (TOPROL XL) 50 MG extended release tablet Take 1 tablet by mouth daily 90 tablet 3    bumetanide (BUMEX) 1 MG tablet Take 1 tablet by mouth daily 90 tablet 3    chlorhexidine (PERIDEX) 0.12 % solution SWISH AND SPIT 15ML BY MOUTH TWICE DAILY FOR 14 DAYS 473 mL 1    colchicine (COLCRYS) 0.6 MG tablet SEE ATTACHED DIRECTIONS 15 tablet 0    Magic Mouthwash (MIRACLE MOUTHWASH) Swish and spit 5 mLs 4 times daily as needed for Irritation 5 mL 0    insulin glargine (LANTUS SOLOSTAR) 100 UNIT/ML injection pen ADMINISTER 42 UNITS UNDER THE SKIN EVERY MORNING 7 pen 2    diclofenac sodium (VOLTAREN) 1 % GEL Apply 2 g topically 4 times daily 350 g 0    ibuprofen (ADVIL;MOTRIN) 400 MG tablet Take 1 tablet by mouth every 6 hours as needed for Pain 120 tablet 3    metOLazone (ZAROXOLYN) 2.5 MG tablet Take 1 tablet by mouth every other day 45 tablet 3    clotrimazole (LOTRIMIN) 1 % cream APPLY A SUFFICIENT AMOUNT EXTERNALLY TWICE A DAY AS NEEDED ON BOTH FEET FOR ATHLETES FOOT INFECTION      amLODIPine (NORVASC) 10 MG tablet Take 1 tablet by mouth daily 90 tablet 3    losartan (COZAAR) 50 MG tablet Take 1 tablet by mouth daily 90 tablet 3    potassium chloride (KLOR-CON M) 20 MEQ extended release tablet Take 1 tablet by mouth 2 times daily (Patient taking differently: Take 20 mEq by mouth daily Stat) 180 tablet 1    B-D ULTRAFINE III SHORT PEN 31G X 8 MM MISC Inject 1 each into the skin nightly 100 each 3    docusate sodium (COLACE) 100 MG capsule Take 1 capsule by mouth 2 times daily 90 capsule 1    gabapentin (NEURONTIN) 800 MG tablet Take 800 mg by mouth 3 times daily.  eplerenone (INSPRA) 50 MG tablet Take 1 tablet by mouth daily 90 tablet 3    ammonium lactate (AMLACTIN) 12 % cream APPLY A SUFFICIENT AMOUNT EXTERNALLY EVERY DAY FOR DRY SKIN ON FEET AND LEGS **DO NOT APPLY BETWEEN TOES**      magnesium oxide (MAG-OX) 400 (240 Mg) MG tablet Take 1 tablet by mouth daily 30 tablet 0    Compression Stockings MISC by Does not apply route As directed  25 mmHg , 1 pair 1 each 0    imatinib (GLEEVEC) 400 MG chemo tablet TAKE 1 TABLET BY MOUTH ONCE DAILY AT THE SAME TIME WITH FOOD AND A LARGE GLASS OF WATER. DO NOT CRUSH TABLETS. AVOID GRAPEFRUIT PRODUCTS. 28 tablet 2    acetaminophen (TYLENOL) 500 MG tablet Take 500 mg by mouth every 6 hours as needed       Blood Glucose Monitoring Suppl (ONETOUCH VERIO FLEX SYSTEM) w/Device KIT AS DIRECTED  0    Cholecalciferol (VITAMIN D3) 2000 units CAPS Take 1 capsule by mouth daily      traMADol (ULTRAM) 50 MG tablet Take 50 mg by mouth every 6 hours as needed for Pain. [place] Crape  sildenafil (VIAGRA) 100 MG tablet Take 100 mg by mouth as needed. Take an hour before sex. Med info obtained from [place] records. No current facility-administered medications for this visit. Physical Examination      /80   Pulse 71   Resp 16   Ht 5' 8\" (1.727 m)   Wt 261 lb 12.8 oz (118.8 kg)   BMI 39.81 kg/m²     General: No acute distress, appears as stated age, nonicteric  Head: Atraumatic, no gross abnormalities or bruises.     Neck: Supple and nontender, no carotid bruits, JVP is normal  Lungs: Clear to auscultation bilaterally, no wheezes, rales, or rhonchi  Heart: Regular rate and rhythm, no murmurs, rubs, or gallops  Abdomen: Soft, nontender, nondistended, normal bowel sounds  Extremities: No obvious deformities, no cyanosis, no pitting edema bilaterally  Neurological: Alert and oriented x3, EOMI, moving all extremities x4  Psychological: Normal mood and affect, cooperative  Skin: Color, texture, and turgor normal for age         Labs/Imaging/Diagnostics     Lab Results   Component Value Date/Time     04/15/2022 03:44 PM    K 3.3 04/15/2022 03:44 PM    K 3.7 03/10/2018 05:17 AM    CL 93 04/15/2022 03:44 PM    CO2 25 04/15/2022 03:44 PM    BUN 13 04/15/2022 03:44 PM    CREATININE 1.2 04/15/2022 03:44 PM    GLUCOSE 165 04/15/2022 03:44 PM    CALCIUM 9.2 04/15/2022 03:44 PM        Estimated Creatinine Clearance: 80 mL/min (based on SCr of 1.2 mg/dL).     Lab Results   Component Value Date    WBC 9.5 04/15/2022    HGB 13.2 04/15/2022    HCT 38.3 04/15/2022    MCV 97.5 04/15/2022     04/15/2022       Lab Results   Component Value Date    ALT 15 07/06/2021    AST 19 07/06/2021    ALKPHOS 53 07/06/2021    BILITOT 0.3 07/06/2021       Lab Results   Component Value Date    LABPROT 1.7 (H) 08/17/2018    LABPROT 1.7 08/17/2018    LABALBU 3.6 07/06/2021       Lab Results   Component Value Date    CHOL 109 07/15/2020    CHOL 150 06/14/2019    CHOL 115 08/18/2018     Lab Results   Component Value Date    TRIG 215 (H) 07/15/2020    TRIG 247 (H) 06/14/2019    TRIG 297 (H) 08/18/2018     Lab Results   Component Value Date    HDL 43 07/15/2020    HDL 48 06/14/2019    HDL 34 08/18/2018     Lab Results   Component Value Date    LDLCALC 23 07/15/2020    LDLCALC 53 06/14/2019    LDLCALC 22 08/18/2018     Lab Results   Component Value Date    LABVLDL 43 07/15/2020    LABVLDL 49 06/14/2019    LABVLDL 59 08/18/2018     No results found for: [facility]    Lab Results   Component Value Date    CKTOTAL 493 (H) 08/17/2018 CKMB 5.2 08/17/2018    TROPONINI <0.01 01/28/2020       No results found for: BNP      Lab Results   Component Value Date    LABA1C 9.1 02/01/2022     Results for Beka Rocha (MRN [id number]) as of 8/17/2021 11:02   Ref. Range 7/6/2021 08:55   Pro-BNP Latest Ref Range: 0 - 125 pg/mL 100       Pertinent Cardiovascular Studies:  EKG: Normal sinus rhythm with borderline age-indeterminate inferior infarct. Assessment and Plan:        77-year-old pleasant -American male with a history noted above. He is euvolemic and has no angina although impaired functional capacity. He has no symptomatic arrhythmia. Diagnosis Orders   1. Chronic heart failure with preserved ejection fraction (HFpEF) ([facility])  bumetanide (BUMEX) 1 MG tablet   2. Essential hypertension        · Continue current regimen of eplerenone 50 mg daily, bumetanide 1 mg p.o. once daily, losartan 50 mg daily, metolazone 2.5 mg every other day, metoprolol (changed to succinate 50 mg once daily),   · Return to CHF clinic. His dry weight appears to be just around 260lbs. · Okay to proceed with colonoscopy from my standpoint  · Reiterated the importance of compliance with CPAP for his heart failure and hypertension and MARIA     Follow up in 6 months    Thank you for the referral.  We appreciate the opportunity to participate in His care!     Afshin Bhat MD, [facility]  Interventional Cardiology/Structural Heart Disease  Office: [phone]  Coordinator: Kvng Cobb

## 2022-07-05 ENCOUNTER — OFFICE VISIT (OUTPATIENT)
Dept: CARDIOLOGY CLINIC | Age: 62
End: 2022-07-05
Payer: MEDICARE

## 2022-07-05 VITALS
SYSTOLIC BLOOD PRESSURE: 136 MMHG | DIASTOLIC BLOOD PRESSURE: 80 MMHG | BODY MASS INDEX: 39.68 KG/M2 | WEIGHT: 261.8 LBS | HEIGHT: 68 IN | RESPIRATION RATE: 16 BRPM | HEART RATE: 71 BPM

## 2022-07-05 DIAGNOSIS — I10 ESSENTIAL HYPERTENSION: ICD-10-CM

## 2022-07-05 DIAGNOSIS — I50.32 CHRONIC HEART FAILURE WITH PRESERVED EJECTION FRACTION (HFPEF) (HCC): Primary | ICD-10-CM

## 2022-07-05 PROCEDURE — G8427 DOCREV CUR MEDS BY ELIG CLIN: HCPCS | Performed by: INTERNAL MEDICINE

## 2022-07-05 PROCEDURE — 4004F PT TOBACCO SCREEN RCVD TLK: CPT | Performed by: INTERNAL MEDICINE

## 2022-07-05 PROCEDURE — 99213 OFFICE O/P EST LOW 20 MIN: CPT | Performed by: INTERNAL MEDICINE

## 2022-07-05 PROCEDURE — G8417 CALC BMI ABV UP PARAM F/U: HCPCS | Performed by: INTERNAL MEDICINE

## 2022-07-05 PROCEDURE — 3017F COLORECTAL CA SCREEN DOC REV: CPT | Performed by: INTERNAL MEDICINE

## 2022-07-05 PROCEDURE — 93000 ELECTROCARDIOGRAM COMPLETE: CPT | Performed by: INTERNAL MEDICINE

## 2022-07-05 RX ORDER — METOPROLOL SUCCINATE 50 MG/1
50 TABLET, EXTENDED RELEASE ORAL DAILY
Qty: 90 TABLET | Refills: 3 | Status: SHIPPED | OUTPATIENT
Start: 2022-07-05

## 2022-07-05 RX ORDER — BUMETANIDE 1 MG/1
1 TABLET ORAL DAILY
Qty: 90 TABLET | Refills: 3
Start: 2022-07-05 | End: 2022-09-26

## 2022-07-05 NOTE — PATIENT INSTRUCTIONS
1. I changed your METOPROLOL to the long acting formulation - please discard the ones you have  2. Keep everything else as you're taking it  3. Watch your weight and make sure to go to CHF clinic if over 265 lbs  4.  Return in 6 months to see Nubia Her NP

## 2022-07-05 NOTE — LETTER
[facility]  [address] [place] [address] Way  Phone: [phone]  Fax: [phone]    Wilfredo Larios MD    July 5, 2022     Radha Vidal, [address] [place] [id number]    Patient: Gloria Menendez   MR Number: [id number]   YOB: 1960   Date of Visit: 7/5/2022       Dear Radha Vidal:    Thank you for referring Nakia Urbina to me for evaluation/treatment. Below are the relevant portions of my assessment and plan of care. If you have questions, please do not hesitate to call me. I look forward to following Enoc Jackson along with you.     Sincerely,      Wilfredo Larios MD

## 2022-07-07 ENCOUNTER — CARE COORDINATION (OUTPATIENT)
Dept: CARE COORDINATION | Age: 62
End: 2022-07-07

## 2022-07-14 ENCOUNTER — CARE COORDINATION (OUTPATIENT)
Dept: CARE COORDINATION | Age: 62
End: 2022-07-14

## 2022-07-14 NOTE — CARE COORDINATION
Ambulatory Care Coordination Note  7/14/2022  CM Risk Score: 7  Charlson 10 Year Mortality Risk Score: 98%     ACC: Giuliana De Luna RN    Summary Note:   ACM spoke with Alaina Douglas for CHF/diabetes follow up. He reports [facility] and there is not a large screen glucometer available. He continues to not check his blood sugar. He denies any hypo/hyperglycemia symptoms. He reports he had ingrown toenails in both big toes and they were clipped at his [place] visit. He denies any redness or signs of infection. He reports he has been weighing himself more. He state he is wearing his compression stockings and denies any ankle swelling. He reports both knees are causing him pain and are swollen. He has a same day visit to be assessed tomorrow. He denies any changes to his medications. He states he is out of colchicine and is taking the remainder of his medications as prescribed. Future appointments were reviewed. He does not have a surgical appointment for a colonoscopy scheduled. He declined assistance scheduling and states he is not ready to schedule at this time. PLAN  Contact insurance for talking glucometer coverage. Review medications, appointments with next interaction. Continue to follow for care coordination. Lab Results     None          Care Coordination Interventions    Program Enrollment: Complex Care  Referral from Primary Care Provider: No  Suggested Interventions and Community Resources  Diabetes Education: Declined  Fall Risk Prevention: Completed  Pharmacist: Declined  Senior Services: Declined  Smoking Cessation: Declined  Zone Management Tools: Completed (Comment: diabetes/CHF)         Goals Addressed                    This Visit's Progress      COMPLETED: Conditions and Symptoms   Improving      I will schedule office visits, as directed by my provider. I will keep my appointment or reschedule if I have to cancel.   I will notify my provider of any barriers to my plan of care. I will follow my Zone Management tool to seek urgent or emergent care. I will notify my provider of any symptoms that indicate a worsening of my condition. Barriers: overwhelmed by complexity of regimen, stress, and lack of education  Plan for overcoming my barriers: diabetes/CHF zone handout, care coordination  Confidence: 8/10  Anticipated Goal Completion Date: 6/25/22          Patient Stated (pt-stated)         I will schedule an appointment with surgery for a colonoscopy. Barriers: stress and time constraints  Plan for overcoming my barriers: Care coordination  Confidence: 7/10  Anticipated Goal Completion Date: 10/14/22        COMPLETED: Wellness Goal   Improving      Patient Self-Management Goal for Health Maintenance  Goal: I will chose a goal related to tobacco cessation:  I will think about my triggers for smoking, I will think about reasons why I should quit smoking, and I will try a nicotine replacement product or medication. Barriers: fear of failure and overwhelmed by complexity of regimen  Plan for overcoming my barriers: smoking cessation education, care coordination, decrease daily cigarette use  Confidence: 7/10  Anticipated Goal Completion Date: 6/25/22 5/24/2022 patient reports he is smoking less              Prior to Admission medications    Medication Sig Start Date End Date Taking?  Authorizing Provider   metoprolol succinate (TOPROL XL) 50 MG extended release tablet Take 1 tablet by mouth daily 7/5/22   Kathi Gaspar MD   bumetanide (BUMEX) 1 MG tablet Take 1 tablet by mouth daily 7/5/22   Kathi Gaspar MD   chlorhexidine (PERIDEX) 0.12 % solution SWISH AND SPIT 15ML BY MOUTH TWICE DAILY FOR 14 DAYS 6/10/22   Su Mera DO   colchicine (COLCRYS) 0.6 MG tablet SEE ATTACHED DIRECTIONS 5/24/22   Norm Peralta MD   Magic Mouthwash (MIRACLE MOUTHWASH) Swish and spit 5 mLs 4 times daily as needed for Irritation 4/12/22   Su Mera DO   insulin glargine (LANTUS SOLOSTAR) 100 UNIT/ML injection pen ADMINISTER 42 UNITS UNDER THE SKIN EVERY MORNING 2/1/22   Hao Almanzar MD   diclofenac sodium (VOLTAREN) 1 % GEL Apply 2 g topically 4 times daily 2/1/22   Hao Almanzar MD   ibuprofen (ADVIL;MOTRIN) 400 MG tablet Take 1 tablet by mouth every 6 hours as needed for Pain 2/1/22   Hao Almanzar MD   metOLazone (ZAROXOLYN) 2.5 MG tablet Take 1 tablet by mouth every other day 12/13/21   Hao Almanzar MD   clotrimazole (LOTRIMIN) 1 % cream APPLY A SUFFICIENT AMOUNT EXTERNALLY TWICE A DAY AS NEEDED ON BOTH FEET FOR ATHLETES FOOT INFECTION 8/24/21   Historical Provider, MD   amLODIPine (NORVASC) 10 MG tablet Take 1 tablet by mouth daily 10/8/21   Hao Almanzar MD   losartan (COZAAR) 50 MG tablet Take 1 tablet by mouth daily 10/8/21   Hao Almanzar MD   potassium chloride (KLOR-CON M) 20 MEQ extended release tablet Take 1 tablet by mouth 2 times daily  Patient taking differently: Take 20 mEq by mouth daily Stat 10/8/21   Hao Almanzar MD   B-D ULTRAFINE III SHORT PEN 31G X 8 MM MISC Inject 1 each into the skin nightly 10/8/21   Hao Almanzar MD   docusate sodium (COLACE) 100 MG capsule Take 1 capsule by mouth 2 times daily 10/8/21   Hao Almanzar MD   gabapentin (NEURONTIN) 800 MG tablet Take 800 mg by mouth 3 times daily. Historical Provider, MD   eplerenone (INSPRA) 50 MG tablet Take 1 tablet by mouth daily 8/17/21   Fili Peres MD   ammonium lactate (AMLACTIN) 12 % cream APPLY A SUFFICIENT AMOUNT EXTERNALLY EVERY DAY FOR DRY SKIN ON FEET AND LEGS **DO NOT APPLY BETWEEN TOES** 3/8/21   Historical Provider, MD   magnesium oxide (MAG-OX) 400 (240 Mg) MG tablet Take 1 tablet by mouth daily 2/8/21   Gwen Dill MD   Compression Stockings MISC by Does not apply route As directed  25 mmHg , 1 pair 1/19/21   Hao Almanzar MD   imatinib (GLEEVEC) 400 MG chemo tablet TAKE 1 TABLET BY MOUTH ONCE DAILY AT THE SAME TIME WITH FOOD AND A LARGE GLASS OF WATER. DO NOT CRUSH TABLETS. AVOID GRAPEFRUIT PRODUCTS. 10/15/20   Chase Mitchell, APRN - CNP   acetaminophen (TYLENOL) 500 MG tablet Take 500 mg by mouth every 6 hours as needed  5/14/19   Historical Provider, MD   Blood Glucose Monitoring Suppl ([address]) w/Device KIT AS DIRECTED 11/7/18   Historical Provider, MD   Cholecalciferol (VITAMIN D3) 2000 units CAPS Take 1 capsule by mouth daily    Historical Provider, MD   traMADol (ULTRAM) 50 MG tablet Take 50 mg by mouth every 6 hours as needed for Pain. Jesle Moder Historical Provider, MD   sildenafil (VIAGRA) 100 MG tablet Take 100 mg by mouth as needed. Take an hour before sex. Med info obtained from [place] records. Historical Provider, MD       Future Appointments   Date Time Provider Alek Goldman   7/15/2022 11:00 AM MD Liseth Mullins [facility]   1/24/2023  9:00 AM DO Lori Sky [facility]     ,   Diabetes Assessment    Medic Alert ID: No  Meal Planning: Avoidance of concentrated sweets, Carb counting   How often do you test your blood sugar?: Other (Comment: weekly)   Do you have barriers with adherence to non-pharmacologic self-management interventions?  (Nutrition/Exercise/Self-Monitoring): No   Have you ever had to go to the ED for symptoms of low blood sugar?: No       Blood Sugar Monitoring Regimen: Not Testing      ,   Congestive Heart Failure Assessment    Are you currently restricting fluids?: No Restriction  Do you understand a low sodium diet?: Yes  Do you understand how to read food labels?: Yes  How many restaurant meals do you eat per week?: 1-2  Do you salt your food before tasting it?: No         Symptoms:  None: Yes      Symptom course: stable  Patient-reported weight (lb): 253  Weight trend: decreasing steadily      and   General Assessment    Do you have any symptoms that are causing concern?: Yes  Progression since Onset: Gradually Worsening  Reported Symptoms: Pain (Comment: both knees)

## 2022-07-15 ENCOUNTER — OFFICE VISIT (OUTPATIENT)
Dept: FAMILY MEDICINE CLINIC | Age: 62
End: 2022-07-15
Payer: MEDICARE

## 2022-07-15 VITALS
WEIGHT: 267 LBS | BODY MASS INDEX: 40.47 KG/M2 | OXYGEN SATURATION: 97 % | SYSTOLIC BLOOD PRESSURE: 151 MMHG | RESPIRATION RATE: 20 BRPM | HEART RATE: 85 BPM | TEMPERATURE: 98 F | DIASTOLIC BLOOD PRESSURE: 83 MMHG | HEIGHT: 68 IN

## 2022-07-15 DIAGNOSIS — Z79.4 TYPE 2 DIABETES MELLITUS WITH HYPEROSMOLARITY WITHOUT COMA, WITH LONG-TERM CURRENT USE OF INSULIN (HCC): ICD-10-CM

## 2022-07-15 DIAGNOSIS — M17.0 OSTEOARTHRITIS OF BOTH KNEES, UNSPECIFIED OSTEOARTHRITIS TYPE: Primary | ICD-10-CM

## 2022-07-15 DIAGNOSIS — E11.00 TYPE 2 DIABETES MELLITUS WITH HYPEROSMOLARITY WITHOUT COMA, WITH LONG-TERM CURRENT USE OF INSULIN (HCC): ICD-10-CM

## 2022-07-15 DIAGNOSIS — M10.9 ACUTE GOUT INVOLVING TOE OF RIGHT FOOT, UNSPECIFIED CAUSE: ICD-10-CM

## 2022-07-15 PROCEDURE — 3017F COLORECTAL CA SCREEN DOC REV: CPT

## 2022-07-15 PROCEDURE — 2022F DILAT RTA XM EVC RTNOPTHY: CPT

## 2022-07-15 PROCEDURE — 99213 OFFICE O/P EST LOW 20 MIN: CPT

## 2022-07-15 PROCEDURE — 3046F HEMOGLOBIN A1C LEVEL >9.0%: CPT

## 2022-07-15 PROCEDURE — G8427 DOCREV CUR MEDS BY ELIG CLIN: HCPCS

## 2022-07-15 PROCEDURE — G8417 CALC BMI ABV UP PARAM F/U: HCPCS

## 2022-07-15 PROCEDURE — 4004F PT TOBACCO SCREEN RCVD TLK: CPT

## 2022-07-15 RX ORDER — INSULIN GLARGINE 100 [IU]/ML
INJECTION, SOLUTION SUBCUTANEOUS
Qty: 7 PEN | Refills: 2 | Status: SHIPPED | OUTPATIENT
Start: 2022-07-15

## 2022-07-15 RX ORDER — COLCHICINE 0.6 MG/1
TABLET ORAL
Qty: 15 TABLET | Refills: 0 | Status: SHIPPED
Start: 2022-07-15 | End: 2022-08-19 | Stop reason: SDUPTHER

## 2022-07-15 ASSESSMENT — LIFESTYLE VARIABLES
HOW MANY STANDARD DRINKS CONTAINING ALCOHOL DO YOU HAVE ON A TYPICAL DAY: 3 OR 4
HOW OFTEN DO YOU HAVE A DRINK CONTAINING ALCOHOL: 2-4 TIMES A MONTH

## 2022-07-15 NOTE — PROGRESS NOTES
[facility] MEDICINE RESIDENCY PROGRAM  DATE OF VISIT : 2022    Patient : Maikol Hodgson. Age : 58 y.o.  : 1960   MRN : [id number]   ______________________________________________________________________    Chief Complaint:   Chief Complaint   Patient presents with    Knee Pain     Bilateral knee x 1 month       HPI:        28-year-old male here for same-day visit regarding bilateral knee pain. Knee pain has been chronic for past years. has past medical history of osteoarthritis already been on ibuprofen and  knee brace on right knee. Pain is worse as the day goes on especially during afternoons and nights. Tylenol ibuprofen provides some mild relief. Has also tried Voltaren which did not help his symptoms. Patient has not worked with physical therapy for knee pain before he has worked with physical therapy for other pains including left elbow. Has walking cane. Requesting physical therapy at this visit. Per chart review, patient has had an x-ray of the right knee in 2019 which showed moderate or moderate medial and mild patellofemoral compartment osteoarthritis. 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 .. ileocecectomy @[facility] by Dr. Aileen Gomez.  Currently receiving oral chemo    COLONOSCOPY  2018    polyp--eliane    MOUTH SURGERY N/A 3/21/2022 Attends Jewish Services: Never    Active Member of Clubs or Organizations: No    Attends Club or Organization Meetings: Never    Marital Status:        Allergies: Allergies   Allergen Reactions    Lasix [Furosemide] Other (See Comments)     Per pt potassium was too low when on lasix    Lisinopril Swelling     Angioedema    Prunus Persica Anaphylaxis     Per report to [facility] manager, Sunil Ny. [address], BS-NDTR    Atenolol Other (See Comments) and Swelling     Thins his blood    Nitrates, Organic Other (See Comments)     Is using Sildenafil.   containdicated with Nitrates    Spironolactone      Swelling in the chest area       Medications:    Current Outpatient Medications   Medication Sig Dispense Refill    insulin glargine (LANTUS SOLOSTAR) 100 UNIT/ML injection pen ADMINISTER 42 UNITS UNDER THE SKIN EVERY MORNING 7 pen 2    colchicine (COLCRYS) 0.6 MG tablet SEE ATTACHED DIRECTIONS 15 tablet 0    Capsaicin (ZOSTRIX) 0.035 % CREA cream Apply topically 3 times daily 1 each 1    metoprolol succinate (TOPROL XL) 50 MG extended release tablet Take 1 tablet by mouth daily 90 tablet 3    bumetanide (BUMEX) 1 MG tablet Take 1 tablet by mouth daily 90 tablet 3    chlorhexidine (PERIDEX) 0.12 % solution SWISH AND SPIT 15ML BY MOUTH TWICE DAILY FOR 14 DAYS 473 mL 1    Magic Mouthwash (MIRACLE MOUTHWASH) Swish and spit 5 mLs 4 times daily as needed for Irritation 5 mL 0    diclofenac sodium (VOLTAREN) 1 % GEL Apply 2 g topically 4 times daily 350 g 0    ibuprofen (ADVIL;MOTRIN) 400 MG tablet Take 1 tablet by mouth every 6 hours as needed for Pain 120 tablet 3    clotrimazole (LOTRIMIN) 1 % cream APPLY A SUFFICIENT AMOUNT EXTERNALLY TWICE A DAY AS NEEDED ON BOTH FEET FOR ATHLETES FOOT INFECTION      amLODIPine (NORVASC) 10 MG tablet Take 1 tablet by mouth daily 90 tablet 3    losartan (COZAAR) 50 MG tablet Take 1 tablet by mouth daily 90 tablet 3    potassium chloride (KLOR-CON M) 20 MEQ extended release tablet Take 1 tablet by mouth 2 times daily (Patient taking differently: Take 20 mEq by mouth in the morning. Stat.) 180 tablet 1    B-D ULTRAFINE III SHORT PEN 31G X 8 MM MISC Inject 1 each into the skin nightly 100 each 3    docusate sodium (COLACE) 100 MG capsule Take 1 capsule by mouth 2 times daily 90 capsule 1    gabapentin (NEURONTIN) 800 MG tablet Take 800 mg by mouth 3 times daily. eplerenone (INSPRA) 50 MG tablet Take 1 tablet by mouth daily 90 tablet 3    ammonium lactate (AMLACTIN) 12 % cream APPLY A SUFFICIENT AMOUNT EXTERNALLY EVERY DAY FOR DRY SKIN ON FEET AND LEGS **DO NOT APPLY BETWEEN TOES**      magnesium oxide (MAG-OX) 400 (240 Mg) MG tablet Take 1 tablet by mouth daily 30 tablet 0    Compression Stockings MISC by Does not apply route As directed  25 mmHg , 1 pair 1 each 0    imatinib (GLEEVEC) 400 MG chemo tablet TAKE 1 TABLET BY MOUTH ONCE DAILY AT THE SAME TIME WITH FOOD AND A LARGE GLASS OF WATER. DO NOT CRUSH TABLETS. AVOID GRAPEFRUIT PRODUCTS. 28 tablet 2    acetaminophen (TYLENOL) 500 MG tablet Take 500 mg by mouth every 6 hours as needed       Blood Glucose Monitoring Suppl (ONETOUCH VERIO FLEX SYSTEM) w/Device KIT AS DIRECTED  0    Cholecalciferol (VITAMIN D3) 2000 units CAPS Take 1 capsule by mouth daily      traMADol (ULTRAM) 50 MG tablet Take 50 mg by mouth every 6 hours as needed for Pain. .      sildenafil (VIAGRA) 100 MG tablet Take 100 mg by mouth as needed. Take an hour before sex. Med info obtained from [place] records. metOLazone (ZAROXOLYN) 2.5 MG tablet Take 1 tablet by mouth every other day 45 tablet 3     No current facility-administered medications for this visit. Review of Systems:  Review of Systems   Constitutional:  Negative for activity change, appetite change, chills, fatigue and fever. HENT:  Negative for ear pain, facial swelling, postnasal drip and sore throat. Eyes:  Negative for pain and redness.    Respiratory:  Negative for cough, shortness of breath, wheezing and stridor. Cardiovascular:  Negative for chest pain, palpitations and leg swelling. Gastrointestinal:  Negative for nausea, rectal pain and vomiting. Endocrine: Negative for polyphagia and polyuria. Genitourinary:  Negative for flank pain, hematuria and urgency. Musculoskeletal:  Positive for arthralgias, back pain, gait problem and joint swelling. Skin:  Negative for pallor, rash and wound. Neurological:  Negative for dizziness, tremors, syncope, weakness, light-headedness and numbness. Psychiatric/Behavioral:  Negative for self-injury, sleep disturbance and suicidal ideas. The patient is not nervous/anxious. ______________________________________________________________________    Physical Exam:    Vitals: BP (!) 151/83   Pulse 85   Temp 98 °F (36.7 °C) (Temporal)   Resp 20   Ht 5' 8\" (1.727 m)   Wt 267 lb (121.1 kg)   SpO2 97%   BMI 40.60 kg/m²   AAO/NAD, appropriate affect for mood  CV:  RRR, no murmur  Resp: CTAB  Bilateral knees diffuse tenderness to palpation. No effusion noted. Range of motion preserved on bilateral knees. Able to ambulate. No erythema or obvious deformities noted on bilateral knees. No rednesss, swelling  ______________________________________________________________________    Assessment & Plan:  1) osteoarthritis-patient presenting with bilateral knee pains that has been chronic for him due to his osteoarthritis condition. Has been on ibuprofen and Tylenol, Voltaren gel with mild relief. Will order knee brace for both knees, capsaicin cream and send referral for physical therapy. Instructed patient to return to clinic in 12 weeks for further evaluation of his bilateral knee pain with possible referral to orthopedics.          Return to Office: 12 weeks with PCP    Lawrence Marques MD   This case was discussed with Dr. Emperatriz Ahumada

## 2022-07-15 NOTE — PROGRESS NOTES
S: 58 y.o. male with   Chief Complaint   Patient presents with    Knee Pain     Bilateral knee x 1 month       44-year-old male here for same-day visit regarding bilateral knee pain. Knee pain has been chronic for past years. has past medical history of osteoarthritis already been on ibuprofen and  knee brace on right knee. Pain is worse as the day goes on especially during afternoons and nights. Tylenol ibuprofen provides some mild relief. Has also tried Voltaren which did not help his symptoms. Patient has not worked with physical therapy for knee pain before he has worked with physical therapy for other pains including left elbow. Has walking cane. Requesting physical therapy at this visit. Per chart review, patient has had an x-ray of the right knee in 2019 which showed moderate or moderate medial and mild patellofemoral compartment osteoarthritis. O: VS:  height is 5' 8\" (1.727 m) and weight is 267 lb (121.1 kg). His temporal temperature is 98 °F (36.7 °C). His blood pressure is 151/83 (abnormal) and his pulse is 85. His respiration is 20 and oxygen saturation is 97%. AAO/NAD, appropriate affect for mood  CV:  RRR, no murmur  Resp: CTAB  Bilateral knees diffuse tenderness to palpation. No effusion noted. Range of motion preserved on bilateral knees. Able to ambulate. No erythema or obvious deformities noted on bilateral knees. No rednesss, swelling    Impression/Plan:   1) osteoarthritis-patient presenting with bilateral knee pains that has been chronic for him due to his osteoarthritis condition. Has been on ibuprofen and Tylenol, Voltaren gel with mild relief. Will order knee brace for both knees, capsaicin cream and send referral for physical therapy.   Instructed patient to return to clinic in 12 weeks for further evaluation of his bilateral knee pain with possible referral to orthopedics        Health Maintenance Due   Topic Date Due    Diabetic retinal exam  Never done    Prostate Specific Antigen (PSA) Screening or Monitoring  Never done    Shingles vaccine (1 of 2) Never done    Pneumococcal 0-64 years Vaccine (2 - PCV) 10/18/2017    Lipids  07/15/2021    COVID-19 Vaccine (3 - Booster for Pfizer series) 08/25/2021    Colorectal Cancer Screen  09/14/2021    Diabetic microalbuminuria test  01/19/2022    A1C test (Diabetic or Prediabetic)  05/01/2022    Annual Wellness Visit (AWV)  05/05/2022         Attending Physician Statement  I have discussed the case, including pertinent history and exam findings with the resident. I agree with the documented assessment and plan.       Rick Moreira MD

## 2022-07-20 ASSESSMENT — ENCOUNTER SYMPTOMS
COUGH: 0
EYE PAIN: 0
BACK PAIN: 1
EYE REDNESS: 0
SORE THROAT: 0
STRIDOR: 0
RECTAL PAIN: 0
VOMITING: 0
FACIAL SWELLING: 0
SHORTNESS OF BREATH: 0
WHEEZING: 0
NAUSEA: 0

## 2022-07-25 ENCOUNTER — HOSPITAL ENCOUNTER (OUTPATIENT)
Dept: PHYSICAL THERAPY | Age: 62
Setting detail: THERAPIES SERIES
Discharge: HOME | End: 2022-07-25
Payer: MEDICARE

## 2022-07-25 PROCEDURE — 97161 PT EVAL LOW COMPLEX 20 MIN: CPT

## 2022-07-25 NOTE — PROGRESS NOTES
[facility]                Phone: [phone]   Fax: [phone]    Physical Therapy Daily Treatment Note  Date:  2022    Patient Name:  Thuy Mehta.     :  1960  MRN: [id number]    Referring Physician:  Criselda Buchanan 24 Information:  Arnaldo DELGADO      Evaluation date:  22  Diagnosis:  bilateral knee pain- chronic  PMH:  neuropathy in bilateral feet, history of cancer (3 yrs ago)  ICD-10 Codes:  M17.0  Evaluating Physical Therapist:  Darryle Pancake, PT, DPT   Plan of care signed (Y/N):    Visit# / total visits:          Subjective:        Exercises:   Exercise/Equipment Reps  During/ after  Other comments    Bike                 LAQ         SLR         Supine bridges                 Quad sets               Manual distraction                                       Home Exercise Program:      Comments:      Treatment/Activity Tolerance:  [] Patient tolerated treatment well [] Patient limited by fatique  [] Patient limited by pain  [] Patient limited by other medical complications  [] Other:     Prognosis: [] Good [x] Fair  [] Poor    Patient Requires Follow-up: [] Yes  [] No    Plan:   [] Continue per plan of care [] Alice Wolfe current plan (see comments)  [] Plan of care initiated [] Hold pending MD visit [] Discharge    Plan for Next Session:        See Weekly Progress Note: []  Yes  []  No  Next due:          CPT codes 2022 Units    Low Complexity PT evaluation [id number]    Moderate Complexity PT evaluation  63068    High Complexity PT evaluation 75996    PT Re-evaluation  [id number]    Gait training 31116    Manual therapy  [address] [place] [address]    Therapeutic activities  90043    Therapeutic exercises  36690    Neuromuscular reeducation  [phone]        Time In:   Time Out:     Electronically signed by:    Darryle Pancake, PT, [email or web address]  [id number]
feet, decreased bilateral knee ROM, BLE weakness, and impaired gait mechanics. Plan:     Below checked are areas for improvement during physical therapy POC:        [x]  Pain reduction  []  Balance Improvement       [x]  Strengthening  []  Postural Improvement   [x]  Range of Motion  [x]  Soft Tissue Improvement    [x]  Gait Training   []  Other:      [x]  Home Exercise Program      Pt will be see for 1-2 visits per week for 4 weeks for a total of 8 visits to accomplish goals set below:        Short Term/ Long Term Goals: (4 weeks)      1. Pt will report at least 50% improvement in bilateral knee pain with daily activities     2. Pt will increase BLE strength to at least 4+/5 throughout     3. Pt will increase bilateral knee AROM to at least 0-115 °     4. Pt will ambulate greater than 500' with SPC as needed at modified independent level     5. Pt will ascend/ descend 12 steps with SPC and non-reciprocal pattern at modified independent level     6. Pt will be independent with HEP         Pt's potential for reaching Physical Therapy goals: fair . Time In:  1000  Time Out:  [place], [place]  [id number]    Bryce Shrestha  T: [phone]   F: [phone]     If you have any questions or concerns, please don't hesitate to call. Thank you for your referral.    Physician Signature:________________________________Date:__________________  By signing above, therapists plan is approved by physician. All patients under [facility]   must be signed by physician.

## 2022-08-02 ENCOUNTER — HOSPITAL ENCOUNTER (OUTPATIENT)
Dept: PHYSICAL THERAPY | Age: 62
Setting detail: THERAPIES SERIES
Discharge: HOME | End: 2022-08-02

## 2022-08-02 NOTE — PROGRESS NOTES
[facility]                Phone: [phone]  Fax: [phone]    Physical Therapy  Cancellation/No-show Note  Patient Name:  Joseluis Womack. :  1960   Date:  2022    For today's appointment patient:  [x]  Cancelled  []  Rescheduled appointment  []  No-show     Reason given by patient:  []  Patient ill  []  Conflicting appointment  []  No transportation    []  Conflict with work  []  No reason given  [x]  Other:  Pt reports that his feet are painful and swollen from his neuropathy this morning. He would like to reschedule his appointment for today. Thinks he might have a gout flare up starting. Pt rescheduled.           Electronically signed by:    Blanka Ortiz PT, DPT  [id number]

## 2022-08-05 ENCOUNTER — HOSPITAL ENCOUNTER (OUTPATIENT)
Dept: PHYSICAL THERAPY | Age: 62
Setting detail: THERAPIES SERIES
Discharge: HOME | End: 2022-08-05

## 2022-08-05 NOTE — PROGRESS NOTES
[facility]                Phone: [phone]  Fax: [phone]    Physical Therapy  Cancellation/No-show Note  Patient Name:  Luis Alberto Kunz. :  1960   Date:  2022    For today's appointment patient:  [x]  Cancelled  []  Rescheduled appointment  []  No-show     Reason given by patient:  [x]  Patient ill  []  Conflicting appointment  []  No transportation    []  Conflict with work  []  No reason given  [x]  Other:  Pt continues to have pain in feet. He reports that he will call us back to reschedule once he is feeling better.           Electronically signed by:    Stephanie Hernández, PT, DPT  [id number]

## 2022-08-11 ENCOUNTER — CARE COORDINATION (OUTPATIENT)
Dept: CARE COORDINATION | Age: 62
End: 2022-08-11

## 2022-08-11 NOTE — CARE COORDINATION
Ambulatory Care Coordination Note  8/11/2022    ACC: Berhane Cooney, RN    Summary Note:   ACM spoke with Yahaira Janel for CHF/diabetes follow up. He states he spoke with someone from [facility] and he is awaiting a new glucometer. He admits he has not been weighing himself regularly. He reports he wears his compression stockings or his diabetic socks and he is wearing knee braces. He states he is taking a break from physical therapy and plans to return when he is experiencing less pain. He states he completed his oncology appointment and his \"chemo pill\" has been discontinued. He reports he doesn't have to follow up for 1 year. He reports his metoprolol was changed to 50 mg once daily. He denies any changes to his remaining medications and states he is taking them as prescribed. He reports he has not scheduled a surgery appointment for his colonoscopy. He declined assistance scheduling. PLAN  Review medications, appointments and assess care coordination needs with next interaction. Continue to follow for care coordination. Lab Results       None            Care Coordination Interventions    Program Enrollment: Complex Care  Referral from Primary Care Provider: No  Suggested Interventions and Community Resources  Diabetes Education: Declined  Fall Risk Prevention: Completed  Pharmacist: Declined  Senior Services: Declined  Smoking Cessation: Declined  Zone Management Tools: Completed (Comment: diabetes/CHF)          Goals Addressed                      This Visit's Progress      Patient Stated (pt-stated)   No change      I will schedule an appointment with surgery for a colonoscopy. Barriers: stress and time constraints  Plan for overcoming my barriers: Care coordination  Confidence: 7/10  Anticipated Goal Completion Date: 10/14/22              Prior to Admission medications    Medication Sig Start Date End Date Taking?  Authorizing Provider   metoprolol succinate (TOPROL XL) 50 MG extended release tablet Take 1 tablet by mouth daily 7/5/22  Yes Fritz Roldan MD   insulin glargine (LANTUS SOLOSTAR) 100 UNIT/ML injection pen ADMINISTER 42 UNITS UNDER THE SKIN EVERY MORNING 7/15/22   Blayne Haynes MD   colchicine (COLCRYS) 0.6 MG tablet SEE ATTACHED DIRECTIONS 7/15/22   Blayne Haynes MD   Capsaicin (ZOSTRIX) 0.035 % CREA cream Apply topically 3 times daily 7/15/22   Blayne Haynes MD   bumetanide (BUMEX) 1 MG tablet Take 1 tablet by mouth daily 7/5/22   Fritz Roldan MD   chlorhexidine (PERIDEX) 0.12 % solution SWISH AND SPIT 15ML BY MOUTH TWICE DAILY FOR 14 DAYS 6/10/22   [facility] mandi   Magic Mouthwash (MIRACLE MOUTHWASH) Swish and spit 5 mLs 4 times daily as needed for Irritation 4/12/22   [facility]   diclofenac sodium (VOLTAREN) 1 % GEL Apply 2 g topically 4 times daily 2/1/22   Ruven Essex, MD   ibuprofen (ADVIL;MOTRIN) 400 MG tablet Take 1 tablet by mouth every 6 hours as needed for Pain 2/1/22   Ruven Essex, MD   metOLazone (ZAROXOLYN) 2.5 MG tablet Take 1 tablet by mouth every other day 12/13/21   Ruven Essex, MD   clotrimazole (LOTRIMIN) 1 % cream APPLY A SUFFICIENT AMOUNT EXTERNALLY TWICE A DAY AS NEEDED ON BOTH FEET FOR ATHLETES FOOT INFECTION 8/24/21   Historical MD Luis   amLODIPine (NORVASC) 10 MG tablet Take 1 tablet by mouth daily 10/8/21   Ruven Essex, MD   losartan (COZAAR) 50 MG tablet Take 1 tablet by mouth daily 10/8/21   Ruven Essex, MD   potassium chloride (KLOR-CON M) 20 MEQ extended release tablet Take 1 tablet by mouth 2 times daily  Patient taking differently: Take 20 mEq by mouth in the morning. Stat. 10/8/21   Ruven Essex, MD   B-D ULTRAFINE III SHORT PEN 31G X 8 MM MISC Inject 1 each into the skin nightly 10/8/21   Ruven Essex, MD   docusate sodium (COLACE) 100 MG capsule Take 1 capsule by mouth 2 times daily 10/8/21   Ruven Essex, MD   gabapentin (NEURONTIN) 800 MG tablet Take 800 mg by mouth 3 times daily. Historical Provider, MD   eplerenone (INSPRA) 50 MG tablet Take 1 tablet by mouth daily 8/17/21   Anya Villegas MD   ammonium lactate (AMLACTIN) 12 % cream APPLY A SUFFICIENT AMOUNT EXTERNALLY EVERY DAY FOR DRY SKIN ON FEET AND LEGS **DO NOT APPLY BETWEEN TOES** 3/8/21   Historical Provider, MD   magnesium oxide (MAG-OX) 400 (240 Mg) MG tablet Take 1 tablet by mouth daily 2/8/21   Romy Pike MD   Compression Stockings MISC by Does not apply route As directed  25 mmHg , 1 pair 1/19/21   Aj Islas MD   imatinib (GLEEVEC) 400 MG chemo tablet TAKE 1 TABLET BY MOUTH ONCE DAILY AT THE SAME TIME WITH FOOD AND A LARGE GLASS OF WATER. DO NOT CRUSH TABLETS. AVOID GRAPEFRUIT PRODUCTS. Patient not taking: Reported on 8/11/2022 10/15/20   JEAN Mendoza - CNP   acetaminophen (TYLENOL) 500 MG tablet Take 500 mg by mouth every 6 hours as needed  5/14/19   Historical Provider, MD   Blood Glucose Monitoring Suppl ([address]) w/Device KIT AS DIRECTED 11/7/18   Historical Provider, MD   Cholecalciferol (VITAMIN D3) 2000 units CAPS Take 1 capsule by mouth daily    Historical Provider, MD   traMADol (ULTRAM) 50 MG tablet Take 50 mg by mouth every 6 hours as needed for Pain. Anabel Reddy Historical Provider, MD   sildenafil (VIAGRA) 100 MG tablet Take 100 mg by mouth as needed. Take an hour before sex. Med info obtained from [place] records. Historical Provider, MD       Future Appointments   Date Time Provider Alek Goldman   1/24/2023  9:00 AM DO Lori Calabrese [facility]   ,   Diabetes Assessment    Medic Alert ID: No  Meal Planning: Avoidance of concentrated sweets, Carb counting   How often do you test your blood sugar?: Other (Comment: weekly)   Do you have barriers with adherence to non-pharmacologic self-management interventions?  (Nutrition/Exercise/Self-Monitoring): No   Have you ever had to go to the ED for symptoms of low blood sugar?: No       Do you have hyperglycemia symptoms?: No Do you have hypoglycemia symptoms?: No   Blood Sugar Monitoring Regimen: Not Testing        ,   Congestive Heart Failure Assessment    Are you currently restricting fluids?: No Restriction  Do you understand a low sodium diet?: Yes  Do you understand how to read food labels?: Yes  How many restaurant meals do you eat per week?: 1-2  Do you salt your food before tasting it?: No         Symptoms:     Symptom course: stable  Patient-reported weight (lb):  (Comment: did not weigh self)  Salt intake watch compared to last visit: stable     , and   General Assessment

## 2022-08-19 DIAGNOSIS — M10.9 ACUTE GOUT INVOLVING TOE OF RIGHT FOOT, UNSPECIFIED CAUSE: ICD-10-CM

## 2022-08-19 RX ORDER — COLCHICINE 0.6 MG/1
TABLET ORAL
Qty: 15 TABLET | Refills: 0 | Status: SHIPPED | OUTPATIENT
Start: 2022-08-19

## 2022-09-02 ENCOUNTER — HOSPITAL ENCOUNTER (OUTPATIENT)
Dept: PHYSICAL THERAPY | Age: 62
Setting detail: THERAPIES SERIES
Discharge: HOME | End: 2022-09-02

## 2022-09-02 NOTE — PROGRESS NOTES
[facility]                Phone: [phone]   Fax: [phone]      Physical Therapy  Non compliance/Attendance Issue    DATE:   9/2/2022            MRN: [id number]     PATIENT NAME:  Rosemarie Maldonado. Diagnosis:  bilateral knee pain- chronic     Total Visits to Date: 1 eval only   Cancels/No shows to Date: 2 cancels     Dear Dr. Killian Matias, Joaquina Alvares    This is to notify you that per our physical therapy department policy your patient, noted above, is being discharged from Physical Therapy due to the following reason(s):     If a Physical Therapy out patient is absent from three consecutive scheduled appointments without notification    If a Physical Therapy out patient is absent for 50% of the scheduled visits     Pt's last contact with PT department was on initial eval on 7/25/22. If you have any questions, please feel free to contact me at [phone].     Thank You,    Electronically Signed by:   Tracey Gleason PT, DPT        [id number]  9/2/2022

## 2022-09-13 ENCOUNTER — CARE COORDINATION (OUTPATIENT)
Dept: CARE COORDINATION | Age: 62
End: 2022-09-13

## 2022-09-14 NOTE — CARE COORDINATION
7/10  Anticipated Goal Completion Date: 10/14/22              Prior to Admission medications    Medication Sig Start Date End Date Taking? Authorizing Provider   colchicine (COLCRYS) 0.6 MG tablet SEE ATTACHED DIRECTIONS 8/19/22   Hao Almanzar MD   insulin glargine (LANTUS SOLOSTAR) 100 UNIT/ML injection pen ADMINISTER 42 UNITS UNDER THE SKIN EVERY MORNING 7/15/22   Sridhar Baron MD   Capsaicin (ZOSTRIX) 0.035 % CREA cream Apply topically 3 times daily 7/15/22   Sridhar Baron MD   metoprolol succinate (TOPROL XL) 50 MG extended release tablet Take 1 tablet by mouth daily 7/5/22   Fili Peres MD   bumetanide (BUMEX) 1 MG tablet Take 1 tablet by mouth daily 7/5/22   Fili Peres MD   chlorhexidine (PERIDEX) 0.12 % solution SWISH AND SPIT 15ML BY MOUTH TWICE DAILY FOR 14 DAYS 6/10/22   Enoc Mera DO   Magic Mouthwash (MIRACLE MOUTHWASH) Swish and spit 5 mLs 4 times daily as needed for Irritation 4/12/22   Enoc Mera DO   diclofenac sodium (VOLTAREN) 1 % GEL Apply 2 g topically 4 times daily 2/1/22   Hao Almanzar MD   ibuprofen (ADVIL;MOTRIN) 400 MG tablet Take 1 tablet by mouth every 6 hours as needed for Pain 2/1/22   Hao Almanzar MD   metOLazone (ZAROXOLYN) 2.5 MG tablet Take 1 tablet by mouth every other day 12/13/21   Hao Almanzar MD   clotrimazole (LOTRIMIN) 1 % cream APPLY A SUFFICIENT AMOUNT EXTERNALLY TWICE A DAY AS NEEDED ON BOTH FEET FOR ATHLETES FOOT INFECTION 8/24/21   Historical Provider, MD   amLODIPine (NORVASC) 10 MG tablet Take 1 tablet by mouth daily 10/8/21   Hao Almanzar MD   losartan (COZAAR) 50 MG tablet Take 1 tablet by mouth daily 10/8/21   Hao Almanzar MD   potassium chloride (KLOR-CON M) 20 MEQ extended release tablet Take 1 tablet by mouth 2 times daily  Patient taking differently: Take 20 mEq by mouth in the morning. Stat.  10/8/21   Hao Almanzar MD   B-D ULTRAFINE III SHORT PEN 31G X 8 MM MISC Inject 1 each into the skin nightly 10/8/21 Vani Varela MD   docusate sodium (COLACE) 100 MG capsule Take 1 capsule by mouth 2 times daily 10/8/21   Vani Varela MD   gabapentin (NEURONTIN) 800 MG tablet Take 800 mg by mouth 3 times daily. Historical Provider, MD   eplerenone (INSPRA) 50 MG tablet Take 1 tablet by mouth daily 8/17/21   Ramon Juarez MD   ammonium lactate (AMLACTIN) 12 % cream APPLY A SUFFICIENT AMOUNT EXTERNALLY EVERY DAY FOR DRY SKIN ON FEET AND LEGS **DO NOT APPLY BETWEEN TOES** 3/8/21   Historical Provider, MD   magnesium oxide (MAG-OX) 400 (240 Mg) MG tablet Take 1 tablet by mouth daily 2/8/21   Kurt Hudson MD   Compression Stockings MISC by Does not apply route As directed  25 mmHg , 1 pair 1/19/21   Vani Varela MD   imatinib (GLEEVEC) 400 MG chemo tablet TAKE 1 TABLET BY MOUTH ONCE DAILY AT THE SAME TIME WITH FOOD AND A LARGE GLASS OF WATER. DO NOT CRUSH TABLETS. AVOID GRAPEFRUIT PRODUCTS. Patient not taking: Reported on 8/11/2022 10/15/20   JEAN Escalante - CNP   acetaminophen (TYLENOL) 500 MG tablet Take 500 mg by mouth every 6 hours as needed  5/14/19   Historical Provider, MD   Blood Glucose Monitoring Suppl ([address]) w/Device KIT AS DIRECTED 11/7/18   Historical Provider, MD   Cholecalciferol (VITAMIN D3) 2000 units CAPS Take 1 capsule by mouth daily    Historical Provider, MD   traMADol (ULTRAM) 50 MG tablet Take 50 mg by mouth every 6 hours as needed for Pain. Anton Patel Historical Provider, MD   sildenafil (VIAGRA) 100 MG tablet Take 100 mg by mouth as needed. Take an hour before sex. Med info obtained from [facility].      Historical Provider, MD       Future Appointments   Date Time Provider Alek Goldman   1/24/2023  9:00 AM DO Lori Lewis [facility]   ,   Diabetes Assessment    Medic Alert ID: No  Meal Planning: Avoidance of concentrated sweets, Carb counting   How often do you test your blood sugar?: Other (Comment: weekly)   Do you have barriers with adherence to non-pharmacologic self-management interventions?  (Nutrition/Exercise/Self-Monitoring): No   Have you ever had to go to the ED for symptoms of low blood sugar?: No       Increase or Decrease trend in Blood Sugars   Do you have hyperglycemia symptoms?: Yes   Do you have hypoglycemia symptoms?: No   Last Blood Sugar Value: 250        ,   Congestive Heart Failure Assessment    Are you currently restricting fluids?: No Restriction  Do you understand a low sodium diet?: Yes  Do you understand how to read food labels?: Yes  How many restaurant meals do you eat per week?: 1-2  Do you salt your food before tasting it?: No         Symptoms:     Symptom course: stable  Patient-reported weight (lb):  (Comment: not weighing self)     , and   General Assessment    Do you have any symptoms that are causing concern?: Yes  Progression since Onset: Gradually Improving  Reported Symptoms: Pain (Comment: knees)

## 2022-09-15 ENCOUNTER — TELEPHONE (OUTPATIENT)
Dept: CARE COORDINATION | Age: 62
End: 2022-09-15

## 2022-09-15 ENCOUNTER — TELEPHONE (OUTPATIENT)
Dept: SURGERY | Age: 62
End: 2022-09-15

## 2022-09-15 NOTE — TELEPHONE ENCOUNTER
I called Tona Kiera and there was no answer so I left a message. His PCP appointment is scheduled for 10/14 @ 1:40. I did leave a message for him with appointment time, PCP office number, as well as mine. Dr. An [facility] office stated that he may not need an appointment before rescheduling Colonoscopy, but I had to leave a message for the nurse. Waiting on response from his office.

## 2022-09-15 NOTE — TELEPHONE ENCOUNTER
I spoke with Haylie Solorio at Dr. Marcos Blood office. She called the patient and scheduled him an appointment for next week.

## 2022-09-15 NOTE — TELEPHONE ENCOUNTER
----- Message from Leonarda Deluna RN sent at 9/14/2022  9:34 AM EDT -----  Regarding: Rhianna Fitzpatrick,  Please contact patient to schedule a PCP visit in October 2022. He also needs to schedule with Dr Constance Merchant for a colonoscopy. Thank you!

## 2022-09-20 DIAGNOSIS — I50.32 CHRONIC HEART FAILURE WITH PRESERVED EJECTION FRACTION (HFPEF) (HCC): ICD-10-CM

## 2022-09-20 RX ORDER — AMLODIPINE BESYLATE 10 MG/1
10 TABLET ORAL DAILY
Qty: 90 TABLET | Refills: 3 | Status: SHIPPED | OUTPATIENT
Start: 2022-09-20

## 2022-09-24 DIAGNOSIS — I50.32 CHRONIC HEART FAILURE WITH PRESERVED EJECTION FRACTION (HFPEF) (HCC): ICD-10-CM

## 2022-09-24 DIAGNOSIS — I10 ESSENTIAL HYPERTENSION: ICD-10-CM

## 2022-09-24 DIAGNOSIS — M10.9 ACUTE GOUT INVOLVING TOE OF RIGHT FOOT, UNSPECIFIED CAUSE: ICD-10-CM

## 2022-09-26 RX ORDER — LOSARTAN POTASSIUM 50 MG/1
50 TABLET ORAL DAILY
Qty: 90 TABLET | Refills: 3 | Status: SHIPPED | OUTPATIENT
Start: 2022-09-26

## 2022-09-26 RX ORDER — BUMETANIDE 1 MG/1
TABLET ORAL
Qty: 180 TABLET | Refills: 3 | Status: SHIPPED | OUTPATIENT
Start: 2022-09-26

## 2022-09-27 RX ORDER — COLCHICINE 0.6 MG/1
TABLET ORAL
Qty: 15 TABLET | Refills: 0 | OUTPATIENT
Start: 2022-09-27

## 2022-09-27 NOTE — TELEPHONE ENCOUNTER
Last Appointment:  2/17/2022  Future Appointments   Date Time Provider Alek Goldman   10/14/2022  1:40 PM MD Cornelio Carrillo [facility]   1/24/2023  9:00 AM Tay Nesbitt, [address] [place] [address]

## 2022-10-20 DIAGNOSIS — I50.32 CHRONIC HEART FAILURE WITH PRESERVED EJECTION FRACTION (HFPEF) (HCC): ICD-10-CM

## 2022-10-20 RX ORDER — POTASSIUM CHLORIDE 20 MEQ/1
TABLET, EXTENDED RELEASE ORAL
Qty: 180 TABLET | Refills: 1 | Status: SHIPPED | OUTPATIENT
Start: 2022-10-20

## 2022-10-20 RX ORDER — EPLERENONE 50 MG/1
50 TABLET, FILM COATED ORAL DAILY
Qty: 90 TABLET | Refills: 3 | Status: SHIPPED | OUTPATIENT
Start: 2022-10-20

## 2022-10-27 ENCOUNTER — APPOINTMENT (OUTPATIENT)
Dept: CT IMAGING | Age: 62
End: 2022-10-27
Payer: MEDICARE

## 2022-10-27 ENCOUNTER — HOSPITAL ENCOUNTER (EMERGENCY)
Age: 62
Discharge: HOME | End: 2022-10-27
Payer: MEDICARE

## 2022-10-27 VITALS
BODY MASS INDEX: 40.6 KG/M2 | TEMPERATURE: 97.3 F | DIASTOLIC BLOOD PRESSURE: 84 MMHG | WEIGHT: 267 LBS | OXYGEN SATURATION: 98 % | HEART RATE: 74 BPM | SYSTOLIC BLOOD PRESSURE: 150 MMHG | RESPIRATION RATE: 16 BRPM

## 2022-10-27 DIAGNOSIS — M51.36 DEGENERATIVE DISC DISEASE, LUMBAR: Primary | ICD-10-CM

## 2022-10-27 PROCEDURE — 96372 THER/PROPH/DIAG INJ SC/IM: CPT

## 2022-10-27 PROCEDURE — 6360000002 HC RX W HCPCS: Performed by: PHYSICIAN ASSISTANT

## 2022-10-27 PROCEDURE — 72131 CT LUMBAR SPINE W/O DYE: CPT

## 2022-10-27 PROCEDURE — 99284 EMERGENCY DEPT VISIT MOD MDM: CPT

## 2022-10-27 RX ORDER — ORPHENADRINE CITRATE 100 MG/1
100 TABLET, EXTENDED RELEASE ORAL 2 TIMES DAILY
Qty: 10 TABLET | Refills: 0 | Status: SHIPPED | OUTPATIENT
Start: 2022-10-27 | End: 2022-11-01

## 2022-10-27 RX ORDER — KETOROLAC TROMETHAMINE 30 MG/ML
30 INJECTION, SOLUTION INTRAMUSCULAR; INTRAVENOUS ONCE
Status: COMPLETED | OUTPATIENT
Start: 2022-10-27 | End: 2022-10-27

## 2022-10-27 RX ORDER — HYDROCODONE BITARTRATE AND ACETAMINOPHEN 5; 325 MG/1; MG/1
1 TABLET ORAL EVERY 6 HOURS PRN
Qty: 12 TABLET | Refills: 0 | Status: SHIPPED | OUTPATIENT
Start: 2022-10-27 | End: 2022-10-30

## 2022-10-27 RX ADMIN — KETOROLAC TROMETHAMINE 30 MG: 30 INJECTION, SOLUTION INTRAMUSCULAR at 10:21

## 2022-10-27 ASSESSMENT — PAIN SCALES - GENERAL: PAINLEVEL_OUTOF10: 8

## 2022-10-27 NOTE — ED NOTES
Notified provider about incomplete discharge paperwork      [facility]  10/27/22 3513

## 2022-10-27 NOTE — ED PROVIDER NOTES
[address]  Department of Emergency Medicine   ED  Encounter Note  Admit Date/RoomTime: 10/27/2022  9:13 AM  ED Room: [facility]  NAME: Daniel Salguero : 1960  MRN: [id number]     Chief Complaint:  Hip Pain (Left hip for the past 2 weeks. Denies any injury. Pts gait steady with a cane. )    HISTORY OF PRESENT ILLNESS        Daniel Salguero is a 58 y.o. male who presents to the ED by private vehicle for 2 weeks worth of left hip pain radiating to his buttock and thigh without any obvious injury causing him to have difficulty walking. Patient is a type II diabetic on insulin. States he has bilateral feet neuropathy as a result he has to use a cane to ambulate. His left hip is made worse with any type of bending over or sitting. It is somewhat made better with lying down although he does lie on his left side normally. He does have a history of gouty arthritis and does take colchicine as needed. He has never had a flareup in either of his hips. This pain does appear to be somewhat different. This has affected his ability to sleep because of discomfort. Is been no fever or chills. He denies any bowel or bladder incontinence. Denies any saddle paresthesias. Denies any chiropractic manipulation, IV drug use or recent spinal injections. He is followed primarily [facility] the [facility] and takes no blood thinners. ,  ROS   Pertinent positives and negatives are stated within HPI, all other systems reviewed and are negative. Past Medical History:  has a past medical history of Angiotensin converting enzyme inhibitor-aggravated angioedema, Arthritis, Carpal tunnel syndrome, Cecum mass, CHF (congestive heart failure) ([facility] CKD (chronic kidney disease), Diabetes mellitus ([facility] Gastrointestinal stromal tumor (GIST) ([facility] Gout, Hyperlipidemia, Hypertension, Hypokalemia, Mass of right submandibular region, Neuropathy, Obesity, and Sleep apnea.     Surgical History: has a past surgical history that includes Tonsillectomy; Nasal septum surgery; Colonoscopy (09/14/2018); pr colonoscopy w/biopsy single/multiple (N/A, 9/14/2018); pr colsc flx w/rmvl of tumor polyp lesion snare tq (9/14/2018); US BIOPSY SOFT TISSUE NECK/THORAX (5/26/2021); Colon surgery (2019); and Mouth surgery (N/A, 3/21/2022). Social History:  reports that he has been smoking cigarettes. He has a 15.50 pack-year smoking history. He has never used smokeless tobacco. He reports current alcohol use. He reports current drug use. Frequency: 7.00 times per week. Drug: Marijuana [place]). Family History: family history includes Heart Failure in his father; Other in his mother. Allergies: Lasix [furosemide]; Lisinopril; Prunus persica; Atenolol; Nitrates, organic; and Spironolactone    PHYSICAL EXAM   Oxygen Saturation Interpretation: Normal on room air analysis. ED Triage Vitals   BP Temp Temp src Heart Rate Resp SpO2 Height Weight   10/27/22 0911 10/27/22 0911 -- 10/27/22 0900 -- 10/27/22 0900 -- 10/27/22 0911   (!) 150/84 97.3 °F (36.3 °C)  74  98 %  267 lb (121.1 kg)         Physical Exam  Constitutional/General: Alert and oriented x3, well appearing, non toxic  HEENT:  NC/NT. PERRLA,  Airway patent. Neck: Supple, full ROM, non tender to palpation in the midline, no stridor, no crepitus, no meningeal signs  Respiratory: Lungs clear to auscultation bilaterally, no wheezes, rales, or rhonchi. Not in respiratory distress  CV:  Regular rate. Regular rhythm. No murmurs, gallops, or rubs. 2+ distal pulses  Chest: No chest wall tenderness  GI:  Abdomen Soft, Non tender, Non distended. +BS. No rebound, guarding, or rigidity. No pulsatile masses. Back: There is mild to moderate point tenderness over the left SI joint. No paraspinal or spinal tenderness along the thoracic lumbar spine. No crepitus or fullness. Range of motion somewhat diminished due to discomfort in lower back.   Reflexes normal bilaterally. Gait is assisted with the use of a cane. There is no calf tenderness bilaterally. Musculoskeletal: Moves all extremities x 4. Warm and well perfused, no clubbing, cyanosis, or edema. Capillary refill <3 seconds  Integument: skin warm and dry. No rashes. Lymphatic: no lymphadenopathy noted  Neurologic: GCS 15, no focal deficits, symmetric strength 5/5 in the upper and lower extremities bilaterally      Lab / Imaging Results   (All laboratory and radiology results have been personally reviewed by myself)  Labs:  No results found for this visit on 10/27/22. Imaging: All Radiology results interpreted by Radiologist unless otherwise noted. CT LUMBAR SPINE WO CONTRAST   Final Result   No evidence of acute osseous abnormality of the lumbar spine. Extensive degenerative changes of the lumbar spine visualized with severe   narrowing of the spinal canal and neural foramina visualized at multiple   levels. ED Course / Medical Decision Making     Medications   ketorolac (TORADOL) injection 30 mg (30 mg IntraMUSCular Given 10/27/22 1021)        MDM:      Counseled regarding todays diagnosis, including possible risks and complications,  especially if left uncontrolled. Counseled regarding the possible side effects, risks, benefits and alternatives to treatment; patient and/or guardian verbalizes understanding, agrees, feels comfortable with and wishes to proceed with treatment plan. Advised patient to call her primary care physician with any new medication issues, and read all Rx info from pharmacy to assure aware of all possible risks and side effects of medication before taking. I did discuss warning signs for when to return to the Emergency Room, and the patient verbalized understanding      ASSESSMENT     1. Degenerative disc disease, lumbar      PLAN   Discharged home.   Patient condition is good    New Medications     Discharge Medication List as of 10/27/2022 12:40 PM START taking these medications    Details   HYDROcodone-acetaminophen (NORCO) 5-325 MG per tablet Take 1 tablet by mouth every 6 hours as needed for Pain for up to 3 days. , Disp-12 tablet, R-0Normal      orphenadrine (NORFLEX) 100 MG extended release tablet Take 1 tablet by mouth 2 times daily for 5 days, Disp-10 tablet, R-0Normal           Electronically signed by ROEL Ayon   DD: 10/27/22  **This report was transcribed using voice recognition software. Every effort was made to ensure accuracy; however, inadvertent computerized transcription errors may be present.   END OF ED PROVIDER NOTE       Ryan Castillo, [address]  11/08/22 3107

## 2022-11-04 ENCOUNTER — APPOINTMENT (OUTPATIENT)
Dept: GENERAL RADIOLOGY | Age: 62
End: 2022-11-04
Payer: MEDICARE

## 2022-11-04 ENCOUNTER — HOSPITAL ENCOUNTER (EMERGENCY)
Age: 62
Discharge: HOME | End: 2022-11-04
Attending: EMERGENCY MEDICINE
Payer: MEDICARE

## 2022-11-04 ENCOUNTER — APPOINTMENT (OUTPATIENT)
Dept: MRI IMAGING | Age: 62
End: 2022-11-04
Payer: MEDICARE

## 2022-11-04 VITALS
SYSTOLIC BLOOD PRESSURE: 159 MMHG | WEIGHT: 267 LBS | RESPIRATION RATE: 16 BRPM | DIASTOLIC BLOOD PRESSURE: 107 MMHG | HEART RATE: 98 BPM | OXYGEN SATURATION: 97 % | BODY MASS INDEX: 40.6 KG/M2 | TEMPERATURE: 98.4 F

## 2022-11-04 DIAGNOSIS — M51.26 LUMBAR DISC HERNIATION: ICD-10-CM

## 2022-11-04 DIAGNOSIS — M54.32 SCIATICA OF LEFT SIDE: ICD-10-CM

## 2022-11-04 DIAGNOSIS — M48.00 SPINAL STENOSIS, UNSPECIFIED SPINAL REGION: Primary | ICD-10-CM

## 2022-11-04 PROCEDURE — 6360000002 HC RX W HCPCS: Performed by: EMERGENCY MEDICINE

## 2022-11-04 PROCEDURE — 72148 MRI LUMBAR SPINE W/O DYE: CPT

## 2022-11-04 PROCEDURE — 96372 THER/PROPH/DIAG INJ SC/IM: CPT

## 2022-11-04 PROCEDURE — 96374 THER/PROPH/DIAG INJ IV PUSH: CPT

## 2022-11-04 PROCEDURE — 73502 X-RAY EXAM HIP UNI 2-3 VIEWS: CPT

## 2022-11-04 PROCEDURE — 99284 EMERGENCY DEPT VISIT MOD MDM: CPT

## 2022-11-04 PROCEDURE — 96375 TX/PRO/DX INJ NEW DRUG ADDON: CPT

## 2022-11-04 RX ORDER — ORPHENADRINE CITRATE 100 MG/1
100 TABLET, EXTENDED RELEASE ORAL 2 TIMES DAILY PRN
Qty: 20 TABLET | Refills: 0 | Status: SHIPPED | OUTPATIENT
Start: 2022-11-04 | End: 2022-11-11

## 2022-11-04 RX ORDER — MORPHINE SULFATE 4 MG/ML
4 INJECTION, SOLUTION INTRAMUSCULAR; INTRAVENOUS ONCE
Status: COMPLETED | OUTPATIENT
Start: 2022-11-04 | End: 2022-11-04

## 2022-11-04 RX ORDER — METHYLPREDNISOLONE 4 MG/1
TABLET ORAL
Qty: 1 KIT | Refills: 0 | Status: SHIPPED | OUTPATIENT
Start: 2022-11-04 | End: 2022-11-10

## 2022-11-04 RX ORDER — ORPHENADRINE CITRATE 30 MG/ML
60 INJECTION INTRAMUSCULAR; INTRAVENOUS ONCE
Status: COMPLETED | OUTPATIENT
Start: 2022-11-04 | End: 2022-11-04

## 2022-11-04 RX ORDER — DEXAMETHASONE SODIUM PHOSPHATE 10 MG/ML
10 INJECTION INTRAMUSCULAR; INTRAVENOUS ONCE
Status: COMPLETED | OUTPATIENT
Start: 2022-11-04 | End: 2022-11-04

## 2022-11-04 RX ORDER — KETOROLAC TROMETHAMINE 30 MG/ML
15 INJECTION, SOLUTION INTRAMUSCULAR; INTRAVENOUS ONCE
Status: COMPLETED | OUTPATIENT
Start: 2022-11-04 | End: 2022-11-04

## 2022-11-04 RX ORDER — KETOROLAC TROMETHAMINE 10 MG/1
10 TABLET, FILM COATED ORAL EVERY 8 HOURS PRN
Qty: 15 TABLET | Refills: 0 | Status: SHIPPED | OUTPATIENT
Start: 2022-11-04 | End: 2022-11-11 | Stop reason: ALTCHOICE

## 2022-11-04 RX ADMIN — KETOROLAC TROMETHAMINE 15 MG: 30 INJECTION, SOLUTION INTRAMUSCULAR; INTRAVENOUS at 09:16

## 2022-11-04 RX ADMIN — ORPHENADRINE CITRATE 60 MG: 30 INJECTION INTRAMUSCULAR; INTRAVENOUS at 09:17

## 2022-11-04 RX ADMIN — MORPHINE SULFATE 4 MG: 4 INJECTION, SOLUTION INTRAMUSCULAR; INTRAVENOUS at 14:37

## 2022-11-04 RX ADMIN — DEXAMETHASONE SODIUM PHOSPHATE 10 MG: 10 INJECTION INTRAMUSCULAR; INTRAVENOUS at 09:17

## 2022-11-04 ASSESSMENT — PAIN SCALES - GENERAL
PAINLEVEL_OUTOF10: 0
PAINLEVEL_OUTOF10: 9
PAINLEVEL_OUTOF10: 7

## 2022-11-04 ASSESSMENT — PAIN DESCRIPTION - LOCATION: LOCATION: LEG;BUTTOCKS;COCCYX

## 2022-11-04 ASSESSMENT — PAIN DESCRIPTION - DESCRIPTORS: DESCRIPTORS: ACHING;NUMBNESS

## 2022-11-04 NOTE — ED PROVIDER NOTES
Department of Emergency Medicine   ED  Provider Note  Admit Date/RoomTime: 11/4/2022  8:26 AM  ED Room: Josephncbriseyda Villarreal          History of Present Illness:  11/4/22, Time: 8:48 AM EDT  Chief Complaint   Patient presents with    Back Pain     Left side back pain that radiates down the leg. Started 3 months ago but has gotten worse. Pt states he had a mechanical fall 2 days ago. Cheo Prakash is a 58 y.o. male presenting to the ED for left-sided lower back pain, beginning several months ago. The complaint has been constant, moderate in severity, and worsened by movement. Patient reports that he has always had a bad back. He reports that over the last 3 months his back pain is gotten worse. He reports the pain is in his left lateral back along the buttocks and radiates towards his foot. He reports he has had ongoing numbness and tingling to his foot. He also reports this involves his lower leg. He reports he has neuropathy. He also reports that the pain has become more intense and is more of a burning pain down his leg. He reports that he was seen here last week and had a CAT scan that showed bulging disks. He reports that he slid onto his buttocks because of the pain and tingling which is what prompted his visit today. He reports that he is already set up to see Dr. Neelam Donald on Monday for evaluation. He denies bowel or bladder incontinence or saddle anesthesia. He denies motor weakness. He said his leg gave out today however. He is still able to walk. He denies any fevers or chills. Denies drug use. He denies any right-sided symptoms. No chest pain or shortness of breath. No abdominal pain.     Review of Systems:   A complete review of systems was performed and pertinent positives and negatives are stated within HPI, all other systems reviewed and are negative.        --------------------------------------------- PAST HISTORY ---------------------------------------------  Past Medical History:  has a past medical history of Angiotensin converting enzyme inhibitor-aggravated angioedema, Arthritis, Carpal tunnel syndrome, Cecum mass, CHF (congestive heart failure) ([facility] [address] CKD (chronic kidney disease), Diabetes [facility] Gastrointestinal stromal tumor (GIST) ([facility] Gout, Hyperlipidemia, Hypertension, Hypokalemia, Mass of right submandibular region, Neuropathy, Obesity, and Sleep apnea. Past Surgical History:  has a past surgical history that includes Tonsillectomy; Nasal septum surgery; Colonoscopy (09/14/2018); pr colonoscopy w/biopsy single/multiple (N/A, 9/14/2018); pr colsc flx w/rmvl of tumor polyp lesion snare tq (9/14/2018); US BIOPSY SOFT TISSUE NECK/THORAX (5/26/2021); Colon surgery (2019); and Mouth surgery (N/A, 3/21/2022). Social History:  reports that he has been smoking cigarettes. He has a 15.50 pack-year smoking history. He has never used smokeless tobacco. He reports current alcohol use. He reports current drug use. Frequency: 7.00 times per week. Drug: Marijuana Daril Kelvin). Family History: family history includes Heart Failure in his father; Other in his mother. . Unless otherwise noted, family history is non contributory    The patients home medications have been reviewed. Allergies: Lasix [furosemide]; Lisinopril; Prunus persica; Atenolol; Nitrates, organic; and Spironolactone    I have reviewed the past medical history, past surgical history, social history, and family history    ---------------------------------------------------PHYSICAL EXAM--------------------------------------    Constitutional/General: Alert and oriented x3  Head: Normocephalic and atraumatic  Eyes: PERRL, EOMI, sclera non icteric  ENT: Oropharynx clear, handling secretions   Neck: Supple, full ROM, no stridor, no meningeal signs  Respiratory: Lungs clear to auscultation bilaterally, no wheezes, rales, or rhonchi. Not in respiratory distress  Cardiovascular:  Regular rate.  Regular rhythm. No murmurs, no gallops, no rubs. 2+ distal pulses. Equal extremity pulses. Gastrointestinal:  Abdomen Soft, Non tender, Non distended. No rebound, guarding, or rigidity. No pulsatile masses. Musculoskeletal: Moves all extremities x 4. Warm and well perfused, no clubbing, no cyanosis, no edema. Capillary refill <3 seconds  Skin: skin warm and dry. No rashes. Psychiatric: Normal Affect  Neurologic exam: The patient's [place] Coma Scale is 15. No focal motor deficits. There are no focal sensory deficits. Deep tendon reflexes are intact and present bilaterally in the lower extremities. Gait is normal. Symmetric strength and sensation in the lower extremities bilaterally. Back exam: The patient has reproducible tenderness to palpation in the paravertebral lumbar region on the left. There is no evidence of localized erythema nor is there any focal warmth to the back. The patient has no evidence of single vertebral tenderness or any single area of interspace tenderness. There are no palpable deformities, lacerations, abrasions, or stepoffs. No midline cervical, thoracic, or lumbar spine tenderness. -------------------------------------------------- RESULTS -------------------------------------------------  Results are listed below. LABS: (Lab results interpreted by me)  No results found for this visit on 11/04/22.,       RADIOLOGY:    Radiologist interpretation  MRI LUMBAR SPINE WO CONTRAST   Preliminary Result   Grade 1 anterolisthesis at L5-S1. Mild congenital spinal canal stenosis. Moderate central canal stenosis from L1 through L5 of multifactorial etiology. Multilevel foraminal stenosis most apparent at L4-L5 and L5-S1. XR HIP 2-3 VW W PELVIS LEFT   Final Result   Degenerative changes, no evidence of acute osseous abnormality is seen.                   ------------------------- NURSING NOTES AND VITALS REVIEWED ---------------------------   The nursing notes within the ED encounter and vital signs as below have been reviewed by myself  BP (!) 159/107   Pulse 98   Temp 98.4 °F (36.9 °C)   Resp 16   Wt 267 lb (121.1 kg)   SpO2 97%   BMI 40.60 kg/m²     Oxygen Saturation Interpretation: Normal    The patients available past medical records and past encounters were reviewed. ------------------------------ ED COURSE/MEDICAL DECISION MAKING----------------------          Oxygen Saturation Interpretation: 97 % on RA. I, Dr. Dez Vera, am the primary provider of record        Medical Decision Making:   The patient reported increasing numbness and that his leg gave out at home. I do not appreciate significant weakness although he says it feels different today. There is no upper extremity symptoms. No fevers or chills. No headache. MRI was ordered to rule out any other type of nerve compression or compression of the conus or cord. It did not demonstrate any of these findings. Plan for steroids, pain medication, muscle relaxer and follow-up on Monday as scheduled. Nothing to suggest epidural abscess, discitis or spinal cord compression. I did discuss his findings with neurosurgery on-call Dr. Karen Mason and she reviewed his imaging and did evaluate the patient agreed with the current plan.         Name and Route of medications administered in the ED:  Medications   orphenadrine (NORFLEX) injection 60 mg (60 mg IntraMUSCular Given 11/4/22 0917)   ketorolac (TORADOL) injection 15 mg (15 mg IntraVENous Given 11/4/22 0916)   dexamethasone (DECADRON) injection 10 mg (10 mg IntraVENous Given 11/4/22 0917)   morphine sulfate (PF) injection 4 mg (4 mg IntraVENous Given 11/4/22 1437)             Re-Evaluations:       improving      This patient's ED course included: a personal history and physicial examination, re-evaluation prior to disposition, IV medications, and complex medical decision making and emergency management    This patient has remained hemodynamically stable during their ED course. Consultations:  Dr. Myesha Vivas    Counseling: The emergency provider has spoken with the patient and discussed todays results, in addition to providing specific details for the plan of care and counseling regarding the diagnosis and prognosis. Questions are answered at this time and they are agreeable with the plan.       --------------------------------- IMPRESSION AND DISPOSITION ---------------------------------    IMPRESSION  1. Spinal stenosis, unspecified spinal region    2. Sciatica of left side    3. Lumbar disc herniation        DISPOSITION  Disposition: Discharge to home  Patient condition is good        NOTE: This report was transcribed using voice recognition software.  Every effort was made to ensure accuracy; however, inadvertent computerized transcription errors may be present       Meghan Stark MD  11/04/22 [id number]

## 2022-11-04 NOTE — ED NOTES
Per MRI desk, scan will be completed around MARÍA Saenz  11/04/22 [address] [place] [address]  11/04/22 [id number]

## 2022-11-07 ENCOUNTER — INITIAL CONSULT (OUTPATIENT)
Dept: NEUROSURGERY | Age: 62
End: 2022-11-07
Payer: MEDICARE

## 2022-11-07 VITALS
BODY MASS INDEX: 40.47 KG/M2 | HEIGHT: 68 IN | HEART RATE: 91 BPM | RESPIRATION RATE: 18 BRPM | OXYGEN SATURATION: 95 % | DIASTOLIC BLOOD PRESSURE: 83 MMHG | SYSTOLIC BLOOD PRESSURE: 124 MMHG | TEMPERATURE: 98.2 F | WEIGHT: 267 LBS

## 2022-11-07 DIAGNOSIS — M48.061 SPINAL STENOSIS OF LUMBAR REGION, UNSPECIFIED WHETHER NEUROGENIC CLAUDICATION PRESENT: ICD-10-CM

## 2022-11-07 DIAGNOSIS — M54.16 LUMBAR RADICULOPATHY: ICD-10-CM

## 2022-11-07 DIAGNOSIS — M54.42 ACUTE LEFT-SIDED LOW BACK PAIN WITH LEFT-SIDED SCIATICA: Primary | ICD-10-CM

## 2022-11-07 PROCEDURE — 3078F DIAST BP <80 MM HG: CPT | Performed by: STUDENT IN AN ORGANIZED HEALTH CARE EDUCATION/TRAINING PROGRAM

## 2022-11-07 PROCEDURE — 3017F COLORECTAL CA SCREEN DOC REV: CPT | Performed by: STUDENT IN AN ORGANIZED HEALTH CARE EDUCATION/TRAINING PROGRAM

## 2022-11-07 PROCEDURE — G8417 CALC BMI ABV UP PARAM F/U: HCPCS | Performed by: STUDENT IN AN ORGANIZED HEALTH CARE EDUCATION/TRAINING PROGRAM

## 2022-11-07 PROCEDURE — 99202 OFFICE O/P NEW SF 15 MIN: CPT

## 2022-11-07 PROCEDURE — 99203 OFFICE O/P NEW LOW 30 MIN: CPT | Performed by: STUDENT IN AN ORGANIZED HEALTH CARE EDUCATION/TRAINING PROGRAM

## 2022-11-07 PROCEDURE — G8427 DOCREV CUR MEDS BY ELIG CLIN: HCPCS | Performed by: STUDENT IN AN ORGANIZED HEALTH CARE EDUCATION/TRAINING PROGRAM

## 2022-11-07 PROCEDURE — 3074F SYST BP LT 130 MM HG: CPT | Performed by: STUDENT IN AN ORGANIZED HEALTH CARE EDUCATION/TRAINING PROGRAM

## 2022-11-07 PROCEDURE — G8484 FLU IMMUNIZE NO ADMIN: HCPCS | Performed by: STUDENT IN AN ORGANIZED HEALTH CARE EDUCATION/TRAINING PROGRAM

## 2022-11-07 PROCEDURE — 4004F PT TOBACCO SCREEN RCVD TLK: CPT | Performed by: STUDENT IN AN ORGANIZED HEALTH CARE EDUCATION/TRAINING PROGRAM

## 2022-11-07 ASSESSMENT — ENCOUNTER SYMPTOMS
BACK PAIN: 1
ABDOMINAL PAIN: 0
PHOTOPHOBIA: 0
TROUBLE SWALLOWING: 0
SHORTNESS OF BREATH: 0

## 2022-11-07 NOTE — PROGRESS NOTES
Subjective:      Patient ID: Lisa Calixto is a 58 y.o. male with a PMH of HTN, HLD, CHF, sleep apnea, and neuropathy who presents for evaluation of low back pain. He states he has had this low back pain for about 2 months. He denies any trauma to the area. He describes this pain as a constant sharp pain that radiates down the left leg. He admits to some weakness and numbness with this pain. Pain is so bad it now requires him to walk with a cane. Pain is worse with sitting and standing. He has tried tramadol and gabapentin with the pain with some relief. He has not tries PT or TIN for this pain. He denies any bowel or bladder incontinence. He is a current smoker, smoking 1 pack per 2 days. He denies any blood thinner usage. Review of Systems   Constitutional:  Negative for chills and fever. HENT:  Negative for trouble swallowing. Eyes:  Negative for photophobia. Respiratory:  Negative for shortness of breath. Cardiovascular:  Negative for chest pain. Gastrointestinal:  Negative for abdominal pain. Endocrine: Negative for heat intolerance. Genitourinary:  Negative for difficulty urinating and flank pain. Musculoskeletal:  Positive for arthralgias and back pain. Negative for myalgias. Skin:  Negative for wound. Neurological:  Positive for weakness and numbness. Negative for headaches. Psychiatric/Behavioral:  Negative for confusion. Objective:   Physical Exam  HENT:      Head: Normocephalic. Eyes:      Pupils: Pupils are equal, round, and reactive to light. Cardiovascular:      Rate and Rhythm: Normal rate. Pulmonary:      Effort: Pulmonary effort is normal.   Abdominal:      General: There is no distension. Musculoskeletal:         General: Normal range of motion. Cervical back: Normal range of motion. Skin:     General: Skin is warm and dry. Neurological:      Mental Status: He is alert.       Comments: A&Ox3  CN3-12 intact  BLE 4/5 secondary to pain otherwise Motor Strength full   Sensation intact to light touch except bilateral feet which patient states is numb 2/2 to neuropathy  Reflexes normal   (-)Bilateral straight leg test  Mild tenderness to palpation of lumbar spine   Psychiatric:         Thought Content: Thought content normal.     Imagin2022 MRI Lumbar Spine  Impression   Grade 1 anterolisthesis at L5-S1. Mild congenital spinal canal stenosis. Moderate central canal stenosis from L1 through L5 of multifactorial etiology. Multilevel foraminal stenosis most apparent at L4-L5 and L5-S1. Assessment:      -Kristopher Burt. Is a 57 y/o male who presents with low back pain that radiates into left leg. He has not tried PT or TIN for this pain. MRI as above.       Plan:      -Pain control and expectations discussed  -MRI reviewed and discussed in detail  -Pain clinic referral given  -PT referral offered-patient declined at this time  -Lumbar Flex/Ex XR  -OARRS report reviewed   -Return to Neurosurgery clinic once conservative measures fail  -Call/return sooner if symptoms worsen or new issues arise in the interim           Mallorie Schultz PA-C

## 2022-11-11 ENCOUNTER — OFFICE VISIT (OUTPATIENT)
Dept: FAMILY MEDICINE CLINIC | Age: 62
End: 2022-11-11
Payer: MEDICARE

## 2022-11-11 VITALS
HEART RATE: 82 BPM | TEMPERATURE: 98 F | OXYGEN SATURATION: 98 % | HEIGHT: 68 IN | WEIGHT: 260 LBS | DIASTOLIC BLOOD PRESSURE: 76 MMHG | BODY MASS INDEX: 39.4 KG/M2 | SYSTOLIC BLOOD PRESSURE: 118 MMHG

## 2022-11-11 DIAGNOSIS — Z79.4 TYPE 2 DIABETES MELLITUS WITH HYPEROSMOLARITY WITHOUT COMA, WITH LONG-TERM CURRENT USE OF INSULIN (HCC): ICD-10-CM

## 2022-11-11 DIAGNOSIS — M48.062 LUMBAR STENOSIS WITH NEUROGENIC CLAUDICATION: ICD-10-CM

## 2022-11-11 DIAGNOSIS — E11.00 TYPE 2 DIABETES MELLITUS WITH HYPEROSMOLARITY WITHOUT COMA, WITH LONG-TERM CURRENT USE OF INSULIN (HCC): ICD-10-CM

## 2022-11-11 DIAGNOSIS — Z79.4 TYPE 2 DIABETES MELLITUS WITH HYPEROSMOLARITY WITHOUT COMA, WITH LONG-TERM CURRENT USE OF INSULIN (HCC): Primary | ICD-10-CM

## 2022-11-11 DIAGNOSIS — E11.00 TYPE 2 DIABETES MELLITUS WITH HYPEROSMOLARITY WITHOUT COMA, WITH LONG-TERM CURRENT USE OF INSULIN (HCC): Primary | ICD-10-CM

## 2022-11-11 LAB
ANION GAP SERPL CALCULATED.3IONS-SCNC: 15 MMOL/L (ref 7–16)
BUN BLDV-MCNC: 24 MG/DL (ref 6–23)
CALCIUM SERPL-MCNC: 9.3 MG/DL (ref 8.6–10.2)
CHLORIDE BLD-SCNC: 92 MMOL/L (ref 98–107)
CHOLESTEROL, TOTAL: 224 MG/DL (ref 0–199)
CO2: 26 MMOL/L (ref 22–29)
CREAT SERPL-MCNC: 0.9 MG/DL (ref 0.7–1.2)
CREATININE URINE: 24 MG/DL (ref 40–278)
GFR SERPL CREATININE-BSD FRML MDRD: >60 ML/MIN/1.73
GLUCOSE BLD-MCNC: 333 MG/DL (ref 74–99)
HBA1C MFR BLD: 10.7 %
HDLC SERPL-MCNC: 45 MG/DL
LDL CHOLESTEROL CALCULATED: ABNORMAL MG/DL (ref 0–99)
MICROALBUMIN UR-MCNC: 50.2 MG/L
MICROALBUMIN/CREAT UR-RTO: 209.2 (ref 0–30)
POTASSIUM SERPL-SCNC: 3.7 MMOL/L (ref 3.5–5)
SODIUM BLD-SCNC: 133 MMOL/L (ref 132–146)
TRIGL SERPL-MCNC: 452 MG/DL (ref 0–149)
VLDLC SERPL CALC-MCNC: ABNORMAL MG/DL

## 2022-11-11 PROCEDURE — 36415 COLL VENOUS BLD VENIPUNCTURE: CPT | Performed by: FAMILY MEDICINE

## 2022-11-11 PROCEDURE — 3017F COLORECTAL CA SCREEN DOC REV: CPT | Performed by: FAMILY MEDICINE

## 2022-11-11 PROCEDURE — G8417 CALC BMI ABV UP PARAM F/U: HCPCS | Performed by: FAMILY MEDICINE

## 2022-11-11 PROCEDURE — 83036 HEMOGLOBIN GLYCOSYLATED A1C: CPT | Performed by: FAMILY MEDICINE

## 2022-11-11 PROCEDURE — 4004F PT TOBACCO SCREEN RCVD TLK: CPT | Performed by: FAMILY MEDICINE

## 2022-11-11 PROCEDURE — G8484 FLU IMMUNIZE NO ADMIN: HCPCS | Performed by: FAMILY MEDICINE

## 2022-11-11 PROCEDURE — 3046F HEMOGLOBIN A1C LEVEL >9.0%: CPT | Performed by: FAMILY MEDICINE

## 2022-11-11 PROCEDURE — 2022F DILAT RTA XM EVC RTNOPTHY: CPT | Performed by: FAMILY MEDICINE

## 2022-11-11 PROCEDURE — 3078F DIAST BP <80 MM HG: CPT | Performed by: FAMILY MEDICINE

## 2022-11-11 PROCEDURE — G8427 DOCREV CUR MEDS BY ELIG CLIN: HCPCS | Performed by: FAMILY MEDICINE

## 2022-11-11 PROCEDURE — 99212 OFFICE O/P EST SF 10 MIN: CPT | Performed by: FAMILY MEDICINE

## 2022-11-11 PROCEDURE — 99213 OFFICE O/P EST LOW 20 MIN: CPT | Performed by: FAMILY MEDICINE

## 2022-11-11 PROCEDURE — 3074F SYST BP LT 130 MM HG: CPT | Performed by: FAMILY MEDICINE

## 2022-11-11 RX ORDER — ATORVASTATIN CALCIUM 40 MG/1
40 TABLET, FILM COATED ORAL DAILY
Qty: 30 TABLET | Refills: 0 | Status: SHIPPED | OUTPATIENT
Start: 2022-11-11

## 2022-11-11 RX ORDER — IBUPROFEN 800 MG/1
1 TABLET ORAL EVERY 8 HOURS PRN
COMMUNITY
Start: 2022-11-04

## 2022-11-11 RX ORDER — INSULIN GLARGINE 100 [IU]/ML
INJECTION, SOLUTION SUBCUTANEOUS
Qty: 7 ADJUSTABLE DOSE PRE-FILLED PEN SYRINGE | Refills: 2 | Status: SHIPPED | OUTPATIENT
Start: 2022-11-11

## 2022-11-11 RX ORDER — TIZANIDINE 4 MG/1
4 TABLET ORAL 2 TIMES DAILY PRN
Qty: 10 TABLET | Refills: 0 | Status: SHIPPED
Start: 2022-11-11 | End: 2022-11-28 | Stop reason: SDUPTHER

## 2022-11-11 RX ORDER — ORPHENADRINE CITRATE 100 MG/1
100 TABLET, EXTENDED RELEASE ORAL 2 TIMES DAILY PRN
Qty: 20 TABLET | Refills: 0 | Status: SHIPPED
Start: 2022-11-11 | End: 2022-11-11

## 2022-11-11 RX ORDER — CYCLOBENZAPRINE HCL 10 MG
1 TABLET ORAL 2 TIMES DAILY PRN
COMMUNITY
Start: 2022-11-04 | End: 2022-11-11

## 2022-11-11 RX ORDER — FLASH GLUCOSE SCANNING READER
EACH MISCELLANEOUS
Qty: 1 EACH | Refills: 0 | Status: SHIPPED | OUTPATIENT
Start: 2022-11-11

## 2022-11-11 RX ORDER — ATORVASTATIN CALCIUM 40 MG/1
40 TABLET, FILM COATED ORAL DAILY
Qty: 90 TABLET | OUTPATIENT
Start: 2022-11-11

## 2022-11-11 RX ORDER — FLASH GLUCOSE SENSOR
KIT MISCELLANEOUS
Qty: 1 EACH | Refills: 11 | Status: SHIPPED | OUTPATIENT
Start: 2022-11-11

## 2022-11-11 NOTE — PROGRESS NOTES
[facility]  Department of Family Medicine  Family Medicine Residency Program    Date of Visit: 11/11/2022     Chief Complaint     Chief Complaint   Patient presents with    ED Follow-up     Back pain         History of Present Illness     HPI:  58 y.o. male presents with pmh of HTN, HLD, CHF, sleep apnea and neuropathy who presents for ER follow-up for left sided lower extremity numbness and tingling (Lumbar stenosis), and diabetic follow-up    ER follow up  Chart reviewed, patient with complaint of worsening low back pain over the past 2 months  Notes that he has been experiencing some increased weakness and worsening neuropathy with this pain  Patient has overall started ambulating with a cane, has not attempted PT or TIN in  Present to the ER on 11/4/2022 when he felt that he had a flare of worsening back pain  At the time he received an MRI lumbar spine which revealed moderate central canal stenosis from L1-L5, and multilevel foraminal stenosis most apparent at L4-L5 and L5-S1  He was treated with Toradol 10 mg 3 times daily, Norflex 100 mg twice daily as well as a Medrol pack    He was referred to neurosurgery and had to have his visit on 11/7/2022  Pending a lumbar flex/EX x-ray  Advised to undergo conservative management and was referred to pain management at this time  Patient did refuse PT OT  Advised to go back to neurosurgery once conservative measures were failing  Of note patient continues to smoke approximately 1 pack every 2 days.       Patient states he is here today because he is running out of Norflex and milligrams twice daily, and would like a refill to carry him over until his pain management visit    DM II, insulin dependant  A1C trend reviewed, now tracking up to 10.7 today  Last BG check per patient was approximately 2 days ago, at the time level was 400  Patient states he stopped insulin a while ago, at least for the past month though cannot recall if its been longer than that  Patient states he was also getting atorvastatin from the [facility] but admits to self discontinued the medication because of how many medications he was on. Notes that the discontinuation time could be anywhere between 1 to 3 years. Current medication regimen with diabetes includes Lantus 42 units nightly. Refuses to take metformin due to the side effects he is heard about on the Internet   Is interested in CGM today  Denies any polyuria, polydipsia, polyphagia aside from secondary to diuretics  Diabetic foot exam completed yesterday at [facility] , worsening neuropathy in left lower extremity > right    HM  Patient states he had some acute circumstances at the time his colonoscopy was due, will be rescheduling soon    Social History     Tobacco Use    Smoking status: Every Day     Packs/day: 0.50     Years: 31.00     Pack years: 15.50     Types: Cigarettes    Smokeless tobacco: Never   Vaping Use    Vaping Use: Never used   Substance Use Topics    Alcohol use: Yes     Comment: pint every 2 days    Drug use: Yes     Frequency: 7.0 times per week     Types: Marijuana (Weed)     Comment: daily use       ROS:    Reviewed, pertinent as above otherwise negative    Objective:    VS:  Blood pressure 118/76, pulse 82, temperature 98 °F (36.7 °C), temperature source Temporal, height 5' 8\" (1.727 m), weight 260 lb (117.9 kg), SpO2 98 %. Physical Exam  Constitutional:       Appearance: He is obese. He is not ill-appearing. Cardiovascular:      Rate and Rhythm: Normal rate and regular rhythm. Pulses: Normal pulses. Heart sounds: Normal heart sounds. No murmur heard. No gallop. Pulmonary:      Effort: Pulmonary effort is normal. No respiratory distress. Breath sounds: Normal breath sounds. No wheezing. Abdominal:      General: Bowel sounds are normal. There is no distension. Palpations: Abdomen is soft. Musculoskeletal:         General: Normal range of motion.       Right lower leg: Edema present. Left lower leg: Edema present. Comments: Mild TTP over lower lumbar spine  1+ bilateral lower extremity edema  Patient refusing further back examination or MSK exam secondary to pain   Neurological:      Mental Status: He is alert. Assessment/Plan:    1. Lumbar stenosis with neurogenic claudication  Amenable okay to refill Norflex at this time  Patient resistant to any other changes  Advise reaching out to and following with pain management  Patient continues to refuse PT OT during this visit  Advise increase stretching at home if possible  - orphenadrine (NORFLEX) 100 MG extended release tablet; Take 1 tablet by mouth 2 times daily as needed for Muscle spasms  Dispense: 20 tablet; Refill: 0    2. Type 2 diabetes mellitus with hyperosmolarity without coma, with long-term current use of insulin ([facility] [address]  Patient with elevated A1c of 10.7 during today's visit  Patient is resistant to change with more IM medications  At this time we will continue 42 units of Lantus nightly. Patient was recently on a Medrol pack, blood sugar should improve over the next few days as he is finishing his course  Readd atorvastatin 40 mg daily  Patient due for labs with poor sugar control, will obtain BMP, lipid panel and microalbumin creatinine ratio today  Start Jardiance 10 mg daily  Monitor hydration status,   - insulin glargine (LANTUS SOLOSTAR) 100 UNIT/ML injection pen; ADMINISTER 42 UNITS UNDER THE SKIN EVERY MORNING  Dispense: 7 Adjustable Dose Pre-filled Pen Syringe; Refill: 2  - POCT glycosylated hemoglobin (Hb A1C)  - atorvastatin (LIPITOR) 40 MG tablet; Take 1 tablet by mouth daily  Dispense: 30 tablet; Refill: 0  - Basic Metabolic Panel; Future  - LIPID PANEL; Future  - empagliflozin (JARDIANCE) 10 MG tablet; Take 1 tablet by mouth daily  Dispense: 90 tablet; Refill: 1  - MICROALBUMIN / CREATININE URINE RATIO; Future     RTO 1 month or sooner prn for any persistent, new, or worsening symptoms.       Please see Patient Instructions for further counseling and information given. Advised to please be adherent to the treatment plans discussed today, and please call with any questions or concerns, letting the office know of any reasons that the plans may not be followed. The risks of untreated conditions include worsening illness, injury, disability, and possibly, death. Please call if symptoms change in any way, worsen, or fail to completely resolve, as this could necessitate a change to treatment plans. Patient and/or caregiver expressed understanding. Indications and proper use of medication(s) reviewed. Potential side-effects and risks of medication(s) also explained. Patient and/or caregiver was instructed to call if any new symptoms develop prior to next visit. Health risk factors discussed and addressed.      Electronically signed by Rodney Plummer MD PGY-3 on 11/11/2022 at 10:47 AM  This case was discussed with attending physician: Dr. Wing Lester

## 2022-11-11 NOTE — TELEPHONE ENCOUNTER
Last Appointment:  11/11/2022  Future Appointments  1/24/2023  9:00 AM    Anastacio Dawkins [facility]

## 2022-11-11 NOTE — PROGRESS NOTES
S: 58 y.o. male with HFpEF, HTN, lumbar stenosis with neurogenic claudication, DM-2 on insulin. Recent ED visit for back pain, worsening past 2 months, down LLE, ambulate with a cane. ED on 11/4. MRI with spinal stenosis L1-L5, foraminal stenosis as well. Referred to NS. Saw NS. Considered PT, he refused, referred to pain specialist in [facility]. Given Toradol, Norflex, Medrol. No B/B changes. Current smoker, 0.5 ppd. No blood thinners. [facility]. DM, A1C 9 previously, 10.7 today. Not taking insulin. Refuses metformin. Lantus 42 units nightly, but not in the past month. Last glucose 400, so took a dose of Lantus yesterday. Previously on atorvastatin, but stopped it, maybe a year ago. Interested in CGM. No polyuria, polydipsia, or polyphagia. Had foot exam yesterday; declines today. Neuropathy left more than right. Colonoscopy previously ordered; he canceled and did not yet reschedule. O: VS: /76   Pulse 82   Temp 98 °F (36.7 °C) (Temporal)   Ht 5' 8\" (1.727 m)   Wt 260 lb (117.9 kg)   SpO2 98%   BMI 39.53 kg/m²    General: NAD, appropriate affect and grooming   CV:  RRR, no gallops, rubs, or murmurs   Resp: CTAB   Abd:  Soft, nontender   Ext:  1+ LE edema   Back: Tenderness lower lumbar spine   Patient refused MSK and neuro exam.    Impression: DM-2, HTN, HFpEF. Plan: Resuming Lantus. Order CGM. Encourage adherence. Labs today, consider add Jardiance or GLP-1, watch hydration status and electrolytes; need to discuss R/B/A and CI/SEs. SW and care coordination. Follow with specialists. Close follow up. RTO 1 month or sooner prn. Attending Physician Statement  I have discussed the case, including pertinent history and exam findings with the resident. I agree with the documented assessment and plan.

## 2022-11-28 DIAGNOSIS — M48.062 LUMBAR STENOSIS WITH NEUROGENIC CLAUDICATION: ICD-10-CM

## 2022-11-28 RX ORDER — TIZANIDINE 4 MG/1
4 TABLET ORAL 2 TIMES DAILY PRN
Qty: 10 TABLET | Refills: 0 | Status: SHIPPED | OUTPATIENT
Start: 2022-11-28

## 2022-12-01 ENCOUNTER — OFFICE VISIT (OUTPATIENT)
Dept: PAIN MANAGEMENT | Age: 62
End: 2022-12-01
Payer: MEDICARE

## 2022-12-01 VITALS
HEART RATE: 82 BPM | WEIGHT: 259 LBS | OXYGEN SATURATION: 95 % | DIASTOLIC BLOOD PRESSURE: 70 MMHG | SYSTOLIC BLOOD PRESSURE: 103 MMHG | RESPIRATION RATE: 16 BRPM | HEIGHT: 68 IN | BODY MASS INDEX: 39.25 KG/M2 | TEMPERATURE: 97.2 F

## 2022-12-01 DIAGNOSIS — G89.29 CHRONIC LEFT-SIDED LOW BACK PAIN WITH LEFT-SIDED SCIATICA: ICD-10-CM

## 2022-12-01 DIAGNOSIS — G89.4 CHRONIC PAIN SYNDROME: Primary | ICD-10-CM

## 2022-12-01 DIAGNOSIS — M54.16 LUMBAR RADICULOPATHY: ICD-10-CM

## 2022-12-01 DIAGNOSIS — E11.42 DIABETIC POLYNEUROPATHY ASSOCIATED WITH TYPE 2 DIABETES MELLITUS (HCC): ICD-10-CM

## 2022-12-01 DIAGNOSIS — M48.062 SPINAL STENOSIS OF LUMBAR REGION WITH NEUROGENIC CLAUDICATION: ICD-10-CM

## 2022-12-01 DIAGNOSIS — M54.42 CHRONIC LEFT-SIDED LOW BACK PAIN WITH LEFT-SIDED SCIATICA: ICD-10-CM

## 2022-12-01 DIAGNOSIS — M51.9 LUMBAR DISC DISORDER: ICD-10-CM

## 2022-12-01 PROCEDURE — 99204 OFFICE O/P NEW MOD 45 MIN: CPT

## 2022-12-01 PROCEDURE — 3017F COLORECTAL CA SCREEN DOC REV: CPT | Performed by: PAIN MEDICINE

## 2022-12-01 PROCEDURE — G8417 CALC BMI ABV UP PARAM F/U: HCPCS | Performed by: PAIN MEDICINE

## 2022-12-01 PROCEDURE — 3074F SYST BP LT 130 MM HG: CPT | Performed by: PAIN MEDICINE

## 2022-12-01 PROCEDURE — 2022F DILAT RTA XM EVC RTNOPTHY: CPT | Performed by: PAIN MEDICINE

## 2022-12-01 PROCEDURE — 99204 OFFICE O/P NEW MOD 45 MIN: CPT | Performed by: PAIN MEDICINE

## 2022-12-01 PROCEDURE — 3078F DIAST BP <80 MM HG: CPT | Performed by: PAIN MEDICINE

## 2022-12-01 PROCEDURE — G8427 DOCREV CUR MEDS BY ELIG CLIN: HCPCS | Performed by: PAIN MEDICINE

## 2022-12-01 PROCEDURE — 3046F HEMOGLOBIN A1C LEVEL >9.0%: CPT | Performed by: PAIN MEDICINE

## 2022-12-01 PROCEDURE — G8484 FLU IMMUNIZE NO ADMIN: HCPCS | Performed by: PAIN MEDICINE

## 2022-12-01 PROCEDURE — 4004F PT TOBACCO SCREEN RCVD TLK: CPT | Performed by: PAIN MEDICINE

## 2022-12-01 NOTE — PROGRESS NOTES
[facility]        [facility] [address]  [facility], [address] [facility]  Dept: [phone]        Patient:  Awais Jaramillo,  1960    Date of Service:  22     Requesting Physician:  Nerissa Schulz, [address]    Reason for Consult:      Patient presents with complaints of {body parts:386432} pain that started {Numbers; 0-20:12168} {DAYS:93294} ago    HISTORY OF PRESENT ILLNESS:      Pain is {Desc; intermittent/persistent/constant:65285} and is described as {pain quality:884271}. Pain {DOES:} radiate to {neuro locations:06008}. He  {HAS/DOES NOT HAVE:13893} {numbness:25705} of the {neuro locations:87614} and {HAS/DOES NOT HAVE:16068} bladder or bowel dysfunction. Alleviating factors include: {alleviating factors:904935}. Aggravating factors include:  {CAUSES; AGGRAVATING FACTORS PAIN EXTREMITIES:97031}. He {HAS HAS WQV:02329} been on anticoagulation medications to include {anticoagulants:68325} and {HAS HAS NOT:96680} been on herbal supplements. He {IS:} diabetic. Imagin/2022 MRI lumbar -  FINDINGS:   BONES/ALIGNMENT: Mild grade 1 anterolisthesis is identified at L5-S1. The   vertebral body heights are maintained. The bone marrow signal appears   unremarkable. No acute fracture is identified. Congenital spinal canal stenosis is appreciated. SPINAL CORD: The conus terminates normally. SOFT TISSUES: No paraspinal mass identified. L1-L2: Broad disc osteophyte complex results in moderate central canal   stenosis. The pedicles are congenitally short. L2-L3: Moderate central canal stenosis again identified due to broad disc   osteophyte complex and congenitally short pedicles. Both foramina are mildly   stenotic. L3-L4: Moderate central canal stenosis appreciated due to broad disc   osteophyte complex and congenitally short pedicles. Both foramina are mildly   stenotic.        L4-L5: Moderate central canal stenosis identified due to broad disc   osteophyte complex facet arthropathy and ligament flavum hypertrophy. There   is mild epidural lipomatosis. Severe right foraminal stenosis and moderate left foraminal stenosis are   identified due to encroachment by disc bulge and facet disease. L5-S1: Broad disc osteophyte complex and facet disease results in severe left   foraminal stenosis and mild right foraminal stenosis. Impression   Grade 1 anterolisthesis at L5-S1. Mild congenital spinal canal stenosis. Moderate central canal stenosis from L1 through L5 of multifactorial etiology. Multilevel foraminal stenosis most apparent at L4-L5 and L5-S1. Xray pelvis 11/2022 -  FINDINGS:   No significant narrowing or widening of the right hip joint space. Unremarkable alignment, no evidence of dislocation. Mild degenerative changes visualized. No evidence of cortical irregularity or lucency to suggest a fracture, no   evidence of lytic or sclerotic bone lesion is seen. No abnormal density in the soft tissues. Vascular calcifications are seen. Impression   Degenerative changes, no evidence of acute osseous abnormality is seen. Previous treatments: {TREATMENTS:789725817::\"medications. \"}. 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 .. ileocecectomy @[facility] by Dr. Kandice Smith.  Currently receiving oral chemo    COLONOSCOPY  09/14/2018    polyp--eliane    MOUTH SURGERY N/A 3/21/2022    EXCISION TONGUE LESION performed by Ashwin Clemons DO at [facility] COLONOSCOPY W/BIOPSY SINGLE/MULTIPLE N/A 9/14/2018    COLONOSCOPY WITH BIOPSY performed by Naina Kurtz MD at [address] FLX W/RMVL OF TUMOR POLYP LESION SNARE TQ  9/14/2018    COLONOSCOPY POLYPECTOMY SNARE/COLD BIOPSY performed by Naina Kurtz MD at [address] NECK/THORAX  5/26/2021    US BIOPSY SOFT TISSUE NECK/THORAX 5/26/2021 [facility]       Prior to Admission medications    Medication Sig Start Date End Date Taking?  Authorizing Provider   tiZANidine (ZANAFLEX) 4 MG tablet Take 1 tablet by mouth 2 times daily as needed (muscle spasm) 11/28/22  Yes Dave Gomez MD   insulin glargine (LANTUS SOLOSTAR) 100 UNIT/ML injection pen ADMINISTER 42 UNITS UNDER THE SKIN EVERY MORNING 11/11/22  Yes Dave Gomez MD   ibuprofen (ADVIL;MOTRIN) 800 MG tablet Take 1 tablet by mouth every 8 hours as needed 11/4/22  Yes Historical Provider, MD   atorvastatin (LIPITOR) 40 MG tablet Take 1 tablet by mouth daily 11/11/22  Yes Dave oGmez MD   empagliflozin (JARDIANCE) 10 MG tablet Take 1 tablet by mouth daily 11/11/22  Yes Dave Gomez MD   Continuous Blood Gluc  (FREESTYLE DEIDRE 2 READER) CARMELA Use to monitor sugars 11/11/22  Yes Dave Gomez MD   Continuous Blood Gluc Sensor (FREESTYLE DEIDRE 2 SENSOR) [facility] [place] replace sensor every 14 days (prescription should cover one month of sensors with 11 months of refills) 11/11/22  Yes Dave Gomze MD   eplerenone (INSPRA) 50 MG tablet TAKE 1 TABLET BY MOUTH DAILY 10/20/22  Yes Charo Conway MD   potassium chloride (KLOR-CON M) 20 MEQ extended release tablet TAKE 1 TABLET BY MOUTH TWICE DAILY 10/20/22  Yes Charo Conway MD   bumetanide (BUMEX) 1 MG tablet TAKE 1 TABLET BY MOUTH TWICE DAILY 9/26/22  Yes Charo Conway MD losartan (COZAAR) 50 MG tablet TAKE 1 TABLET BY MOUTH DAILY 9/26/22  Yes Anneliese Nguyen MD   amLODIPine (NORVASC) 10 MG tablet TAKE 1 TABLET BY MOUTH DAILY 9/20/22  Yes Faviola White MD   colchicine (COLCRYS) 0.6 MG tablet SEE ATTACHED DIRECTIONS 8/19/22  Yes Mckayla Chu MD   Capsaicin (ZOSTRIX) 0.035 % CREA cream Apply topically 3 times daily 7/15/22  Yes Damian Guillaume MD   metoprolol succinate (TOPROL XL) 50 MG extended release tablet Take 1 tablet by mouth daily 7/5/22  Yes Faviola White MD   diclofenac sodium (VOLTAREN) 1 % GEL Apply 2 g topically 4 times daily 2/1/22  Yes Mckayla Chu MD   metOLazone (ZAROXOLYN) 2.5 MG tablet Take 1 tablet by mouth every other day 12/13/21  Yes Mckayla Chu MD   docusate sodium (COLACE) 100 MG capsule Take 1 capsule by mouth 2 times daily 10/8/21  Yes Mckayla Chu MD   gabapentin (NEURONTIN) 800 MG tablet Take 800 mg by mouth 3 times daily. Yes Historical Provider, MD   ammonium lactate (AMLACTIN) 12 % cream APPLY A SUFFICIENT AMOUNT EXTERNALLY EVERY DAY FOR DRY SKIN ON FEET AND LEGS **DO NOT APPLY BETWEEN TOES** 3/8/21  Yes Historical Provider, MD   magnesium oxide (MAG-OX) 400 (240 Mg) MG tablet Take 1 tablet by mouth daily 2/8/21  Yes Chelsea Can MD   Cholecalciferol (VITAMIN D3) 2000 units CAPS Take 1 capsule by mouth daily   Yes Historical Provider, MD   traMADol (ULTRAM) 50 MG tablet Take 50 mg by mouth every 6 hours as needed for Pain. .   Yes Historical Provider, MD   sildenafil (VIAGRA) 100 MG tablet Take 100 mg by mouth as needed. Take an hour before sex. Med info obtained from [place] records. Yes Historical Provider, MD       Allergies   Allergen Reactions    Lasix [Furosemide] Other (See Comments)     Per pt potassium was too low when on lasix    Lisinopril Swelling     Angioedema    Prunus Persica Anaphylaxis     Per report to [facility] manager, Babita Carcamo.   [address], BS-NDTR    Atenolol Other (See Comments) and Swelling     Thins his blood    Nitrates, Organic Other (See Comments)     Is using Sildenafil. containdicated with Nitrates    Spironolactone      Swelling in the chest area       Social History     Socioeconomic History    Marital status:      Spouse name: Not on file    Number of children: Not on file    Years of education: Not on file    Highest education level: Not on file   Occupational History    Not on file   Tobacco Use    Smoking status: Every Day     Packs/day: 0.50     Years: 31.00     Pack years: 15.50     Types: Cigarettes    Smokeless tobacco: Never   Vaping Use    Vaping Use: Never used   Substance and Sexual Activity    Alcohol use: Yes     Alcohol/week: 2.0 standard drinks     Types: 2 Shots of liquor per week    Drug use: Yes     Frequency: 7.0 times per week     Types: Marijuana [place])     Comment: last used 12/1/2022    Sexual activity: Not Currently   Other Topics Concern    Not on file   Social History Narrative    Drinks 1 cup of decaf coffee/tea daily. Social Determinants of Health     Financial Resource Strain: Medium Risk    Difficulty of Paying Living Expenses: Somewhat hard   Food Insecurity: No Food Insecurity    Worried About Running Out of Food in the Last Year: Never true    Ran Out of Food in the Last Year: Never true   Transportation Needs: No Transportation Needs    Lack of Transportation (Medical): No    Lack of Transportation (Non-Medical):  No   Physical Activity: Not on file   Stress: Not on file   Social Connections: Socially Isolated    Frequency of Communication with Friends and Family: More than three times a week    Frequency of Social Gatherings with Friends and Family: Once a week    Attends Nondenominational Services: Never    Active Member of Clubs or Organizations: No    Attends Club or Organization Meetings: Never    Marital Status:    Intimate Partner Violence: Not on file   Housing Stability: Not on file       Family History   Problem Relation Age of Onset    Other Mother         Nicko Heart Failure Father     Other Sister         covid       REVIEW OF SYSTEMS:     Patient specifically denies fever/chills, chest pain, shortness of breath, new bowel or bladder complaints. All other review of systems was negative. PHYSICAL EXAMINATION:      /70   Pulse 82   Temp 97.2 °F (36.2 °C) (Infrared)   Resp 16   Ht 5' 8\" (1.727 m)   Wt 259 lb (117.5 kg)   SpO2 95%   BMI 39.38 kg/m²     General:      General appearance:pleasant and well-hydrated, in no distress and A & O x3  Build:{Patient Status:8965180627::\"Morbidly obese\"}  Function:{Blank single:31952::\"Rises from a seated position easily. \",\"Rises from a seated position with difficulty\",\"Unable to rise from a seated position without assistance. \",\"Moves about room without difficulty. \"}    HEENT:    Head:normocephalic, atraumatic  Pupils:regular, round, equal  Sclera: icterus absent    Lungs:    Breathing:normal breathing pattern    Abdomen:    Shape:{ABDOMINAL SHAPE CHARACTERISTICS:06965::\"non-distended\",\"normal\"}  Tenderness:none  Guarding:none    Lumbar spine:    Spine inspection:normal   CVA tenderness:No   Palpation:tenderness paravertebral muscles, left, right and positive. Range of motion:abnormal mildly in lateral bending, flexion, extension rotation bilateral and is painful.     Musculoskeletal:    Trigger points in Paraveteral:absent bilaterally  SI joint tenderness:{gen positive/negative:542483} right, {gen positive/negative:904201} left              KALEB test:{gen positive/negative:523819} right, {gen positive/negative:216370}             left  Piriformis tenderness:{gen positive/negative:544540} right, {gen positive/negative:676902} left  Trochanteric bursa tenderness:{gen positive/negative:712784} right, {gen positive/negative:639465} left  SLR:{gen positive/negative:869871} right, {gen positive/negative:183737} left, sitting     Extremities:    Tremors:None bilaterally upper and lower  Range of motion:pain with internal rotation of hips negative. Intact:Yes  Varicose veins:absent   Pulses:present Lt radial  Cyanosis:none  Edema:none x all 4 extremities    Neurological:    Sensory:normal to light touch ***  Motor:                Right Quadriceps5/5          Left Quadriceps5/5           Right Gastrocnemius5/5    Left Gastrocnemius5/5  Right Ant Tibialis5/5  Left Ant Tibialis5/5    Reflexes:    Right Quadriceps reflex2+  Left Quadriceps reflex2+  Right Achilles reflex2+  Left Achilles reflex2+  Gait:normal    Dermatology:    Skin:no rashes or lesions noted    Impression:    ***      Plan:      Reviewed referral documents and imaging  Urine screen today  [facility] report reviewed  Patient encouraged to stay active and to lose weight  Treatment plan discussed with the patient including medication and procedure side effects       CC:  Referring physician    ANIL Oliva.

## 2022-12-01 NOTE — PROGRESS NOTES
[facility]        [address]  [facility], [address] [place] [address]  Dept: [phone]        Patient:  Daniel Salguero,  1960    Date of Service:  22     Requesting Physician:  Moni Medina    Reason for Consult:      Patient presents with complaints of left, lower back pain that started 2 years ago although he's had back problems for \"years\"    HISTORY OF PRESENT ILLNESS:      Pain is constant and is described as aching, stabbing, and throbbing. Pain does radiate to left leg. He  has numbness, tingling of the BL feet, weakness of the left leg and does not have bladder or bowel dysfunction. Alleviating factors include: nothing. Aggravating factors include:  walking, standing. He has been on anticoagulation medications to include NSAIDS and has not been on herbal supplements. He is diabetic. Imagin/2022 MRI lumbar -  FINDINGS:   BONES/ALIGNMENT: Mild grade 1 anterolisthesis is identified at L5-S1. The   vertebral body heights are maintained. The bone marrow signal appears   unremarkable. No acute fracture is identified. Congenital spinal canal stenosis is appreciated. SPINAL CORD: The conus terminates normally. SOFT TISSUES: No paraspinal mass identified. L1-L2: Broad disc osteophyte complex results in moderate central canal   stenosis. The pedicles are congenitally short. L2-L3: Moderate central canal stenosis again identified due to broad disc   osteophyte complex and congenitally short pedicles. Both foramina are mildly   stenotic. L3-L4: Moderate central canal stenosis appreciated due to broad disc   osteophyte complex and congenitally short pedicles. Both foramina are mildly   stenotic. L4-L5: Moderate central canal stenosis identified due to broad disc   osteophyte complex facet arthropathy and ligament flavum hypertrophy. There   is mild epidural lipomatosis.        Severe right foraminal stenosis and moderate left foraminal stenosis are   identified due to encroachment by disc bulge and facet disease. L5-S1: Broad disc osteophyte complex and facet disease results in severe left   foraminal stenosis and mild right foraminal stenosis. Impression   Grade 1 anterolisthesis at L5-S1. Mild congenital spinal canal stenosis. Moderate central canal stenosis from L1 through L5 of multifactorial etiology. Multilevel foraminal stenosis most apparent at L4-L5 and L5-S1. Xray pelvis 11/2022 -  FINDINGS:   No significant narrowing or widening of the right hip joint space. Unremarkable alignment, no evidence of dislocation. Mild degenerative changes visualized. No evidence of cortical irregularity or lucency to suggest a fracture, no   evidence of lytic or sclerotic bone lesion is seen. No abnormal density in the soft tissues. Vascular calcifications are seen. Impression   Degenerative changes, no evidence of acute osseous abnormality is seen. Previous treatments: medications. 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 .. ileocecectomy @[facility] by Dr. Jamel Ordoñez.  Currently receiving oral chemo    COLONOSCOPY  09/14/2018    polyp--eliane    MOUTH SURGERY N/A 3/21/2022    EXCISION TONGUE LESION performed by Tay Nesbitt DO at [place] [address] COLONOSCOPY W/BIOPSY SINGLE/MULTIPLE N/A 9/14/2018    COLONOSCOPY WITH BIOPSY performed by Carlos Simpson MD at [address] FLX W/RMVL OF TUMOR POLYP LESION SNARE TQ  9/14/2018    COLONOSCOPY POLYPECTOMY SNARE/COLD BIOPSY performed by Carlos Simpson MD at [address] NECK/THORAX  5/26/2021    US BIOPSY SOFT TISSUE NECK/THORAX 5/26/2021 [facility]       Prior to Admission medications    Medication Sig Start Date End Date Taking?  Authorizing Provider   tiZANidine (ZANAFLEX) 4 MG tablet Take 1 tablet by mouth 2 times daily as needed (muscle spasm) 11/28/22  Yes Yogi Borden MD   insulin glargine (LANTUS SOLOSTAR) 100 UNIT/ML injection pen ADMINISTER 42 UNITS UNDER THE SKIN EVERY MORNING 11/11/22  Yes Ygoi Borden MD   ibuprofen (ADVIL;MOTRIN) 800 MG tablet Take 1 tablet by mouth every 8 hours as needed 11/4/22  Yes Historical Provider, MD   atorvastatin (LIPITOR) 40 MG tablet Take 1 tablet by mouth daily 11/11/22  Yes Yogi Borden MD   empagliflozin (JARDIANCE) 10 MG tablet Take 1 tablet by mouth daily 11/11/22  Yes Yogi Borden MD   Continuous Blood Gluc  (FREESTYLE DEIDRE 2 READER) CARMELA Use to monitor sugars 11/11/22  Yes Yogi Borden MD   Continuous Blood Gluc Sensor (FREESTYLE DEIDRE 2 SENSOR) MISC Please replace sensor every 14 days (prescription should cover one month of sensors with 11 months of refills) 11/11/22  Yes Yogi Borden MD   eplerenone (INSPRA) 50 MG tablet TAKE 1 TABLET BY MOUTH DAILY 10/20/22  Yes Valeri Swain MD   potassium chloride (KLOR-CON M) 20 MEQ extended release tablet TAKE 1 TABLET BY MOUTH TWICE DAILY 10/20/22  Yes Valeri Swain MD   bumetanide (BUMEX) 1 MG tablet TAKE 1 TABLET BY MOUTH TWICE DAILY 9/26/22  Yes Valeri Swain MD   losartan (COZAAR) 50 MG tablet TAKE 1 TABLET BY MOUTH DAILY 9/26/22  Yes Valeri Swain MD   amLODIPine (NORVASC) 10 MG tablet TAKE 1 TABLET BY MOUTH DAILY 9/20/22  Yes Fredy Mixon MD colchicine (COLCRYS) 0.6 MG tablet SEE ATTACHED DIRECTIONS 8/19/22  Yes Theresa Velasquez MD   Capsaicin (ZOSTRIX) 0.035 % CREA cream Apply topically 3 times daily 7/15/22  Yes David Robertson MD   metoprolol succinate (TOPROL XL) 50 MG extended release tablet Take 1 tablet by mouth daily 7/5/22  Yes Lela Marcial MD   diclofenac sodium (VOLTAREN) 1 % GEL Apply 2 g topically 4 times daily 2/1/22  Yes Theresa Velasquez MD   metOLazone (ZAROXOLYN) 2.5 MG tablet Take 1 tablet by mouth every other day 12/13/21  Yes Theresa Velasquez MD   docusate sodium (COLACE) 100 MG capsule Take 1 capsule by mouth 2 times daily 10/8/21  Yes Theresa Velasquez MD   gabapentin (NEURONTIN) 800 MG tablet Take 800 mg by mouth 3 times daily. Yes Historical Provider, MD   ammonium lactate (AMLACTIN) 12 % cream APPLY A SUFFICIENT AMOUNT EXTERNALLY EVERY DAY FOR DRY SKIN ON FEET AND LEGS **DO NOT APPLY BETWEEN TOES** 3/8/21  Yes Historical Provider, MD   magnesium oxide (MAG-OX) 400 (240 Mg) MG tablet Take 1 tablet by mouth daily 2/8/21  Yes Noah Blank MD   Cholecalciferol (VITAMIN D3) 2000 units CAPS Take 1 capsule by mouth daily   Yes Historical Provider, MD   traMADol (ULTRAM) 50 MG tablet Take 50 mg by mouth every 6 hours as needed for Pain. .   Yes Historical Provider, MD   sildenafil (VIAGRA) 100 MG tablet Take 100 mg by mouth as needed. Take an hour before sex. Med info obtained from [place] records. Yes Historical Provider, MD       Allergies   Allergen Reactions    Lasix [Furosemide] Other (See Comments)     Per pt potassium was too low when on lasix    Lisinopril Swelling     Angioedema    Prunus Persica Anaphylaxis     Per report to [facility] manager, Dona [address], BS-NDTR    Atenolol Other (See Comments) and Swelling     Thins his blood    Nitrates, Organic Other (See Comments)     Is using Sildenafil.   containdicated with Nitrates    Spironolactone      Swelling in the chest area       Social History     Socioeconomic History Marital status:      Spouse name: Not on file    Number of children: Not on file    Years of education: Not on file    Highest education level: Not on file   Occupational History    Not on file   Tobacco Use    Smoking status: Every Day     Packs/day: 0.50     Years: 31.00     Pack years: 15.50     Types: Cigarettes    Smokeless tobacco: Never   Vaping Use    Vaping Use: Never used   Substance and Sexual Activity    Alcohol use: Yes     Alcohol/week: 2.0 standard drinks     Types: 2 Shots of liquor per week    Drug use: Yes     Frequency: 7.0 times per week     Types: Marijuana Mable Cotton)     Comment: last used 12/1/2022    Sexual activity: Not Currently   Other Topics Concern    Not on file   Social History Narrative    Drinks 1 cup of decaf coffee/tea daily. Social Determinants of Health     Financial Resource Strain: Medium Risk    Difficulty of Paying Living Expenses: Somewhat hard   Food Insecurity: No Food Insecurity    Worried About Running Out of Food in the Last Year: Never true    Ran Out of Food in the Last Year: Never true   Transportation Needs: No Transportation Needs    Lack of Transportation (Medical): No    Lack of Transportation (Non-Medical):  No   Physical Activity: Not on file   Stress: Not on file   Social Connections: Socially Isolated    Frequency of Communication with Friends and Family: More than three times a week    Frequency of Social Gatherings with Friends and Family: Once a week    Attends Uatsdin Services: Never    Active Member of Clubs or Organizations: No    Attends Club or Organization Meetings: Never    Marital Status:    Intimate Partner Violence: Not on file   Housing Stability: Not on file       Family History   Problem Relation Age of Onset    Other Mother         COVID    Heart Failure Father     Other Sister         covid       REVIEW OF SYSTEMS:     Patient specifically denies fever/chills, chest pain, shortness of breath, new bowel or bladder complaints. All other review of systems was negative. PHYSICAL EXAMINATION:      /70   Pulse 82   Temp 97.2 °F (36.2 °C) (Infrared)   Resp 16   Ht 5' 8\" (1.727 m)   Wt 259 lb (117.5 kg)   SpO2 95%   BMI 39.38 kg/m²     General:      General appearance:pleasant and well-hydrated, in no distress and A & O x3  Build:Obese  Function:Rises from a seated position with difficulty    HEENT:    Head:normocephalic, atraumatic  Pupils:regular, round, equal  Sclera: icterus absent    Lungs:    Breathing:normal breathing pattern    Abdomen:    Shape:non-distended  Tenderness:none  Guarding:none    Lumbar spine:    Spine inspection:normal   CVA tenderness:No   Palpation:tenderness paravertebral muscles, left, right negative. Range of motion:abnormal mildly in lateral bending, flexion, extension rotation bilateral and is painful. Musculoskeletal:    Trigger points in Paraveteral:absent bilaterally  SI joint tenderness:negative right, positive left              AKLEB test:not done right, not done left  Piriformis tenderness:negative right, positive left  Trochanteric bursa tenderness:negative right, negative left  SLR:negative right, positive left, sitting     Extremities:    Tremors:None bilaterally upper and lower  Range of motion:pain with internal rotation of hips negative.   Intact:Yes  Varicose veins:absent   Pulses:present Lt radial  Cyanosis:none  Edema:none x all 4 extremities    Neurological:    Sensory:decreased to light touch BLE in stocking distribution  Motor:                Right Quadriceps5/5          Left Quadriceps4/5           Right Gastrocnemius5/5    Left Gastrocnemius4/5  Right Ant Tibialis5/5  Left Ant Tibialis4/5    Reflexes:    Right Quadriceps reflex0-1+  Left Quadriceps reflex0-1+  Right Achilles reflex0-1+  Left Achilles reflex0-1+  Gait:antalgic station, with a cane    Dermatology:    Skin:no rashes or lesions noted    Impression:    LBP LLE pain with diffuse LLE weakness due to spinal stenosis  B/l foot pain, numbness, tingling due to DM neuropathy  2022 lumbar MRI as above  PMHx: GIST tumor s/p resection, ACEI angioedema, CHF, CKD, DM, gout, DLD, HTN, hypokalemia, obesity, MARIA (not compliant with cpap)    Plan:    Reviewed referral documents and imaging  Urine screen deferred  OARRS report reviewed  Medication mgmt per DPM  Offered left L4 and L5 TFESI - pt declined  PT - pt just started, encouraged to continue  Encouraged in smoking cessation  Patient encouraged to stay active and to lose weight  Treatment plan discussed with the patient including medication and procedure side effects       CC:  Referring physician    ANIL Hood.

## 2022-12-01 NOTE — PROGRESS NOTES
Do you currently have any of the following:    Fever: No  Headache:  No  Cough: No  Shortness of breath: No  Exposed to anyone with these symptoms: No         Aubery Ours Karina Nadia Lucero. presents to the [facility] on 12/1/2022. Taryn Ronquillo is complaining of pain in his lower back and down the legs. The pain is constant. The pain is described as stabbing and tingling. Pain is rated on his best day at a 9, on his worst day at a 10, and on average at a 9 on the VAS scale. He took his last dose of Tramadol 2 weeks ago. Pacemaker or defibrillator: No managed by n/a. He is  on NSAIDS and is not on anticoagulation medications. /70   Pulse 82   Temp 97.2 °F (36.2 °C) (Infrared)   Resp 16   Ht 5' 8\" (1.727 m)   Wt 259 lb (117.5 kg)   SpO2 95%   BMI 39.38 kg/m²      No LMP for male patient.

## 2022-12-09 ENCOUNTER — HOSPITAL ENCOUNTER (EMERGENCY)
Age: 62
Discharge: HOME | End: 2022-12-09
Attending: EMERGENCY MEDICINE
Payer: MEDICARE

## 2022-12-09 ENCOUNTER — APPOINTMENT (OUTPATIENT)
Dept: ULTRASOUND IMAGING | Age: 62
End: 2022-12-09
Payer: MEDICARE

## 2022-12-09 ENCOUNTER — APPOINTMENT (OUTPATIENT)
Dept: GENERAL RADIOLOGY | Age: 62
End: 2022-12-09
Payer: MEDICARE

## 2022-12-09 ENCOUNTER — TELEPHONE (OUTPATIENT)
Dept: ENT CLINIC | Age: 62
End: 2022-12-09

## 2022-12-09 VITALS
OXYGEN SATURATION: 100 % | SYSTOLIC BLOOD PRESSURE: 160 MMHG | HEART RATE: 97 BPM | HEIGHT: 68 IN | WEIGHT: 260 LBS | BODY MASS INDEX: 39.4 KG/M2 | DIASTOLIC BLOOD PRESSURE: 80 MMHG | RESPIRATION RATE: 16 BRPM | TEMPERATURE: 97.2 F

## 2022-12-09 DIAGNOSIS — M79.89 LEG SWELLING: Primary | ICD-10-CM

## 2022-12-09 DIAGNOSIS — M79.672 PAIN IN BOTH FEET: ICD-10-CM

## 2022-12-09 DIAGNOSIS — M79.671 PAIN IN BOTH FEET: ICD-10-CM

## 2022-12-09 LAB
ANION GAP SERPL CALCULATED.3IONS-SCNC: 10 MMOL/L (ref 7–16)
BASOPHILS ABSOLUTE: 0.05 E9/L (ref 0–0.2)
BASOPHILS RELATIVE PERCENT: 0.5 % (ref 0–2)
BUN BLDV-MCNC: 22 MG/DL (ref 6–23)
CALCIUM SERPL-MCNC: 9.7 MG/DL (ref 8.6–10.2)
CHLORIDE BLD-SCNC: 94 MMOL/L (ref 98–107)
CO2: 30 MMOL/L (ref 22–29)
CREAT SERPL-MCNC: 0.8 MG/DL (ref 0.7–1.2)
EKG ATRIAL RATE: 77 BPM
EKG P AXIS: 48 DEGREES
EKG P-R INTERVAL: 166 MS
EKG Q-T INTERVAL: 390 MS
EKG QRS DURATION: 104 MS
EKG QTC CALCULATION (BAZETT): 441 MS
EKG R AXIS: -17 DEGREES
EKG T AXIS: 17 DEGREES
EKG VENTRICULAR RATE: 77 BPM
EOSINOPHILS ABSOLUTE: 0.17 E9/L (ref 0.05–0.5)
EOSINOPHILS RELATIVE PERCENT: 1.7 % (ref 0–6)
GFR SERPL CREATININE-BSD FRML MDRD: >60 ML/MIN/1.73
GLUCOSE BLD-MCNC: 286 MG/DL (ref 74–99)
HCT VFR BLD CALC: 39.5 % (ref 37–54)
HEMOGLOBIN: 13.7 G/DL (ref 12.5–16.5)
IMMATURE GRANULOCYTES #: 0.06 E9/L
IMMATURE GRANULOCYTES %: 0.6 % (ref 0–5)
LYMPHOCYTES ABSOLUTE: 2.38 E9/L (ref 1.5–4)
LYMPHOCYTES RELATIVE PERCENT: 24.1 % (ref 20–42)
MCH RBC QN AUTO: 33.4 PG (ref 26–35)
MCHC RBC AUTO-ENTMCNC: 34.7 % (ref 32–34.5)
MCV RBC AUTO: 96.3 FL (ref 80–99.9)
MONOCYTES ABSOLUTE: 0.74 E9/L (ref 0.1–0.95)
MONOCYTES RELATIVE PERCENT: 7.5 % (ref 2–12)
NEUTROPHILS ABSOLUTE: 6.46 E9/L (ref 1.8–7.3)
NEUTROPHILS RELATIVE PERCENT: 65.6 % (ref 43–80)
PDW BLD-RTO: 13.2 FL (ref 11.5–15)
PLATELET # BLD: 334 E9/L (ref 130–450)
PMV BLD AUTO: 9.2 FL (ref 7–12)
POTASSIUM SERPL-SCNC: 3.6 MMOL/L (ref 3.5–5)
PRO-BNP: 101 PG/ML (ref 0–125)
RBC # BLD: 4.1 E12/L (ref 3.8–5.8)
SODIUM BLD-SCNC: 134 MMOL/L (ref 132–146)
TROPONIN, HIGH SENSITIVITY: 31 NG/L (ref 0–11)
TROPONIN, HIGH SENSITIVITY: 36 NG/L (ref 0–11)
URIC ACID, SERUM: 8.4 MG/DL (ref 3.4–7)
WBC # BLD: 9.9 E9/L (ref 4.5–11.5)

## 2022-12-09 PROCEDURE — 93970 EXTREMITY STUDY: CPT

## 2022-12-09 PROCEDURE — 6360000002 HC RX W HCPCS: Performed by: STUDENT IN AN ORGANIZED HEALTH CARE EDUCATION/TRAINING PROGRAM

## 2022-12-09 PROCEDURE — 93010 ELECTROCARDIOGRAM REPORT: CPT | Performed by: INTERNAL MEDICINE

## 2022-12-09 PROCEDURE — 73630 X-RAY EXAM OF FOOT: CPT

## 2022-12-09 PROCEDURE — 99285 EMERGENCY DEPT VISIT HI MDM: CPT

## 2022-12-09 PROCEDURE — 71046 X-RAY EXAM CHEST 2 VIEWS: CPT

## 2022-12-09 PROCEDURE — 84550 ASSAY OF BLOOD/URIC ACID: CPT

## 2022-12-09 PROCEDURE — 83880 ASSAY OF NATRIURETIC PEPTIDE: CPT

## 2022-12-09 PROCEDURE — 85025 COMPLETE CBC W/AUTO DIFF WBC: CPT

## 2022-12-09 PROCEDURE — 96374 THER/PROPH/DIAG INJ IV PUSH: CPT

## 2022-12-09 PROCEDURE — 80048 BASIC METABOLIC PNL TOTAL CA: CPT

## 2022-12-09 PROCEDURE — 93005 ELECTROCARDIOGRAM TRACING: CPT | Performed by: NURSE PRACTITIONER

## 2022-12-09 PROCEDURE — 84484 ASSAY OF TROPONIN QUANT: CPT

## 2022-12-09 RX ORDER — HYDROCODONE BITARTRATE AND ACETAMINOPHEN 5; 325 MG/1; MG/1
1 TABLET ORAL EVERY 6 HOURS PRN
Qty: 12 TABLET | Refills: 0 | Status: SHIPPED | OUTPATIENT
Start: 2022-12-09 | End: 2022-12-12

## 2022-12-09 RX ORDER — NAPROXEN 500 MG/1
500 TABLET ORAL 2 TIMES DAILY WITH MEALS
Qty: 60 TABLET | Refills: 0 | Status: SHIPPED | OUTPATIENT
Start: 2022-12-09

## 2022-12-09 RX ORDER — MORPHINE SULFATE 4 MG/ML
4 INJECTION, SOLUTION INTRAMUSCULAR; INTRAVENOUS ONCE
Status: COMPLETED | OUTPATIENT
Start: 2022-12-09 | End: 2022-12-09

## 2022-12-09 RX ADMIN — MORPHINE SULFATE 4 MG: 4 INJECTION, SOLUTION INTRAMUSCULAR; INTRAVENOUS at 13:56

## 2022-12-09 ASSESSMENT — ENCOUNTER SYMPTOMS
SINUS PAIN: 0
CHEST TIGHTNESS: 0
SHORTNESS OF BREATH: 1
SINUS PRESSURE: 0
ANAL BLEEDING: 0
BACK PAIN: 0
NAUSEA: 0
CONSTIPATION: 0
EYE DISCHARGE: 0
RHINORRHEA: 0
DIARRHEA: 0
ABDOMINAL DISTENTION: 0
ABDOMINAL PAIN: 0
COUGH: 0
VOMITING: 0

## 2022-12-09 ASSESSMENT — PAIN DESCRIPTION - ORIENTATION: ORIENTATION: LEFT;RIGHT;LOWER

## 2022-12-09 ASSESSMENT — PAIN SCALES - GENERAL: PAINLEVEL_OUTOF10: 8

## 2022-12-09 ASSESSMENT — PAIN DESCRIPTION - LOCATION: LOCATION: BACK;LEG;FOOT

## 2022-12-09 NOTE — ED PROVIDER NOTES
HPI     Patient is a 58 y.o. male presents with a chief complaint of leg pain  This has been occurring for a few weeks. Patient states that it gets better with nothing. Patient states that it gets worse with movement. Patient states that it is severe in severity. Patient states it was gradual in onset. Patient presents with bilateral leg pain. Patient states this has been going on for some time. Patient has a history of gout. States that he has pain in both his toes. Patient does note that this has been happening for the past couple of days. Patient denies any chest pain but states that he is chronically short of breath. Patient denies any fevers, chills, nausea, vomiting, abdominal pain. Patient states that he has been taking his medicine as prescribed. Patient notes that he saw his podiatrist who said that his uric acid was high. Review of Systems   Constitutional:  Negative for activity change, appetite change, fatigue and fever. HENT:  Negative for congestion, rhinorrhea, sinus pressure and sinus pain. Eyes:  Negative for discharge. Respiratory:  Positive for shortness of breath. Negative for cough and chest tightness. Cardiovascular:  Positive for leg swelling. Negative for chest pain and palpitations. Gastrointestinal:  Negative for abdominal distention, abdominal pain, anal bleeding, constipation, diarrhea, nausea and vomiting. Endocrine: Negative for polydipsia and polyuria. Genitourinary:  Negative for decreased urine volume, difficulty urinating, enuresis, flank pain, frequency and urgency. Musculoskeletal:  Negative for arthralgias, back pain and neck stiffness. Skin:  Negative for rash and wound. Neurological:  Negative for dizziness, weakness, light-headedness and headaches. Psychiatric/Behavioral:  Negative for agitation, behavioral problems and confusion. Physical Exam  Vitals and nursing note reviewed.    Constitutional:       Appearance: He is well-developed. HENT:      Head: Normocephalic and atraumatic. Eyes:      Conjunctiva/sclera: Conjunctivae normal.   Cardiovascular:      Rate and Rhythm: Normal rate and regular rhythm. Heart sounds: Normal heart sounds. No murmur heard. Pulmonary:      Effort: Pulmonary effort is normal. No respiratory distress. Breath sounds: Normal breath sounds. No wheezing or rales. Comments: No crackles. Patient is not tachypneic. Sitting comfortably. Abdominal:      General: Bowel sounds are normal.      Palpations: Abdomen is soft. Tenderness: There is no abdominal tenderness. There is no guarding or rebound. Musculoskeletal:         General: No tenderness or deformity. Cervical back: Normal range of motion and neck supple. Skin:     General: Skin is warm and dry. Neurological:      Mental Status: He is alert and oriented to person, place, and time. Cranial Nerves: No cranial nerve deficit. Coordination: Coordination normal.        Procedures     [facility]       ED Course as of 12/09/22 1549   Fri Dec 09, 2022   1321 EKG shows normal sinus rhythm at 77 beats minute no signs of ST changes no ST elevation . . No significant change from prior EKG. EKG interpreted myself. [facility]      ED Course User Index  [KK] Gin Joaquin MD      Patient is a 58 y.o. male presenting with bilateral leg swelling. Patient has had no recent surgery, recent travel, history of blood clots in the past.  Patient has pain with palpation of the feet. Concerning for gout flare. Patient has clear lungs. X-ray was benign. Patient ultrasound of the legs. Ultrasound of the leg shows no DVT. Patient's uric acid was elevated. Patient stated that he felt much better with morphine. Patient will be discharged home on Monitor and naproxen. Discussed this with patient is agreeable to follow-up as an outpatient. Patient clinically appears well and has no signs of ACS.   Patient's delta troponin is appropriate. Patient had no chest pain at any time. Patient's chest x-ray was benign. Patient was given return precautions. Labs were interpreted by me. Patient will follow up with their primary care provider. Patient is agreeable to this plan. Patient has remained stable throughout their stay in the ED. Patient was seen and evaluated by myself and my attending Riri Camejo MD. Assessment and Plan discussed with attending provider, please see attestation for final plan of care. This note was done using dictation software and there may be some grammatical errors associated with this. Jay Hernández MD      ED Course as of 12/09/22 1549   Fri Dec 09, 2022   1321 EKG shows normal sinus rhythm at 77 beats minute no signs of ST changes no ST elevation . . No significant change from prior EKG. EKG interpreted myself. [KK]      ED Course User Index  [KK] Riri Camejo MD       --------------------------------------------- PAST HISTORY ---------------------------------------------  Past Medical History:  has a past medical history of Angiotensin converting enzyme inhibitor-aggravated angioedema, Arthritis, Carpal tunnel syndrome, Cecum mass, CHF (congestive heart failure) ([facility] CKD (chronic kidney disease), Diabetes mellitus ([facility] Gastrointestinal stromal tumor (GIST) ([facility] Gout, Hyperlipidemia, Hypertension, Hypokalemia, Mass of right submandibular region, Neuropathy, Obesity, and Sleep apnea. Past Surgical History:  has a past surgical history that includes Tonsillectomy; Nasal septum surgery; Colonoscopy (09/14/2018); pr colonoscopy w/biopsy single/multiple (N/A, 9/14/2018); pr colsc flx w/rmvl of tumor polyp lesion snare tq (9/14/2018); US BIOPSY SOFT TISSUE NECK/THORAX (5/26/2021); Colon surgery (2019); and Mouth surgery (N/A, 3/21/2022). Social History:  reports that he has been smoking cigarettes. He has a 15.50 pack-year smoking history.  He has never used smokeless tobacco. He reports current alcohol use of about 2.0 standard drinks per week. He reports current drug use. Frequency: 7.00 times per week. Drug: Marijuana Luli Rivas). Family History: family history includes Heart Failure in his father; Other in his mother and sister. The patients home medications have been reviewed. Allergies: Lasix [furosemide]; Lisinopril; Prunus persica; Atenolol;  Nitrates, organic; and Spironolactone    -------------------------------------------------- RESULTS -------------------------------------------------  Labs:  Results for orders placed or performed during the hospital encounter of 12/09/22   CBC with Auto Differential   Result Value Ref Range    WBC 9.9 4.5 - 11.5 E9/L    RBC 4.10 3.80 - 5.80 E12/L    Hemoglobin 13.7 12.5 - 16.5 g/dL    Hematocrit 39.5 37.0 - 54.0 %    MCV 96.3 80.0 - 99.9 fL    MCH 33.4 26.0 - 35.0 pg    MCHC 34.7 (H) 32.0 - 34.5 %    RDW 13.2 11.5 - 15.0 fL    Platelets 975 687 - 204 E9/L    MPV 9.2 7.0 - 12.0 fL    Neutrophils % 65.6 43.0 - 80.0 %    Immature Granulocytes % 0.6 0.0 - 5.0 %    Lymphocytes % 24.1 20.0 - 42.0 %    Monocytes % 7.5 2.0 - 12.0 %    Eosinophils % 1.7 0.0 - 6.0 %    Basophils % 0.5 0.0 - 2.0 %    Neutrophils Absolute 6.46 1.80 - 7.30 E9/L    Immature Granulocytes # 0.06 E9/L    Lymphocytes Absolute 2.38 1.50 - 4.00 E9/L    Monocytes Absolute 0.74 0.10 - 0.95 E9/L    Eosinophils Absolute 0.17 0.05 - 0.50 E9/L    Basophils Absolute 0.05 0.00 - 0.20 H0/W   Basic Metabolic Panel   Result Value Ref Range    Sodium 134 132 - 146 mmol/L    Potassium 3.6 3.5 - 5.0 mmol/L    Chloride 94 (L) 98 - 107 mmol/L    CO2 30 (H) 22 - 29 mmol/L    Anion Gap 10 7 - 16 mmol/L    Glucose 286 (H) 74 - 99 mg/dL    BUN 22 6 - 23 mg/dL    Creatinine 0.8 0.7 - 1.2 mg/dL    Est, Glom Filt Rate >60 >=60 mL/min/1.73    Calcium 9.7 8.6 - 10.2 mg/dL   Brain Natriuretic Peptide   Result Value Ref Range    Pro- 0 - 125 pg/mL   Troponin   Result Value Ref Range    Troponin, High Sensitivity 36 (H) 0 - 11 ng/L   Troponin   Result Value Ref Range    Troponin, High Sensitivity 31 (H) 0 - 11 ng/L   Uric Acid   Result Value Ref Range    Uric Acid, Serum 8.4 (H) 3.4 - 7.0 mg/dL   EKG 12 Lead   Result Value Ref Range    Ventricular Rate 77 BPM    Atrial Rate 77 BPM    P-R Interval 166 ms    QRS Duration 104 ms    Q-T Interval 390 ms    QTc Calculation (Bazett) 441 ms    P Axis 48 degrees    R Axis -17 degrees    T Axis 17 degrees       Radiology:  XR FOOT RIGHT (MIN 3 VIEWS)   Final Result   Degenerative changes seen within the foot with no evidence of acute bony   abnormality. XR FOOT LEFT (MIN 3 VIEWS)   Final Result   Degenerative changes seen within the foot with no acute bony abnormality. US DUP LOWER EXTREMITIES BILATERAL VENOUS   Final Result   No evidence of DVT in either lower extremity. XR CHEST (2 VW)   Final Result   No acute process. ------------------------- NURSING NOTES AND VITALS REVIEWED ---------------------------  Date / Time Roomed:  12/9/2022 11:56 AM  ED Bed Assignment:  30/30    The nursing notes within the ED encounter and vital signs as below have been reviewed. BP (!) 160/80   Pulse 97   Temp 97.2 °F (36.2 °C) (Temporal)   Resp 16   Ht 5' 8\" (1.727 m)   Wt 260 lb (117.9 kg)   SpO2 100%   BMI 39.53 kg/m²   Oxygen Saturation Interpretation: Normal      ------------------------------------------ PROGRESS NOTES ------------------------------------------  3:49 PM EST  I have spoken with the patient and family if present and discussed todays results, in addition to providing specific details for the plan of care and counseling regarding the diagnosis and prognosis. Their questions are answered at this time and they are agreeable with the plan. I discussed at length with them reasons for immediate return here for re evaluation.  They will followup with their primary care provider by calling their office as soon as possible.      --------------------------------- ADDITIONAL PROVIDER NOTES ---------------------------------  At this time the patient is without objective evidence of an acute process requiring hospitalization or inpatient management. They have remained hemodynamically stable throughout their entire ED visit and are stable for discharge with outpatient follow-up. The plan has been discussed in detail and they are aware of the specific conditions for emergent return, as well as the importance of follow-up. New Prescriptions    HYDROCODONE-ACETAMINOPHEN (NORCO) 5-325 MG PER TABLET    Take 1 tablet by mouth every 6 hours as needed for Pain for up to 3 days. Intended supply: 3 days. Take lowest dose possible to manage pain    NAPROXEN (NAPROSYN) 500 MG TABLET    Take 1 tablet by mouth 2 times daily (with meals)       Diagnosis:  1. Leg swelling    2. Pain in both feet        Disposition:  Patient's disposition: Discharge to home  Patient's condition is stable.          Dominga Persaud MD  Resident  12/09/22 [id number]

## 2022-12-09 NOTE — TELEPHONE ENCOUNTER
[facility] to authorize order with patient insurance. Patient is scheduled for thyroid ultrasound with radiology on 1/7/23 @ 11:00am. Patient has been notified of date and time and that they need to arrive at 10:30am. Patient was informed he has no prep prior to procedure. Patient instructed to park in [facility] parking lot and report to registration. Detail voicemail left for patient.     Electronically signed by Archana Wei MA on 12/9/22 at 3:06 PM EST

## 2022-12-09 NOTE — ED NOTES
Department of Emergency Medicine  FIRST PROVIDER TRIAGE NOTE             Independent MLP           12/9/22  10:30 AM EST    Date of Encounter: 12/9/22   MRN: [id number]      HPI: Suman Huerta. is a 58 y.o. male who presents to the ED for bilateral lower extremity swelling and pain. ROS: Negative for cp. PE: Gen Appearance/Constitutional: alert  Pulm: Respirations easy and unlabored    Vitals:    12/09/22 0950   BP: (!) 146/79   Pulse: 83   Resp: 16   Temp: 97.2 °F (36.2 °C)   SpO2: 99%       Past medical history, surgical history, and medications reviewed. Initial Plan of Care: All treatment areas within department are currently occupied. Plan to order/initiate the following while awaiting opening in ED: labs, EKG, and imaging studies. Initiate treatment/testing, proceed to treatment area when bed available for ED Attending/MLP to continue care.     Electronically signed by JEAN Cardenas CNP   DD: 12/9/22          JEAN Cardenas CNP  12/09/22 5448

## 2022-12-19 DIAGNOSIS — Z76.0 MEDICATION REFILL: ICD-10-CM

## 2022-12-19 NOTE — TELEPHONE ENCOUNTER
Last Appointment:  11/11/2022  Future Appointments   Date Time Provider Alek Goldman   1/7/2023 11:00 AM [facility] US RM 1 [facility] US [facility] Radiolog   1/24/2023  9:00 AM DO SYEDA Noyola [facility] - BE[facility]IOR[facility]

## 2022-12-20 DIAGNOSIS — E11.00 TYPE 2 DIABETES MELLITUS WITH HYPEROSMOLARITY WITHOUT COMA, WITH LONG-TERM CURRENT USE OF INSULIN (HCC): ICD-10-CM

## 2022-12-20 DIAGNOSIS — Z79.4 TYPE 2 DIABETES MELLITUS WITH HYPEROSMOLARITY WITHOUT COMA, WITH LONG-TERM CURRENT USE OF INSULIN (HCC): ICD-10-CM

## 2022-12-20 RX ORDER — ATORVASTATIN CALCIUM 40 MG/1
40 TABLET, FILM COATED ORAL DAILY
Qty: 90 TABLET | Refills: 2 | Status: SHIPPED | OUTPATIENT
Start: 2022-12-20

## 2022-12-20 RX ORDER — METOLAZONE 2.5 MG/1
2.5 TABLET ORAL EVERY OTHER DAY
Qty: 45 TABLET | Refills: 3 | Status: SHIPPED | OUTPATIENT
Start: 2022-12-20

## 2022-12-20 NOTE — TELEPHONE ENCOUNTER
Last Appointment:  11/11/2022  Future Appointments  1/7/2023   11:00 AM   [facility] US RM 1                [facility] US             [facility] Radiolog  1/24/2023  9:00 AM    Meghan Hester [facility]

## 2022-12-27 DIAGNOSIS — M10.9 ACUTE GOUT INVOLVING TOE OF RIGHT FOOT, UNSPECIFIED CAUSE: ICD-10-CM

## 2022-12-28 RX ORDER — COLCHICINE 0.6 MG/1
TABLET ORAL
Qty: 15 TABLET | Refills: 0 | Status: SHIPPED | OUTPATIENT
Start: 2022-12-28

## 2023-01-03 ENCOUNTER — OFFICE VISIT (OUTPATIENT)
Dept: FAMILY MEDICINE CLINIC | Age: 63
End: 2023-01-03
Payer: MEDICARE

## 2023-01-03 VITALS
OXYGEN SATURATION: 96 % | TEMPERATURE: 98.1 F | WEIGHT: 251 LBS | SYSTOLIC BLOOD PRESSURE: 156 MMHG | HEART RATE: 96 BPM | RESPIRATION RATE: 18 BRPM | HEIGHT: 68 IN | BODY MASS INDEX: 38.04 KG/M2 | DIASTOLIC BLOOD PRESSURE: 87 MMHG

## 2023-01-03 DIAGNOSIS — M48.062 LUMBAR STENOSIS WITH NEUROGENIC CLAUDICATION: ICD-10-CM

## 2023-01-03 DIAGNOSIS — M10.9 ACUTE GOUT INVOLVING TOE OF RIGHT FOOT, UNSPECIFIED CAUSE: Primary | ICD-10-CM

## 2023-01-03 DIAGNOSIS — Z00.00 HEALTHCARE MAINTENANCE: ICD-10-CM

## 2023-01-03 PROCEDURE — 99213 OFFICE O/P EST LOW 20 MIN: CPT | Performed by: FAMILY MEDICINE

## 2023-01-03 PROCEDURE — 3017F COLORECTAL CA SCREEN DOC REV: CPT | Performed by: FAMILY MEDICINE

## 2023-01-03 PROCEDURE — 99212 OFFICE O/P EST SF 10 MIN: CPT | Performed by: FAMILY MEDICINE

## 2023-01-03 PROCEDURE — G8417 CALC BMI ABV UP PARAM F/U: HCPCS | Performed by: FAMILY MEDICINE

## 2023-01-03 PROCEDURE — G8427 DOCREV CUR MEDS BY ELIG CLIN: HCPCS | Performed by: FAMILY MEDICINE

## 2023-01-03 PROCEDURE — 3078F DIAST BP <80 MM HG: CPT | Performed by: FAMILY MEDICINE

## 2023-01-03 PROCEDURE — 3074F SYST BP LT 130 MM HG: CPT | Performed by: FAMILY MEDICINE

## 2023-01-03 PROCEDURE — 4004F PT TOBACCO SCREEN RCVD TLK: CPT | Performed by: FAMILY MEDICINE

## 2023-01-03 PROCEDURE — G8484 FLU IMMUNIZE NO ADMIN: HCPCS | Performed by: FAMILY MEDICINE

## 2023-01-03 ASSESSMENT — PATIENT HEALTH QUESTIONNAIRE - PHQ9
6. FEELING BAD ABOUT YOURSELF - OR THAT YOU ARE A FAILURE OR HAVE LET YOURSELF OR YOUR FAMILY DOWN: 0
7. TROUBLE CONCENTRATING ON THINGS, SUCH AS READING THE NEWSPAPER OR WATCHING TELEVISION: 0
SUM OF ALL RESPONSES TO PHQ9 QUESTIONS 1 & 2: 0
4. FEELING TIRED OR HAVING LITTLE ENERGY: 0
SUM OF ALL RESPONSES TO PHQ QUESTIONS 1-9: 3
2. FEELING DOWN, DEPRESSED OR HOPELESS: 0
SUM OF ALL RESPONSES TO PHQ QUESTIONS 1-9: 3
9. THOUGHTS THAT YOU WOULD BE BETTER OFF DEAD, OR OF HURTING YOURSELF: 0
SUM OF ALL RESPONSES TO PHQ QUESTIONS 1-9: 3
8. MOVING OR SPEAKING SO SLOWLY THAT OTHER PEOPLE COULD HAVE NOTICED. OR THE OPPOSITE, BEING SO FIGETY OR RESTLESS THAT YOU HAVE BEEN MOVING AROUND A LOT MORE THAN USUAL: 0
SUM OF ALL RESPONSES TO PHQ QUESTIONS 1-9: 3
10. IF YOU CHECKED OFF ANY PROBLEMS, HOW DIFFICULT HAVE THESE PROBLEMS MADE IT FOR YOU TO DO YOUR WORK, TAKE CARE OF THINGS AT HOME, OR GET ALONG WITH OTHER PEOPLE: 0
3. TROUBLE FALLING OR STAYING ASLEEP: 3
1. LITTLE INTEREST OR PLEASURE IN DOING THINGS: 0
5. POOR APPETITE OR OVEREATING: 0

## 2023-01-03 NOTE — PROGRESS NOTES
[facility] [facility]  Department of Family Medicine  Family Medicine Residency Program    Date of Visit: 1/3/2023     Chief Complaint     Chief Complaint   Patient presents with    Gout     Ed f/u    Back Pain        History of Present Illness     HPI:  58 y.o. male  with pmh of essential HTN, CHF, Hx gout, PVD who presents for follow up on gout flare    Hx Gout  Patient with hx of multiple gout flares  States he went to the ER recently 12/9/2022 for repeat gout flare  He was receiving allopurinol through the [place] but recently stopped   In the ER, was given morphine for pain, and discharged with hydrocodone-acetaminophen and naprosyn  DVT US was negative at the time. Uric acid level in the ER was 8.4. Worsening low back pain  Patient states he chronically gets low back pain/ paraspinal tenderness  Well controlled today  Would like refill on zanaflex    No other concerns today    Social History     Tobacco Use    Smoking status: Every Day     Packs/day: 0.50     Years: 31.00     Pack years: 15.50     Types: Cigarettes    Smokeless tobacco: Never   Vaping Use    Vaping Use: Never used   Substance Use Topics    Alcohol use: Yes     Alcohol/week: 2.0 standard drinks     Types: 2 Shots of liquor per week    Drug use: Yes     Frequency: 7.0 times per week     Types: Marijuana Lukas Spina)     Comment: last used 12/1/2022       ROS:    Reviewed, pertinent as above otherwise negative    Objective:    VS:  Blood pressure (!) 156/87, pulse 96, temperature 98.1 °F (36.7 °C), temperature source Temporal, resp. rate 18, height 5' 8\" (1.727 m), weight 251 lb (113.9 kg), SpO2 96 %. Physical Exam  Constitutional:       Appearance: He is not ill-appearing. Cardiovascular:      Rate and Rhythm: Normal rate and regular rhythm. Pulses: Normal pulses. Heart sounds: Normal heart sounds. No murmur heard. No gallop. Pulmonary:      Effort: Pulmonary effort is normal. No respiratory distress.       Breath sounds: Normal breath sounds. No wheezing. Abdominal:      General: Bowel sounds are normal. There is no distension. Tenderness: There is no abdominal tenderness. Musculoskeletal:      Comments: Limited ROM, paraspinal tenderness lumbar spine L1-L5 bilaterally. Bilateral greater toes with tenderness and inflammation. Neurological:      Mental Status: He is alert. Assessment/Plan:    1. Acute gout involving toe of right foot, unspecified cause  Trial colchicine again at this time for repeat gout flare  Advise restart of allopurinol though need current flare to be resolved prior to start  Will need to obtain new uric acid level as well. - colchicine (COLCRYS) 0.6 MG tablet; Day 1: take 1.2 mg (2 pills),  followed by 0.6 mg (1 pill) after 1 hour . Day 2 and forward: 0.6 mg once or twice daily until flare resolves. Dispense: 15 tablet; Refill: 0    2. Lumbar stenosis with neurogenic claudication  Refill zanaflex at this time  - tiZANidine (ZANAFLEX) 4 MG tablet; Take 1 tablet by mouth 2 times daily as needed (muscle spasm)  Dispense: 10 tablet; Refill: 0    3. Healthcare maintenance  Patient advised to rebook colonoscopy    RTO 1-2 months for repeat DM II visit or sooner prn for any persistent, new, or worsening symptoms. Please see Patient Instructions for further counseling and information given. Advised to please be adherent to the treatment plans discussed today, and please call with any questions or concerns, letting the office know of any reasons that the plans may not be followed. The risks of untreated conditions include worsening illness, injury, disability, and possibly, death. Please call if symptoms change in any way, worsen, or fail to completely resolve, as this could necessitate a change to treatment plans. Patient and/or caregiver expressed understanding. Indications and proper use of medication(s) reviewed.   Potential side-effects and risks of medication(s) also explained. Patient and/or caregiver was instructed to call if any new symptoms develop prior to next visit. Health risk factors discussed and addressed.      Electronically signed by Yuly Bay MD PGY-3 on 1/3/2023 at 10:55 AM  This case was discussed with attending physician: Dr. Vicenta Uriostegui

## 2023-01-03 NOTE — PROGRESS NOTES
[facility]  Precepting Note    Subjective:  Gout follow up seen in ER last month  had stopped allopurinol and colchicine  Concern for flare today   Back pain on xanaflex  ROS otherwise negative     Past medical, surgical, family and social history were reviewed, non-contributory, and unchanged unless otherwise stated. Objective:    BP (!) 156/87 (Site: Right Upper Arm, Position: Sitting, Cuff Size: Large Adult)   Pulse 96   Temp 98.1 °F (36.7 °C) (Temporal)   Resp 18   Ht 5' 8\" (1.727 m)   Wt 251 lb (113.9 kg)   SpO2 96%   BMI 38.16 kg/m²     Exam is as noted by resident with the following changes, additions or corrections:    Back pain- limited ROM. Paraspinal tenderness  Extremities- both toes appear inflamed. Limited ROM due to pain     Assessment/Plan:  Acute gout flare- resume colchicine, naproxen. Restart allopurinol after acute episode resolves  LBP- refill Xanaflex  Needs DM follow up   Will schedule colonoscopy     Attending Physician Statement  I have reviewed the chart, including any radiology or labs. I have discussed the case, including pertinent history and exam findings with the resident. I agree with the assessment, plan and orders as documented by the resident. Please refer to the resident note for additional information.       Electronically signed by Trisha Huerta MD on 1/3/2023 at 11:03 AM

## 2023-01-06 RX ORDER — COLCHICINE 0.6 MG/1
TABLET ORAL
Qty: 15 TABLET | Refills: 0 | Status: SHIPPED | OUTPATIENT
Start: 2023-01-06

## 2023-01-06 RX ORDER — TIZANIDINE 4 MG/1
4 TABLET ORAL 2 TIMES DAILY PRN
Qty: 10 TABLET | Refills: 0 | Status: SHIPPED | OUTPATIENT
Start: 2023-01-06

## 2023-01-09 ENCOUNTER — TELEPHONE (OUTPATIENT)
Dept: ENT CLINIC | Age: 63
End: 2023-01-09

## 2023-01-09 NOTE — TELEPHONE ENCOUNTER
Patient no-showed to scheduled thyroid ultrasound appt at [facility] 1/7/23. Called patient to have him reschedule imaging prior to follow up with Dr. Higinio Cho 1/24/23. Patient states he is recovering from gout and on his way to a foot appt, states he will call back to reschedule follow up.     Electronically signed by Giuliano Tavarez MA on 1/9/23 at 8:28 AM EST

## 2023-01-10 ENCOUNTER — TELEPHONE (OUTPATIENT)
Dept: ADMINISTRATIVE | Age: 63
End: 2023-01-10

## 2023-01-10 DIAGNOSIS — R59.1 LYMPHADENOPATHY: Primary | ICD-10-CM

## 2023-01-10 NOTE — TELEPHONE ENCOUNTER
Order pended for Dr. Gildardo Willoughby    Electronically signed by Joel Santana MA on 1/10/23 at 10:33 AM EST

## 2023-01-12 NOTE — TELEPHONE ENCOUNTER
Patient advised new order signed. Patient will reschedule thyroid ultrasound and then reschedule follow up with Dr. Giacomo Daigle.     Electronically signed by Edson Rodriguez MA on 1/12/23 at 2:19 PM EST 28-Apr-2021

## 2023-01-18 ENCOUNTER — TELEPHONE (OUTPATIENT)
Dept: ENT CLINIC | Age: 63
End: 2023-01-18

## 2023-01-18 NOTE — TELEPHONE ENCOUNTER
Patient rescheduled thyroid ultrasound at [facility] for 2/4/23. I spoke with patient to reschedule follow up with Dr. Betty Mcgee, patient states he doesn't have a car right now and would need a weekend appt. Patient advised we do not offer weekend appointments. Patient states he will call back after he gets his ultrasound done to get his follow up rescheduled.     Electronically signed by Ashley Ascencio MA on 1/18/23 at 9:36 AM EST

## 2023-01-24 DIAGNOSIS — M48.062 LUMBAR STENOSIS WITH NEUROGENIC CLAUDICATION: ICD-10-CM

## 2023-01-25 NOTE — TELEPHONE ENCOUNTER
Last Appointment:  1/3/2023  Future Appointments  2/4/2023   9:00 AM    [facility] US RM [facility]               [facility]  2/13/2023  1:00 PM    MD Viviana Teixeira [facility]

## 2023-01-26 RX ORDER — TIZANIDINE 4 MG/1
TABLET ORAL
Qty: 10 TABLET | Refills: 0 | OUTPATIENT
Start: 2023-01-26

## 2023-01-31 DIAGNOSIS — M48.062 LUMBAR STENOSIS WITH NEUROGENIC CLAUDICATION: ICD-10-CM

## 2023-02-02 RX ORDER — TIZANIDINE 4 MG/1
4 TABLET ORAL 2 TIMES DAILY PRN
Qty: 30 TABLET | Refills: 1 | Status: SHIPPED | OUTPATIENT
Start: 2023-02-02

## 2023-02-04 ENCOUNTER — HOSPITAL ENCOUNTER (OUTPATIENT)
Dept: ULTRASOUND IMAGING | Age: 63
End: 2023-02-04
Payer: MEDICARE

## 2023-02-04 DIAGNOSIS — R59.1 LYMPHADENOPATHY: ICD-10-CM

## 2023-02-04 PROCEDURE — 76536 US EXAM OF HEAD AND NECK: CPT

## 2023-03-28 NOTE — PROGRESS NOTES
LVM for patient to schedule a follow up.   Nephrology Progress Note    Subjective/   62y.o. year old male who we are seeing in consultation for Anasarca. He has no new complaints today and edema is improving. He is on bumex 1mg bd. Objective/   VITALS:  BP (!) 157/88   Pulse 74   Temp 98.1 °F (36.7 °C) (Temporal)   Resp 18   Ht 5' 8\" (1.727 m)   Wt 289 lb 5 oz (131.2 kg)   SpO2 98%   BMI 43.99 kg/m²   24HR INTAKE/OUTPUT:    Intake/Output Summary (Last 24 hours) at 08/21/18 1405  Last data filed at 08/21/18 1345   Gross per 24 hour   Intake              480 ml   Output              700 ml   Net             -220 ml     Physical Exam   Constitutional: He is oriented to person, place, and time. He appears well-developed and well-nourished. No distress. HENT:   Head: Normocephalic and atraumatic. Neck: Normal range of motion. Neck supple. Cardiovascular: Normal rate, regular rhythm, normal heart sounds and intact distal pulses. Exam reveals no gallop and no friction rub. No murmur heard. Pulmonary/Chest: Effort normal and breath sounds normal. No respiratory distress. He has no wheezes. He has no rales. He exhibits no tenderness. Abdominal: Soft. Bowel sounds are normal. He exhibits distension. He exhibits no mass. There is no tenderness. There is no rebound and no guarding. No hernia. Musculoskeletal: He exhibits edema. He exhibits no tenderness or deformity. Neurological: He is alert and oriented to person, place, and time. He displays normal reflexes. No cranial nerve deficit or sensory deficit. He exhibits normal muscle tone. Coordination normal.   Skin: He is not diaphoretic. Psychiatric: He has a normal mood and affect.        Scheduled Meds:   sodium chloride (PF)  10 mL Intravenous BID    insulin glargine  35 Units Subcutaneous Nightly    insulin lispro  0-6 Units Subcutaneous TID WC    insulin lispro  0-3 Units Subcutaneous Nightly    spironolactone  25 mg Oral Daily    sodium chloride flush  10 mL Intravenous 2 pending  - Continue bumex 1mg twice daily     3. Hypertensive urgency  - /110 on presentation  - BP today: 158/92  - Norvasc, Bumex, Doxazosin, Lopressor and aldactone for BP control  - Monitor BP     4. Hypokalemia  - Probably 2/2 diuretic use  - resolved, K today: 4.1  - On daily potassium supplementation  - Monitor BMP     5. Stage I chronic kidney disease with proteinuria   - GFR:   - likely reflecting diabetic nephropathy.  - Hemoglobin A1c: 9.5 reflecting uncontrolled diabetes.    - Consider possible addition of ARB if creatinine, BP and K can tolerate  - continue diuretic control     6. Type II DM  - on insulin Lantus 35 units daily  - Daily BG check  - Hypoglycemia protocol     7.  Hypomagnesemia  - Probably 2/2 diuretic use  - 1.3 on presentation  - resolved, Mg today: 1.8  - Monitor Mg level    Jana Maldonado MD    Pt seen and examined, agree with above  Diuresing  Cr stable  Madelaine Ordaz

## 2023-04-05 RX ORDER — POTASSIUM CHLORIDE 20 MEQ/1
TABLET, EXTENDED RELEASE ORAL
Qty: 180 TABLET | Refills: 1 | Status: SHIPPED | OUTPATIENT
Start: 2023-04-05

## 2023-04-07 DIAGNOSIS — E11.00 TYPE 2 DIABETES MELLITUS WITH HYPEROSMOLARITY WITHOUT COMA, WITH LONG-TERM CURRENT USE OF INSULIN (HCC): ICD-10-CM

## 2023-04-07 DIAGNOSIS — Z79.4 TYPE 2 DIABETES MELLITUS WITH HYPEROSMOLARITY WITHOUT COMA, WITH LONG-TERM CURRENT USE OF INSULIN (HCC): ICD-10-CM

## 2023-04-07 RX ORDER — EMPAGLIFLOZIN 10 MG/1
TABLET, FILM COATED ORAL
Qty: 90 TABLET | Refills: 1 | Status: SHIPPED | OUTPATIENT
Start: 2023-04-07

## 2023-04-07 NOTE — TELEPHONE ENCOUNTER
Last Appointment:  1/3/2023  Future Appointments  4/13/2023  1:00 PM    MD Raquel Muñoz [facility]

## 2023-04-13 ENCOUNTER — OFFICE VISIT (OUTPATIENT)
Dept: FAMILY MEDICINE CLINIC | Age: 63
End: 2023-04-13
Payer: MEDICARE

## 2023-04-13 VITALS
HEART RATE: 85 BPM | SYSTOLIC BLOOD PRESSURE: 120 MMHG | DIASTOLIC BLOOD PRESSURE: 76 MMHG | WEIGHT: 244 LBS | OXYGEN SATURATION: 95 % | RESPIRATION RATE: 16 BRPM | BODY MASS INDEX: 36.98 KG/M2 | TEMPERATURE: 97.5 F | HEIGHT: 68 IN

## 2023-04-13 DIAGNOSIS — E66.01 SEVERE OBESITY (BMI 35.0-39.9) WITH COMORBIDITY (HCC): ICD-10-CM

## 2023-04-13 DIAGNOSIS — F33.40 RECURRENT MAJOR DEPRESSIVE DISORDER, IN REMISSION (HCC): ICD-10-CM

## 2023-04-13 DIAGNOSIS — I50.32 CHRONIC HEART FAILURE WITH PRESERVED EJECTION FRACTION (HFPEF) (HCC): ICD-10-CM

## 2023-04-13 DIAGNOSIS — M17.12 PRIMARY OSTEOARTHRITIS OF LEFT KNEE: Primary | ICD-10-CM

## 2023-04-13 DIAGNOSIS — E11.42 DIABETIC POLYNEUROPATHY ASSOCIATED WITH TYPE 2 DIABETES MELLITUS (HCC): ICD-10-CM

## 2023-04-13 DIAGNOSIS — M48.062 LUMBAR STENOSIS WITH NEUROGENIC CLAUDICATION: ICD-10-CM

## 2023-04-13 DIAGNOSIS — C49.A4 GIST (GASTROINTESTINAL STROMA TUMOR), MALIGNANT, COLON (HCC): ICD-10-CM

## 2023-04-13 LAB — HBA1C MFR BLD: 11.4 %

## 2023-04-13 PROCEDURE — 83036 HEMOGLOBIN GLYCOSYLATED A1C: CPT | Performed by: FAMILY MEDICINE

## 2023-04-13 PROCEDURE — 2022F DILAT RTA XM EVC RTNOPTHY: CPT | Performed by: FAMILY MEDICINE

## 2023-04-13 PROCEDURE — 3046F HEMOGLOBIN A1C LEVEL >9.0%: CPT | Performed by: FAMILY MEDICINE

## 2023-04-13 PROCEDURE — 3017F COLORECTAL CA SCREEN DOC REV: CPT | Performed by: FAMILY MEDICINE

## 2023-04-13 PROCEDURE — 3078F DIAST BP <80 MM HG: CPT | Performed by: FAMILY MEDICINE

## 2023-04-13 PROCEDURE — 4004F PT TOBACCO SCREEN RCVD TLK: CPT | Performed by: FAMILY MEDICINE

## 2023-04-13 PROCEDURE — 99213 OFFICE O/P EST LOW 20 MIN: CPT | Performed by: FAMILY MEDICINE

## 2023-04-13 PROCEDURE — 3074F SYST BP LT 130 MM HG: CPT | Performed by: FAMILY MEDICINE

## 2023-04-13 PROCEDURE — G8417 CALC BMI ABV UP PARAM F/U: HCPCS | Performed by: FAMILY MEDICINE

## 2023-04-13 PROCEDURE — G8428 CUR MEDS NOT DOCUMENT: HCPCS | Performed by: FAMILY MEDICINE

## 2023-04-20 DIAGNOSIS — M48.062 LUMBAR STENOSIS WITH NEUROGENIC CLAUDICATION: ICD-10-CM

## 2023-04-21 RX ORDER — TIZANIDINE 4 MG/1
TABLET ORAL
Qty: 30 TABLET | Refills: 1 | OUTPATIENT
Start: 2023-04-21

## 2023-04-23 RX ORDER — TIZANIDINE 4 MG/1
4 TABLET ORAL 2 TIMES DAILY PRN
Qty: 30 TABLET | Refills: 1 | Status: SHIPPED | OUTPATIENT
Start: 2023-04-23

## 2023-04-25 DIAGNOSIS — M10.9 ACUTE GOUT INVOLVING TOE OF RIGHT FOOT, UNSPECIFIED CAUSE: ICD-10-CM

## 2023-04-25 RX ORDER — COLCHICINE 0.6 MG/1
TABLET ORAL
Qty: 15 TABLET | Refills: 0 | Status: SHIPPED | OUTPATIENT
Start: 2023-04-25

## 2023-05-06 RX ORDER — METOLAZONE 2.5 MG/1
2.5 TABLET ORAL DAILY
Qty: 90 TABLET | Refills: 0 | OUTPATIENT
Start: 2023-05-06

## 2023-05-19 DIAGNOSIS — M48.062 LUMBAR STENOSIS WITH NEUROGENIC CLAUDICATION: ICD-10-CM

## 2023-05-21 RX ORDER — TIZANIDINE 4 MG/1
TABLET ORAL
Qty: 30 TABLET | Refills: 1 | Status: SHIPPED | OUTPATIENT
Start: 2023-05-21

## 2023-05-25 DIAGNOSIS — E11.00 TYPE 2 DIABETES MELLITUS WITH HYPEROSMOLARITY WITHOUT COMA, WITH LONG-TERM CURRENT USE OF INSULIN (HCC): ICD-10-CM

## 2023-05-25 DIAGNOSIS — Z79.4 TYPE 2 DIABETES MELLITUS WITH HYPEROSMOLARITY WITHOUT COMA, WITH LONG-TERM CURRENT USE OF INSULIN (HCC): ICD-10-CM

## 2023-05-25 RX ORDER — INSULIN GLARGINE 100 [IU]/ML
INJECTION, SOLUTION SUBCUTANEOUS
Qty: 21 ML | Refills: 1 | Status: SHIPPED | OUTPATIENT
Start: 2023-05-25

## 2023-05-25 NOTE — TELEPHONE ENCOUNTER
Last Appointment:  4/13/2023  Future Appointments  6/9/2023   10:00 AM   MD Clem Pereira [facility]

## 2023-06-09 ENCOUNTER — OFFICE VISIT (OUTPATIENT)
Dept: FAMILY MEDICINE CLINIC | Age: 63
End: 2023-06-09
Payer: MEDICARE

## 2023-06-09 VITALS
SYSTOLIC BLOOD PRESSURE: 154 MMHG | RESPIRATION RATE: 18 BRPM | WEIGHT: 248.2 LBS | TEMPERATURE: 97.9 F | HEART RATE: 82 BPM | OXYGEN SATURATION: 97 % | BODY MASS INDEX: 37.62 KG/M2 | HEIGHT: 68 IN | DIASTOLIC BLOOD PRESSURE: 99 MMHG

## 2023-06-09 DIAGNOSIS — E11.42 TYPE 2 DIABETES MELLITUS WITH DIABETIC POLYNEUROPATHY, UNSPECIFIED WHETHER LONG TERM INSULIN USE (HCC): ICD-10-CM

## 2023-06-09 DIAGNOSIS — R35.0 INCREASED URINARY FREQUENCY: ICD-10-CM

## 2023-06-09 DIAGNOSIS — E11.42 TYPE 2 DIABETES MELLITUS WITH DIABETIC POLYNEUROPATHY, UNSPECIFIED WHETHER LONG TERM INSULIN USE (HCC): Primary | ICD-10-CM

## 2023-06-09 DIAGNOSIS — I10 ESSENTIAL HYPERTENSION: ICD-10-CM

## 2023-06-09 DIAGNOSIS — G62.9 PERIPHERAL POLYNEUROPATHY: ICD-10-CM

## 2023-06-09 LAB
ANION GAP SERPL CALCULATED.3IONS-SCNC: 11 MMOL/L (ref 7–16)
BACTERIA URNS QL MICRO: NORMAL /HPF
BILIRUB UR QL STRIP: NEGATIVE
BUN SERPL-MCNC: 12 MG/DL (ref 6–23)
CALCIUM SERPL-MCNC: 9.4 MG/DL (ref 8.6–10.2)
CHLORIDE SERPL-SCNC: 98 MMOL/L (ref 98–107)
CLARITY UR: CLEAR
CO2 SERPL-SCNC: 25 MMOL/L (ref 22–29)
COLOR UR: YELLOW
CREAT SERPL-MCNC: 0.6 MG/DL (ref 0.7–1.2)
EPI CELLS #/AREA URNS HPF: NORMAL /HPF
GLUCOSE SERPL-MCNC: 216 MG/DL (ref 74–99)
GLUCOSE UR STRIP-MCNC: >=1000 MG/DL
HGB UR QL STRIP: ABNORMAL
KETONES UR STRIP-MCNC: NEGATIVE MG/DL
LEUKOCYTE ESTERASE UR QL STRIP: NEGATIVE
NITRITE UR QL STRIP: NEGATIVE
PH UR STRIP: 6 [PH] (ref 5–9)
POTASSIUM SERPL-SCNC: 3.8 MMOL/L (ref 3.5–5)
PROT UR STRIP-MCNC: 100 MG/DL
RBC #/AREA URNS HPF: NORMAL /HPF (ref 0–2)
SODIUM SERPL-SCNC: 134 MMOL/L (ref 132–146)
SP GR UR STRIP: 1.01 (ref 1–1.03)
UROBILINOGEN UR STRIP-ACNC: 0.2 E.U./DL
WBC #/AREA URNS HPF: NORMAL /HPF (ref 0–5)

## 2023-06-09 PROCEDURE — 3077F SYST BP >= 140 MM HG: CPT | Performed by: FAMILY MEDICINE

## 2023-06-09 PROCEDURE — 36415 COLL VENOUS BLD VENIPUNCTURE: CPT | Performed by: FAMILY MEDICINE

## 2023-06-09 PROCEDURE — 99213 OFFICE O/P EST LOW 20 MIN: CPT | Performed by: FAMILY MEDICINE

## 2023-06-09 PROCEDURE — 3080F DIAST BP >= 90 MM HG: CPT | Performed by: FAMILY MEDICINE

## 2023-06-09 PROCEDURE — G8427 DOCREV CUR MEDS BY ELIG CLIN: HCPCS | Performed by: FAMILY MEDICINE

## 2023-06-09 PROCEDURE — 4004F PT TOBACCO SCREEN RCVD TLK: CPT | Performed by: FAMILY MEDICINE

## 2023-06-09 PROCEDURE — G8417 CALC BMI ABV UP PARAM F/U: HCPCS | Performed by: FAMILY MEDICINE

## 2023-06-09 PROCEDURE — 3017F COLORECTAL CA SCREEN DOC REV: CPT | Performed by: FAMILY MEDICINE

## 2023-06-09 PROCEDURE — 2022F DILAT RTA XM EVC RTNOPTHY: CPT | Performed by: FAMILY MEDICINE

## 2023-06-09 PROCEDURE — 3046F HEMOGLOBIN A1C LEVEL >9.0%: CPT | Performed by: FAMILY MEDICINE

## 2023-06-09 RX ORDER — TIRZEPATIDE 2.5 MG/.5ML
2.5 INJECTION, SOLUTION SUBCUTANEOUS WEEKLY
Qty: 4 ADJUSTABLE DOSE PRE-FILLED PEN SYRINGE | Refills: 0 | Status: SHIPPED | OUTPATIENT
Start: 2023-06-09

## 2023-06-09 RX ORDER — TIRZEPATIDE 5 MG/.5ML
5 INJECTION, SOLUTION SUBCUTANEOUS WEEKLY
Qty: 4 ADJUSTABLE DOSE PRE-FILLED PEN SYRINGE | Refills: 2 | Status: SHIPPED | OUTPATIENT
Start: 2023-07-07

## 2023-06-09 SDOH — ECONOMIC STABILITY: HOUSING INSECURITY
IN THE LAST 12 MONTHS, WAS THERE A TIME WHEN YOU DID NOT HAVE A STEADY PLACE TO SLEEP OR SLEPT IN A SHELTER (INCLUDING NOW)?: NO

## 2023-06-09 SDOH — ECONOMIC STABILITY: FOOD INSECURITY: WITHIN THE PAST 12 MONTHS, YOU WORRIED THAT YOUR FOOD WOULD RUN OUT BEFORE YOU GOT MONEY TO BUY MORE.: NEVER TRUE

## 2023-06-09 SDOH — ECONOMIC STABILITY: FOOD INSECURITY: WITHIN THE PAST 12 MONTHS, THE FOOD YOU BOUGHT JUST DIDN'T LAST AND YOU DIDN'T HAVE MONEY TO GET MORE.: NEVER TRUE

## 2023-06-09 SDOH — ECONOMIC STABILITY: INCOME INSECURITY: HOW HARD IS IT FOR YOU TO PAY FOR THE VERY BASICS LIKE FOOD, HOUSING, MEDICAL CARE, AND HEATING?: NOT HARD AT ALL

## 2023-06-09 NOTE — PROGRESS NOTES
[address] [facility]  Department of Family Medicine  Family Medicine Residency Program    Date of Visit: 6/9/2023     Chief Complaint     Chief Complaint   Patient presents with    Annual Exam        History of Present Illness     HPI:  This is a 55-year-old gentleman with history of IDDM II, hypertension, hyperlipidemia, MARIA, chronic left knee pain who presents for follow-up on chronic conditions as well as request for DME orders     Hypertension  Patient currently on multiple meds for his hypertension  He follows both here and with cardiology  His current medication regimen includes losartan 50 mg daily, amlodipine 10 mg daily, metoprolol 50 mg daily, patient is also supposed to be on eplerenone and Bumex per cardiology  States he has not taken his Bumex today, states that he has forgotten all about taking his eplerenone for at least the past few weeks.   Does state he has enough medications at home, and states is more about just adherence/forgetting about what he supposed be taking  Denies any worsening headaches, blurry vision or dizziness at this time     IDDM type II, noncompliant  He was 11.4 approximately 1 to 2 months ago  Patient is supposed to be on insulin at home as well as [place]  States that he has only been taking the [place] which was increased to a dose of 25 mg during his last visit  Patient states that though he supposed be on insulin, he does not like injecting himself daily, hence skips days where he takes the medication at  On discussion today based on how hyperglycemia and elevated sugars can affect his health, patient states \" I am fine\"  Complaints today include increased polyuria, though patient states that this may be secondary to the new SGLT2 dose of Jardiance at 25 mg daily  He denies any worsening peripheral neuropathy, has not been checking his sugars often, does not know what his recent BG's have been  Denies any worsening hyper/hypoglycemia     Request for DME
Advised of side effects of Mounjaro which include increased satiety, weight loss which may be good benefits for the patient in general  3. Need for shower rails: Order placed. Resident physician will reach out to [address] if needed for further evaluations or any other recommendations. Attending Physician Statement  I have discussed the case, including pertinent history and exam findings with the resident. I agree with the documented assessment and plan.

## 2023-06-22 ENCOUNTER — TELEPHONE (OUTPATIENT)
Dept: FAMILY MEDICINE CLINIC | Age: 63
End: 2023-06-22

## 2023-06-22 DIAGNOSIS — M48.062 LUMBAR STENOSIS WITH NEUROGENIC CLAUDICATION: ICD-10-CM

## 2023-06-22 RX ORDER — TIZANIDINE 4 MG/1
TABLET ORAL
Qty: 30 TABLET | Refills: 1 | Status: SHIPPED | OUTPATIENT
Start: 2023-06-22

## 2023-06-22 NOTE — TELEPHONE ENCOUNTER
Mike Castro called in and is asking about paperwork that he gave you at his last visit for some modifications to his bathroom. He is asking if it is done.     Please advise    Thank you

## 2023-06-22 NOTE — PROGRESS NOTES
Talked to patient about letter requirements  Left voicemails for [employer] with call back numbers    Faxed letter to address/number as below:    [employer]  [address] [facility], [address] [place] [address] Road    Phone: [phone]  Fax: [phone]    Mumtaz Estrada MD  6/22/2023  2:08 PM

## 2023-06-22 NOTE — TELEPHONE ENCOUNTER
Last Appointment:  6/9/2023  Future Appointments  7/20/2023  10:00 AM   DO LILA Coffman [facility]

## 2023-07-19 ENCOUNTER — APPOINTMENT (OUTPATIENT)
Dept: GENERAL RADIOLOGY | Age: 63
End: 2023-07-19
Payer: MEDICARE

## 2023-07-19 ENCOUNTER — HOSPITAL ENCOUNTER (EMERGENCY)
Age: 63
Discharge: HOME | End: 2023-07-19
Payer: MEDICARE

## 2023-07-19 VITALS
HEART RATE: 95 BPM | RESPIRATION RATE: 18 BRPM | OXYGEN SATURATION: 100 % | TEMPERATURE: 97.8 F | WEIGHT: 248 LBS | DIASTOLIC BLOOD PRESSURE: 94 MMHG | BODY MASS INDEX: 37.71 KG/M2 | SYSTOLIC BLOOD PRESSURE: 191 MMHG

## 2023-07-19 DIAGNOSIS — M65.20 CALCIFYING TENDINITIS: Primary | ICD-10-CM

## 2023-07-19 DIAGNOSIS — M25.729 OLECRANON BONE SPUR: ICD-10-CM

## 2023-07-19 PROCEDURE — 6370000000 HC RX 637 (ALT 250 FOR IP): Performed by: PHYSICIAN ASSISTANT

## 2023-07-19 PROCEDURE — 73090 X-RAY EXAM OF FOREARM: CPT

## 2023-07-19 PROCEDURE — 73030 X-RAY EXAM OF SHOULDER: CPT

## 2023-07-19 PROCEDURE — 73060 X-RAY EXAM OF HUMERUS: CPT

## 2023-07-19 PROCEDURE — 99283 EMERGENCY DEPT VISIT LOW MDM: CPT

## 2023-07-19 PROCEDURE — 73130 X-RAY EXAM OF HAND: CPT

## 2023-07-19 RX ORDER — HYDROCODONE BITARTRATE AND ACETAMINOPHEN 5; 325 MG/1; MG/1
1 TABLET ORAL ONCE
Status: COMPLETED | OUTPATIENT
Start: 2023-07-19 | End: 2023-07-19

## 2023-07-19 RX ADMIN — HYDROCODONE BITARTRATE AND ACETAMINOPHEN 1 TABLET: 5; 325 TABLET ORAL at 12:51

## 2023-07-19 ASSESSMENT — PAIN DESCRIPTION - LOCATION: LOCATION: ARM

## 2023-07-19 ASSESSMENT — LIFESTYLE VARIABLES
HOW MANY STANDARD DRINKS CONTAINING ALCOHOL DO YOU HAVE ON A TYPICAL DAY: PATIENT DOES NOT DRINK
HOW OFTEN DO YOU HAVE A DRINK CONTAINING ALCOHOL: NEVER

## 2023-07-19 ASSESSMENT — PAIN SCALES - GENERAL
PAINLEVEL_OUTOF10: 9
PAINLEVEL_OUTOF10: 9

## 2023-07-19 ASSESSMENT — PAIN DESCRIPTION - DESCRIPTORS: DESCRIPTORS: SHARP;THROBBING

## 2023-07-19 ASSESSMENT — PAIN - FUNCTIONAL ASSESSMENT: PAIN_FUNCTIONAL_ASSESSMENT: 0-10

## 2023-07-19 ASSESSMENT — PAIN DESCRIPTION - ORIENTATION: ORIENTATION: RIGHT

## 2023-07-28 RX ORDER — METOPROLOL SUCCINATE 50 MG/1
50 TABLET, EXTENDED RELEASE ORAL DAILY
Qty: 90 TABLET | Refills: 3 | Status: SHIPPED | OUTPATIENT
Start: 2023-07-28

## 2023-08-03 ENCOUNTER — HOSPITAL ENCOUNTER (OUTPATIENT)
Dept: GENERAL RADIOLOGY | Age: 63
Discharge: HOME | End: 2023-08-05
Payer: MEDICARE

## 2023-08-03 ENCOUNTER — OFFICE VISIT (OUTPATIENT)
Dept: ORTHOPEDIC SURGERY | Age: 63
End: 2023-08-03
Payer: MEDICARE

## 2023-08-03 VITALS — WEIGHT: 248 LBS | HEIGHT: 68 IN | BODY MASS INDEX: 37.59 KG/M2

## 2023-08-03 DIAGNOSIS — M75.31 CALCIFIC TENDINITIS OF RIGHT SHOULDER: ICD-10-CM

## 2023-08-03 DIAGNOSIS — M25.562 ACUTE PAIN OF BOTH KNEES: ICD-10-CM

## 2023-08-03 DIAGNOSIS — M25.561 ACUTE PAIN OF BOTH KNEES: ICD-10-CM

## 2023-08-03 DIAGNOSIS — M17.12 PRIMARY OSTEOARTHRITIS OF LEFT KNEE: Primary | ICD-10-CM

## 2023-08-03 PROCEDURE — 73565 X-RAY EXAM OF KNEES: CPT

## 2023-08-03 NOTE — PATIENT INSTRUCTIONS
You were prescribed [employer]. You will receive a phone call from [facility]. Please answer the phone call to have your prescription filled. This will be from a 205 area code on your home phone or an 21 [phone] number on your cell phone. If you do not receive a phone call in 48 hours, please call the pharmacy at [phone] and select option 1 to speak a patient advocate. To get the most out of your pain cream:  Wash affected area beforehand to moisturize the skin and remove any contaminants. After bathing is a good time to use the cream.  Apply enough cream to rub it in well. This is usually about a nickel sized dollop. Rub in well until fully absorbed. This is about 2 minutes. Wash hands after applying cream  DO NOT bathe or swim for an hour after application. The cream my tingle after application, this is normal.  Side effects may include rashes from contamination of the skin surface or allergies, please call us or the pharmacy with any concerns.

## 2023-08-03 NOTE — PROGRESS NOTES
benefits of each. They have elected for conservative management at this time. PLAN:  Discussed findings with the patient. Patient has a hemoglobin A1c over 11. Discussed he is not a candidate for cortisone injections or a knee replacement at this time. He does not wish to attend formal therapy or aquatic therapy. Patient provided a rx for [facility]der[employer] [facility]. Patient to follow up PRN. All questions answered. Electronically signed by Luz Elena Sahu PA-C on 8/3/2023 at 2:58 PM  Note: This report was completed using [email or web address] voiced recognition software. Every effort has been made to ensure accuracy; however, inadvertent computerized transcription errors may be present.

## 2023-08-04 RX ORDER — EMOLLIENT BASE
2 CREAM (GRAM) TOPICAL 4 TIMES DAILY PRN
Qty: 240 G | Refills: 5 | Status: SHIPPED | OUTPATIENT
Start: 2023-08-04 | End: 2023-09-03

## 2023-08-07 NOTE — TELEPHONE ENCOUNTER
----- Message from Narendraportillo Rigo sent at 12/27/2022  2:09 PM EST -----  Subject: Refill Request    QUESTIONS  Name of Medication? Other - gout medication  Patient-reported dosage and instructions? n/a  How many days do you have left? 0  Preferred Pharmacy? [facility] [facility]  Pharmacy phone number (if available)? [phone]  Additional Information for Provider? would like a ten day supply to hold   him over until his appointment  ---------------------------------------------------------------------------  --------------  Elizabeth Dunn  What is the best way for the office to contact you? OK to leave message on   voicemail  Preferred Call Back Phone Number? [phone]  ---------------------------------------------------------------------------  --------------  SCRIPT ANSWERS  Relationship to Patient?  Self
Last Appointment:  11/11/2022  Future Appointments   Date Time Provider Alek Goldman   1/3/2023 10:40 AM MD Rachael De Santiago [facility]   1/7/2023 11:00 AM [facility] US RM 1 [facility]  [facility] Radiolog   1/24/2023  9:00 AM Mathew Lewis, [facility]
Mariam Total Knee Replacement  Pt instructed to stop vitamins/supplements/herbal medications/ASA/NSAIDS for one week prior to surgery and discuss with PMD.  Patient educated on surgical scrub, ERP preadmission instructions, medical clearance and day of procedure medications, verbalizes understanding.  Hold Jardiance for 3 days, take glimerpride day before surgery (8/23/23) AM dose only

## 2023-10-07 DIAGNOSIS — I50.32 CHRONIC HEART FAILURE WITH PRESERVED EJECTION FRACTION (HFPEF) (HCC): ICD-10-CM

## 2023-10-07 DIAGNOSIS — E11.42 DIABETIC POLYNEUROPATHY ASSOCIATED WITH TYPE 2 DIABETES MELLITUS (HCC): ICD-10-CM

## 2023-10-16 RX ORDER — EPLERENONE 50 MG/1
50 TABLET, FILM COATED ORAL DAILY
Qty: 90 TABLET | Refills: 3 | Status: SHIPPED | OUTPATIENT
Start: 2023-10-16

## 2023-10-18 DIAGNOSIS — E11.42 TYPE 2 DIABETES MELLITUS WITH DIABETIC POLYNEUROPATHY, UNSPECIFIED WHETHER LONG TERM INSULIN USE (HCC): ICD-10-CM

## 2023-10-18 DIAGNOSIS — E11.42 DIABETIC POLYNEUROPATHY ASSOCIATED WITH TYPE 2 DIABETES MELLITUS (HCC): ICD-10-CM

## 2023-10-19 RX ORDER — TIRZEPATIDE 2.5 MG/.5ML
2.5 INJECTION, SOLUTION SUBCUTANEOUS WEEKLY
Qty: 4 ADJUSTABLE DOSE PRE-FILLED PEN SYRINGE | Refills: 0 | OUTPATIENT
Start: 2023-10-19

## 2023-10-19 RX ORDER — TIRZEPATIDE 5 MG/.5ML
5 INJECTION, SOLUTION SUBCUTANEOUS WEEKLY
Qty: 4 ADJUSTABLE DOSE PRE-FILLED PEN SYRINGE | Refills: 2 | Status: SHIPPED | OUTPATIENT
Start: 2023-10-19

## 2023-10-19 NOTE — TELEPHONE ENCOUNTER
Called patient to clarify what dose of Mounjaro he is taking- patient confirms he is no longer on the 2.5 mg and he is taking the 5 mg. 5 mg Mounjaro refilled and 2.5 mg Mounjaro removed from patient med list

## 2023-11-13 ENCOUNTER — TELEPHONE (OUTPATIENT)
Dept: ENT CLINIC | Age: 63
End: 2023-11-13

## 2023-11-13 DIAGNOSIS — M48.062 LUMBAR STENOSIS WITH NEUROGENIC CLAUDICATION: ICD-10-CM

## 2023-11-13 RX ORDER — TIZANIDINE 4 MG/1
TABLET ORAL
Qty: 30 TABLET | Refills: 0 | Status: SHIPPED | OUTPATIENT
Start: 2023-11-13

## 2023-11-13 NOTE — TELEPHONE ENCOUNTER
Last Appointment:  Visit date not found  Future Appointments   Date Time Provider [address]   11/21/2023  9:00 AM Lamar Madura, MD Mandy Primrose [facility]   1/2/2024  1:15 PM [address] DO [facility]

## 2023-11-13 NOTE — TELEPHONE ENCOUNTER
Patient scheduled with Dr. Tory Hobbs for 1/2/24. Patient was unable to come in sooner for thyroid ultrasound results due to transportation.     Electronically signed by Altaf Fernandes MA on 11/13/23 at 11:00 AM EST

## 2023-11-13 NOTE — TELEPHONE ENCOUNTER
Patient is asking to be fit on schedule to have follow up for neck with Dr Caren Bill. He never did follow up after his neck US. Nothing soon to offer. please contact patient to schedule.

## 2023-11-14 DIAGNOSIS — I10 ESSENTIAL HYPERTENSION: ICD-10-CM

## 2023-11-20 RX ORDER — LOSARTAN POTASSIUM 50 MG/1
50 TABLET ORAL DAILY
Qty: 90 TABLET | Refills: 3 | Status: SHIPPED | OUTPATIENT
Start: 2023-11-20

## 2023-11-21 ENCOUNTER — OFFICE VISIT (OUTPATIENT)
Dept: FAMILY MEDICINE CLINIC | Age: 63
End: 2023-11-21

## 2023-11-21 VITALS
HEIGHT: 68 IN | OXYGEN SATURATION: 98 % | WEIGHT: 221 LBS | HEART RATE: 90 BPM | TEMPERATURE: 98.2 F | SYSTOLIC BLOOD PRESSURE: 126 MMHG | RESPIRATION RATE: 20 BRPM | BODY MASS INDEX: 33.49 KG/M2 | DIASTOLIC BLOOD PRESSURE: 77 MMHG

## 2023-11-21 DIAGNOSIS — E78.5 HYPERLIPIDEMIA, UNSPECIFIED HYPERLIPIDEMIA TYPE: ICD-10-CM

## 2023-11-21 DIAGNOSIS — E11.42 TYPE 2 DIABETES MELLITUS WITH DIABETIC POLYNEUROPATHY, UNSPECIFIED WHETHER LONG TERM INSULIN USE (HCC): Primary | ICD-10-CM

## 2023-11-21 DIAGNOSIS — I10 ESSENTIAL HYPERTENSION: ICD-10-CM

## 2023-11-21 DIAGNOSIS — M54.16 LUMBAR RADICULOPATHY: ICD-10-CM

## 2023-11-21 DIAGNOSIS — Z76.0 MEDICATION REFILL: ICD-10-CM

## 2023-11-21 LAB
CHOLESTEROL: 147 MG/DL
HBA1C MFR BLD: 8.6 %
HDLC SERPL-MCNC: 34 MG/DL
LDL CHOLESTEROL: 80 MG/DL
TRIGL SERPL-MCNC: 163 MG/DL
VLDLC SERPL CALC-MCNC: 33 MG/DL

## 2023-11-21 RX ORDER — LANOLIN ALCOHOL/MO/W.PET/CERES
400 CREAM (GRAM) TOPICAL DAILY
Qty: 30 TABLET | Refills: 2 | Status: SHIPPED | OUTPATIENT
Start: 2023-11-21

## 2023-11-21 RX ORDER — POTASSIUM CHLORIDE 20 MEQ/1
20 TABLET, EXTENDED RELEASE ORAL 2 TIMES DAILY
Qty: 180 TABLET | Refills: 1 | Status: SHIPPED | OUTPATIENT
Start: 2023-11-21

## 2023-11-21 RX ORDER — TIRZEPATIDE 7.5 MG/.5ML
7.5 INJECTION, SOLUTION SUBCUTANEOUS WEEKLY
Qty: 2 ML | Refills: 2 | Status: SHIPPED | OUTPATIENT
Start: 2023-11-21 | End: 2024-02-13

## 2023-11-21 NOTE — PROGRESS NOTES
[address]  FAMILY MEDICINE RESIDENCY PROGRAM  DATE OF VISIT : 2023    Patient : Chidi Reid. Age : 61 y.o.  : 1960   MRN : [id number]   ______________________________________________________________________    Chief Complaint:   Chief Complaint   Patient presents with    Diabetes     Follow up        HPI:   Chidi Reid. is a 61 y.o. male who presents for new to provider visit. T2DM with peripheral neuropathy: Last A1c 23 was 11.4%. Patient's prescribed regimen is Jardiance and Mounjaro. A1c today 8.6%. The patient states he attempts to avoid sugary foods but is not always successful. Patient reports significantly diminished sensation in his feet. The patient has been abstaining from alcohol and has been wearing specialized boots, which is improving his neuropathy. He states that he checks his feet for wounds regularly. Lumbar spinal canal stenosis: The patient had an MRI Lumbar spine 2022 that showed lumbar spinal stenosis. He has an appt at the [place] for application of a TENS unit and has just started physical therapy. He also takes Zanaflex for that with good effect. HLD: Takes Lipitor 40 mg. Due for lipid panel today    MARIA: The patient has not used a CPAP in 7-8 years. He states he does not trust the CPAP d/t having multiple recalls on the filters and knowing others who have gotten lung infections from not cleaning them properly. Health maintenance: Patient has been previously referred to gen surg for colonoscopy and he intends to call and make an appt.              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

## 2023-11-28 NOTE — DISCHARGE SUMMARY
Angioedema  Active Problems:    Tobacco abuse    Uncontrolled type 2 diabetes mellitus with hyperglycemia (HCC)  Resolved Problems:    Tongue swelling      Consults: ENT    Procedures: None    Discharge Exam:  General Appearance:  awake, alert, oriented, in no acute distress  Mouth/Throat:  Mucosa moist, no lesions. Some mild edema of posterior oropharynx without erythema or evidence of obstruction. Lungs:  Normal expansion. Clear to auscultation. No rales, rhonchi, or wheezing. Heart:  Heart sounds are normal.  Regular rate and rhythm without murmur, gallop or rub. Abdomen:  Soft, non-tender, normal bowel sounds. No bruits, organomegaly or masses. Extremities: Extremities warm to touch, pink, with no edema. and pulses present in all extremities    Disposition: home    Patient Instructions:      Medication List      START taking these medications    insulin glargine 100 UNIT/ML injection pen  Commonly known as:  LANTUS SOLOSTAR  Inject 15 Units into the skin nightly     predniSONE 20 MG tablet  Commonly known as:  DELTASONE  Take 2 tablets by mouth daily for 5 days        CHANGE how you take these medications    amLODIPine 10 MG tablet  Commonly known as:  NORVASC  Take 1 tablet by mouth daily  What changed:  · medication strength  · how much to take     atorvastatin 40 MG tablet  Commonly known as:  LIPITOR  Take 1 tablet by mouth nightly  What changed:  when to take this        CONTINUE taking these medications    acetaminophen-codeine 300-30 MG per tablet  Commonly known as:  TYLENOL/CODEINE #3  Take 1 tablet by mouth every 4 hours as needed.      bumetanide 1 MG tablet  Commonly known as:  BUMEX     furosemide 40 MG tablet  Commonly known as:  LASIX     hydrochlorothiazide 25 MG tablet  Commonly known as:  HYDRODIURIL     ibuprofen 800 MG tablet  Commonly known as:  ADVIL;MOTRIN     metoprolol tartrate 50 MG tablet  Commonly known as:  LOPRESSOR     potassium chloride 20 MEQ packet  Commonly known as: KLOR-CON     sertraline 50 MG tablet  Commonly known as:  ZOLOFT  Take 3 tablets by mouth daily     sildenafil 100 MG tablet  Commonly known as:  VIAGRA     vitamin D 33652 units Caps capsule  Commonly known as:  ERGOCALCIFEROL        STOP taking these medications    GLIMEPIRIDE PO     rosuvastatin 20 MG tablet  Commonly known as:  CRESTOR           Where to Get Your Medications      You can get these medications from any pharmacy    Bring a paper prescription for each of these medications  · amLODIPine 10 MG tablet  · atorvastatin 40 MG tablet  · insulin glargine 100 UNIT/ML injection pen  · predniSONE 20 MG tablet  · sertraline 50 MG tablet         Activity: activity as tolerated  Diet: diabetic diet    Follow-up:   No future appointments.     Note that over 35minutes was spent in preparing discharge papers, discussing discharge with patient, medication review, etc.    Electronically signed by Krista Sheldon MD, PGY2 on 3/11/2018 at 2:36 PM (E4) spontaneous

## 2023-12-06 ENCOUNTER — TELEPHONE (OUTPATIENT)
Dept: FAMILY MEDICINE CLINIC | Age: 63
End: 2023-12-06

## 2023-12-06 NOTE — TELEPHONE ENCOUNTER
Patient called to report he has been having constipation. He is taking laxative and drinking plenty of liquids and did have BM this morning. He will call office if continues.  Did not wish to schedule appointment at this time but wanted his doctor to be aware of issue

## 2023-12-07 ENCOUNTER — APPOINTMENT (OUTPATIENT)
Dept: CT IMAGING | Age: 63
End: 2023-12-07
Payer: MEDICARE

## 2023-12-07 ENCOUNTER — HOSPITAL ENCOUNTER (EMERGENCY)
Age: 63
Discharge: HOME | End: 2023-12-07
Attending: EMERGENCY MEDICINE
Payer: MEDICARE

## 2023-12-07 VITALS
OXYGEN SATURATION: 100 % | BODY MASS INDEX: 33.34 KG/M2 | RESPIRATION RATE: 16 BRPM | SYSTOLIC BLOOD PRESSURE: 131 MMHG | HEART RATE: 90 BPM | TEMPERATURE: 97.4 F | HEIGHT: 68 IN | DIASTOLIC BLOOD PRESSURE: 99 MMHG | WEIGHT: 220 LBS

## 2023-12-07 DIAGNOSIS — K59.09 OTHER CONSTIPATION: Primary | ICD-10-CM

## 2023-12-07 DIAGNOSIS — E87.6 HYPOKALEMIA: ICD-10-CM

## 2023-12-07 LAB
ALBUMIN SERPL-MCNC: 3.6 G/DL (ref 3.5–5.2)
ALP SERPL-CCNC: 96 U/L (ref 40–129)
ALT SERPL-CCNC: 20 U/L (ref 0–40)
ANION GAP SERPL CALCULATED.3IONS-SCNC: 13 MMOL/L (ref 7–16)
AST SERPL-CCNC: 15 U/L (ref 0–39)
BASOPHILS # BLD: 0.04 K/UL (ref 0–0.2)
BASOPHILS NFR BLD: 1 % (ref 0–2)
BILIRUB SERPL-MCNC: 0.4 MG/DL (ref 0–1.2)
BUN SERPL-MCNC: 17 MG/DL (ref 6–23)
CALCIUM SERPL-MCNC: 9.1 MG/DL (ref 8.6–10.2)
CHLORIDE SERPL-SCNC: 93 MMOL/L (ref 98–107)
CO2 SERPL-SCNC: 28 MMOL/L (ref 22–29)
CREAT SERPL-MCNC: 0.8 MG/DL (ref 0.7–1.2)
EOSINOPHIL # BLD: 0.14 K/UL (ref 0.05–0.5)
EOSINOPHILS RELATIVE PERCENT: 2 % (ref 0–6)
ERYTHROCYTE [DISTWIDTH] IN BLOOD BY AUTOMATED COUNT: 14.5 % (ref 11.5–15)
GFR SERPL CREATININE-BSD FRML MDRD: >60 ML/MIN/1.73M2
GLUCOSE SERPL-MCNC: 234 MG/DL (ref 74–99)
HCT VFR BLD AUTO: 43.2 % (ref 37–54)
HGB BLD-MCNC: 14.6 G/DL (ref 12.5–16.5)
IMM GRANULOCYTES # BLD AUTO: 0.03 K/UL (ref 0–0.58)
IMM GRANULOCYTES NFR BLD: 0 % (ref 0–5)
LACTATE BLDV-SCNC: 1.6 MMOL/L (ref 0.5–2.2)
LYMPHOCYTES NFR BLD: 2.56 K/UL (ref 1.5–4)
LYMPHOCYTES RELATIVE PERCENT: 32 % (ref 20–42)
MAGNESIUM SERPL-MCNC: 2.5 MG/DL (ref 1.6–2.6)
MCH RBC QN AUTO: 31.8 PG (ref 26–35)
MCHC RBC AUTO-ENTMCNC: 33.8 G/DL (ref 32–34.5)
MCV RBC AUTO: 94.1 FL (ref 80–99.9)
MONOCYTES NFR BLD: 0.52 K/UL (ref 0.1–0.95)
MONOCYTES NFR BLD: 7 % (ref 2–12)
NEUTROPHILS NFR BLD: 58 % (ref 43–80)
NEUTS SEG NFR BLD: 4.64 K/UL (ref 1.8–7.3)
PLATELET # BLD AUTO: 278 K/UL (ref 130–450)
PMV BLD AUTO: 9.3 FL (ref 7–12)
POTASSIUM SERPL-SCNC: 2.9 MMOL/L (ref 3.5–5)
PROT SERPL-MCNC: 7 G/DL (ref 6.4–8.3)
RBC # BLD AUTO: 4.59 M/UL (ref 3.8–5.8)
SODIUM SERPL-SCNC: 134 MMOL/L (ref 132–146)
WBC OTHER # BLD: 7.9 K/UL (ref 4.5–11.5)

## 2023-12-07 PROCEDURE — 85025 COMPLETE CBC W/AUTO DIFF WBC: CPT

## 2023-12-07 PROCEDURE — 83605 ASSAY OF LACTIC ACID: CPT

## 2023-12-07 PROCEDURE — 96360 HYDRATION IV INFUSION INIT: CPT

## 2023-12-07 PROCEDURE — 2580000003 HC RX 258

## 2023-12-07 PROCEDURE — 80053 COMPREHEN METABOLIC PANEL: CPT

## 2023-12-07 PROCEDURE — 96361 HYDRATE IV INFUSION ADD-ON: CPT

## 2023-12-07 PROCEDURE — 99284 EMERGENCY DEPT VISIT MOD MDM: CPT

## 2023-12-07 PROCEDURE — 74176 CT ABD & PELVIS W/O CONTRAST: CPT

## 2023-12-07 PROCEDURE — 6370000000 HC RX 637 (ALT 250 FOR IP): Performed by: EMERGENCY MEDICINE

## 2023-12-07 PROCEDURE — 83735 ASSAY OF MAGNESIUM: CPT

## 2023-12-07 RX ORDER — SENNOSIDES A AND B 8.6 MG/1
1 TABLET, FILM COATED ORAL DAILY
Qty: 30 TABLET | Refills: 0 | Status: SHIPPED | OUTPATIENT
Start: 2023-12-07 | End: 2024-01-06

## 2023-12-07 RX ORDER — 0.9 % SODIUM CHLORIDE 0.9 %
500 INTRAVENOUS SOLUTION INTRAVENOUS ONCE
Status: COMPLETED | OUTPATIENT
Start: 2023-12-07 | End: 2023-12-07

## 2023-12-07 RX ORDER — POTASSIUM CHLORIDE 7.45 MG/ML
10 INJECTION INTRAVENOUS ONCE
Status: DISCONTINUED | OUTPATIENT
Start: 2023-12-07 | End: 2023-12-07

## 2023-12-07 RX ADMIN — SODIUM CHLORIDE 500 ML: 9 INJECTION, SOLUTION INTRAVENOUS at 08:36

## 2023-12-07 RX ADMIN — POTASSIUM BICARBONATE 40 MEQ: 782 TABLET, EFFERVESCENT ORAL at 12:18

## 2023-12-07 NOTE — ED PROVIDER NOTES
------------------------- ADDITIONAL PROVIDER NOTES ---------------------------    I have spoken to the patient and/or family regarding results and the proposed plan for disposition. They are comfortable with management and treatment plans as discussed. No critical care time    ------------------------- Disposition ---------------------------    I have discussed with the patient the use and purpose of the following prescription medications outpatient after they pick it up from their pharmacy. Discharge Medication List as of 12/7/2023  1:21 PM        START taking these medications    Details   senna (SENOKOT) 8.6 MG tablet Take 1 tablet by mouth daily, Disp-30 tablet, R-0Normal             Clinical Impression  1. Other constipation          Disposition  Patient's disposition: Discharge to home  Patient's condition is good. Kanwal Stephenson MD  Resident  12/07/23 4049          ATTENDING PROVIDER ATTESTATION:     Duncan Mcwilliams presented to the emergency department for evaluation of Constipation (States he is constipated. Last BM yesterday \"after I manually had to get it out\" )    I have reviewed and discussed the case, including pertinent history (medical, surgical, family and social) and exam findings with the Resident and the Nurse assigned to Duncan Mcwilliams .  I have personally performed and/or participated in the history, exam, medical decision making, and procedures and agree with all pertinent clinical information. I have reviewed my findings and recommendations with Duncan Mcwilliams and members of family present at the time of disposition. I, Dr. Chloe Araiza am the primary provider of record    CBC is ordered to evaluate for any signs of infection or inflammation by obtaining a WBC count, or any signs of acute anemia by interpreting hemoglobin. CMP was ordered to evaluate for any electrolyte imbalances, kidney function, hepatic injury or any elevations in anion gap.

## 2023-12-27 ENCOUNTER — TELEPHONE (OUTPATIENT)
Dept: FAMILY MEDICINE CLINIC | Age: 63
End: 2023-12-27

## 2023-12-27 DIAGNOSIS — K59.00 CONSTIPATION, UNSPECIFIED CONSTIPATION TYPE: Primary | ICD-10-CM

## 2023-12-27 RX ORDER — POLYETHYLENE GLYCOL 3350 17 G/17G
17 POWDER, FOR SOLUTION ORAL 2 TIMES DAILY
Qty: 510 G | Refills: 0 | Status: SHIPPED | OUTPATIENT
Start: 2023-12-27 | End: 2024-01-11

## 2023-12-27 RX ORDER — AMOXICILLIN 250 MG
2 CAPSULE ORAL 2 TIMES DAILY
Qty: 56 TABLET | Refills: 0 | Status: SHIPPED | OUTPATIENT
Start: 2023-12-27 | End: 2024-01-10

## 2023-12-27 NOTE — TELEPHONE ENCOUNTER
PT called today, is constipated and OTC treatment not helping. Advised [facility], said [facility] can't get [facility]  Asking for med to be called in.  Marily[address]

## 2023-12-31 DIAGNOSIS — E11.42 DIABETIC POLYNEUROPATHY ASSOCIATED WITH TYPE 2 DIABETES MELLITUS (HCC): ICD-10-CM

## 2023-12-31 DIAGNOSIS — I50.32 CHRONIC HEART FAILURE WITH PRESERVED EJECTION FRACTION (HFPEF) (HCC): ICD-10-CM

## 2024-01-02 ENCOUNTER — OFFICE VISIT (OUTPATIENT)
Dept: ENT CLINIC | Age: 64
End: 2024-01-02
Payer: MEDICARE

## 2024-01-02 VITALS — BODY MASS INDEX: 34.86 KG/M2 | WEIGHT: 230 LBS | HEIGHT: 68 IN

## 2024-01-02 DIAGNOSIS — K14.8 TONGUE LESION: Primary | ICD-10-CM

## 2024-01-02 DIAGNOSIS — R59.1 LYMPHADENOPATHY: ICD-10-CM

## 2024-01-02 PROCEDURE — 99213 OFFICE O/P EST LOW 20 MIN: CPT | Performed by: OTOLARYNGOLOGY

## 2024-01-02 PROCEDURE — G8427 DOCREV CUR MEDS BY ELIG CLIN: HCPCS | Performed by: OTOLARYNGOLOGY

## 2024-01-02 PROCEDURE — 4004F PT TOBACCO SCREEN RCVD TLK: CPT | Performed by: OTOLARYNGOLOGY

## 2024-01-02 PROCEDURE — 3017F COLORECTAL CA SCREEN DOC REV: CPT | Performed by: OTOLARYNGOLOGY

## 2024-01-02 PROCEDURE — G8417 CALC BMI ABV UP PARAM F/U: HCPCS | Performed by: OTOLARYNGOLOGY

## 2024-01-02 PROCEDURE — G8482 FLU IMMUNIZE ORDER/ADMIN: HCPCS | Performed by: OTOLARYNGOLOGY

## 2024-01-02 RX ORDER — BUMETANIDE 1 MG/1
TABLET ORAL
Qty: 180 TABLET | Refills: 3 | Status: SHIPPED | OUTPATIENT
Start: 2024-01-02

## 2024-01-02 RX ORDER — EMPAGLIFLOZIN 25 MG/1
25 TABLET, FILM COATED ORAL DAILY
Qty: 90 TABLET | Refills: 0 | Status: SHIPPED | OUTPATIENT
Start: 2024-01-02

## 2024-01-02 ASSESSMENT — ENCOUNTER SYMPTOMS
VOMITING: 0
COLOR CHANGE: 0
SORE THROAT: 0
APNEA: 0
SHORTNESS OF BREATH: 0
CHEST TIGHTNESS: 0
EYE DISCHARGE: 0
ABDOMINAL PAIN: 0
COUGH: 0
TROUBLE SWALLOWING: 0
DIARRHEA: 0
VOICE CHANGE: 0
EYE PAIN: 0
EYES NEGATIVE: 1
GASTROINTESTINAL NEGATIVE: 1
RESPIRATORY NEGATIVE: 1

## 2024-01-02 NOTE — TELEPHONE ENCOUNTER
Last Appointment:  11/21/2023  Future Appointments  1/2/2024   1:15 PM    Jd Mera* [facility]  1/8/2024   3:20 PM    Kenny Parker MD           [facility]

## 2024-01-02 NOTE — PROGRESS NOTES
General: Normal range of motion.      Cervical back: Normal range of motion and neck supple.   Lymphadenopathy:      Cervical: Cervical adenopathy present.   Skin:     General: Skin is warm and dry.   Neurological:      General: No focal deficit present.      Mental Status: He is alert and oriented to person, place, and time.   Psychiatric:         Mood and Affect: Mood normal.         Behavior: Behavior normal.           US:  IMPRESSION:  1.  Normal thyroid gland     2.  Prominent mildly enlarged lymph node superior to the right thyroid lobe  measuring 10 mm in short axis.       Assessment:       Diagnosis Orders   1. Tongue lesion        2. Lymphadenopathy                   Plan:      Since the tongue was negative and has not returned, neck node is small, there is no further follow up  Follow up prn        RX given today:

## 2024-02-05 DIAGNOSIS — M48.062 LUMBAR STENOSIS WITH NEUROGENIC CLAUDICATION: ICD-10-CM

## 2024-02-05 RX ORDER — TIZANIDINE 4 MG/1
TABLET ORAL
Qty: 30 TABLET | Refills: 0 | Status: SHIPPED | OUTPATIENT
Start: 2024-02-05

## 2024-02-12 ENCOUNTER — PREP FOR PROCEDURE (OUTPATIENT)
Dept: SURGERY | Age: 64
End: 2024-02-12

## 2024-02-12 ENCOUNTER — TELEPHONE (OUTPATIENT)
Dept: SURGERY | Age: 64
End: 2024-02-12

## 2024-02-12 ENCOUNTER — OFFICE VISIT (OUTPATIENT)
Dept: SURGERY | Age: 64
End: 2024-02-12
Payer: MEDICARE

## 2024-02-12 VITALS
BODY MASS INDEX: 33.34 KG/M2 | HEART RATE: 71 BPM | HEIGHT: 68 IN | SYSTOLIC BLOOD PRESSURE: 149 MMHG | RESPIRATION RATE: 16 BRPM | WEIGHT: 220 LBS | DIASTOLIC BLOOD PRESSURE: 92 MMHG | OXYGEN SATURATION: 100 %

## 2024-02-12 DIAGNOSIS — Z85.09 HISTORY OF GASTROINTESTINAL STROMAL TUMOR (GIST): ICD-10-CM

## 2024-02-12 DIAGNOSIS — Z86.010 PERSONAL HISTORY OF COLONIC POLYPS: ICD-10-CM

## 2024-02-12 DIAGNOSIS — Z86.010 HX OF ADENOMATOUS COLONIC POLYPS: Primary | ICD-10-CM

## 2024-02-12 DIAGNOSIS — K59.00 CONSTIPATION, UNSPECIFIED CONSTIPATION TYPE: ICD-10-CM

## 2024-02-12 PROBLEM — Z86.0100 PERSONAL HISTORY OF COLONIC POLYPS: Status: ACTIVE | Noted: 2024-02-12

## 2024-02-12 PROCEDURE — G8482 FLU IMMUNIZE ORDER/ADMIN: HCPCS | Performed by: SURGERY

## 2024-02-12 PROCEDURE — 3077F SYST BP >= 140 MM HG: CPT | Performed by: SURGERY

## 2024-02-12 PROCEDURE — 3080F DIAST BP >= 90 MM HG: CPT | Performed by: SURGERY

## 2024-02-12 PROCEDURE — 99212 OFFICE O/P EST SF 10 MIN: CPT | Performed by: SURGERY

## 2024-02-12 PROCEDURE — G8417 CALC BMI ABV UP PARAM F/U: HCPCS | Performed by: SURGERY

## 2024-02-12 PROCEDURE — 99213 OFFICE O/P EST LOW 20 MIN: CPT | Performed by: SURGERY

## 2024-02-12 PROCEDURE — 3017F COLORECTAL CA SCREEN DOC REV: CPT | Performed by: SURGERY

## 2024-02-12 PROCEDURE — 4004F PT TOBACCO SCREEN RCVD TLK: CPT | Performed by: SURGERY

## 2024-02-12 PROCEDURE — G8427 DOCREV CUR MEDS BY ELIG CLIN: HCPCS | Performed by: SURGERY

## 2024-02-12 NOTE — PATIENT INSTRUCTIONS
Call [phone] for any questions/concerns.    Patient Information and Instructions for Colonoscopy         Definition of Colonoscopy   A colonoscopy is the visual exam of the rectum and colon (large intestine). The exam is done with a tool called a colonoscope. The colonoscope is a flexible tube with a tiny camera on the end. This instrument allows the doctor to view the inside of your rectum and colon.  Sigmoidoscopy is a shorter scope that views only the last one third of the colon.     Reasons for Colonoscopy   It is used to examine, diagnose, and treat problems in your large intestine. The procedure is most often done for the following reasons:   To determine the cause of abdominal pain, rectal bleeding, or a change in bowel habits   To detect and treat colon cancer or colon polyps   To obtain tissue samples for testing   To stop intestinal bleeding   Monitor response to treatment if you have inflammatory bowel disease     Possible Complications   Complications are rare, but no procedure is completely free of risk. If you are planning to have a colonoscopy, your doctor will review a list of possible complications, which may include:   Bleeding   Reaction to the sedation causing drop in your blood pressure or problems breathing  Perforation or puncture of the bowel     Factors that may increase the risk of complications include:   Pre-existing heart or kidney condition   Treatment with certain medicines, including aspirin and other drugs with anticoagulant or blood-thinning properties   Prior abdominal surgery or radiation treatments   Active colitis , diverticulitis , or other acute bowel disease   Previous treatment with radiation therapy     Be sure to discuss these risks with your doctor before the procedure.     What to Expect   Prior to Procedure   Your doctor will likely do the following:   Physical exam   Health history   Review of medicines   Test your stool for hidden blood (called \"occult blood\")

## 2024-02-12 NOTE — PROGRESS NOTES
[facility]  History and Physical    Patient's Name/Date of Birth: Pipe Collins . / 1960 (63 y.o.)    PCP:  Dr. Parker    Chief Complaint:  colonoscopy eval    History of Present Illness:  63 yr old male with last colonoscopy 2018--found to have tubular adenoma.  Was seen in 2021 but never scheduled procedure due to other health issues.  Pt denies abd pain, blood in stool, unintentional weight loss, or family hx of colon cancer.  Reports recent constipation--improved with stool softeners/Miralax.    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 [facility]ce[facility] by Dr. Willian Swartz. Currently receiving oral chemo    COLONOSCOPY  09/14/2018    polyp--eliane    MOUTH SURGERY N/A 3/21/2022    EXCISION TONGUE LESION performed by Jd Mera DO at [facility] OR    NASAL SEPTUM SURGERY      RI COLONOSCOPY W/BIOPSY SINGLE/MULTIPLE N/A 9/14/2018    COLONOSCOPY WITH BIOPSY performed by Pineda Demarco MD at [facility]COPY    RI COLSC FLX W/RMVL OF TUMOR POLYP LESION SNARE TQ  9/14/2018    COLONOSCOPY POLYPECTOMY SNARE/COLD BIOPSY performed by Pineda Demarco MD at [facility] ENDOSCOPY    TONSILLECTOMY      US BIOPSY SOFT TISSUE NECK/THORAX  5/26/2021    US BIOPSY SOFT TISSUE NECK/THORAX 5/26/2021 [facility] ULTRASOUND       Family History   Problem Relation Age of Onset    Other Mother         COVID    Heart Failure Father     Other Sister         covid

## 2024-02-12 NOTE — TELEPHONE ENCOUNTER
Scheduled pt for Colonoscopy 24 at 10AM. Pt needs to arrive at [facility] at 9AM. Patient confirmed date and time, address and directions given in office. Prep instructions given in office, patient understood.      Prior Authorization Form:      DEMOGRAPHICS:                     Patient Name:  Harry Brown Jr.  Patient :  1960            Insurance:  Payor: [employer] MEDICARE / Plan: UNITEDHE[employer]CARE DUAL COMPLETE / Product Type: *No Product type* /   Insurance ID Number:    Payer/Plan Subscr  Sex Relation Sub. Ins. ID Effective Group Num   1. [employer] MEDICARE * HARRY BROWN * 1960 Male Self [id number]                                   [address] [address]   2. [facility]* HARRY BROWN * 1960 Male Self [id number]                                    [address]         DIAGNOSIS & PROCEDURE:                       Procedure/Operation: COLONOSCOPY           CPT Code: 92515    Diagnosis:  HX OF ADENOMATOUS COLONIC POLYPS, CONSTIPATION, HX OF GASTROINTESTINAL STROMAL TUMOR    ICD10 Code: Z86.010, K59.00, Z85.09    Location:  [facility]    Surgeon:  CARMINA URBINA    SCHEDULING INFORMATION:                          Date: 24    Time: 10AM              Anesthesia:  LMAC                                                       Status:  OUTPATIENT       Special Comments:  N/A       Electronically signed by Paula Obrien MA on 2024 at 3:42 PM

## 2024-02-27 DIAGNOSIS — M48.062 LUMBAR STENOSIS WITH NEUROGENIC CLAUDICATION: ICD-10-CM

## 2024-02-28 RX ORDER — TIZANIDINE 4 MG/1
TABLET ORAL
Qty: 30 TABLET | Refills: 0 | Status: SHIPPED | OUTPATIENT
Start: 2024-02-28

## 2024-03-21 ENCOUNTER — OFFICE VISIT (OUTPATIENT)
Dept: FAMILY MEDICINE CLINIC | Age: 64
End: 2024-03-21

## 2024-03-21 VITALS
TEMPERATURE: 98.6 F | SYSTOLIC BLOOD PRESSURE: 177 MMHG | OXYGEN SATURATION: 100 % | WEIGHT: 220 LBS | RESPIRATION RATE: 18 BRPM | BODY MASS INDEX: 31.5 KG/M2 | HEIGHT: 70 IN | HEART RATE: 68 BPM | DIASTOLIC BLOOD PRESSURE: 91 MMHG

## 2024-03-21 DIAGNOSIS — E11.42 TYPE 2 DIABETES MELLITUS WITH DIABETIC POLYNEUROPATHY, UNSPECIFIED WHETHER LONG TERM INSULIN USE (HCC): ICD-10-CM

## 2024-03-21 DIAGNOSIS — K59.03 DRUG-INDUCED CONSTIPATION: ICD-10-CM

## 2024-03-21 DIAGNOSIS — M48.062 LUMBAR STENOSIS WITH NEUROGENIC CLAUDICATION: Primary | ICD-10-CM

## 2024-03-21 DIAGNOSIS — C49.A4 GIST (GASTROINTESTINAL STROMA TUMOR), MALIGNANT, COLON (HCC): ICD-10-CM

## 2024-03-21 DIAGNOSIS — I10 ESSENTIAL HYPERTENSION: ICD-10-CM

## 2024-03-21 LAB
ALBUMIN SERPL-MCNC: 3.5 G/DL (ref 3.5–5.2)
ALP BLD-CCNC: 107 U/L (ref 40–129)
ALT SERPL-CCNC: 15 U/L (ref 0–40)
ANION GAP SERPL CALCULATED.3IONS-SCNC: 10 MMOL/L (ref 7–16)
AST SERPL-CCNC: 19 U/L (ref 0–39)
BILIRUB SERPL-MCNC: 0.3 MG/DL (ref 0–1.2)
BUN BLDV-MCNC: 11 MG/DL (ref 6–23)
CALCIUM SERPL-MCNC: 9.3 MG/DL (ref 8.6–10.2)
CHLORIDE BLD-SCNC: 102 MMOL/L (ref 98–107)
CO2: 26 MMOL/L (ref 22–29)
CREAT SERPL-MCNC: 0.7 MG/DL (ref 0.7–1.2)
GFR SERPL CREATININE-BSD FRML MDRD: >60 ML/MIN/1.73M2
GLUCOSE BLD-MCNC: 122 MG/DL (ref 74–99)
MAGNESIUM: 2.1 MG/DL (ref 1.6–2.6)
POTASSIUM SERPL-SCNC: 4.4 MMOL/L (ref 3.5–5)
SODIUM BLD-SCNC: 138 MMOL/L (ref 132–146)
TOTAL PROTEIN: 6.8 G/DL (ref 6.4–8.3)

## 2024-03-21 RX ORDER — TIZANIDINE 4 MG/1
4 TABLET ORAL EVERY 12 HOURS PRN
Qty: 30 TABLET | Refills: 0 | Status: SHIPPED | OUTPATIENT
Start: 2024-03-21 | End: 2024-04-20

## 2024-03-21 RX ORDER — LOSARTAN POTASSIUM 50 MG/1
50 TABLET ORAL DAILY
Qty: 90 TABLET | Refills: 3 | Status: SHIPPED | OUTPATIENT
Start: 2024-03-21

## 2024-03-21 ASSESSMENT — PATIENT HEALTH QUESTIONNAIRE - PHQ9
8. MOVING OR SPEAKING SO SLOWLY THAT OTHER PEOPLE COULD HAVE NOTICED. OR THE OPPOSITE, BEING SO FIGETY OR RESTLESS THAT YOU HAVE BEEN MOVING AROUND A LOT MORE THAN USUAL: NOT AT ALL
4. FEELING TIRED OR HAVING LITTLE ENERGY: NOT AT ALL
SUM OF ALL RESPONSES TO PHQ QUESTIONS 1-9: 0
SUM OF ALL RESPONSES TO PHQ QUESTIONS 1-9: 0
5. POOR APPETITE OR OVEREATING: NOT AT ALL
6. FEELING BAD ABOUT YOURSELF - OR THAT YOU ARE A FAILURE OR HAVE LET YOURSELF OR YOUR FAMILY DOWN: NOT AT ALL
2. FEELING DOWN, DEPRESSED OR HOPELESS: NOT AT ALL
10. IF YOU CHECKED OFF ANY PROBLEMS, HOW DIFFICULT HAVE THESE PROBLEMS MADE IT FOR YOU TO DO YOUR WORK, TAKE CARE OF THINGS AT HOME, OR GET ALONG WITH OTHER PEOPLE: NOT DIFFICULT AT ALL
SUM OF ALL RESPONSES TO PHQ QUESTIONS 1-9: 0
9. THOUGHTS THAT YOU WOULD BE BETTER OFF DEAD, OR OF HURTING YOURSELF: NOT AT ALL
3. TROUBLE FALLING OR STAYING ASLEEP: NOT AT ALL
SUM OF ALL RESPONSES TO PHQ QUESTIONS 1-9: 0
SUM OF ALL RESPONSES TO PHQ9 QUESTIONS 1 & 2: 0
7. TROUBLE CONCENTRATING ON THINGS, SUCH AS READING THE NEWSPAPER OR WATCHING TELEVISION: NOT AT ALL
1. LITTLE INTEREST OR PLEASURE IN DOING THINGS: NOT AT ALL

## 2024-03-21 NOTE — PROGRESS NOTES
Kandy Rose MD  
membranes clear; no lymphadenopathy/thyroid enlargement  Cardiovascular: regular rate and rhythm, no murmurs/gallops/rubs  Pulmonary: lungs clear to auscultation bilaterally  Abdomen: nondistended, nontender, bowel sounds active  Extremities: no peripheral edema  MSK: Tenderness to palpation of b/l lumbar paraspinal muscles, full ROM in all extremities  Neuro: No focal deficit    ______________________________________________________________________    Assessment & Plan:  1. Lumbar stenosis with neurogenic claudication  Risk benefit discussion had with patient on continuing Zanaflex in light of his constipation. Patient agrees that adverse effect of constipation is worth the pain relief from Zanaflex- agreeable to refill at this time  PMR referral and updated imaging  - tiZANidine (ZANAFLEX) 4 MG tablet; Take 1 tablet by mouth every 12 hours as needed (Pain)  Dispense: 30 tablet; Refill: 0  - MRI LUMBAR SPINE W WO CONTRAST; Future    2. Drug-induced constipation  Likely drug induced from Mounjaro/Zanaflex  Benefit of Mounjaro/Zanaflex outweights adverse effect of  - magnesium hydroxide (MILK OF MAGNESIA) 400 MG/5ML suspension; Take 30 mLs by mouth once as needed for Constipation If it has been more than three days with no bowel movement  Dispense: 1 each; Refill: 0    3. Type 2 diabetes mellitus with diabetic polyneuropathy, unspecified whether long term insulin use ([facility])  Check CMP/Mg to r/o electrolyte abnormalities as cause for persistent constipation  - Comprehensive Metabolic Panel  - Magnesium    4. Essential hypertension    - losartan (COZAAR) 50 MG tablet; Take 1 tablet by mouth daily  Dispense: 90 tablet; Refill: 3    5. GIST (gastrointestinal stroma tumor), malignant, colon (HCC)  Patient to have colonoscopy in April per Dr. Demarco        Return to Office: Return in about 1 week (around 3/28/2024).    Kenny Parker MD   This case was discussed with Dr. Rose

## 2024-03-28 ENCOUNTER — OFFICE VISIT (OUTPATIENT)
Dept: FAMILY MEDICINE CLINIC | Age: 64
End: 2024-03-28
Payer: MEDICARE

## 2024-03-28 VITALS
BODY MASS INDEX: 32.74 KG/M2 | HEART RATE: 76 BPM | SYSTOLIC BLOOD PRESSURE: 148 MMHG | WEIGHT: 216 LBS | HEIGHT: 68 IN | DIASTOLIC BLOOD PRESSURE: 86 MMHG | RESPIRATION RATE: 16 BRPM | TEMPERATURE: 97.2 F | OXYGEN SATURATION: 99 %

## 2024-03-28 DIAGNOSIS — E11.42 TYPE 2 DIABETES MELLITUS WITH DIABETIC POLYNEUROPATHY, UNSPECIFIED WHETHER LONG TERM INSULIN USE (HCC): ICD-10-CM

## 2024-03-28 DIAGNOSIS — R20.0 BILATERAL HAND NUMBNESS: Primary | ICD-10-CM

## 2024-03-28 DIAGNOSIS — M48.061 SPINAL STENOSIS OF LUMBAR REGION WITHOUT NEUROGENIC CLAUDICATION: ICD-10-CM

## 2024-03-28 LAB — HBA1C MFR BLD: 6.8 %

## 2024-03-28 PROCEDURE — 3044F HG A1C LEVEL LT 7.0%: CPT

## 2024-03-28 PROCEDURE — 99213 OFFICE O/P EST LOW 20 MIN: CPT

## 2024-03-28 PROCEDURE — 3079F DIAST BP 80-89 MM HG: CPT

## 2024-03-28 PROCEDURE — G8417 CALC BMI ABV UP PARAM F/U: HCPCS

## 2024-03-28 PROCEDURE — G8427 DOCREV CUR MEDS BY ELIG CLIN: HCPCS

## 2024-03-28 PROCEDURE — 4004F PT TOBACCO SCREEN RCVD TLK: CPT

## 2024-03-28 PROCEDURE — 83036 HEMOGLOBIN GLYCOSYLATED A1C: CPT

## 2024-03-28 PROCEDURE — 3017F COLORECTAL CA SCREEN DOC REV: CPT

## 2024-03-28 PROCEDURE — 2022F DILAT RTA XM EVC RTNOPTHY: CPT

## 2024-03-28 PROCEDURE — G8482 FLU IMMUNIZE ORDER/ADMIN: HCPCS

## 2024-03-28 PROCEDURE — 3077F SYST BP >= 140 MM HG: CPT

## 2024-03-28 RX ORDER — TIRZEPATIDE 7.5 MG/.5ML
7.5 INJECTION, SOLUTION SUBCUTANEOUS WEEKLY
Qty: 2 ML | Refills: 2 | Status: SHIPPED | OUTPATIENT
Start: 2024-03-28 | End: 2024-06-20

## 2024-03-28 ASSESSMENT — LIFESTYLE VARIABLES
HOW OFTEN DO YOU HAVE A DRINK CONTAINING ALCOHOL: NEVER
HOW MANY STANDARD DRINKS CONTAINING ALCOHOL DO YOU HAVE ON A TYPICAL DAY: PATIENT DOES NOT DRINK

## 2024-03-28 NOTE — PROGRESS NOTES
[facility]  FAMILY MEDICINE RESIDENCY PROGRAM  DATE OF VISIT : 3/29/2024    Patient : Pipe Collins Jr.   Age : 63 y.o.    : 1960   MRN : [id number]   ______________________________________________________________________    Chief Complaint:   Chief Complaint   Patient presents with    Diabetes       HPI:   Pipe Collins Jr. is a 63 y.o. male who presents for f/u T2DM  Hemoglobin A1C   Date Value Ref Range Status   2024 6.8 % Final     T2DM: Medications include Jardiance, Mounjaro. The patient states he has been out of Mounjaro for two weeks but before that he was compliant with it. He has also been compliant with Jardiance. The patient states that he has not been checking his glucose at home- he had a CGM but his arm mobility makes it hard to wear. He has been limiting his fried foods. He denies symptoms of hypoglycemia. He has a history of diabetic neuropathy, but he did not take gabapentin because he felt it caused GI upset. He only takes it when he feels he needs it.     Bilateral hand numbness/weakness: The patient states that he has been having worsening mobility in his shoulders with associated sharp pain, as well as pain in his neck. He states his hands feel weaker, and they are developing worsening numbness/tingling, especially at night. XR of shoulder in  showed calcific tendinitis of the R shoulder.     Spinal stenosis of lumbar region: Patient has not established with PMR or scheduled previously ordered MRI. He has been taking the Zanaflex with good effect for his pain.     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

## 2024-03-28 NOTE — PROGRESS NOTES
Patient is a 63-year-old male with past medical history of type 2 diabetes who presents today for a diabetes follow-up as well as discussion of shoulder and low back pain.  The patient's A1c today was 6.8%, improved from 8.6% in December 2023.  Medications include Jardiance and Mounjaro.  Patient states he has been out of Mounjaro for 2 weeks before that he was compliant with it.  He is also been compliant with Jardiance.  He has not been checking his glucose at home because of his limited shoulder mobility makes it difficult for him to use his CGM.  He denies symptoms of hypoglycemia.  He has been limiting his fried foods.  He does have a history of diabetic neuropathy, but he does not take gabapentin unless he feels he needs it because he feels it causes GI upset.  Of note, the patient has been experiencing constipation for several months, relieved with stool softeners and milk of magnesia.  Patient had requested to decrease his dose of or discontinue Mounjaro because he feels that it might be contributing to his constipation.    Low back pain/shoulder pain: The patient was seen 1 week ago for low back pain, with noted weakness and decreased sensation to the bilateral lower extremities though no red flag signs for back pain.  It was determined at that time there would be concern for lumbar spinal stenosis, so patient was ordered an MRI and was given Zanaflex and a PMR referral for further symptom management.  The patient states the Zanaflex has been helping his pain.  He states that he is also concerned about shoulder pain and restricted mobility.  He states that he can only abduct his right shoulder to 90 degrees, and it is painful to do so.  He states this has been progressively worsening over several months.  The patient had an x-ray of the shoulder July 2023 that did show calcific tendinitis of the right shoulder.    Hypertension-patient is mildly hypertensive in clinic however he states he drank a large

## 2024-04-15 ENCOUNTER — TELEPHONE (OUTPATIENT)
Dept: SURGERY | Age: 64
End: 2024-04-15

## 2024-04-15 NOTE — TELEPHONE ENCOUNTER
MA received call from patient stating that he won't be able to make it to his procedure with Dr. Demarco on 04/18 due to issues he is experiencing with the arthritis in his knees and being unable to get out of bed. Patient stated he would like to cancel his procedure for the time being. MA verbalized understanding and advised patient Dr. Demarco's medical assistant, Paula, would contact him to discuss a new procedure date that works for him. Patient verbalized his understanding.    Patient can be reached at [phone]    Routing message to Paula Obrien MA     Electronically signed by Debo Rivera MA on 4/15/2024 at 2:39 PM

## 2024-04-16 ENCOUNTER — APPOINTMENT (OUTPATIENT)
Dept: ULTRASOUND IMAGING | Age: 64
End: 2024-04-16
Payer: MEDICARE

## 2024-04-16 ENCOUNTER — APPOINTMENT (OUTPATIENT)
Dept: GENERAL RADIOLOGY | Age: 64
End: 2024-04-16
Payer: MEDICARE

## 2024-04-16 ENCOUNTER — HOSPITAL ENCOUNTER (EMERGENCY)
Age: 64
Discharge: HOME | End: 2024-04-16
Payer: MEDICARE

## 2024-04-16 VITALS
SYSTOLIC BLOOD PRESSURE: 175 MMHG | HEART RATE: 99 BPM | WEIGHT: 215 LBS | DIASTOLIC BLOOD PRESSURE: 94 MMHG | TEMPERATURE: 97.8 F | OXYGEN SATURATION: 98 % | HEIGHT: 68 IN | BODY MASS INDEX: 32.58 KG/M2 | RESPIRATION RATE: 18 BRPM

## 2024-04-16 DIAGNOSIS — M25.561 CHRONIC PAIN OF RIGHT KNEE: ICD-10-CM

## 2024-04-16 DIAGNOSIS — M48.062 LUMBAR STENOSIS WITH NEUROGENIC CLAUDICATION: ICD-10-CM

## 2024-04-16 DIAGNOSIS — G89.29 CHRONIC PAIN OF RIGHT KNEE: ICD-10-CM

## 2024-04-16 DIAGNOSIS — I10 PRIMARY HYPERTENSION: ICD-10-CM

## 2024-04-16 DIAGNOSIS — M17.11 ARTHRITIS OF RIGHT KNEE: Primary | ICD-10-CM

## 2024-04-16 LAB
ALBUMIN SERPL-MCNC: 3.2 G/DL (ref 3.5–5.2)
ALP SERPL-CCNC: 101 U/L (ref 40–129)
ALT SERPL-CCNC: 17 U/L (ref 0–40)
ANION GAP SERPL CALCULATED.3IONS-SCNC: 11 MMOL/L (ref 7–16)
AST SERPL-CCNC: 16 U/L (ref 0–39)
BASOPHILS # BLD: 0.04 K/UL (ref 0–0.2)
BASOPHILS NFR BLD: 1 % (ref 0–2)
BILIRUB SERPL-MCNC: 0.4 MG/DL (ref 0–1.2)
BUN SERPL-MCNC: 8 MG/DL (ref 6–23)
CALCIUM SERPL-MCNC: 8.8 MG/DL (ref 8.6–10.2)
CHLORIDE SERPL-SCNC: 100 MMOL/L (ref 98–107)
CO2 SERPL-SCNC: 26 MMOL/L (ref 22–29)
CREAT SERPL-MCNC: 0.6 MG/DL (ref 0.7–1.2)
EOSINOPHIL # BLD: 0.09 K/UL (ref 0.05–0.5)
EOSINOPHILS RELATIVE PERCENT: 1 % (ref 0–6)
ERYTHROCYTE [DISTWIDTH] IN BLOOD BY AUTOMATED COUNT: 14.4 % (ref 11.5–15)
GFR SERPL CREATININE-BSD FRML MDRD: >90 ML/MIN/1.73M2
GLUCOSE SERPL-MCNC: 151 MG/DL (ref 74–99)
HCT VFR BLD AUTO: 43.6 % (ref 37–54)
HGB BLD-MCNC: 14.6 G/DL (ref 12.5–16.5)
IMM GRANULOCYTES # BLD AUTO: <0.03 K/UL (ref 0–0.58)
IMM GRANULOCYTES NFR BLD: 0 % (ref 0–5)
LACTATE BLDV-SCNC: 0.9 MMOL/L (ref 0.5–2.2)
LYMPHOCYTES NFR BLD: 2.41 K/UL (ref 1.5–4)
LYMPHOCYTES RELATIVE PERCENT: 28 % (ref 20–42)
MCH RBC QN AUTO: 31.8 PG (ref 26–35)
MCHC RBC AUTO-ENTMCNC: 33.5 G/DL (ref 32–34.5)
MCV RBC AUTO: 95 FL (ref 80–99.9)
MONOCYTES NFR BLD: 0.57 K/UL (ref 0.1–0.95)
MONOCYTES NFR BLD: 7 % (ref 2–12)
NEUTROPHILS NFR BLD: 63 % (ref 43–80)
NEUTS SEG NFR BLD: 5.42 K/UL (ref 1.8–7.3)
PLATELET # BLD AUTO: 270 K/UL (ref 130–450)
PMV BLD AUTO: 10.1 FL (ref 7–12)
POTASSIUM SERPL-SCNC: 3.6 MMOL/L (ref 3.5–5)
PROT SERPL-MCNC: 6.4 G/DL (ref 6.4–8.3)
RBC # BLD AUTO: 4.59 M/UL (ref 3.8–5.8)
SODIUM SERPL-SCNC: 137 MMOL/L (ref 132–146)
URATE SERPL-MCNC: 4.5 MG/DL (ref 3.4–7)
WBC OTHER # BLD: 8.6 K/UL (ref 4.5–11.5)

## 2024-04-16 PROCEDURE — 73562 X-RAY EXAM OF KNEE 3: CPT

## 2024-04-16 PROCEDURE — 80053 COMPREHEN METABOLIC PANEL: CPT

## 2024-04-16 PROCEDURE — 6370000000 HC RX 637 (ALT 250 FOR IP): Performed by: PHYSICIAN ASSISTANT

## 2024-04-16 PROCEDURE — 6360000002 HC RX W HCPCS: Performed by: PHYSICIAN ASSISTANT

## 2024-04-16 PROCEDURE — 84550 ASSAY OF BLOOD/URIC ACID: CPT

## 2024-04-16 PROCEDURE — 96372 THER/PROPH/DIAG INJ SC/IM: CPT

## 2024-04-16 PROCEDURE — 85025 COMPLETE CBC W/AUTO DIFF WBC: CPT

## 2024-04-16 PROCEDURE — 83605 ASSAY OF LACTIC ACID: CPT

## 2024-04-16 PROCEDURE — 93971 EXTREMITY STUDY: CPT

## 2024-04-16 PROCEDURE — 99284 EMERGENCY DEPT VISIT MOD MDM: CPT

## 2024-04-16 RX ORDER — TIZANIDINE 4 MG/1
4 TABLET ORAL EVERY 12 HOURS PRN
Qty: 30 TABLET | Refills: 0 | Status: SHIPPED | OUTPATIENT
Start: 2024-04-16 | End: 2024-05-16

## 2024-04-16 RX ORDER — OXYCODONE HYDROCHLORIDE AND ACETAMINOPHEN 5; 325 MG/1; MG/1
1 TABLET ORAL ONCE
Status: COMPLETED | OUTPATIENT
Start: 2024-04-16 | End: 2024-04-16

## 2024-04-16 RX ORDER — KETOROLAC TROMETHAMINE 30 MG/ML
30 INJECTION, SOLUTION INTRAMUSCULAR; INTRAVENOUS ONCE
Status: COMPLETED | OUTPATIENT
Start: 2024-04-16 | End: 2024-04-16

## 2024-04-16 RX ADMIN — OXYCODONE HYDROCHLORIDE AND ACETAMINOPHEN 1 TABLET: 5; 325 TABLET ORAL at 09:22

## 2024-04-16 RX ADMIN — KETOROLAC TROMETHAMINE 30 MG: 30 INJECTION, SOLUTION INTRAMUSCULAR at 09:21

## 2024-04-16 ASSESSMENT — PAIN SCALES - GENERAL
PAINLEVEL_OUTOF10: 9
PAINLEVEL_OUTOF10: 9

## 2024-04-16 ASSESSMENT — PAIN DESCRIPTION - DESCRIPTORS
DESCRIPTORS: SHARP;THROBBING
DESCRIPTORS: SHARP;THROBBING

## 2024-04-16 ASSESSMENT — PAIN DESCRIPTION - LOCATION
LOCATION: SHOULDER
LOCATION: SHOULDER

## 2024-04-16 ASSESSMENT — PAIN DESCRIPTION - ORIENTATION
ORIENTATION: RIGHT
ORIENTATION: RIGHT

## 2024-04-16 ASSESSMENT — PAIN - FUNCTIONAL ASSESSMENT
PAIN_FUNCTIONAL_ASSESSMENT: PREVENTS OR INTERFERES SOME ACTIVE ACTIVITIES AND ADLS
PAIN_FUNCTIONAL_ASSESSMENT: PREVENTS OR INTERFERES SOME ACTIVE ACTIVITIES AND ADLS

## 2024-04-16 ASSESSMENT — LIFESTYLE VARIABLES: HOW OFTEN DO YOU HAVE A DRINK CONTAINING ALCOHOL: NEVER

## 2024-04-16 NOTE — DISCHARGE INSTRUCTIONS
Your knee x-ray shows that you have arthritis.  No evidence of a DVT.  Sugar was within normal limits.  Please resume your follow-up with your primary care provider.  You are given tramadol and gabapentin and have an active prescription therefore I cannot provide any for home.  I hope you feel better soon!

## 2024-04-16 NOTE — ED PROVIDER NOTES
Independent DI Visit.    [facility]  Department of Emergency Medicine   ED  Encounter Note  Admit Date/RoomTime: 2024  8:20 AM  ED Room: PATSY/PATSY    NAME: Pipe Collins Jr.  : 1960  MRN: [id number]     Chief Complaint:  Generalized Body Aches (Generalized all over body pains. ) and Extremity Weakness (Right knee swelling and pain.)    History of Present Illness       Pipe Collins Jr. is a 63 y.o. old male who presents to the emergency department by private vehicle, for non-traumatic swelling to right knee  which occured 3 day(s) prior to arrival.  Patient states he has history of swelling in the left leg as well.  Patient states over the last 2 months the swelling has switched to his right knee.  Patient states the current swelling started over the last 3 to 4 days.  The complaint is due to no known cause.  Since onset the symptoms have been persistent.  Patient does have a history of gout.  His pain is aggraveated by any use of or weight bearing and relieved by nothing.  Patient states he tried topical pain reliever on the area as well as over-the-counter arthritis pills and a compression wrap without resolution of the swelling.  His weight bearing ability is: normal. He denies any head injury, headache, loss of consciousness, confusion, dizziness, neck pain, chest pain, abdominal pain, extremity injury, numbness, weakness, blurred vision, nausea, vomiting, fever, chills, wounds, or rash.  Patient also states that his right shoulder.  He is also struggling with his neuropathy and lower back pain.  Patient does have a history of lumbar radiculopathy, lumbar disc disorder, spinal stenosis of the lumbar region with neurogenic claudication, and type 2 diabetes with diabetic polyneuropathy.  Patient does have gabapentin but he states \" I have so many pills that I do not take my gabapentin\".  Tetanus Status: up to date.     ROS   Pertinent positives and negatives are stated within

## 2024-04-16 NOTE — TELEPHONE ENCOUNTER
MA returned pt call. Pt states he is not ready to reschedule. He will call office when ready to reschedule.     Electronically signed by Paula Obrien MA on 4/16/2024 at 8:43 AM

## 2024-04-23 ENCOUNTER — OFFICE VISIT (OUTPATIENT)
Dept: PHYSICAL MEDICINE AND REHAB | Age: 64
End: 2024-04-23
Payer: MEDICARE

## 2024-04-23 VITALS
HEART RATE: 70 BPM | DIASTOLIC BLOOD PRESSURE: 85 MMHG | HEIGHT: 68 IN | WEIGHT: 215 LBS | SYSTOLIC BLOOD PRESSURE: 155 MMHG | TEMPERATURE: 98.1 F | BODY MASS INDEX: 32.58 KG/M2 | OXYGEN SATURATION: 100 %

## 2024-04-23 DIAGNOSIS — M48.062 SPINAL STENOSIS OF LUMBAR REGION WITH NEUROGENIC CLAUDICATION: Primary | ICD-10-CM

## 2024-04-23 DIAGNOSIS — M51.36 DDD (DEGENERATIVE DISC DISEASE), LUMBAR: ICD-10-CM

## 2024-04-23 PROCEDURE — 3079F DIAST BP 80-89 MM HG: CPT | Performed by: PHYSICAL MEDICINE & REHABILITATION

## 2024-04-23 PROCEDURE — 99204 OFFICE O/P NEW MOD 45 MIN: CPT | Performed by: PHYSICAL MEDICINE & REHABILITATION

## 2024-04-23 PROCEDURE — 3077F SYST BP >= 140 MM HG: CPT | Performed by: PHYSICAL MEDICINE & REHABILITATION

## 2024-04-23 PROCEDURE — G8427 DOCREV CUR MEDS BY ELIG CLIN: HCPCS | Performed by: PHYSICAL MEDICINE & REHABILITATION

## 2024-04-23 PROCEDURE — G8417 CALC BMI ABV UP PARAM F/U: HCPCS | Performed by: PHYSICAL MEDICINE & REHABILITATION

## 2024-04-23 PROCEDURE — 3017F COLORECTAL CA SCREEN DOC REV: CPT | Performed by: PHYSICAL MEDICINE & REHABILITATION

## 2024-04-23 PROCEDURE — 4004F PT TOBACCO SCREEN RCVD TLK: CPT | Performed by: PHYSICAL MEDICINE & REHABILITATION

## 2024-04-23 NOTE — PROGRESS NOTES
Vilma Gross M.D.  [facility] [place] [facility] PHYSICAL MEDICINE AND REHABILITAION  [address]  [address]  [place] [id number]  Dept: [phone]  Dept Fax: [phone]  Loc: [phone]    PCP: Kenny Parker MD  Date of visit: 4/23/24    Chief Complaint   Patient presents with    Back Pain     Chronic low back pain, not due to injury.  Prolong standing makes pain worse.   Medication, tramadol and Zanaflex as needed.          Pipe Collins Jr. is a 63 y.o.   man with gradual onset of low back pain after no known injury 4-5 years ago. Now, the pain is constant.  The pain is rated Pain Score:   8, is described as sharp, throbbing, and is located in the midline low back with radiation to both lower extremities. He reports numbness/tingling in both feet and hands related to his neuropathy. He reports chronic weakness in both legs. No incontinence, he does report urinary urgency which he relates to his diuretic, unchanged. The symptoms have been worse since onset.  The pain is better with \"sleep\". The pain is worse with any prolonged position, but he feels standing is the worst.     He previously was evaluated by [facility] Pain management and was offered injections but declined.   New lumbar spine MRI was ordered but has not yet been completed-- was scheduled 4/16 however patient cancelled, which he states was due to an \"arthritis flare up\" . Patient reports that he is not going to reschedule the MRI \"until my pain is under better control\". Prior lumbar MRI with multilevel DDD, stenosis, and NF narrowing - moderate to severe. Patient saw NSGY in 2022 and per their notes patient was recommended to try PT and injections first and then return if no relief -- patient does not want surgery.         The prior workup has included:   -Lumbar spine MRI 11/4/2022  FINDINGS:  BONES/ALIGNMENT: Mild grade 1 anterolisthesis is identified at L5-S1.  The  vertebral body

## 2024-04-26 ENCOUNTER — OFFICE VISIT (OUTPATIENT)
Dept: FAMILY MEDICINE CLINIC | Age: 64
End: 2024-04-26
Payer: MEDICARE

## 2024-04-26 VITALS
WEIGHT: 212 LBS | HEART RATE: 77 BPM | SYSTOLIC BLOOD PRESSURE: 160 MMHG | TEMPERATURE: 97.3 F | DIASTOLIC BLOOD PRESSURE: 98 MMHG | HEIGHT: 68 IN | OXYGEN SATURATION: 99 % | BODY MASS INDEX: 32.13 KG/M2 | RESPIRATION RATE: 18 BRPM

## 2024-04-26 DIAGNOSIS — M25.561 ACUTE PAIN OF RIGHT KNEE: Primary | ICD-10-CM

## 2024-04-26 DIAGNOSIS — Z76.0 MEDICATION REFILL: ICD-10-CM

## 2024-04-26 DIAGNOSIS — I50.32 CHRONIC HEART FAILURE WITH PRESERVED EJECTION FRACTION (HFPEF) (HCC): ICD-10-CM

## 2024-04-26 DIAGNOSIS — Z79.4 TYPE 2 DIABETES MELLITUS WITH HYPEROSMOLARITY WITHOUT COMA, WITH LONG-TERM CURRENT USE OF INSULIN (HCC): ICD-10-CM

## 2024-04-26 DIAGNOSIS — E11.00 TYPE 2 DIABETES MELLITUS WITH HYPEROSMOLARITY WITHOUT COMA, WITH LONG-TERM CURRENT USE OF INSULIN (HCC): ICD-10-CM

## 2024-04-26 LAB
CREATININE URINE POCT: ABNORMAL
MICROALBUMIN/CREAT 24H UR: ABNORMAL MG/G{CREAT}
MICROALBUMIN/CREAT UR-RTO: ABNORMAL

## 2024-04-26 PROCEDURE — G8417 CALC BMI ABV UP PARAM F/U: HCPCS

## 2024-04-26 PROCEDURE — 3077F SYST BP >= 140 MM HG: CPT

## 2024-04-26 PROCEDURE — 99213 OFFICE O/P EST LOW 20 MIN: CPT

## 2024-04-26 PROCEDURE — 3017F COLORECTAL CA SCREEN DOC REV: CPT

## 2024-04-26 PROCEDURE — 82044 UR ALBUMIN SEMIQUANTITATIVE: CPT

## 2024-04-26 PROCEDURE — 2022F DILAT RTA XM EVC RTNOPTHY: CPT

## 2024-04-26 PROCEDURE — G8427 DOCREV CUR MEDS BY ELIG CLIN: HCPCS

## 2024-04-26 PROCEDURE — 3044F HG A1C LEVEL LT 7.0%: CPT

## 2024-04-26 PROCEDURE — 3080F DIAST BP >= 90 MM HG: CPT

## 2024-04-26 PROCEDURE — 4004F PT TOBACCO SCREEN RCVD TLK: CPT

## 2024-04-26 RX ORDER — DOCUSATE SODIUM 100 MG/1
100 CAPSULE, LIQUID FILLED ORAL 2 TIMES DAILY
Qty: 90 CAPSULE | Refills: 1 | Status: SHIPPED | OUTPATIENT
Start: 2024-04-26

## 2024-04-26 RX ORDER — LANOLIN ALCOHOL/MO/W.PET/CERES
400 CREAM (GRAM) TOPICAL DAILY
Qty: 30 TABLET | Refills: 2 | Status: SHIPPED | OUTPATIENT
Start: 2024-04-26

## 2024-04-26 RX ORDER — EPLERENONE 50 MG/1
50 TABLET, FILM COATED ORAL DAILY
Qty: 90 TABLET | Refills: 3 | Status: SHIPPED | OUTPATIENT
Start: 2024-04-26

## 2024-04-26 RX ORDER — AMLODIPINE BESYLATE 10 MG/1
10 TABLET ORAL DAILY
Qty: 90 TABLET | Refills: 3 | Status: SHIPPED | OUTPATIENT
Start: 2024-04-26

## 2024-04-26 RX ORDER — BUMETANIDE 1 MG/1
1 TABLET ORAL 2 TIMES DAILY
Qty: 180 TABLET | Refills: 3 | Status: SHIPPED | OUTPATIENT
Start: 2024-04-26

## 2024-04-26 RX ORDER — ATORVASTATIN CALCIUM 40 MG/1
40 TABLET, FILM COATED ORAL DAILY
Qty: 90 TABLET | Refills: 2 | Status: SHIPPED | OUTPATIENT
Start: 2024-04-26

## 2024-04-26 RX ORDER — ACETAMINOPHEN 160 MG
2000 TABLET,DISINTEGRATING ORAL DAILY
Qty: 30 CAPSULE | Refills: 4 | Status: SHIPPED | OUTPATIENT
Start: 2024-04-26

## 2024-04-26 NOTE — PATIENT INSTRUCTIONS
Please call around to your local pharmacies to see if they have a supply of Tizanidine, then call the office and let us know.

## 2024-04-26 NOTE — PROGRESS NOTES
S: 63 y.o. male presents today for: F/u    1) HTN -  Takes med as described. Rare missed doses. No CP/SOB/SEBASTIAN/Leg Cramping w/ Exertion/LE Edema. No side effects.     2) Diabetes Followup: off mounjaro 2/2 pharmacy shortage. Surgary drinks.     3) HFrEF - bumex.     4) ED F/u - inflammed RIGHT knee, swollen, painful, stiff. Similar episodes on LEFT in the past. XR w/o acute change, uric acid, WBC, CMP all normal. Toradol, percocet and sent home with signif improvement. Afebrile thru-out. Did have chills initially. HX Gout but felt different.     O: VS: BP (!) 160/98   Pulse 77   Temp 97.3 °F (36.3 °C) (Temporal)   Resp 18   Ht 1.727 m (5' 8\")   Wt 96.2 kg (212 lb)   SpO2 99%   BMI 32.23 kg/m²   AAO/NAD, appropriate affect for mood  CV:  RRR, no murmur  Resp: CTAB      Impression/Plan:   1) HTN - BP elevated today, hasn't been taking amlodipine since out & forgot.     2) DM - call pharmacy for mounjaru. Stop surgary drinks    3) HFrEF - follows with Maria Eugenia.     HM: Colonoscopy ordered x2     Attending Physician Statement  I have discussed the case, including pertinent history and exam findings with the resident. I also have seen the patient and performed key portions of the examination.  I agree with the documented assessment and plan.      Erick Swan MD  
clinic for maintenance  F/u with Dr. Del Cid  - amLODIPine (NORVASC) 10 MG tablet; Take 1 tablet by mouth daily  Dispense: 90 tablet; Refill: 3  - bumetanide (BUMEX) 1 MG tablet; Take 1 tablet by mouth 2 times daily  Dispense: 180 tablet; Refill: 3  - eplerenone (INSPRA) 50 MG tablet; Take 1 tablet by mouth daily  Dispense: 90 tablet; Refill: 3  [facility]    2. Type 2 diabetes mellitus with hyperosmolarity without coma, with long-term current use of insulin ([facility])  Advised to call around to different pharmacies to find one with supply of mounjaro  - atorvastatin (LIPITOR) 40 MG tablet; Take 1 tablet by mouth daily  Dispense: 90 tablet; Refill: 2  - POCT microalbumin    3. Acute pain of right knee  Likely acute gout attack, now resolved  Voltaren for residual pain  - diclofenac sodium (VOLTAREN) 1 % GEL; Apply 2 g topically 4 times daily as needed for Pain (Knee)  Dispense: 50 g; Refill: 4    4. Medication refill    - Capsaicin (ZOSTRIX) 0.035 % CREA cream; Apply topically 3 times daily  Dispense: 1 each; Refill: 1  - vitamin D (VITAMIN D3) 50 MCG (2000 UT) CAPS capsule; Take 1 capsule by mouth daily  Dispense: 30 capsule; Refill: 4  - docusate sodium (COLACE) 100 MG capsule; Take 1 capsule by mouth 2 times daily  Dispense: 90 capsule; Refill: 1  - magnesium oxide (MAG-OX) 400 (240 Mg) MG tablet; Take 1 tablet by mouth daily  Dispense: 30 tablet; Refill: 2        Return to Office: Return in about 4 weeks (around 5/24/2024).    Kenny Parker MD   This case was discussed with Dr. Swan

## 2024-05-17 ENCOUNTER — HOSPITAL ENCOUNTER (OUTPATIENT)
Dept: OTHER | Age: 64
Setting detail: THERAPIES SERIES
Discharge: HOME | End: 2024-05-17
Payer: MEDICARE

## 2024-05-17 VITALS
HEART RATE: 75 BPM | BODY MASS INDEX: 32.54 KG/M2 | WEIGHT: 214 LBS | RESPIRATION RATE: 18 BRPM | OXYGEN SATURATION: 96 % | SYSTOLIC BLOOD PRESSURE: 138 MMHG | DIASTOLIC BLOOD PRESSURE: 74 MMHG

## 2024-05-17 LAB
ANION GAP SERPL CALCULATED.3IONS-SCNC: 11 MMOL/L (ref 7–16)
BNP SERPL-MCNC: 301 PG/ML (ref 0–125)
BUN SERPL-MCNC: 10 MG/DL (ref 6–23)
CALCIUM SERPL-MCNC: 9.5 MG/DL (ref 8.6–10.2)
CHLORIDE SERPL-SCNC: 101 MMOL/L (ref 98–107)
CO2 SERPL-SCNC: 26 MMOL/L (ref 22–29)
CREAT SERPL-MCNC: 0.7 MG/DL (ref 0.7–1.2)
GFR, ESTIMATED: >90 ML/MIN/1.73M2
GLUCOSE SERPL-MCNC: 161 MG/DL (ref 74–99)
POTASSIUM SERPL-SCNC: 4.1 MMOL/L (ref 3.5–5)
SODIUM SERPL-SCNC: 138 MMOL/L (ref 132–146)

## 2024-05-17 PROCEDURE — 99214 OFFICE O/P EST MOD 30 MIN: CPT

## 2024-05-17 PROCEDURE — 99204 OFFICE O/P NEW MOD 45 MIN: CPT

## 2024-05-17 PROCEDURE — 83880 ASSAY OF NATRIURETIC PEPTIDE: CPT

## 2024-05-17 PROCEDURE — 80048 BASIC METABOLIC PNL TOTAL CA: CPT

## 2024-05-17 ASSESSMENT — PATIENT HEALTH QUESTIONNAIRE - PHQ9
SUM OF ALL RESPONSES TO PHQ9 QUESTIONS 1 & 2: 0
2. FEELING DOWN, DEPRESSED OR HOPELESS: NOT AT ALL
SUM OF ALL RESPONSES TO PHQ QUESTIONS 1-9: 0
SUM OF ALL RESPONSES TO PHQ QUESTIONS 1-9: 0
1. LITTLE INTEREST OR PLEASURE IN DOING THINGS: NOT AT ALL
SUM OF ALL RESPONSES TO PHQ QUESTIONS 1-9: 0
SUM OF ALL RESPONSES TO PHQ QUESTIONS 1-9: 0

## 2024-05-17 NOTE — PROGRESS NOTES
mg once or twice daily until flare resolves.  Jill Mabry MD   Continuous Blood Gluc  (FREESTYLE DEIDRE 2 READER) CARMELA Use to monitor sugars  Patient not taking: Reported on 4/26/2024  Jill Mabry MD   Continuous Blood Gluc Sensor (FREESTYLE DEIDRE 2 SENSOR) [facility] [place], [place] Please replace sensor every 14 days (prescription should cover one month of sensors with 11 months of refills)  Patient not taking: Reported on 4/26/2024  iJll Mabry MD   gabapentin (NEURONTIN) 800 MG tablet Take 1 tablet by mouth 3 times daily as needed.  Provider, MD Jessica   traMADol (ULTRAM) 50 MG tablet Take 1 tablet by mouth every 6 hours as needed for Pain.  ProviderJessica MD   sildenafil (VIAGRA) 100 MG tablet Take 1 tablet by mouth as needed Take an hour before sex.  [facility].  ProviderJessica MD              GUIDELINE DIRECTED MEDICAL THERAPY for HFpEF:  SGLT2i:  (2a indication)  JARDIANCE 25 MG DAILY  Aldosterone antagonist: (2b indication)  Eplerenone (Inspra) 50 mg daily  ARNI/ACE I/ARB: (2b indication, with LVEF on lower end of spectrum)  Losartan      HEART FAILURE FOCUSED PHYSICAL EXAMINATION:    Vitals:    05/17/24 0855   BP: 138/74   Pulse: 75   Resp: 18   SpO2: 96%        Assessment  Charting Type: Shift assessment           Respiratory  Respiratory Pattern: Regular  Respiratory Depth: Normal  Respiratory Quality/Effort: Unlabored, Dyspnea with exertion  Chest Assessment: Chest expansion symmetrical  L Breath Sounds: Diminished, Clear (current smoker)  R Breath Sounds: Diminished  Breath Sounds  Respiratory Pattern: Regular     Cardiac  Cardiac Rhythm: Sinus rhythm  Rhythm Interpretation  Cardiac Rhythm: Sinus rhythm  Pulse: 75     Gastrointestinal  Abdominal (WDL): Within Defined Limits         Peripheral Vascular  Peripheral Vascular (WDL): Exceptions to WDL  Edema: Right lower extremity, Left lower extremity  RLE Edema: Pitting, Trace  LLE Edema: Trace, Non-pitting

## 2024-05-17 NOTE — PLAN OF CARE
Problem: Chronic Conditions and Co-morbidities  Goal: Patient's chronic conditions and co-morbidity symptoms are monitored and maintained or improved  Flowsheets (Taken 5/17/2024 9889)  Care Plan - Patient's Chronic Conditions and Co-Morbidity Symptoms are Monitored and Maintained or Improved: Monitor and assess patient's chronic conditions and comorbid symptoms for stability, deterioration, or improvement

## 2024-05-30 ENCOUNTER — HOSPITAL ENCOUNTER (OUTPATIENT)
Dept: MRI IMAGING | Age: 64
Discharge: HOME | End: 2024-06-01
Payer: MEDICAID

## 2024-05-30 DIAGNOSIS — H53.2 DIPLOPIA: ICD-10-CM

## 2024-05-30 PROCEDURE — 6360000004 HC RX CONTRAST MEDICATION: Performed by: RADIOLOGY

## 2024-05-30 PROCEDURE — 70543 MRI ORBT/FAC/NCK W/O &W/DYE: CPT

## 2024-05-30 PROCEDURE — 70553 MRI BRAIN STEM W/O & W/DYE: CPT

## 2024-05-30 PROCEDURE — A9579 GAD-BASE MR CONTRAST NOS,1ML: HCPCS | Performed by: RADIOLOGY

## 2024-05-30 RX ADMIN — GADOTERIDOL 20 ML: 279.3 INJECTION, SOLUTION INTRAVENOUS at 13:01

## 2024-05-31 ENCOUNTER — TELEPHONE (OUTPATIENT)
Dept: FAMILY MEDICINE CLINIC | Age: 64
End: 2024-05-31

## 2024-05-31 DIAGNOSIS — M48.062 LUMBAR STENOSIS WITH NEUROGENIC CLAUDICATION: Primary | ICD-10-CM

## 2024-06-03 RX ORDER — TIZANIDINE 2 MG/1
4 TABLET ORAL EVERY 12 HOURS PRN
Qty: 60 TABLET | Refills: 0 | Status: SHIPPED | OUTPATIENT
Start: 2024-06-03 | End: 2024-07-03

## 2024-06-06 ENCOUNTER — HOSPITAL ENCOUNTER (OUTPATIENT)
Dept: OTHER | Age: 64
Setting detail: THERAPIES SERIES
Discharge: HOME | End: 2024-06-06
Payer: MEDICARE

## 2024-06-06 VITALS
RESPIRATION RATE: 18 BRPM | HEART RATE: 78 BPM | SYSTOLIC BLOOD PRESSURE: 138 MMHG | BODY MASS INDEX: 31.32 KG/M2 | OXYGEN SATURATION: 99 % | WEIGHT: 206 LBS | DIASTOLIC BLOOD PRESSURE: 78 MMHG

## 2024-06-06 LAB
ANION GAP SERPL CALCULATED.3IONS-SCNC: 12 MMOL/L (ref 7–16)
BNP SERPL-MCNC: 132 PG/ML (ref 0–125)
BUN SERPL-MCNC: 13 MG/DL (ref 6–23)
CALCIUM SERPL-MCNC: 9.2 MG/DL (ref 8.6–10.2)
CHLORIDE SERPL-SCNC: 99 MMOL/L (ref 98–107)
CO2 SERPL-SCNC: 24 MMOL/L (ref 22–29)
CREAT SERPL-MCNC: 0.8 MG/DL (ref 0.7–1.2)
GFR, ESTIMATED: >90 ML/MIN/1.73M2
GLUCOSE SERPL-MCNC: 161 MG/DL (ref 74–99)
POTASSIUM SERPL-SCNC: 4.1 MMOL/L (ref 3.5–5)
SODIUM SERPL-SCNC: 135 MMOL/L (ref 132–146)

## 2024-06-06 PROCEDURE — 83880 ASSAY OF NATRIURETIC PEPTIDE: CPT

## 2024-06-06 PROCEDURE — 80048 BASIC METABOLIC PNL TOTAL CA: CPT

## 2024-06-06 PROCEDURE — 99214 OFFICE O/P EST MOD 30 MIN: CPT

## 2024-06-06 NOTE — PROGRESS NOTES
Congestive Heart Failure Clinic   [facility]      Referring Provider: DR. RODRIGUEZ  Primary Care Physician: Kenny Parker MD   Cardiologist: DOES NOT HAVE ONE  Nephrologist: N/A      HISTORY OF PRESENT ILLNESS:     Pipe Collins Jr. is a 63 y.o. (1960) male with a history of HFpEF (EF> 50%)  Pre Cupid:     Lab Results   Component Value Date    LVEF 60 08/13/2020     Post Cupid:  No results found for: \"EFBP\"      He presents to the CHF clinic for ongoing evaluation and monitoring of heart failure.    In the CHF clinic today he denies any adverse symptoms except:  [] Dizziness or lightheadedness   [] Syncope or near syncope  [] Recent Fall  [] Shortness of breath at rest   [x] Dyspnea with exertion  [] Decline in functional capacity (unable to perform activities they had previously been able to do)  [] Fatigue   [] Orthopnea  [] PND  [] Nocturnal cough  [] Hemoptysis  [] Chest pain, pressure, or discomfort  [] Palpitations  [] Abdominal distention  [] Abdominal bloating  [] Early satiety  [] Blood in stool   [] Diarrhea  [] Constipation  [] Nausea/Vomiting  [] Decreased urinary response to oral diuretic   [] Scrotal swelling   [] Lower extremity edema  [] Used PRN doses of oral diuretic   [] Weight gain    Wt Readings from Last 3 Encounters:   06/06/24 93.4 kg (206 lb)   05/17/24 97.1 kg (214 lb)   04/26/24 96.2 kg (212 lb)           SOCIAL HISTORY:  [] Denies tobacco, alcohol or illicit drug abuse  [x] Tobacco use:1 pack every 2 days  [] ETOH use:  [x] Illicit drug use:Marijuana        MEDICATIONS:    Allergies   Allergen Reactions    Lasix [Furosemide] Other (See Comments)     Per pt potassium was too low when on lasix    Lisinopril Swelling     Angioedema    Prunus Persica Anaphylaxis     Per report to PFS managerSamson.  Mariajose Henry, BS-NDTR    Atenolol Other (See Comments) and Swelling     Thins his blood    Nitrates, Organic Other (See

## 2024-06-06 NOTE — PLAN OF CARE
Problem: Chronic Conditions and Co-morbidities  Goal: Patient's chronic conditions and co-morbidity symptoms are monitored and maintained or improved  Flowsheets (Taken 6/6/2024 1034)  Care Plan - Patient's Chronic Conditions and Co-Morbidity Symptoms are Monitored and Maintained or Improved: Monitor and assess patient's chronic conditions and comorbid symptoms for stability, deterioration, or improvement

## 2024-06-24 ENCOUNTER — HOSPITAL ENCOUNTER (OUTPATIENT)
Dept: MRI IMAGING | Age: 64
Discharge: HOME | End: 2024-06-26
Payer: MEDICAID

## 2024-06-24 DIAGNOSIS — M48.061 SPINAL STENOSIS OF LUMBAR REGION WITHOUT NEUROGENIC CLAUDICATION: ICD-10-CM

## 2024-06-24 DIAGNOSIS — R20.0 BILATERAL HAND NUMBNESS: ICD-10-CM

## 2024-06-24 PROCEDURE — 72148 MRI LUMBAR SPINE W/O DYE: CPT

## 2024-06-24 PROCEDURE — 72141 MRI NECK SPINE W/O DYE: CPT

## 2024-06-26 ENCOUNTER — TELEPHONE (OUTPATIENT)
Dept: FAMILY MEDICINE CLINIC | Age: 64
End: 2024-06-26

## 2024-06-26 DIAGNOSIS — G95.9 SPINAL CORD LESION (HCC): Primary | ICD-10-CM

## 2024-06-28 ENCOUNTER — PREP FOR PROCEDURE (OUTPATIENT)
Dept: SURGERY | Age: 64
End: 2024-06-28

## 2024-06-28 PROBLEM — K59.00 CONSTIPATION: Status: ACTIVE | Noted: 2024-06-28

## 2024-07-10 NOTE — PROGRESS NOTES
During reminder call, patient cancelled appointment for tomorrow 7-11-24.   States he'll call back to reschedule.

## 2024-07-11 ENCOUNTER — HOSPITAL ENCOUNTER (OUTPATIENT)
Dept: OTHER | Age: 64
Setting detail: THERAPIES SERIES
Discharge: HOME | End: 2024-07-11

## 2024-07-11 SDOH — ECONOMIC STABILITY: INCOME INSECURITY: HOW HARD IS IT FOR YOU TO PAY FOR THE VERY BASICS LIKE FOOD, HOUSING, MEDICAL CARE, AND HEATING?: SOMEWHAT HARD

## 2024-07-11 SDOH — ECONOMIC STABILITY: FOOD INSECURITY: WITHIN THE PAST 12 MONTHS, YOU WORRIED THAT YOUR FOOD WOULD RUN OUT BEFORE YOU GOT MONEY TO BUY MORE.: SOMETIMES TRUE

## 2024-07-11 SDOH — ECONOMIC STABILITY: FOOD INSECURITY: WITHIN THE PAST 12 MONTHS, THE FOOD YOU BOUGHT JUST DIDN'T LAST AND YOU DIDN'T HAVE MONEY TO GET MORE.: SOMETIMES TRUE

## 2024-07-11 SDOH — ECONOMIC STABILITY: TRANSPORTATION INSECURITY
IN THE PAST 12 MONTHS, HAS LACK OF TRANSPORTATION KEPT YOU FROM MEETINGS, WORK, OR FROM GETTING THINGS NEEDED FOR DAILY LIVING?: YES

## 2024-07-12 ENCOUNTER — OFFICE VISIT (OUTPATIENT)
Dept: FAMILY MEDICINE CLINIC | Age: 64
End: 2024-07-12
Payer: MEDICARE

## 2024-07-12 VITALS
SYSTOLIC BLOOD PRESSURE: 124 MMHG | WEIGHT: 208 LBS | HEIGHT: 68 IN | DIASTOLIC BLOOD PRESSURE: 68 MMHG | TEMPERATURE: 98.6 F | RESPIRATION RATE: 18 BRPM | OXYGEN SATURATION: 97 % | HEART RATE: 75 BPM | BODY MASS INDEX: 31.52 KG/M2

## 2024-07-12 DIAGNOSIS — M54.16 LUMBAR RADICULOPATHY: ICD-10-CM

## 2024-07-12 DIAGNOSIS — Z79.4 TYPE 2 DIABETES MELLITUS WITH HYPEROSMOLARITY WITHOUT COMA, WITH LONG-TERM CURRENT USE OF INSULIN (HCC): ICD-10-CM

## 2024-07-12 DIAGNOSIS — F33.40 RECURRENT MAJOR DEPRESSIVE DISORDER, IN REMISSION (HCC): ICD-10-CM

## 2024-07-12 DIAGNOSIS — Z87.891 PERSONAL HISTORY OF TOBACCO USE: ICD-10-CM

## 2024-07-12 DIAGNOSIS — G95.9 SPINAL CORD LESION (HCC): Primary | ICD-10-CM

## 2024-07-12 DIAGNOSIS — E11.00 TYPE 2 DIABETES MELLITUS WITH HYPEROSMOLARITY WITHOUT COMA, WITH LONG-TERM CURRENT USE OF INSULIN (HCC): ICD-10-CM

## 2024-07-12 DIAGNOSIS — Z12.5 SCREENING FOR PROSTATE CANCER: ICD-10-CM

## 2024-07-12 LAB
HBA1C MFR BLD: 8.3 %
PROSTATE SPECIFIC ANTIGEN: 1.61 NG/ML (ref 0–4)

## 2024-07-12 PROCEDURE — 3074F SYST BP LT 130 MM HG: CPT

## 2024-07-12 PROCEDURE — 83036 HEMOGLOBIN GLYCOSYLATED A1C: CPT

## 2024-07-12 PROCEDURE — 3078F DIAST BP <80 MM HG: CPT

## 2024-07-12 PROCEDURE — 3052F HG A1C>EQUAL 8.0%<EQUAL 9.0%: CPT

## 2024-07-12 PROCEDURE — 99213 OFFICE O/P EST LOW 20 MIN: CPT

## 2024-07-12 PROCEDURE — G0296 VISIT TO DETERM LDCT ELIG: HCPCS

## 2024-07-12 RX ORDER — DULOXETIN HYDROCHLORIDE 30 MG/1
30 CAPSULE, DELAYED RELEASE ORAL DAILY
Qty: 30 CAPSULE | Refills: 3 | Status: SHIPPED | OUTPATIENT
Start: 2024-07-12

## 2024-07-12 NOTE — PROGRESS NOTES
[facility]  FAMILY MEDICINE RESIDENCY PROGRAM  DATE OF VISIT : 2024    Patient : Pipe Collins Jr.   Age : 64 y.o.    : 1960   MRN : [id number]   ______________________________________________________________________    Chief Complaint:   Chief Complaint   Patient presents with    Results     Patient presents today for results to his recent MRI.     Medication Refill       HPI:   Pipe Collins Jr. is a 64 y.o. male who presents for f/u MRI results and for T2DM. Patient has hx GIST tx w/ imatinib, last colonoscopy .    Lesion on MRI:: The patient had an MRI 2024 that showed a lesion at C3 concerning for infiltration v. Metastasis. The patient at that time was referred to heme onc and advised to call gen surg to set up his colonoscopy (prev scheduled in 2024 but cancelled). He has never had a PSA screen. He is a current smoker and is amenable to LDCT. He has an appt for colonoscopy on 24.    Cervical radiculopathy: Cervical canal stenosis seen on MRI as well, very symptomatic with pain/burning down R arm. The patient states that he takes gabapentin 800 mg TID w/ mild/moderate effect. He only takes the tramadol occasionally prn. The patient has never been given medication. He is amenable to starting Cymbalta for this pain.     T2DM: The patient has been off his Mounjaro for 4 mos because he had trouble getting it from the pharmacy. 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

## 2024-07-12 NOTE — PROGRESS NOTES
S: 64 y.o. male here for GIST s/p imatinib and cervical radiculopathy and DM2.   Cervical imaging concerning for mets. Smoker. Will see Heme/Onc on the 24th.   DM. Hasn't been able to get mounjaro. Declines metformin.   Hemoglobin A1C   Date Value Ref Range Status   07/12/2024 8.3 % Final   Neuropathy. Gabapentin mildly effective.     O: VS: /68   Pulse 75   Temp 98.6 °F (37 °C) (Temporal)   Resp 18   Ht 1.727 m (5' 8\")   Wt 94.3 kg (208 lb)   SpO2 97%   BMI 31.63 kg/m²    General: NAD, alert and interacting appropriately.    Cerv LAD   CV:  RRR, no gallops, rubs, or murmurs    Resp: CTAB      Impression: GIST. Concern for mets. DM. Neuropathy.   Plan:   cymbalta  LDCT  PSA  F/u heme/onc  Ozempic  Rtc 1 mo    Attending Physician Statement  I have discussed the case, including pertinent history and exam findings with the resident.  I agree with the documented assessment and plan.

## 2024-07-12 NOTE — PATIENT INSTRUCTIONS
years.  Experts agree that screening is for people who have a high risk of lung cancer. But experts don't agree on what high risk means. Some say people age 50 or older with at least a 20-pack-year smoking history are high risk. Others say it's people age 55 or older with a 30-pack-year history.  To see if you could benefit from screening, first find out if you are at high risk for lung cancer. Your doctor can help you decide your lung cancer risk.  What are the risks of screening?  CT screening for lung cancer isn't perfect. It can show an abnormal result when it turns out there wasn't any cancer. This is called a false-positive result. This means you may need more tests to make sure you don't have cancer. These tests can be harmful and cause a lot of worry.  These tests may include more CT scans and invasive testing like a lung biopsy. In a biopsy, the doctor takes a sample of tissue from inside your lung so it can be looked at under a microscope. A biopsy is the only way to tell if you have lung cancer. If the biopsy finds cancer, you and your doctor will have to decide how or whether to treat it.  Some lung cancers found on CT scans are harmless and would not have caused a problem if they had not been found through screening. But because doctors can't tell which ones will turn out to be harmless, most will be treated. This means that you may get treatment--including surgery, radiation, or chemotherapy--that you don't need.  There is a risk of damage to cells or tissue from being exposed to radiation, including the small amounts used in CTs, X-rays, and other medical tests. Over time, exposure to radiation may cause cancer and other health problems. But in most cases, the risk of getting cancer from being exposed to small amounts of radiation is low. It's not a reason to avoid these tests for most people.  What are the benefits of screening?  Your scan may be normal (negative).  For some people who are at higher

## 2024-07-18 ENCOUNTER — ANESTHESIA (OUTPATIENT)
Dept: ENDOSCOPY | Age: 64
End: 2024-07-18
Payer: MEDICARE

## 2024-07-18 ENCOUNTER — ANESTHESIA EVENT (OUTPATIENT)
Dept: ENDOSCOPY | Age: 64
End: 2024-07-18
Payer: MEDICARE

## 2024-07-18 ENCOUNTER — HOSPITAL ENCOUNTER (OUTPATIENT)
Age: 64
Setting detail: OUTPATIENT SURGERY
Discharge: HOME | End: 2024-07-18
Attending: SURGERY | Admitting: SURGERY
Payer: MEDICARE

## 2024-07-18 VITALS
OXYGEN SATURATION: 98 % | HEART RATE: 74 BPM | TEMPERATURE: 96.9 F | DIASTOLIC BLOOD PRESSURE: 96 MMHG | HEIGHT: 68 IN | RESPIRATION RATE: 21 BRPM | SYSTOLIC BLOOD PRESSURE: 170 MMHG | WEIGHT: 208 LBS | BODY MASS INDEX: 31.52 KG/M2

## 2024-07-18 DIAGNOSIS — Z01.812 PRE-OPERATIVE LABORATORY EXAMINATION: Primary | ICD-10-CM

## 2024-07-18 PROBLEM — Z86.010 HX OF COLONIC POLYPS: Status: ACTIVE | Noted: 2024-07-18

## 2024-07-18 PROBLEM — Z85.09 HISTORY OF GASTROINTESTINAL STROMAL TUMOR (GIST): Status: ACTIVE | Noted: 2024-07-18

## 2024-07-18 PROBLEM — Z86.0100 HX OF COLONIC POLYPS: Status: ACTIVE | Noted: 2024-07-18

## 2024-07-18 LAB — GLUCOSE BLD-MCNC: 103 MG/DL (ref 74–99)

## 2024-07-18 PROCEDURE — 3700000000 HC ANESTHESIA ATTENDED CARE: Performed by: SURGERY

## 2024-07-18 PROCEDURE — 6360000002 HC RX W HCPCS

## 2024-07-18 PROCEDURE — 3609027000 HC COLONOSCOPY: Performed by: SURGERY

## 2024-07-18 PROCEDURE — 2709999900 HC NON-CHARGEABLE SUPPLY: Performed by: SURGERY

## 2024-07-18 PROCEDURE — 7100000010 HC PHASE II RECOVERY - FIRST 15 MIN: Performed by: SURGERY

## 2024-07-18 PROCEDURE — 82962 GLUCOSE BLOOD TEST: CPT

## 2024-07-18 PROCEDURE — 3700000001 HC ADD 15 MINUTES (ANESTHESIA): Performed by: SURGERY

## 2024-07-18 PROCEDURE — 2580000003 HC RX 258: Performed by: SURGERY

## 2024-07-18 PROCEDURE — 7100000011 HC PHASE II RECOVERY - ADDTL 15 MIN: Performed by: SURGERY

## 2024-07-18 PROCEDURE — 2580000003 HC RX 258

## 2024-07-18 RX ORDER — SODIUM CHLORIDE 0.9 % (FLUSH) 0.9 %
5-40 SYRINGE (ML) INJECTION EVERY 12 HOURS SCHEDULED
Status: DISCONTINUED | OUTPATIENT
Start: 2024-07-18 | End: 2024-07-18 | Stop reason: HOSPADM

## 2024-07-18 RX ORDER — SODIUM CHLORIDE 9 MG/ML
INJECTION, SOLUTION INTRAVENOUS PRN
Status: DISCONTINUED | OUTPATIENT
Start: 2024-07-18 | End: 2024-07-18 | Stop reason: HOSPADM

## 2024-07-18 RX ORDER — SODIUM CHLORIDE 9 MG/ML
INJECTION, SOLUTION INTRAVENOUS CONTINUOUS PRN
Status: DISCONTINUED | OUTPATIENT
Start: 2024-07-18 | End: 2024-07-18 | Stop reason: SDUPTHER

## 2024-07-18 RX ORDER — PROPOFOL 10 MG/ML
INJECTION, EMULSION INTRAVENOUS PRN
Status: DISCONTINUED | OUTPATIENT
Start: 2024-07-18 | End: 2024-07-18 | Stop reason: SDUPTHER

## 2024-07-18 RX ORDER — SODIUM CHLORIDE 9 MG/ML
INJECTION, SOLUTION INTRAVENOUS CONTINUOUS
Status: DISCONTINUED | OUTPATIENT
Start: 2024-07-18 | End: 2024-07-18 | Stop reason: HOSPADM

## 2024-07-18 RX ORDER — SODIUM CHLORIDE 0.9 % (FLUSH) 0.9 %
5-40 SYRINGE (ML) INJECTION PRN
Status: DISCONTINUED | OUTPATIENT
Start: 2024-07-18 | End: 2024-07-18 | Stop reason: HOSPADM

## 2024-07-18 RX ADMIN — PROPOFOL 510 MG: 10 INJECTION, EMULSION INTRAVENOUS at 13:45

## 2024-07-18 RX ADMIN — SODIUM CHLORIDE: 9 INJECTION, SOLUTION INTRAVENOUS at 12:37

## 2024-07-18 RX ADMIN — SODIUM CHLORIDE: 9 INJECTION, SOLUTION INTRAVENOUS at 13:45

## 2024-07-18 ASSESSMENT — PAIN - FUNCTIONAL ASSESSMENT
PAIN_FUNCTIONAL_ASSESSMENT: NONE - DENIES PAIN
PAIN_FUNCTIONAL_ASSESSMENT: 0-10

## 2024-07-18 NOTE — PROGRESS NOTES
Dr. Demarco in speaking with patient. Patient tolerating ice chips.  
Pt verified using 2 Identifiers and ID band with OR staff prior to acceptance to PACU/Phase II care.     Patient's walker and belongings returned. Calling Kimberley for a ride home.   
It is located on the corner of [address] and [address]. The entrance is on [address].   Only one vehicle - per patient, is permitted in parking lot.   Upon entering the parking lot, a voucher ticket will print.    MEDICATION INSTRUCTIONS:    [x] Bring a complete list of your medications, please write the last time you took the medicine, give this list to the nurse in Pre-Op.  [x] Take ONLY the following medications the morning of surgery: metoprolol, norvasc  [x] Stop all herbal supplements and vitamins 5 days before surgery.   [x] Stop all NSAIDS 7 days before surgery.  [x] DO NOT take any diabetic medicine the morning of surgery.  Follow instructions for insulin the day before surgery.  [x] If you are diabetic and your blood sugar is low or you feel symptomatic, you may drink 1-2 ounces of apple juice or take a glucose tablet.            -The morning of your procedure, you may call the pre-op area if you have concerns about your blood sugar [phone].  [] Use your inhalers the morning of surgery.  Bring your emergency inhaler with you day of surgery.  [x] Follow physician instructions regarding any blood thinners you may be taking.    WHAT TO EXPECT:    [x] The day of your procedure you will be greeted and checked in by the [facility] .  In addition, you will be registered in the [facility] by a Patient Access Representative. Please bring your photo ID and insurance card. A nurse will greet you in accordance to the time you are needed in the pre-op area to prepare you for surgery. Please do not be discouraged if you are not greeted in the order you arrive as there are many variables that are involved in patient preparation. Your patience is greatly appreciated as you wait for your nurse.  [x] Delays may occur. Staff will make a sincere effort to keep you informed of delays. If any delays occur with your procedure, we apologize ahead of time for your inconvenience as we

## 2024-07-18 NOTE — ANESTHESIA PRE PROCEDURE
Department of Anesthesiology  Preprocedure Note       Name:  Pipe Collins Jr.   Age:  64 y.o.  :  1960                                          MRN:  [id number]         Date:  2024      Surgeon: Surgeon(s):  Pineda Demarco MD    Procedure: Procedure(s):  COLONOSCOPY DIAGNOSTIC    Medications prior to admission:   Prior to Admission medications    Medication Sig Start Date End Date Taking? Authorizing Provider   Semaglutide,0.25 or 0.5MG/DOS, 2 MG/1.5ML SOPN Inject 0.25 mg into the skin once a week  Patient not taking: Reported on 2024  Kenny Parker MD   DULoxetine (CYMBALTA) 30 MG extended release capsule Take 1 capsule by mouth daily  Patient not taking: Reported on 2024   Kenny Parker MD   amLODIPine (NORVASC) 10 MG tablet Take 1 tablet by mouth daily 24   Kenny Parker MD   atorvastatin (LIPITOR) 40 MG tablet Take 1 tablet by mouth daily 24   Kenny Parker MD   bumetanide (BUMEX) 1 MG tablet Take 1 tablet by mouth 2 times daily  Patient taking differently: Take 1 tablet by mouth 2 times daily Takes 2 mg in am 24   Kenny Parker MD   Capsaicin (ZOSTRIX) 0.035 % CREA cream Apply topically 3 times daily  Patient not taking: Reported on 2024   Kenny Parker MD   vitamin D (VITAMIN D3) 50 MCG (2000 UT) CAPS capsule Take 1 capsule by mouth daily 24   Kenny Parker MD   diclofenac sodium (VOLTAREN) 1 % GEL Apply 2 g topically 4 times daily as needed for Pain (Knee) 24  Kenny Parker MD   docusate sodium (COLACE) 100 MG capsule Take 1 capsule by mouth 2 times daily 24   Kenny Parker MD   eplerenone (INSPRA) 50 MG tablet Take 1 tablet by mouth daily 24   Kenny Parker MD   magnesium oxide (MAG-OX) 400 (240 Mg) MG tablet Take 1 tablet by mouth daily 24   Kenny Parker MD   losartan (COZAAR) 50 MG tablet Take 1 tablet by mouth daily 3/21/24   Kenny Parker MD   JARDIANCE 25 MG tablet TAKE 1 TABLET BY MOUTH DAILY

## 2024-07-18 NOTE — H&P
[facility]  History and Physical     Patient's Name/Date of Birth: Pipe Collins . / 1960 (64 y.o.)     PCP:  Dr. Parker     Chief Complaint:  colonoscopy eval     History of Present Illness:  64 yr old male with last colonoscopy 2018--found to have tubular adenoma.  Was seen in 2021 but never scheduled procedure due to other health issues.  Pt denies abd pain, blood in stool, unintentional weight loss, or family hx of colon cancer.  Reports recent constipation--improved with stool softeners/Miralax.     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 ile[facility]cecectomy @[facility] by Dr. Willian Swartz. Currently receiving oral chemo    COLONOSCOPY   09/14/2018     polyp--eliane    MOUTH SURGERY N/A 3/21/2022     EXCISION TONGUE LESION performed by Jd Mera DO at [facility] OR    NASAL SEPTUM SURGERY        ID COLONOSCOPY W/BIOPSY SINGLE/MULTIPLE N/A 9/14/2018     COLONOSCOPY WITH BIOPSY performed by Pineda Demarco MD at [facility] [place]    ID COLSC FLX W/RMVL OF TUMOR POLYP LESION SNARE TQ   9/14/2018     COLONOSCOPY POLYPECTOMY SNARE/COLD BIOPSY performed by Pineda Demarco MD at [facility]COPY    TONSILLECTOMY        US BIOPSY SOFT TISSUE NECK/THORAX   5/26/2021     US BIOPSY SOFT TISSUE NECK/THORAX 5/26/2021 [facility] [place]TR[facility]OUND            Family History         Family

## 2024-07-18 NOTE — DISCHARGE INSTRUCTIONS
MAY RESUME ALL YOUR MEDICATIONS THAT YOU DID NOT TAKE TODAY.      Call [phone] for any questions/concerns.    Extremely poor prep--you will need repeat prep and colonoscopy.         Colonoscopy: What to Expect at Home  Your Recovery  After a colonoscopy, you'll stay at the clinic until you wake up. Then you can go home. But you'll need to arrange for a ride. Your doctor will tell you when you can eat and do your other usual activities.  Your doctor will talk to you about when you'll need your next colonoscopy. Your doctor can help you decide how often you need to be checked. This will depend on the results of your test and your risk for colorectal cancer.  After the test, you may be bloated or have gas pains. You may need to pass gas. If a biopsy was done or a polyp was removed, you may have streaks of blood in your stool (feces) for a few days. Problems such as heavy rectal bleeding may not occur until several weeks after the test. This isn't common. But it can happen after polyps are removed.  This care sheet gives you a general idea about how long it will take for you to recover. But each person recovers at a different pace. Follow the steps below to get better as quickly as possible.  How can you care for yourself at home?  Activity    Rest when you feel tired.     You can do your normal activities when it feels okay to do so.   Diet    Follow your doctor's directions for eating.     Unless your doctor has told you not to, drink plenty of fluids. This helps to replace the fluids that were lost during the colon prep.     Do not drink alcohol.   Medicines    Your doctor will tell you if and when you can restart your medicines. You will also be given instructions about taking any new medicines.     If you stopped taking aspirin or some other blood thinner, your doctor will tell you when to start taking it again.     If polyps were removed or a biopsy was done during the test, your doctor may tell you not to

## 2024-07-19 ASSESSMENT — LIFESTYLE VARIABLES: SMOKING_STATUS: 1

## 2024-07-19 NOTE — ANESTHESIA POSTPROCEDURE EVALUATION
Department of Anesthesiology  Postprocedure Note    Patient: Pipe Collins Jr.  MRN: [id number]  YOB: 1960  Date of evaluation: 7/19/2024    Procedure Summary       Date: 07/18/24 Room / Location: [place] [address] / [facility]    Anesthesia Start: 1343 Anesthesia Stop: 1420    Procedure: COLONOSCOPY DIAGNOSTIC Diagnosis:       Constipation      (Constipation [K59.00])    Surgeons: Pineda Demarco MD Responsible Provider: Sarah Michelle MD    Anesthesia Type: MAC ASA Status: 3            Anesthesia Type: MAC    Tali Phase I: Tali Score: 10    Tali Phase II: Tali Score: 10    Anesthesia Post Evaluation    Patient location during evaluation: PACU  Patient participation: complete - patient participated  Level of consciousness: awake  Pain score: 0  Airway patency: patent  Nausea & Vomiting: no nausea  Cardiovascular status: hemodynamically stable  Respiratory status: acceptable  Hydration status: stable    No notable events documented.

## 2024-07-24 ENCOUNTER — OFFICE VISIT (OUTPATIENT)
Dept: ONCOLOGY | Age: 64
End: 2024-07-24
Payer: MEDICARE

## 2024-07-24 ENCOUNTER — TELEPHONE (OUTPATIENT)
Dept: SURGERY | Age: 64
End: 2024-07-24

## 2024-07-24 ENCOUNTER — PREP FOR PROCEDURE (OUTPATIENT)
Dept: SURGERY | Age: 64
End: 2024-07-24

## 2024-07-24 ENCOUNTER — HOSPITAL ENCOUNTER (OUTPATIENT)
Dept: INFUSION THERAPY | Age: 64
Discharge: HOME | End: 2024-07-24
Payer: MEDICARE

## 2024-07-24 ENCOUNTER — TELEPHONE (OUTPATIENT)
Dept: CASE MANAGEMENT | Age: 64
End: 2024-07-24

## 2024-07-24 VITALS
DIASTOLIC BLOOD PRESSURE: 70 MMHG | HEART RATE: 78 BPM | TEMPERATURE: 98.4 F | BODY MASS INDEX: 31.26 KG/M2 | OXYGEN SATURATION: 99 % | WEIGHT: 205.6 LBS | SYSTOLIC BLOOD PRESSURE: 146 MMHG

## 2024-07-24 DIAGNOSIS — C49.A4 GIST (GASTROINTESTINAL STROMA TUMOR), MALIGNANT, COLON (HCC): Primary | ICD-10-CM

## 2024-07-24 DIAGNOSIS — R90.89: ICD-10-CM

## 2024-07-24 DIAGNOSIS — C49.A4 GIST (GASTROINTESTINAL STROMA TUMOR), MALIGNANT, COLON (HCC): ICD-10-CM

## 2024-07-24 LAB
ALBUMIN SERPL-MCNC: 3.5 G/DL (ref 3.5–5.2)
ALP SERPL-CCNC: 101 U/L (ref 40–129)
ALT SERPL-CCNC: 21 U/L (ref 0–40)
ANION GAP SERPL CALCULATED.3IONS-SCNC: 10 MMOL/L (ref 7–16)
AST SERPL-CCNC: 20 U/L (ref 0–39)
BASOPHILS # BLD: 0.06 K/UL (ref 0–0.2)
BASOPHILS NFR BLD: 1 % (ref 0–2)
BILIRUB SERPL-MCNC: 0.5 MG/DL (ref 0–1.2)
BUN SERPL-MCNC: 11 MG/DL (ref 6–23)
CALCIUM SERPL-MCNC: 8.9 MG/DL (ref 8.6–10.2)
CHLORIDE SERPL-SCNC: 103 MMOL/L (ref 98–107)
CO2 SERPL-SCNC: 24 MMOL/L (ref 22–29)
CREAT SERPL-MCNC: 0.7 MG/DL (ref 0.7–1.2)
EOSINOPHIL # BLD: 0.27 K/UL (ref 0.05–0.5)
EOSINOPHILS RELATIVE PERCENT: 3 % (ref 0–6)
ERYTHROCYTE [DISTWIDTH] IN BLOOD BY AUTOMATED COUNT: 14.6 % (ref 11.5–15)
GFR, ESTIMATED: >90 ML/MIN/1.73M2
GLUCOSE SERPL-MCNC: 146 MG/DL (ref 74–99)
HCT VFR BLD AUTO: 43.8 % (ref 37–54)
HGB BLD-MCNC: 14.6 G/DL (ref 12.5–16.5)
IMM GRANULOCYTES # BLD AUTO: 0.03 K/UL (ref 0–0.58)
IMM GRANULOCYTES NFR BLD: 0 % (ref 0–5)
LYMPHOCYTES NFR BLD: 2.99 K/UL (ref 1.5–4)
LYMPHOCYTES RELATIVE PERCENT: 36 % (ref 20–42)
MCH RBC QN AUTO: 31.1 PG (ref 26–35)
MCHC RBC AUTO-ENTMCNC: 33.3 G/DL (ref 32–34.5)
MCV RBC AUTO: 93.4 FL (ref 80–99.9)
MONOCYTES NFR BLD: 0.59 K/UL (ref 0.1–0.95)
MONOCYTES NFR BLD: 7 % (ref 2–12)
NEUTROPHILS NFR BLD: 53 % (ref 43–80)
NEUTS SEG NFR BLD: 4.38 K/UL (ref 1.8–7.3)
PLATELET # BLD AUTO: 246 K/UL (ref 130–450)
PMV BLD AUTO: 10 FL (ref 7–12)
POTASSIUM SERPL-SCNC: 4.1 MMOL/L (ref 3.5–5)
PROT SERPL-MCNC: 6.5 G/DL (ref 6.4–8.3)
RBC # BLD AUTO: 4.69 M/UL (ref 3.8–5.8)
SODIUM SERPL-SCNC: 137 MMOL/L (ref 132–146)
TSH SERPL DL<=0.05 MIU/L-ACNC: 0.66 UIU/ML (ref 0.27–4.2)
WBC OTHER # BLD: 8.3 K/UL (ref 4.5–11.5)

## 2024-07-24 PROCEDURE — 36415 COLL VENOUS BLD VENIPUNCTURE: CPT

## 2024-07-24 PROCEDURE — 85025 COMPLETE CBC W/AUTO DIFF WBC: CPT

## 2024-07-24 PROCEDURE — G8417 CALC BMI ABV UP PARAM F/U: HCPCS | Performed by: INTERNAL MEDICINE

## 2024-07-24 PROCEDURE — 3077F SYST BP >= 140 MM HG: CPT | Performed by: INTERNAL MEDICINE

## 2024-07-24 PROCEDURE — 3017F COLORECTAL CA SCREEN DOC REV: CPT | Performed by: INTERNAL MEDICINE

## 2024-07-24 PROCEDURE — 4004F PT TOBACCO SCREEN RCVD TLK: CPT | Performed by: INTERNAL MEDICINE

## 2024-07-24 PROCEDURE — 83521 IG LIGHT CHAINS FREE EACH: CPT

## 2024-07-24 PROCEDURE — 84443 ASSAY THYROID STIM HORMONE: CPT

## 2024-07-24 PROCEDURE — 99205 OFFICE O/P NEW HI 60 MIN: CPT | Performed by: INTERNAL MEDICINE

## 2024-07-24 PROCEDURE — G8427 DOCREV CUR MEDS BY ELIG CLIN: HCPCS | Performed by: INTERNAL MEDICINE

## 2024-07-24 PROCEDURE — 99214 OFFICE O/P EST MOD 30 MIN: CPT

## 2024-07-24 PROCEDURE — 3078F DIAST BP <80 MM HG: CPT | Performed by: INTERNAL MEDICINE

## 2024-07-24 PROCEDURE — 84165 PROTEIN E-PHORESIS SERUM: CPT

## 2024-07-24 PROCEDURE — 84155 ASSAY OF PROTEIN SERUM: CPT

## 2024-07-24 PROCEDURE — 80053 COMPREHEN METABOLIC PANEL: CPT

## 2024-07-24 RX ORDER — SENNOSIDES A AND B 8.6 MG/1
1 TABLET, FILM COATED ORAL 2 TIMES DAILY
Qty: 60 TABLET | Refills: 11 | Status: SHIPPED | OUTPATIENT
Start: 2024-07-24 | End: 2025-07-24

## 2024-07-24 RX ORDER — POLYETHYLENE GLYCOL 3350 17 G/17G
17 POWDER, FOR SOLUTION ORAL DAILY PRN
Qty: 1530 G | Refills: 1 | Status: SHIPPED | OUTPATIENT
Start: 2024-07-24 | End: 2025-01-20

## 2024-07-24 NOTE — PROGRESS NOTES
Patient provided with discharge instructions, received printed AVS.  All questions answered.  Patient understands follow up plan of care.       
tablet, Take 1 tablet by mouth daily, Disp: 90 tablet, Rfl: 3    JARDIANCE 25 MG tablet, TAKE 1 TABLET BY MOUTH DAILY, Disp: 90 tablet, Rfl: 0    potassium chloride (KLOR-CON M) 20 MEQ extended release tablet, Take 1 tablet by mouth 2 times daily (Patient taking differently: Take 1 tablet by mouth 2 times daily Only taking daily), Disp: 180 tablet, Rfl: 1    metoprolol succinate (TOPROL XL) 50 MG extended release tablet, Take 1 tablet by mouth daily, Disp: 90 tablet, Rfl: 3    colchicine (COLCRYS) 0.6 MG tablet, Day 1: take 1.2 mg (2 pills),  followed by 0.6 mg (1 pill) after 1 hour . Day 2 and forward: 0.6 mg once or twice daily until flare resolves., Disp: 15 tablet, Rfl: 0    Continuous Blood Gluc  (FREESTYLE DEIDRE 2 READER) CARMELA, Use to monitor sugars, Disp: 1 each, Rfl: 0    Continuous Blood Gluc Sensor (FREESTYLE DEIDRE 2 SENSOR) [facility], Please replace sensor every 14 days (prescription should cover one month of sensors with 11 months of refills), Disp: 1 each, Rfl: 11    gabapentin (NEURONTIN) 800 MG tablet, Take 1 tablet by mouth 3 times daily as needed., Disp: , Rfl:     traMADol (ULTRAM) 50 MG tablet, Take 1 tablet by mouth every 6 hours as needed for Pain., Disp: , Rfl:     sildenafil (VIAGRA) 100 MG tablet, Take 1 tablet by mouth as needed Take an hour before sex.  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  
weight loss for the last 8-month which he thought might be related to mass lesion which was biopsied and that being benign noncancerous, currently he is able to eat very well.  No lymph nodes or adenopathy  He does smoke continues to do so    Regarding his GIST tumor he was treated in the [facility] by Dr. 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 Willian Swartz [facility] 5/8/2019, pT3,pN0=, treated with Gleevec adjuvantly for 3 years.       ONCOLOGIC HISTORY:    Oncology History    No history exists.           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 ileocecectomy @[facility] by Dr. Willian Swartz. Currently receiving oral chemo    COLONOSCOPY  09/14/2018    polyp--eliane    COLONOSCOPY  07/18/2024    extremely poor prep--eliane    COLONOSCOPY N/A 7/18/2024    COLONOSCOPY DIAGNOSTIC performed by Pineda Demarco MD at [facility] ENDOSCOPY    MOUTH SURGERY N/A 03/21/2022    EXCISION TONGUE LESION performed by Jd Mera DO at [facility] OR    NASAL SEPTUM SURGERY      MN COLONOSCOPY W/BIOPSY SINGLE/MULTIPLE N/A 09/14/2018    COLONOSCOPY WITH BIOPSY performed by Pineda Demarco MD at [facility] ENDOSCOPY    MN COLSC FLX W/RMVL OF TUMOR POLYP LESION SNARE TQ  09/14/2018    COLONOSCOPY POLYPECTOMY SNARE/COLD BIOPSY performed by Pineda Demarco MD at [facility] ENDOSCOPY    TONSILLECTOMY      US BIOPSY SOFT TISSUE NECK/THORAX  05/26/2021

## 2024-07-24 NOTE — TELEPHONE ENCOUNTER
Met with patient during his initial consultation with  for his his tory of GIST tumor diagnosis. Introduced myself and explained my role with patients receiving treatment at our center. Patient originally diagnosed in 2019 . He was treated by Dr Michel at [facility] [place]. He is s/p ileo-cecectomy May 2019. He has 3 years of Gleevec which patient chose to stop. Patient visually in pain. Moves and adjusts himself on his wheeled walker. Patient very short with answers to questions and not very forthcoming.Instructed on next steps including Pet Scan,MRI with contrast, referral to neurosurgery and labs today per 's recommendations and follow up care. Provided patient with written instructions for Pet Scan, patient not very attentive to instructions but is requesting that this RN call and schedule. [place] due [place]. Reviewed resources available to him such as Social Work, Dietician and Financial Aid Navigator. Patient denies any needs at this time. He lives alone, doesn't drive and uses insurance benefits for transportation. Patient states that he has lost about 95 pounds over the last year and states that his appetite is not good. New patient nursing assessment, medical history, surgical history, family history completed. Medication list reviewed and updated. Patient here to establish care, will not be followed by navigation.Betzaida NGN,RN-OCN Nurse Navigator

## 2024-07-24 NOTE — TELEPHONE ENCOUNTER
Scheduled pt for Colonoscopy 24 at 1:30PM. Pt needs to arrive at [facility] at 12:30PM. Patient confirmed date and time, address and directions given in office. Prep instructions given in office, patient understood.      Prior Authorization Form:      DEMOGRAPHICS:                     Patient Name:  Harry Brown Jr.  Patient :  1960            Insurance:  Payor: [facility] MEDICARE / Plan: [employer] DUAL COMPLETE / Product Type: *No Product type* /   Insurance ID Number:    Payer/Plan Subscr  Sex Relation Sub. Ins. ID Effective Group Num   1. [facility] MEDICARE * HARRY BROWN * 1960 Male Self [id number]                                   [address]   2. [facility]* HARRY BROWN * 1960 Male Self [id number]                                    [address]         DIAGNOSIS & PROCEDURE:                       Procedure/Operation: COLONOSCOPY           CPT Code: 51852    Diagnosis:  HX OF ADENOMATOUS COLONIC POLYPS, CONSTIPATION, HX OF GASTROINTESTINAL STROMAL TUMOR    ICD10 Code: Z86.010, K59.00, Z85.09    Location:  [facility]    Surgeon:  CARMINA URBINA    SCHEDULING INFORMATION:                          Date: 24    Time: 1:30PM              Anesthesia:  LMAC                                                       Status:  OUTPATIENT       Special Comments:  N/A       Electronically signed by Paula Obrien MA on 2024 at 9:43 AM

## 2024-07-26 LAB
ALBUMIN SERPL-MCNC: 2.9 G/DL (ref 3.5–4.7)
ALPHA1 GLOB SERPL ELPH-MCNC: 0.3 G/DL (ref 0.2–0.4)
ALPHA2 GLOB SERPL ELPH-MCNC: 0.7 G/DL (ref 0.5–1)
B-GLOBULIN SERPL ELPH-MCNC: 0.9 G/DL (ref 0.8–1.3)
FREE KAPPA/LAMBDA RATIO: 1.41 (ref 0.22–1.74)
GAMMA GLOB SERPL ELPH-MCNC: 1.1 G/DL (ref 0.7–1.6)
KAPPA LC FREE SER-MCNC: 48.5 MG/L
LAMBDA LC FREE SERPL-MCNC: 34.4 MG/L (ref 4.2–27.7)
PATHOLOGIST: ABNORMAL
PROT PATTERN SERPL ELPH-IMP: ABNORMAL
PROT SERPL-MCNC: 5.9 G/DL (ref 6.4–8.3)

## 2024-08-02 DIAGNOSIS — R90.89: Primary | ICD-10-CM

## 2024-08-05 RX ORDER — METOPROLOL SUCCINATE 50 MG/1
50 TABLET, EXTENDED RELEASE ORAL DAILY
Qty: 90 TABLET | Refills: 3 | OUTPATIENT
Start: 2024-08-05

## 2024-08-05 NOTE — TELEPHONE ENCOUNTER
Last Appointment:  7/12/2024  Future Appointments   Date Time Provider Department Center   8/6/2024  2:00 PM [facility] PET ROOM [facility]Car   8/8/2024  3:00 PM [facility] MRI RM 1 [facility] MRI [facility] Rad[facility]Car   8/19/2024 11:15 AM [facility] MED ONC FAST TRACK 1 [facility] Med Onc [facility]   8/19/2024 11:30 AM Arturo Mulligan MD MED ONC [facility]   8/28/2024 11:15 AM Dorys Tapia, JOSE LAIRD [facility]

## 2024-08-06 ENCOUNTER — HOSPITAL ENCOUNTER (OUTPATIENT)
Dept: NUCLEAR MEDICINE | Age: 64
Discharge: HOME | End: 2024-08-08
Attending: INTERNAL MEDICINE
Payer: MEDICARE

## 2024-08-06 DIAGNOSIS — R90.89: ICD-10-CM

## 2024-08-06 DIAGNOSIS — C49.A4 GIST (GASTROINTESTINAL STROMA TUMOR), MALIGNANT, COLON (HCC): ICD-10-CM

## 2024-08-06 PROCEDURE — A9609 HC RX DIAGNOSTIC RADIOPHARMACEUTICAL: HCPCS | Performed by: RADIOLOGY

## 2024-08-06 PROCEDURE — 3430000000 HC RX DIAGNOSTIC RADIOPHARMACEUTICAL: Performed by: RADIOLOGY

## 2024-08-06 PROCEDURE — 78815 PET IMAGE W/CT SKULL-THIGH: CPT

## 2024-08-06 RX ORDER — FLUDEOXYGLUCOSE F 18 200 MCI/ML
10 INJECTION, SOLUTION INTRAVENOUS ONCE
Status: COMPLETED | OUTPATIENT
Start: 2024-08-06 | End: 2024-08-06

## 2024-08-06 RX ADMIN — FLUDEOXYGLUCOSE F 18 10 MILLICURIE: 200 INJECTION, SOLUTION INTRAVENOUS at 14:04

## 2024-08-07 ENCOUNTER — HOSPITAL ENCOUNTER (OUTPATIENT)
Dept: INFUSION THERAPY | Age: 64
Discharge: HOME | End: 2024-08-07

## 2024-08-08 ENCOUNTER — HOSPITAL ENCOUNTER (OUTPATIENT)
Dept: MRI IMAGING | Age: 64
Discharge: HOME | End: 2024-08-10
Attending: INTERNAL MEDICINE
Payer: MEDICARE

## 2024-08-08 DIAGNOSIS — C49.A4 GIST (GASTROINTESTINAL STROMA TUMOR), MALIGNANT, COLON (HCC): ICD-10-CM

## 2024-08-08 DIAGNOSIS — R90.89: ICD-10-CM

## 2024-08-08 PROCEDURE — 72142 MRI NECK SPINE W/DYE: CPT

## 2024-08-08 PROCEDURE — A9579 GAD-BASE MR CONTRAST NOS,1ML: HCPCS | Performed by: RADIOLOGY

## 2024-08-08 PROCEDURE — 6360000004 HC RX CONTRAST MEDICATION: Performed by: RADIOLOGY

## 2024-08-08 RX ADMIN — GADOTERIDOL 19 ML: 279.3 INJECTION, SOLUTION INTRAVENOUS at 15:36

## 2024-08-12 DIAGNOSIS — D49.9 NEOPLASTIC DISEASE: Primary | ICD-10-CM

## 2024-08-15 ENCOUNTER — TELEPHONE (OUTPATIENT)
Dept: CASE MANAGEMENT | Age: 64
End: 2024-08-15

## 2024-08-20 ENCOUNTER — HOSPITAL ENCOUNTER (OUTPATIENT)
Dept: NUCLEAR MEDICINE | Age: 64
Discharge: HOME | End: 2024-08-22
Attending: INTERNAL MEDICINE
Payer: MEDICARE

## 2024-08-20 DIAGNOSIS — D49.9 NEOPLASTIC DISEASE: ICD-10-CM

## 2024-08-20 PROCEDURE — 3430000000 HC RX DIAGNOSTIC RADIOPHARMACEUTICAL: Performed by: RADIOLOGY

## 2024-08-20 PROCEDURE — A9503 TC99M MEDRONATE: HCPCS | Performed by: RADIOLOGY

## 2024-08-20 PROCEDURE — 78306 BONE IMAGING WHOLE BODY: CPT | Performed by: INTERNAL MEDICINE

## 2024-08-20 RX ORDER — TC 99M MEDRONATE 20 MG/10ML
25 INJECTION, POWDER, LYOPHILIZED, FOR SOLUTION INTRAVENOUS ONCE
Status: COMPLETED | OUTPATIENT
Start: 2024-08-20 | End: 2024-08-20

## 2024-08-20 RX ADMIN — TC 99M MEDRONATE 25 MILLICURIE: 20 INJECTION, POWDER, LYOPHILIZED, FOR SOLUTION INTRAVENOUS at 11:55

## 2024-08-21 ENCOUNTER — OFFICE VISIT (OUTPATIENT)
Dept: NEUROSURGERY | Age: 64
End: 2024-08-21
Payer: MEDICARE

## 2024-08-21 ENCOUNTER — OFFICE VISIT (OUTPATIENT)
Dept: ONCOLOGY | Age: 64
End: 2024-08-21
Payer: MEDICARE

## 2024-08-21 ENCOUNTER — HOSPITAL ENCOUNTER (OUTPATIENT)
Dept: INFUSION THERAPY | Age: 64
Discharge: HOME | End: 2024-08-21
Payer: MEDICARE

## 2024-08-21 VITALS
DIASTOLIC BLOOD PRESSURE: 83 MMHG | TEMPERATURE: 97 F | BODY MASS INDEX: 31.78 KG/M2 | HEIGHT: 68 IN | OXYGEN SATURATION: 98 % | RESPIRATION RATE: 20 BRPM | SYSTOLIC BLOOD PRESSURE: 151 MMHG | HEART RATE: 93 BPM | WEIGHT: 209.7 LBS

## 2024-08-21 VITALS
HEART RATE: 87 BPM | BODY MASS INDEX: 31.95 KG/M2 | OXYGEN SATURATION: 98 % | WEIGHT: 210.8 LBS | SYSTOLIC BLOOD PRESSURE: 148 MMHG | HEIGHT: 68 IN | DIASTOLIC BLOOD PRESSURE: 89 MMHG

## 2024-08-21 DIAGNOSIS — M54.2 NECK PAIN: Primary | ICD-10-CM

## 2024-08-21 DIAGNOSIS — M54.16 LUMBAR RADICULOPATHY: ICD-10-CM

## 2024-08-21 DIAGNOSIS — G89.29 CHRONIC BILATERAL LOW BACK PAIN WITH LEFT-SIDED SCIATICA: ICD-10-CM

## 2024-08-21 DIAGNOSIS — M48.061 SPINAL STENOSIS OF LUMBAR REGION, UNSPECIFIED WHETHER NEUROGENIC CLAUDICATION PRESENT: ICD-10-CM

## 2024-08-21 DIAGNOSIS — M48.02 CERVICAL STENOSIS OF SPINAL CANAL: ICD-10-CM

## 2024-08-21 DIAGNOSIS — M48.062 SPINAL STENOSIS OF LUMBAR REGION WITH NEUROGENIC CLAUDICATION: ICD-10-CM

## 2024-08-21 DIAGNOSIS — R90.89: ICD-10-CM

## 2024-08-21 DIAGNOSIS — M54.42 CHRONIC BILATERAL LOW BACK PAIN WITH LEFT-SIDED SCIATICA: ICD-10-CM

## 2024-08-21 DIAGNOSIS — M48.062 SPINAL STENOSIS OF LUMBAR REGION WITH NEUROGENIC CLAUDICATION: Primary | ICD-10-CM

## 2024-08-21 LAB
ALBUMIN SERPL-MCNC: 3.5 G/DL (ref 3.5–5.2)
ALP SERPL-CCNC: 126 U/L (ref 40–129)
ALT SERPL-CCNC: 35 U/L (ref 0–40)
ANION GAP SERPL CALCULATED.3IONS-SCNC: 10 MMOL/L (ref 7–16)
AST SERPL-CCNC: 19 U/L (ref 0–39)
BASOPHILS # BLD: 0.04 K/UL (ref 0–0.2)
BASOPHILS NFR BLD: 0 % (ref 0–2)
BILIRUB SERPL-MCNC: 0.2 MG/DL (ref 0–1.2)
BUN SERPL-MCNC: 14 MG/DL (ref 6–23)
CALCIUM SERPL-MCNC: 8.9 MG/DL (ref 8.6–10.2)
CHLORIDE SERPL-SCNC: 100 MMOL/L (ref 98–107)
CO2 SERPL-SCNC: 25 MMOL/L (ref 22–29)
CREAT SERPL-MCNC: 0.7 MG/DL (ref 0.7–1.2)
CRP SERPL HS-MCNC: 3 MG/L (ref 0–5)
EOSINOPHIL # BLD: 0.25 K/UL (ref 0.05–0.5)
EOSINOPHILS RELATIVE PERCENT: 3 % (ref 0–6)
ERYTHROCYTE [DISTWIDTH] IN BLOOD BY AUTOMATED COUNT: 14.8 % (ref 11.5–15)
ERYTHROCYTE [SEDIMENTATION RATE] IN BLOOD BY WESTERGREN METHOD: 32 MM/HR (ref 0–15)
GFR, ESTIMATED: >90 ML/MIN/1.73M2
GLUCOSE SERPL-MCNC: 142 MG/DL (ref 74–99)
HCT VFR BLD AUTO: 45.4 % (ref 37–54)
HGB BLD-MCNC: 15.2 G/DL (ref 12.5–16.5)
IMM GRANULOCYTES # BLD AUTO: 0.03 K/UL (ref 0–0.58)
IMM GRANULOCYTES NFR BLD: 0 % (ref 0–5)
LYMPHOCYTES NFR BLD: 3.26 K/UL (ref 1.5–4)
LYMPHOCYTES RELATIVE PERCENT: 37 % (ref 20–42)
MCH RBC QN AUTO: 31.1 PG (ref 26–35)
MCHC RBC AUTO-ENTMCNC: 33.5 G/DL (ref 32–34.5)
MCV RBC AUTO: 92.8 FL (ref 80–99.9)
MONOCYTES NFR BLD: 0.65 K/UL (ref 0.1–0.95)
MONOCYTES NFR BLD: 7 % (ref 2–12)
NEUTROPHILS NFR BLD: 53 % (ref 43–80)
NEUTS SEG NFR BLD: 4.66 K/UL (ref 1.8–7.3)
PLATELET # BLD AUTO: 274 K/UL (ref 130–450)
PMV BLD AUTO: 9.5 FL (ref 7–12)
POTASSIUM SERPL-SCNC: 3.9 MMOL/L (ref 3.5–5)
PROT SERPL-MCNC: 6.8 G/DL (ref 6.4–8.3)
PSA SERPL-MCNC: 1.58 NG/ML (ref 0–4)
RBC # BLD AUTO: 4.89 M/UL (ref 3.8–5.8)
SODIUM SERPL-SCNC: 135 MMOL/L (ref 132–146)
WBC OTHER # BLD: 8.9 K/UL (ref 4.5–11.5)

## 2024-08-21 PROCEDURE — G8417 CALC BMI ABV UP PARAM F/U: HCPCS | Performed by: INTERNAL MEDICINE

## 2024-08-21 PROCEDURE — G8427 DOCREV CUR MEDS BY ELIG CLIN: HCPCS | Performed by: STUDENT IN AN ORGANIZED HEALTH CARE EDUCATION/TRAINING PROGRAM

## 2024-08-21 PROCEDURE — 36415 COLL VENOUS BLD VENIPUNCTURE: CPT

## 2024-08-21 PROCEDURE — 85025 COMPLETE CBC W/AUTO DIFF WBC: CPT

## 2024-08-21 PROCEDURE — 3077F SYST BP >= 140 MM HG: CPT | Performed by: INTERNAL MEDICINE

## 2024-08-21 PROCEDURE — 99214 OFFICE O/P EST MOD 30 MIN: CPT | Performed by: INTERNAL MEDICINE

## 2024-08-21 PROCEDURE — 85652 RBC SED RATE AUTOMATED: CPT

## 2024-08-21 PROCEDURE — 3077F SYST BP >= 140 MM HG: CPT | Performed by: STUDENT IN AN ORGANIZED HEALTH CARE EDUCATION/TRAINING PROGRAM

## 2024-08-21 PROCEDURE — 87040 BLOOD CULTURE FOR BACTERIA: CPT

## 2024-08-21 PROCEDURE — G8427 DOCREV CUR MEDS BY ELIG CLIN: HCPCS | Performed by: INTERNAL MEDICINE

## 2024-08-21 PROCEDURE — 3017F COLORECTAL CA SCREEN DOC REV: CPT | Performed by: STUDENT IN AN ORGANIZED HEALTH CARE EDUCATION/TRAINING PROGRAM

## 2024-08-21 PROCEDURE — 86140 C-REACTIVE PROTEIN: CPT

## 2024-08-21 PROCEDURE — 4004F PT TOBACCO SCREEN RCVD TLK: CPT | Performed by: INTERNAL MEDICINE

## 2024-08-21 PROCEDURE — G8417 CALC BMI ABV UP PARAM F/U: HCPCS | Performed by: STUDENT IN AN ORGANIZED HEALTH CARE EDUCATION/TRAINING PROGRAM

## 2024-08-21 PROCEDURE — 99214 OFFICE O/P EST MOD 30 MIN: CPT | Performed by: STUDENT IN AN ORGANIZED HEALTH CARE EDUCATION/TRAINING PROGRAM

## 2024-08-21 PROCEDURE — 3017F COLORECTAL CA SCREEN DOC REV: CPT | Performed by: INTERNAL MEDICINE

## 2024-08-21 PROCEDURE — 84153 ASSAY OF PSA TOTAL: CPT

## 2024-08-21 PROCEDURE — 3079F DIAST BP 80-89 MM HG: CPT | Performed by: INTERNAL MEDICINE

## 2024-08-21 PROCEDURE — 3079F DIAST BP 80-89 MM HG: CPT | Performed by: STUDENT IN AN ORGANIZED HEALTH CARE EDUCATION/TRAINING PROGRAM

## 2024-08-21 PROCEDURE — 4004F PT TOBACCO SCREEN RCVD TLK: CPT | Performed by: STUDENT IN AN ORGANIZED HEALTH CARE EDUCATION/TRAINING PROGRAM

## 2024-08-21 PROCEDURE — 80053 COMPREHEN METABOLIC PANEL: CPT

## 2024-08-21 ASSESSMENT — ENCOUNTER SYMPTOMS
BACK PAIN: 1
ABDOMINAL PAIN: 0
TROUBLE SWALLOWING: 0
SHORTNESS OF BREATH: 0
PHOTOPHOBIA: 0

## 2024-08-21 NOTE — PROGRESS NOTES
metastatic disease.  Correlation with bone scan may be helpful.  2. No evidence of epidural mass or enhancing lesion.  No evidence of cord compression.    06/24/2024 MRI Lumbar Spine  1. No fracture or bony destructive changes.  2.  Developmentally narrow central canal.  3. Severe central canal stenoses at L1-2, L2-3, L3-4 and L4-5. Moderate stenosis at L5-S1.  4. Multilevel neural foraminal stenoses, worst (severe) of the right L4-5 and left L5-S1 levels    Assessment:  Pipe Collins Jr. is a 64 y.o. y/o male who presents for evaluation of neck and low back pain. He has tried PT in the past. No TIN. MRIs as above.     Plan:  -Pain control and expectations discussed  -MRI reviewed and discussed in detail, Patient with abnormal enhancement on MRI but bone scan negative for active metastasis. Will have patient continue follow up with oncology   -Patient to resume Aqua therapy when able  -Smoking cessation, if patient were to pursue surgical options, he would need to be 6 weeks nicotine free   -OARRS reviewed  -Follow up in clinic to discuss surgical options once patient is nicotine free  -Call/return sooner if symptoms worsen or new issues arise in the interim     Electronically signed by Jewell Monreal PA-C on 8/21/2024 at 9:33 AM

## 2024-08-22 LAB — PATH REV BLD -IMP: NORMAL

## 2024-08-22 NOTE — PROGRESS NOTES
Patient did NOT stop at check out window, left without AVS.  Patient has MYCHART.      
exam?->CRC    FINDINGS:  Mild diffuse increased tracer uptake is seen about both shoulders, both  knees, feet, wrists laterally, right lateral L4-L5 level corroborated on the  CT portion of the PET CT study done on the August 6, 2024 consistent with  degenerative change.    Mild focal radiotracer uptake is seen in the posterior 7th rib which  corresponds to healing or healed rib fracture on the corresponding PET-CT  study of August 6, 2024.    No abnormal radiotracer distribution is seen about the kidneys.    Increased uptake is seen in the maxilla and mandible consistent with  periodontal disease.    IMPRESSION:  No evidence of active osseous metastasis.  Specifically, no scintigraphically  active osseous abnormality is seen in the limited images of the cervical  spine.    Degenerative changes.  Periodontal disease.        Reading Physician: Paulina Kay MD  Signing Physician:   Paulina Kay MD    MRI CERVICAL SPINE WITH CONTRAST  Result Date: 08/11/2024  1:22 PM    EXAMINATION:  MRI OF THE CERVICAL SPINE WITHOUT AND WITH CONTRAST  8/8/2024 3:02 pm:    TECHNIQUE:  Multiplanar multisequence MRI of the cervical spine was performed without and  with the administration of intravenous contrast.    COMPARISON:  Unenhanced MRI cervical spine dated 06/24/2024 and PET-CT scan dated  08/06/2024    HISTORY:  ORDERING SYSTEM PROVIDED HISTORY: GIST (gastrointestinal stroma tumor),  malignant, colon (HCC)  TECHNOLOGIST PROVIDED HISTORY:  STAT Creatinine as needed:->Yes  Reason for exam:->infiltrative process in the cervical spine  What is the sedation requirement?->None  What reading provider will be dictating this exam?->CRC    FINDINGS:  BONES/ALIGNMENT: There is normal alignment of the spine.  There is again  straightening of the cervical lordosis.  Degenerative changes with spurring  and disc space narrowing seen at multiple levels.  The vertebral body heights  are maintained.  Heterogeneous marrow signal is

## 2024-08-24 LAB
SEND OUT REPORT: NORMAL
TEST NAME: NORMAL

## 2024-08-25 LAB
MICROORGANISM SPEC CULT: NORMAL
MICROORGANISM SPEC CULT: NORMAL
SERVICE CMNT-IMP: NORMAL
SERVICE CMNT-IMP: NORMAL
SPECIMEN DESCRIPTION: NORMAL
SPECIMEN DESCRIPTION: NORMAL

## 2024-08-27 NOTE — PROGRESS NOTES
[facility] PRE-ADMISSION TESTING   COLONOSCOPY INSTRUCTIONS  PAT- Phone Number: [phone]    COLONOSCOPY INSTRUCTIONS:     [x] Bowel Prep instructions reviewed - any questions regarding your prep, please contact surgeon's office.  [x] Colonoscopy: 1-2 days prior: Clear liquids only - nothing red or purple in color.  [x] Antibacterial Soap Shower Night before AND the morning of procedure.  [x] No solid food after midnight. You may have SIPS of clear liquids up until 2 hours before your arrival time to the hospital.   [x] Do not wear makeup, lotions, powders, deodorant.   [x] No tobacco products, illegal drugs, or alcohol within 24 hours of your surgery.  [x] Jewelry or valuables should not be brought to the hospital. All body and/or tongue piercing's must be removed prior to arriving to hospital. No contact lens or hair pins.   [] Urine Pregnancy test will be preformed the day of surgery. A specimen sample may be brought from home.  [x] Arrange transportation with a responsible adult  to and from the hospital. If you do not have a responsible adult  to transport you, you will need to make arrangements with a medical transportation company. Arrange for someone to be with you for the remainder of the day and for 24 hours after your procedure due to having had anesthesia.    -Who will be your  for transportation? Daughter  -Who will be staying with you for 24 hrs after your procedure? Daughter  [x] Bring insurance card and photo ID.  [] Bring copy of living will or healthcare power of  papers to be placed in your electronic record.    PARKING INSTRUCTIONS:     [x] ARRIVAL DATE & TIME: 08/30/24 at 1215 pm  [x] Times are subject to change. We will contact you the business day before surgery if that were to occur.  [x] Enter into the [facility] Entrance. Two people may accompany you. Masks are not required.  [x] Parking Lot \"I\" is where you will park. It is

## 2024-08-30 ENCOUNTER — HOSPITAL ENCOUNTER (OUTPATIENT)
Age: 64
Setting detail: OUTPATIENT SURGERY
Discharge: HOME | End: 2024-08-30
Attending: SURGERY | Admitting: SURGERY
Payer: MEDICARE

## 2024-08-30 ENCOUNTER — ANESTHESIA (OUTPATIENT)
Dept: ENDOSCOPY | Age: 64
End: 2024-08-30
Payer: MEDICARE

## 2024-08-30 ENCOUNTER — ANESTHESIA EVENT (OUTPATIENT)
Dept: ENDOSCOPY | Age: 64
End: 2024-08-30
Payer: MEDICARE

## 2024-08-30 VITALS
BODY MASS INDEX: 31.67 KG/M2 | OXYGEN SATURATION: 96 % | RESPIRATION RATE: 16 BRPM | HEIGHT: 68 IN | TEMPERATURE: 97.2 F | WEIGHT: 209 LBS | SYSTOLIC BLOOD PRESSURE: 141 MMHG | HEART RATE: 68 BPM | DIASTOLIC BLOOD PRESSURE: 78 MMHG

## 2024-08-30 DIAGNOSIS — Z01.812 PRE-OPERATIVE LABORATORY EXAMINATION: Primary | ICD-10-CM

## 2024-08-30 LAB — GLUCOSE BLD-MCNC: 109 MG/DL (ref 74–99)

## 2024-08-30 PROCEDURE — 6360000002 HC RX W HCPCS: Performed by: NURSE ANESTHETIST, CERTIFIED REGISTERED

## 2024-08-30 PROCEDURE — 3609027000 HC COLONOSCOPY: Performed by: SURGERY

## 2024-08-30 PROCEDURE — 82962 GLUCOSE BLOOD TEST: CPT

## 2024-08-30 PROCEDURE — 2580000003 HC RX 258: Performed by: SURGERY

## 2024-08-30 PROCEDURE — 6370000000 HC RX 637 (ALT 250 FOR IP): Performed by: ANESTHESIOLOGY

## 2024-08-30 PROCEDURE — 3700000001 HC ADD 15 MINUTES (ANESTHESIA): Performed by: SURGERY

## 2024-08-30 PROCEDURE — 7100000011 HC PHASE II RECOVERY - ADDTL 15 MIN: Performed by: SURGERY

## 2024-08-30 PROCEDURE — 7100000010 HC PHASE II RECOVERY - FIRST 15 MIN: Performed by: SURGERY

## 2024-08-30 PROCEDURE — 2709999900 HC NON-CHARGEABLE SUPPLY: Performed by: SURGERY

## 2024-08-30 PROCEDURE — 3700000000 HC ANESTHESIA ATTENDED CARE: Performed by: SURGERY

## 2024-08-30 PROCEDURE — 2580000003 HC RX 258: Performed by: NURSE ANESTHETIST, CERTIFIED REGISTERED

## 2024-08-30 PROCEDURE — G0105 COLORECTAL SCRN; HI RISK IND: HCPCS | Performed by: SURGERY

## 2024-08-30 RX ORDER — SODIUM CHLORIDE 9 MG/ML
INJECTION, SOLUTION INTRAVENOUS PRN
Status: DISCONTINUED | OUTPATIENT
Start: 2024-08-30 | End: 2024-08-30 | Stop reason: HOSPADM

## 2024-08-30 RX ORDER — FENTANYL CITRATE 50 UG/ML
INJECTION, SOLUTION INTRAMUSCULAR; INTRAVENOUS PRN
Status: DISCONTINUED | OUTPATIENT
Start: 2024-08-30 | End: 2024-08-30 | Stop reason: SDUPTHER

## 2024-08-30 RX ORDER — PHENYLEPHRINE HCL IN 0.9% NACL 1 MG/10 ML
SYRINGE (ML) INTRAVENOUS PRN
Status: DISCONTINUED | OUTPATIENT
Start: 2024-08-30 | End: 2024-08-30 | Stop reason: SDUPTHER

## 2024-08-30 RX ORDER — SODIUM CHLORIDE 9 MG/ML
INJECTION, SOLUTION INTRAVENOUS CONTINUOUS PRN
Status: DISCONTINUED | OUTPATIENT
Start: 2024-08-30 | End: 2024-08-30 | Stop reason: SDUPTHER

## 2024-08-30 RX ORDER — SODIUM CHLORIDE 0.9 % (FLUSH) 0.9 %
5-40 SYRINGE (ML) INJECTION EVERY 12 HOURS SCHEDULED
Status: DISCONTINUED | OUTPATIENT
Start: 2024-08-30 | End: 2024-08-30 | Stop reason: HOSPADM

## 2024-08-30 RX ORDER — SODIUM CHLORIDE 0.9 % (FLUSH) 0.9 %
5-40 SYRINGE (ML) INJECTION PRN
Status: DISCONTINUED | OUTPATIENT
Start: 2024-08-30 | End: 2024-08-30 | Stop reason: HOSPADM

## 2024-08-30 RX ORDER — SODIUM CHLORIDE 9 MG/ML
INJECTION, SOLUTION INTRAVENOUS CONTINUOUS
Status: DISCONTINUED | OUTPATIENT
Start: 2024-08-30 | End: 2024-08-30 | Stop reason: HOSPADM

## 2024-08-30 RX ORDER — PROPOFOL 10 MG/ML
INJECTION, EMULSION INTRAVENOUS PRN
Status: DISCONTINUED | OUTPATIENT
Start: 2024-08-30 | End: 2024-08-30 | Stop reason: SDUPTHER

## 2024-08-30 RX ORDER — METOPROLOL SUCCINATE 50 MG/1
50 TABLET, EXTENDED RELEASE ORAL ONCE
Status: COMPLETED | OUTPATIENT
Start: 2024-08-30 | End: 2024-08-30

## 2024-08-30 RX ADMIN — SODIUM CHLORIDE: 9 INJECTION, SOLUTION INTRAVENOUS at 12:47

## 2024-08-30 RX ADMIN — METOPROLOL SUCCINATE 50 MG: 50 TABLET, EXTENDED RELEASE ORAL at 13:09

## 2024-08-30 RX ADMIN — PROPOFOL 100 MG: 10 INJECTION, EMULSION INTRAVENOUS at 14:24

## 2024-08-30 RX ADMIN — Medication 100 MCG: at 14:45

## 2024-08-30 RX ADMIN — PROPOFOL 110 MCG/KG/MIN: 10 INJECTION, EMULSION INTRAVENOUS at 14:25

## 2024-08-30 RX ADMIN — FENTANYL CITRATE 100 MCG: 50 INJECTION, SOLUTION INTRAMUSCULAR; INTRAVENOUS at 14:20

## 2024-08-30 RX ADMIN — SODIUM CHLORIDE: 9 INJECTION, SOLUTION INTRAVENOUS at 14:20

## 2024-08-30 ASSESSMENT — PAIN - FUNCTIONAL ASSESSMENT: PAIN_FUNCTIONAL_ASSESSMENT: 0-10

## 2024-08-30 ASSESSMENT — LIFESTYLE VARIABLES: SMOKING_STATUS: 1

## 2024-08-30 ASSESSMENT — PAIN SCALES - GENERAL
PAINLEVEL_OUTOF10: 0

## 2024-08-30 NOTE — DISCHARGE INSTRUCTIONS
Call [phone] for any questions/concerns.    POOR prep again.    Repeat within 3 years.         Colonoscopy: What to Expect at Home  Your Recovery  After a colonoscopy, you'll stay at the clinic until you wake up. Then you can go home. But you'll need to arrange for a ride. Your doctor will tell you when you can eat and do your other usual activities.  Your doctor will talk to you about when you'll need your next colonoscopy. Your doctor can help you decide how often you need to be checked. This will depend on the results of your test and your risk for colorectal cancer.  After the test, you may be bloated or have gas pains. You may need to pass gas. If a biopsy was done or a polyp was removed, you may have streaks of blood in your stool (feces) for a few days. Problems such as heavy rectal bleeding may not occur until several weeks after the test. This isn't common. But it can happen after polyps are removed.  This care sheet gives you a general idea about how long it will take for you to recover. But each person recovers at a different pace. Follow the steps below to get better as quickly as possible.  How can you care for yourself at home?  Activity    Rest when you feel tired.     You can do your normal activities when it feels okay to do so.   Diet    Follow your doctor's directions for eating.     Unless your doctor has told you not to, drink plenty of fluids. This helps to replace the fluids that were lost during the colon prep.     Do not drink alcohol.   Medicines    Your doctor will tell you if and when you can restart your medicines. You will also be given instructions about taking any new medicines.     If you stopped taking aspirin or some other blood thinner, your doctor will tell you when to start taking it again.     If polyps were removed or a biopsy was done during the test, your doctor may tell you not to take aspirin or other anti-inflammatory medicines for a few days. These include ibuprofen  (Advil, Motrin) and naproxen (Aleve).   Other instructions    For your safety, do not drive or operate machinery until the medicine wears off and you can think clearly. Your doctor may tell you not to drive or operate machinery until the day after your test.     Do not sign legal documents or make major decisions until the medicine wears off and you can think clearly. The anesthesia can make it hard for you to fully understand what you are agreeing to.   Follow-up care is a key part of your treatment and safety. Be sure to make and go to all appointments, and call your doctor if you are having problems. It's also a good idea to know your test results and keep a list of the medicines you take.  When should you call for help?   Call 911 anytime you think you may need emergency care. For example, call if:    You passed out (lost consciousness).     You pass maroon or bloody stools.     You have trouble breathing.   Call your doctor now or seek immediate medical care if:    You have pain that does not get better after you take pain medicine.     You are sick to your stomach or cannot drink fluids.     You have new or worse belly pain.     You have blood in your stools.     You have a fever.     You cannot pass stools or gas.   Watch closely for changes in your health, and be sure to contact your doctor if you have any problems.  Where can you learn more?  Go to https://www.[employer].net/patientEd and enter E264 to learn more about \"Colonoscopy: What to Expect at Home.\"  Current as of: October 25, 2023  Content Version: 14.1  © 2006-2024 [employer].   Care instructions adapted under license by [employer]. If you have questions about a medical condition or this instruction, always ask your healthcare professional. [employer] disclaims any warranty or liability for your use of this information.

## 2024-08-30 NOTE — ANESTHESIA PRE PROCEDURE
Department of Anesthesiology  Preprocedure Note       Name:  Pipe Collins Jr.   Age:  64 y.o.  :  1960                                          MRN:  [id number]         Date:  2024      Surgeon: Surgeon(s):  Pineda Demarco MD    Procedure: Procedure(s):  COLORECTAL CANCER SCREENING, NOT HIGH RISK    Medications prior to admission:   Prior to Admission medications    Medication Sig Start Date End Date Taking? Authorizing Provider   senna (SENOKOT) 8.6 MG tablet Take 1 tablet by mouth 2 times daily Hold if diarrhea or loose stool 24 Yes Arturo Mulligan MD   polyethylene glycol (GLYCOLAX) 17 GM/SCOOP powder Take 17 g by mouth daily as needed (constipation not improving with senna) 24 Yes Arturo Mulligan MD   DULoxetine (CYMBALTA) 30 MG extended release capsule Take 1 capsule by mouth daily 24  Yes Kenny Parker MD   amLODIPine (NORVASC) 10 MG tablet Take 1 tablet by mouth daily 24  Yes Kenny Parker MD   atorvastatin (LIPITOR) 40 MG tablet Take 1 tablet by mouth daily 24  Yes Kenny Parker MD   bumetanide (BUMEX) 1 MG tablet Take 1 tablet by mouth 2 times daily  Patient taking differently: Take 1 tablet by mouth 2 times daily Takes 2 mg in am 24  Yes Kenny Parker MD   docusate sodium (COLACE) 100 MG capsule Take 1 capsule by mouth 2 times daily 24  Yes Kenny Parker MD   eplerenone (INSPRA) 50 MG tablet Take 1 tablet by mouth daily 24  Yes Kenny Parker MD   losartan (COZAAR) 50 MG tablet Take 1 tablet by mouth daily 3/21/24  Yes Kenny Parker MD   metoprolol succinate (TOPROL XL) 50 MG extended release tablet Take 1 tablet by mouth daily 23  Yes Anmol Del Cid MD   colchicine (COLCRYS) 0.6 MG tablet Day 1: take 1.2 mg (2 pills),  followed by 0.6 mg (1 pill) after 1 hour . Day 2 and forward: 0.6 mg once or twice daily until flare resolves. 23  Yes Jill Mabry MD   Continuous Blood Gluc  (FREESTYLE DEIDRE 2 READER) CARMELA Use to  injection 5-40 mL  5-40 mL IntraVENous PRN Pineda Demarco MD        0.9 % sodium chloride infusion   IntraVENous PRN Pineda Demarco MD           Allergies:    Allergies   Allergen Reactions    Lasix [Furosemide] Other (See Comments)     Per pt potassium was too low when on lasix    Lisinopril Swelling     Angioedema    Prunus Persica Anaphylaxis     Per report to PFS manager, Samson.  Mariajose Kym Henry, BS-NDTR    Atenolol Other (See Comments) and Swelling     Thins his blood    Nitrates, Organic Other (See Comments)     Is using Sildenafil.  containdicated with Nitrates    Spironolactone      Swelling in the chest area       Problem List:    Patient Active Problem List   Diagnosis Code    Essential hypertension I10    Type 2 diabetes mellitus ([facility]) E11.9    Mixed hyperlipidemia E78.2    Recurrent major depressive disorder, in remission ([facility]) F33.40    Carpal tunnel syndrome G56.00    Obstructive sleep apnea syndrome G47.33    Tobacco abuse Z72.0    Uncontrolled type 2 diabetes mellitus with hyperglycemia ([facility]) E11.65    Hypomagnesemia E83.42    Bilateral leg edema R60.0    Synovial cyst of right popliteal space M71.21    Primary osteoarthritis of right knee M17.11    GIST (gastrointestinal stroma tumor), malignant, colon ([facility]) C49.A4    Chronic heart failure with preserved ejection fraction (HFpEF) (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

## 2024-08-30 NOTE — H&P
[facility]  History and Physical     Patient's Name/Date of Birth: Pipe Collins . / 1960 (64 y.o.)     PCP:  Dr. Parker     Chief Complaint:  colonoscopy eval     History of Present Illness:  64 yr old male with last colonoscopy 2018--found to have tubular adenoma.  Was seen in 2021 but never scheduled procedure due to other health issues.  Pt denies abd pain, blood in stool, unintentional weight loss, or family hx of colon cancer.  Reports recent constipation--improved with stool softeners/Miralax.     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 ileocecectomy @[facility] by Dr. Willian Swartz. Currently receiving oral chemo    COLONOSCOPY   09/14/2018     polyp--eliane    MOUTH SURGERY N/A 3/21/2022     EXCISION TONGUE LESION performed by Jd Mera DO at [facility] OR    NASAL SEPTUM SURGERY        AL COLONOSCOPY W/BIOPSY SINGLE/MULTIPLE N/A 9/14/2018     COLONOSCOPY WITH BIOPSY performed by Pineda Demarco MD at [facility] ENDOSCOPY    AL COLSC FLX W/RMVL OF TUMOR POLYP LESION SNARE TQ   9/14/2018     COLONOSCOPY POLYPECTOMY SNARE/COLD BIOPSY performed by Pineda Demarco MD at [facility] ENDOSCOPY    TONSILLECTOMY        US BIOPSY SOFT TISSUE NECK/THORAX   5/26/2021     US BIOPSY SOFT TISSUE NECK/THORAX 5/26/2021 [facility] ULTRASOUND            Family History            Capsaicin (ZOSTRIX) 0.035 % CREA cream Apply topically 3 times daily 1 each 1    diclofenac sodium (VOLTAREN) 1 % GEL Apply 2 g topically 4 times daily (Patient taking differently: Apply 2 g topically 4 times daily as needed) 350 g 0    docusate sodium (COLACE) 100 MG capsule Take 1 capsule by mouth 2 times daily 90 capsule 1    gabapentin (NEURONTIN) 800 MG tablet Take 1 tablet by mouth 3 times daily as needed.        Cholecalciferol (VITAMIN D3) 2000 units CAPS Take 1 capsule by mouth daily        traMADol (ULTRAM) 50 MG tablet Take 1 tablet by mouth every 6 hours as needed for Pain.        sildenafil (VIAGRA) 100 MG tablet Take 1 tablet by mouth as needed Take an hour before sex.  [facility]        Cream Base CREA Apply 2 g topically 4 times daily as needed (pain) 240 g 5      No current facility-administered medications for this visit.                      Allergies   Allergen Reactions    Lasix [Furosemide] Other (See Comments)       Per pt potassium was too low when on lasix    Lisinopril Swelling       Angioedema    Prunus Persica Anaphylaxis       Per report to PFS managerSamson.  Mariajose Henry, BS-NDTR    Atenolol Other (See Comments) and Swelling       Thins his blood    Nitrates, Organic Other (See Comments)       Is using Sildenafil.  containdicated with Nitrates    Spironolactone         Swelling in the chest area         REVIEW OF SYSTEMS:    Constitutional: negative  Eyes: negative  Ears, nose, mouth, throat, and face: negative  Respiratory: negative  Cardiovascular: negative  Gastrointestinal: negative  Genitourinary:negative  Integument/breast: negative  Hematologic/lymphatic: negative  Musculoskeletal:  walks with cane  Neurological: neuropathy in hands and feet  Allergic/Immunologic: negative        Physical Exam:     GENERAL EXAM: On exam- pt appears stated age.  No acute distress.   NEURO:  Alert and oriented x 3.   HEENT: head- atraumatic-normocephalic.  No

## 2024-09-06 NOTE — ANESTHESIA POSTPROCEDURE EVALUATION
Department of Anesthesiology  Postprocedure Note    Patient: Pipe Collins Jr.  MRN: [id number]  YOB: 1960  Date of evaluation: 9/6/2024    Procedure Summary       Date: 08/30/24 Room / Location: [place] [address] / [facility]    Anesthesia Start: 1420 Anesthesia Stop: 1448    Procedure: COLORECTAL CANCER SCREENING, NOT HIGH RISK Diagnosis:       Personal history of colonic polyps      (Personal history of colonic polyps [Z86.010])    Surgeons: Pineda Demarco MD Responsible Provider: Della Adams DO    Anesthesia Type: MAC ASA Status: 3            Anesthesia Type: MAC    Tali Phase I: Tali Score: 10    Tali Phase II: Tali Score: 10    Anesthesia Post Evaluation    Patient location during evaluation: PACU  Patient participation: complete - patient participated  Level of consciousness: awake and alert  Airway patency: patent  Nausea & Vomiting: no nausea and no vomiting  Cardiovascular status: hemodynamically stable  Respiratory status: acceptable  Hydration status: euvolemic  Pain management: adequate        No notable events documented.

## 2024-09-11 ENCOUNTER — TELEPHONE (OUTPATIENT)
Dept: ADMINISTRATIVE | Age: 64
End: 2024-09-11

## 2024-09-11 ENCOUNTER — TELEPHONE (OUTPATIENT)
Dept: ONCOLOGY | Age: 64
End: 2024-09-11

## 2024-09-11 ENCOUNTER — TELEPHONE (OUTPATIENT)
Dept: FAMILY MEDICINE CLINIC | Age: 64
End: 2024-09-11

## 2024-09-17 ENCOUNTER — TELEPHONE (OUTPATIENT)
Dept: CASE MANAGEMENT | Age: 64
End: 2024-09-17

## 2024-09-23 ENCOUNTER — SCHEDULED TELEPHONE ENCOUNTER (OUTPATIENT)
Dept: ONCOLOGY | Age: 64
End: 2024-09-23
Payer: MEDICARE

## 2024-09-23 DIAGNOSIS — Z76.0 MEDICATION REFILL: ICD-10-CM

## 2024-09-23 DIAGNOSIS — Z85.09 HX OF MALIGNANT GASTROINTESTINAL STROMAL TUMOR (GIST): Primary | ICD-10-CM

## 2024-09-23 PROCEDURE — 99441 PR PHYS/QHP TELEPHONE EVALUATION 5-10 MIN: CPT | Performed by: INTERNAL MEDICINE

## 2024-09-24 RX ORDER — POTASSIUM CHLORIDE 1500 MG/1
20 TABLET, EXTENDED RELEASE ORAL 2 TIMES DAILY
Qty: 180 TABLET | Refills: 1 | Status: SHIPPED | OUTPATIENT
Start: 2024-09-24

## 2024-10-07 ENCOUNTER — TELEPHONE (OUTPATIENT)
Dept: ENT CLINIC | Age: 64
End: 2024-10-07

## 2024-10-07 DIAGNOSIS — R59.1 LYMPHADENOPATHY: Primary | ICD-10-CM

## 2024-10-07 DIAGNOSIS — E11.42 TYPE 2 DIABETES MELLITUS WITH DIABETIC POLYNEUROPATHY, UNSPECIFIED WHETHER LONG TERM INSULIN USE (HCC): ICD-10-CM

## 2024-10-07 DIAGNOSIS — E11.00 TYPE 2 DIABETES MELLITUS WITH HYPEROSMOLARITY WITHOUT COMA, WITH LONG-TERM CURRENT USE OF INSULIN (HCC): ICD-10-CM

## 2024-10-07 DIAGNOSIS — E11.42 DIABETIC POLYNEUROPATHY ASSOCIATED WITH TYPE 2 DIABETES MELLITUS (HCC): ICD-10-CM

## 2024-10-07 DIAGNOSIS — M48.062 LUMBAR STENOSIS WITH NEUROGENIC CLAUDICATION: ICD-10-CM

## 2024-10-07 DIAGNOSIS — Z79.4 TYPE 2 DIABETES MELLITUS WITH HYPEROSMOLARITY WITHOUT COMA, WITH LONG-TERM CURRENT USE OF INSULIN (HCC): ICD-10-CM

## 2024-10-07 RX ORDER — SEMAGLUTIDE 0.68 MG/ML
INJECTION, SOLUTION SUBCUTANEOUS
Qty: 3 ML | Refills: 2 | Status: SHIPPED | OUTPATIENT
Start: 2024-10-07

## 2024-10-07 RX ORDER — TIRZEPATIDE 5 MG/.5ML
INJECTION, SOLUTION SUBCUTANEOUS
Qty: 2 ML | OUTPATIENT
Start: 2024-10-07

## 2024-10-07 RX ORDER — METOPROLOL SUCCINATE 50 MG/1
50 TABLET, EXTENDED RELEASE ORAL DAILY
Qty: 90 TABLET | Refills: 3 | OUTPATIENT
Start: 2024-10-07

## 2024-10-07 RX ORDER — EMPAGLIFLOZIN 25 MG/1
25 TABLET, FILM COATED ORAL DAILY
Qty: 90 TABLET | Refills: 0 | Status: SHIPPED | OUTPATIENT
Start: 2024-10-07

## 2024-10-07 NOTE — TELEPHONE ENCOUNTER
Patient calling to schedule follow up  appointment    for lump on the lower jaw area, that is getting larger.  No soon appointments available.

## 2024-10-07 NOTE — TELEPHONE ENCOUNTER
Called patient to discuss current symptoms patient has lump on lower jaw getting larger. Patient was last seen in January 2024 for same matter. Advised patient will speak with provider for further recommendations. Will call patient back by 10/8/24.

## 2024-10-07 NOTE — TELEPHONE ENCOUNTER
Last Appointment:  7/12/2024  Future Appointments   Date Time Provider Department Center   10/23/2024  2:45 PM Anmol Del Cid MD [facility]   11/11/2024  3:30 PM Arturo Mulligan MD MED ONC [facility]

## 2024-10-07 NOTE — TELEPHONE ENCOUNTER
Last Appointment:  Visit date not found  Future Appointments   Date Time Provider Department Center   10/23/2024  2:45 PM Anmol Del Cid MD [facility]   11/11/2024  3:30 PM Arturo Mulligan MD MED ONC [facility]

## 2024-10-08 NOTE — TELEPHONE ENCOUNTER
Called and spoke with patient advised per provider obtain US thyroid provided phone number to radiology scheduling patient has a lot of appointments in future will call to schedule for a later date.

## 2024-10-14 ENCOUNTER — TELEPHONE (OUTPATIENT)
Dept: ONCOLOGY | Age: 64
End: 2024-10-14

## 2024-10-14 NOTE — TELEPHONE ENCOUNTER
Contacted pt re: positive distress screen.  Pt is 64-year-old male being managed for GIST.  Reviewed distress screen dated 2024:     2024   Distress Tool Screening    Please select the number that describes how much distress you have experienced in the PAST WEEK: 6    Please select the number that describes how much distress you have experienced TODAY: 6    Finances, bills, insurance: 6    Housin    Transportation: 2    Availability of caregivers: 2    Sadness/Depression: 6    Anxiety/Worry/Fear: 6    Concerns about appearance: 6    Connection to a higher power: 2    Sense of meaning and purpose: 4    Support from my spiritual community: 6    Nausea: 6    Eating/Loss of appetite: 8    Pain: 8    Fatigue: 6      Practical:  Pt discussed ongoing medical care.  He reported several chronic health issues and frequent medical appointments.  He indicated that he is utilizing transportation under his [place] Medicaid noting that he must provide three days notice for all appointments.  He noted no concerns re: barriers to care.    Emotional:  Pt discussed stresses surrounding management of chronic health issues.  He noted that he believes that he is managing this well at this time.    Spiritual:  Provided information on spiritual care services.      Physical:  Pt did report some ongoing pain but indicated that he is working to manage at this time.  Provided information on palliative care services should pt request additional support with symptom management.    No additional needs identified at this time.  Reviewed role of oncology SW and encouraged pt to notify this provider if additional needs arise.    Brielle Del Valle MSW, RAMILA-S  Oncology Social Worker

## 2024-10-16 ENCOUNTER — HOSPITAL ENCOUNTER (OUTPATIENT)
Age: 64
Discharge: HOME | End: 2024-10-16
Payer: MEDICARE

## 2024-10-16 DIAGNOSIS — R93.7 ABNORMAL MAGNETIC RESONANCE IMAGING OF CERVICAL SPINE: ICD-10-CM

## 2024-10-16 LAB
ALBUMIN SERPL-MCNC: 3.6 G/DL (ref 3.5–5.2)
ALP SERPL-CCNC: 100 U/L (ref 40–129)
ALT SERPL-CCNC: 18 U/L (ref 0–40)
ANION GAP SERPL CALCULATED.3IONS-SCNC: 13 MMOL/L (ref 7–16)
AST SERPL-CCNC: 16 U/L (ref 0–39)
BASOPHILS # BLD: 0.06 K/UL (ref 0–0.2)
BASOPHILS NFR BLD: 1 % (ref 0–2)
BILIRUB SERPL-MCNC: 0.3 MG/DL (ref 0–1.2)
BUN SERPL-MCNC: 15 MG/DL (ref 6–23)
CALCIUM SERPL-MCNC: 9.1 MG/DL (ref 8.6–10.2)
CHLORIDE SERPL-SCNC: 100 MMOL/L (ref 98–107)
CO2 SERPL-SCNC: 23 MMOL/L (ref 22–29)
CREAT SERPL-MCNC: 0.7 MG/DL (ref 0.7–1.2)
CRP SERPL HS-MCNC: 3 MG/L (ref 0–5)
EOSINOPHIL # BLD: 0.22 K/UL (ref 0.05–0.5)
EOSINOPHILS RELATIVE PERCENT: 3 % (ref 0–6)
ERYTHROCYTE [DISTWIDTH] IN BLOOD BY AUTOMATED COUNT: 14.7 % (ref 11.5–15)
ERYTHROCYTE [SEDIMENTATION RATE] IN BLOOD BY WESTERGREN METHOD: 33 MM/HR (ref 0–15)
GFR, ESTIMATED: >90 ML/MIN/1.73M2
GLUCOSE SERPL-MCNC: 160 MG/DL (ref 74–99)
HCT VFR BLD AUTO: 46.9 % (ref 37–54)
HGB BLD-MCNC: 15.5 G/DL (ref 12.5–16.5)
IMM GRANULOCYTES # BLD AUTO: <0.03 K/UL (ref 0–0.58)
IMM GRANULOCYTES NFR BLD: 0 % (ref 0–5)
LYMPHOCYTES NFR BLD: 3.05 K/UL (ref 1.5–4)
LYMPHOCYTES RELATIVE PERCENT: 37 % (ref 20–42)
MCH RBC QN AUTO: 31.3 PG (ref 26–35)
MCHC RBC AUTO-ENTMCNC: 33 G/DL (ref 32–34.5)
MCV RBC AUTO: 94.6 FL (ref 80–99.9)
MONOCYTES NFR BLD: 0.55 K/UL (ref 0.1–0.95)
MONOCYTES NFR BLD: 7 % (ref 2–12)
NEUTROPHILS NFR BLD: 53 % (ref 43–80)
NEUTS SEG NFR BLD: 4.45 K/UL (ref 1.8–7.3)
PLATELET # BLD AUTO: 324 K/UL (ref 130–450)
PMV BLD AUTO: 8.9 FL (ref 7–12)
POTASSIUM SERPL-SCNC: 3.9 MMOL/L (ref 3.5–5)
PROT SERPL-MCNC: 6.6 G/DL (ref 6.4–8.3)
RBC # BLD AUTO: 4.96 M/UL (ref 3.8–5.8)
SODIUM SERPL-SCNC: 136 MMOL/L (ref 132–146)
WBC OTHER # BLD: 8.4 K/UL (ref 4.5–11.5)

## 2024-10-16 PROCEDURE — 86481 TB AG RESPONSE T-CELL SUSP: CPT

## 2024-10-16 PROCEDURE — 36415 COLL VENOUS BLD VENIPUNCTURE: CPT

## 2024-10-16 PROCEDURE — 85025 COMPLETE CBC W/AUTO DIFF WBC: CPT

## 2024-10-16 PROCEDURE — 86140 C-REACTIVE PROTEIN: CPT

## 2024-10-16 PROCEDURE — 80053 COMPREHEN METABOLIC PANEL: CPT

## 2024-10-16 PROCEDURE — 87040 BLOOD CULTURE FOR BACTERIA: CPT

## 2024-10-16 PROCEDURE — 85652 RBC SED RATE AUTOMATED: CPT

## 2024-10-19 LAB — T SPOT TB TEST: NORMAL

## 2024-10-21 DIAGNOSIS — M46.42 DISCITIS OF CERVICAL REGION: Primary | ICD-10-CM

## 2024-10-23 ENCOUNTER — OFFICE VISIT (OUTPATIENT)
Dept: CARDIOLOGY CLINIC | Age: 64
End: 2024-10-23
Payer: MEDICARE

## 2024-10-23 VITALS
SYSTOLIC BLOOD PRESSURE: 132 MMHG | WEIGHT: 218 LBS | HEIGHT: 68 IN | BODY MASS INDEX: 33.04 KG/M2 | HEART RATE: 80 BPM | DIASTOLIC BLOOD PRESSURE: 78 MMHG | RESPIRATION RATE: 20 BRPM

## 2024-10-23 DIAGNOSIS — Z72.0 TOBACCO ABUSE: ICD-10-CM

## 2024-10-23 DIAGNOSIS — I10 ESSENTIAL HYPERTENSION: ICD-10-CM

## 2024-10-23 DIAGNOSIS — E11.42 TYPE 2 DIABETES MELLITUS WITH DIABETIC POLYNEUROPATHY, UNSPECIFIED WHETHER LONG TERM INSULIN USE (HCC): ICD-10-CM

## 2024-10-23 DIAGNOSIS — I50.33 ACUTE ON CHRONIC HEART FAILURE WITH PRESERVED EJECTION FRACTION (HCC): Primary | ICD-10-CM

## 2024-10-23 DIAGNOSIS — E78.2 MIXED HYPERLIPIDEMIA: ICD-10-CM

## 2024-10-23 PROCEDURE — 3017F COLORECTAL CA SCREEN DOC REV: CPT | Performed by: INTERNAL MEDICINE

## 2024-10-23 PROCEDURE — G8417 CALC BMI ABV UP PARAM F/U: HCPCS | Performed by: INTERNAL MEDICINE

## 2024-10-23 PROCEDURE — 4004F PT TOBACCO SCREEN RCVD TLK: CPT | Performed by: INTERNAL MEDICINE

## 2024-10-23 PROCEDURE — 93000 ELECTROCARDIOGRAM COMPLETE: CPT | Performed by: INTERNAL MEDICINE

## 2024-10-23 PROCEDURE — 3078F DIAST BP <80 MM HG: CPT | Performed by: INTERNAL MEDICINE

## 2024-10-23 PROCEDURE — G8428 CUR MEDS NOT DOCUMENT: HCPCS | Performed by: INTERNAL MEDICINE

## 2024-10-23 PROCEDURE — 99214 OFFICE O/P EST MOD 30 MIN: CPT | Performed by: INTERNAL MEDICINE

## 2024-10-23 PROCEDURE — 3052F HG A1C>EQUAL 8.0%<EQUAL 9.0%: CPT | Performed by: INTERNAL MEDICINE

## 2024-10-23 PROCEDURE — 2022F DILAT RTA XM EVC RTNOPTHY: CPT | Performed by: INTERNAL MEDICINE

## 2024-10-23 PROCEDURE — 3075F SYST BP GE 130 - 139MM HG: CPT | Performed by: INTERNAL MEDICINE

## 2024-10-23 PROCEDURE — G8484 FLU IMMUNIZE NO ADMIN: HCPCS | Performed by: INTERNAL MEDICINE

## 2024-10-23 RX ORDER — METOPROLOL SUCCINATE 50 MG/1
50 TABLET, EXTENDED RELEASE ORAL DAILY
Qty: 90 TABLET | Refills: 3 | Status: SHIPPED | OUTPATIENT
Start: 2024-10-23

## 2024-10-23 NOTE — PATIENT INSTRUCTIONS
Continue all your medications at current doses.   Take an extra pill of the bumex in the afternoon for 2-3 days.   We will schedule an echocardiogram.   Restrict sodium intake to less than 2-2.5 g/day. Restrict fluid intake to less than 2.2 L/day. Goal BP is less than 130/80.   If your weight increases by > 2 lbs in a day or >5 lbs in more than a day, take an extra bumex pill.   Please try to exercise for 150 minutes a week.   I will see you back in the office in 6 months. Please call the office at ([phone], option 2) if you have any questions.

## 2024-10-23 NOTE — PROGRESS NOTES
CHIEF COMPLAINT:   Chief Complaint   Patient presents with    Hypertension     Needs refills          HISTORY OF PRESENT ILLNESS: Patient is a 64 y.o. male who is here for a follow up visit of HTN.    He has a history of HTN, HLD, Gout, DM-2, Gastrointestinal stomal tumor (GIST), Cervical (C3 vertebrae) lesion currently being worked up per ID and NSGY.     At today's office visit, He denies any acute complaints or concerns. BP reading in office today is elevated to 160/100 initially then 132/78 on manual repeat.  He denies any chest pain, shortness of breath, palpitations, dizziness, pedal edema. The patient is capable of activities of daily living. There is no orthopnea or PND. He states that he is compliant with medication regimen and has had frequent follow up appointments recently with various providers. He is still smoking 1/2  pack cigarettes daily. He endorses Left knee swelling for several months but he has not followed up  with his PCP about this yet as he has been in and out of multiple appointments for other chronic conditions.    Today, he wants a refills of metoprolol 50 mg. He states that he has been out of this medication for many months. He states that he otherwise has been taking all his other BP medications consistently. He was lastly seen by Cardiology > 2 years ago. He states [facility].  He was seeing Dr. Peng in the past.    He was on Ozempic.  He had issues with joint pains, constipation.  He stopped taking it.  He still has supplies.        Past Medical History:   Diagnosis Date    Angiotensin converting enzyme inhibitor-aggravated angioedema 03/29/2018    Arthritis     Carpal tunnel syndrome     Cecum mass     CHF (congestive heart failure) (HCC)     CKD (chronic kidney disease)     Diabetes mellitus (HCC)     Gastrointestinal stromal tumor (GIST) (HCC) 06/03/2019    Gout     Hyperlipidemia     Hypertension     Hypokalemia     Mass of right

## 2024-11-08 DIAGNOSIS — E11.00 TYPE 2 DIABETES MELLITUS WITH HYPEROSMOLARITY WITHOUT COMA, WITH LONG-TERM CURRENT USE OF INSULIN (HCC): ICD-10-CM

## 2024-11-08 DIAGNOSIS — M54.16 LUMBAR RADICULOPATHY: ICD-10-CM

## 2024-11-08 DIAGNOSIS — Z79.4 TYPE 2 DIABETES MELLITUS WITH HYPEROSMOLARITY WITHOUT COMA, WITH LONG-TERM CURRENT USE OF INSULIN (HCC): ICD-10-CM

## 2024-11-11 ENCOUNTER — SCHEDULED TELEPHONE ENCOUNTER (OUTPATIENT)
Dept: ONCOLOGY | Age: 64
End: 2024-11-11
Payer: MEDICARE

## 2024-11-11 DIAGNOSIS — R93.89 ABNORMAL FINDING ON IMAGING: Primary | ICD-10-CM

## 2024-11-11 PROCEDURE — 99441 PR PHYS/QHP TELEPHONE EVALUATION 5-10 MIN: CPT | Performed by: INTERNAL MEDICINE

## 2024-11-11 NOTE — PROGRESS NOTES
Total Time: minutes: 5-10 minutes     Pipe Collins Jr. was evaluated through a synchronous (real-time) audio encounter. Patient identification was verified at the start of the visit. He (or guardian if applicable) is aware that this is a billable service, which includes applicable co-pays. This visit was conducted with the patient's (and/or legal guardian's) verbal consent. He has not had a related appointment within my department in the past 7 days or scheduled within the next 24 hours.   The patient was located at Home: [address] [place], [place] [id number].  The provider was located at Facility (Appt Dept): [address] [place] [phone].    Note: not billable if this call serves to triage the patient into an appointment for the relevant concern  Yes, I confirm.   Pipe Collins Jr. is a 64 y.o. male evaluated via telephone on 11/11/2024 for No chief complaint on file.  .      Assessment & Plan    No follow-ups on file.        ASSESSMENT      Concern of infiltrative process in the cervical spine:   Prominently at the level of C3.   And multilevel foraminal stenosis at the rest of the spine. With increasing pain symptoms.         MRI cervical spine 8/8/2024 still showed abnormal enhancement in the cervical spine.   PET scan 8/6/2024  showed no metabolically active tumor  Bone scan 8/20/2024: no evidence of active osseous mets  No M spike on SPEP and normal kappa/ lambda ratio ,   Referred to neurosurgery for evaluation, followed up today  For constipation started on senna daily and MiraLAX as needed, said that has helped, evaluated again for colonoscopy/EGD     - discussed with neuroradiology Dr. Weston about the MRI readings in the context of negative PET and bone scan, he raised the concern of osteomyelitis as the patient had a quick narrowing of the disc space in two months repeat MRI and the marrow edema.      - discussed with the patient that so far the tests are not showing signs of

## 2024-11-12 RX ORDER — DULOXETIN HYDROCHLORIDE 30 MG/1
30 CAPSULE, DELAYED RELEASE ORAL DAILY
Qty: 90 CAPSULE | Refills: 0 | Status: SHIPPED | OUTPATIENT
Start: 2024-11-12

## 2024-11-12 NOTE — TELEPHONE ENCOUNTER
Name of Medication(s) Requested:  Requested Prescriptions     Pending Prescriptions Disp Refills    DULoxetine (CYMBALTA) 30 MG extended release capsule [Pharmacy Med Name: DULOXETINE DR 30MG CAPSULES] 30 capsule 3     Sig: TAKE 1 CAPSULE BY MOUTH DAILY       Medication is on current medication list Yes    Dosage and directions were verified? Yes    Quantity verified: 30 day supply     Pharmacy Verified?  Yes    Last Appointment:  7/12/2024    Future appts:  Future Appointments   Date Time Provider Department Center   11[facility]20/2024  [facility] Rad[facility]Car   1/8/2025  3:30 PM Arturo Mulligan MD MED ONC [facility]        (If no appt send self scheduling link. .REFILLAPPT)  Scheduling request sent?     [x] Yes  [] No    Does patient need updated?  [] Yes  [x] No

## 2024-11-20 ENCOUNTER — HOSPITAL ENCOUNTER (OUTPATIENT)
Age: 64
Discharge: HOME | End: 2024-11-20
Attending: OTOLARYNGOLOGY
Payer: MEDICARE

## 2024-11-20 ENCOUNTER — HOSPITAL ENCOUNTER (OUTPATIENT)
Dept: ULTRASOUND IMAGING | Age: 64
Discharge: HOME | End: 2024-11-22
Attending: OTOLARYNGOLOGY
Payer: MEDICARE

## 2024-11-20 DIAGNOSIS — R59.1 LYMPHADENOPATHY: ICD-10-CM

## 2024-11-20 DIAGNOSIS — M46.42 DISCITIS OF CERVICAL REGION: ICD-10-CM

## 2024-11-20 DIAGNOSIS — Z76.0 MEDICATION REFILL: ICD-10-CM

## 2024-11-20 PROCEDURE — 86481 TB AG RESPONSE T-CELL SUSP: CPT

## 2024-11-20 PROCEDURE — 36415 COLL VENOUS BLD VENIPUNCTURE: CPT

## 2024-11-20 PROCEDURE — 76536 US EXAM OF HEAD AND NECK: CPT

## 2024-11-20 RX ORDER — ACETAMINOPHEN 160 MG
2000 TABLET,DISINTEGRATING ORAL DAILY
Qty: 30 CAPSULE | Refills: 4 | Status: SHIPPED | OUTPATIENT
Start: 2024-11-20

## 2024-11-20 NOTE — TELEPHONE ENCOUNTER
Name of Medication(s) Requested:  Requested Prescriptions     Pending Prescriptions Disp Refills    VITAMIN D3 50 MCG (2000 UT) CAPS capsule [Pharmacy Med Name: VITAMIN D3 2,000UNIT CAPSULES] 30 capsule 4     Sig: TAKE 1 CAPSULE BY MOUTH DAILY       Medication is on current medication list Yes    Dosage and directions were verified? Yes    Quantity verified: 30 day supply     Pharmacy Verified?  Yes    Last Appointment:  7/12/2024    Future appts:  Future Appointments   Date Time Provider Department Center   12/9/2024 10:40 AM Kenny Parker MD [facility] [place] [facility] DEP   1/8/2025  3:30 PM Arturo Mulligan MD MED ONC [facility]        (If no appt send self scheduling link. .REFILLAPPT)  Scheduling request sent?     [] Yes  [x] No    Does patient need updated?  [] Yes  [x] No

## 2024-11-24 LAB — T SPOT TB TEST: NORMAL

## 2024-12-06 NOTE — TELEPHONE ENCOUNTER
CHF clinic called and stated the labs from 2/2/21 visit for K was 2.5. Labs drawn here on  1/28/21 was 2.9  Potassium 20 MEQ was ordered. Patient did not  until 2/2/21. Patient is being redrawn tomorrow. They wanted any additional orders.   Thanks, Germania PATE
Thanks Germania, I was able to call back and discuss with patient and heart failure clinic.
No

## 2024-12-09 ENCOUNTER — OFFICE VISIT (OUTPATIENT)
Dept: FAMILY MEDICINE CLINIC | Age: 64
End: 2024-12-09

## 2024-12-09 VITALS
HEIGHT: 68 IN | SYSTOLIC BLOOD PRESSURE: 139 MMHG | WEIGHT: 219 LBS | HEART RATE: 73 BPM | DIASTOLIC BLOOD PRESSURE: 72 MMHG | RESPIRATION RATE: 18 BRPM | TEMPERATURE: 96.8 F | BODY MASS INDEX: 33.19 KG/M2 | OXYGEN SATURATION: 98 %

## 2024-12-09 DIAGNOSIS — M48.062 SPINAL STENOSIS OF LUMBAR REGION WITH NEUROGENIC CLAUDICATION: ICD-10-CM

## 2024-12-09 DIAGNOSIS — E78.2 MIXED HYPERLIPIDEMIA: ICD-10-CM

## 2024-12-09 DIAGNOSIS — E11.42 DIABETIC POLYNEUROPATHY ASSOCIATED WITH TYPE 2 DIABETES MELLITUS (HCC): ICD-10-CM

## 2024-12-09 DIAGNOSIS — Z79.4 TYPE 2 DIABETES MELLITUS WITH HYPEROSMOLARITY WITHOUT COMA, WITH LONG-TERM CURRENT USE OF INSULIN (HCC): Primary | ICD-10-CM

## 2024-12-09 DIAGNOSIS — Z23 FLU VACCINE NEED: ICD-10-CM

## 2024-12-09 DIAGNOSIS — E11.00 TYPE 2 DIABETES MELLITUS WITH HYPEROSMOLARITY WITHOUT COMA, WITH LONG-TERM CURRENT USE OF INSULIN (HCC): Primary | ICD-10-CM

## 2024-12-09 LAB
CHOLESTEROL, TOTAL: 238 MG/DL
HBA1C MFR BLD: 8.2 %
HDLC SERPL-MCNC: 52 MG/DL
LDL CHOLESTEROL: 164 MG/DL
TRIGL SERPL-MCNC: 111 MG/DL
VLDLC SERPL CALC-MCNC: 22 MG/DL

## 2024-12-09 RX ORDER — ACYCLOVIR 400 MG/1
1 TABLET ORAL
Qty: 3 EACH | Refills: 0 | Status: SHIPPED | OUTPATIENT
Start: 2024-12-09 | End: 2025-01-08

## 2024-12-09 RX ORDER — SEMAGLUTIDE 0.68 MG/ML
0.25 INJECTION, SOLUTION SUBCUTANEOUS WEEKLY
Qty: 3 ML | Refills: 2 | Status: SHIPPED | OUTPATIENT
Start: 2024-12-09

## 2024-12-09 NOTE — PROGRESS NOTES
[facility]  FAMILY MEDICINE RESIDENCY PROGRAM  DATE OF VISIT : 2024    Patient : Pipe Collins Jr.   Age : 64 y.o.    : 1960   MRN : [id number]   ______________________________________________________________________    Chief Complaint:   Chief Complaint   Patient presents with    Diabetes     F/u    Medication Refill     On his creams        HPI:   Pipe Collins Jr. is a 64 y.o. male who presents for f/u T2DM    T2DM-   Hemoglobin A1C   Date Value Ref Range Status   2024 8.2 % Final   Due for repeat today. At [facility] appt had been out of [facility] for 4 mos d/t pharmacy on back order, switched to Ozempic at that time. The patient states he has been able to get his Ozempic but he has not been taking it. He states that he got overwhelmed with all of his other medications. He has been taking Jardiance 25 mg. The patient has a glucometer at home but he states he has not checked his glucose in approximately 1 year.     Spinal lesion on MRI- patient has hx of GIST, found lesion on MRI spine concerning for malignancy. Was referred to heme onc who was less concerned for malignancy, then referred to ID. ID consulted w/ NSY who suspected spinal stenosis. Patient was then referred to NSY who offered surgery, which he declined. TB test was negative. The patient has not been taking his tizanidine, reports that his lumbar spinal stenosis is bothersome.     Past Medical History:  Past Medical History:   Diagnosis Date    Angiotensin converting enzyme inhibitor-aggravated angioedema 2018    Arthritis     Carpal tunnel syndrome     Cecum mass     CHF (congestive heart failure) (HCC)     CKD (chronic kidney disease)     Diabetes mellitus (HCC)     Gastrointestinal stromal tumor (GIST) (HCC) 2019    Gout     Hyperlipidemia     Hypertension     Hypokalemia     Mass of right submandibular region     Neuropathy     Obesity     bmi 43.1 weight 283 #    Sleep apnea     c pap stopped using 10

## 2024-12-09 NOTE — PROGRESS NOTES
S: 64 y.o. male presents today for Diabetes (F/u) and Medication Refill (On his creams )      DMII: 8.2%; on ozempic now but overwhelmed with meds so stopped; only on jardiance 25mg    Severe peripheral neuropathy: on capsasin cream and cymbalta    severe lumbar spinal stenosis: MRI 4/2024; enhanced area / hx of Hx of gastinsteinal stromal tumor 2019 ; gen surg for scope negative; send to hemonc felt infectious; ID felt stenosis; consulted NS; NS offered surgical consultation which was declines    Medication refills      O: VS: /72 (Site: Left Upper Arm, Position: Sitting, Cuff Size: Large Adult)   Pulse 73   Temp 96.8 °F (36 °C) (Temporal)   Resp 18   Ht 1.727 m (5' 8\")   Wt 99.3 kg (219 lb)   SpO2 98%   BMI 33.30 kg/m²   AAO/NAD, appropriate affect for mood  CV:  RRR, no murmur  Resp: CTAB  Foot exam: no sensation in toes b/l; good pulses    Assessment/Plan:   1) DMII - encourage adherence  2) lumbar stenosis - prefers to wait for intervention; continue tinazidine   3) Neuropathy - capsasicin cream; cymbalta for now; f/u with podiatry  4) HM as ordered  RTO: Return in about 4 weeks (around 1/6/2025).    Attending Physician Statement  I have discussed the case, including pertinent history and exam findings with the resident.  I agree with the documented assessment and plan.      Electronically signed by Yvan Ontiveros MD on 12/9/2024 at 11:20 AM

## 2024-12-12 DIAGNOSIS — E11.00 TYPE 2 DIABETES MELLITUS WITH HYPEROSMOLARITY WITHOUT COMA, WITH LONG-TERM CURRENT USE OF INSULIN (HCC): Primary | ICD-10-CM

## 2024-12-12 DIAGNOSIS — E11.42 DIABETIC POLYNEUROPATHY ASSOCIATED WITH TYPE 2 DIABETES MELLITUS (HCC): ICD-10-CM

## 2024-12-12 DIAGNOSIS — Z79.4 TYPE 2 DIABETES MELLITUS WITH HYPEROSMOLARITY WITHOUT COMA, WITH LONG-TERM CURRENT USE OF INSULIN (HCC): Primary | ICD-10-CM

## 2024-12-17 ENCOUNTER — TELEPHONE (OUTPATIENT)
Dept: FAMILY MEDICINE CLINIC | Age: 64
End: 2024-12-17

## 2024-12-17 DIAGNOSIS — M48.062 LUMBAR STENOSIS WITH NEUROGENIC CLAUDICATION: ICD-10-CM

## 2024-12-17 DIAGNOSIS — Z79.4 TYPE 2 DIABETES MELLITUS WITH HYPEROSMOLARITY WITHOUT COMA, WITH LONG-TERM CURRENT USE OF INSULIN (HCC): Primary | ICD-10-CM

## 2024-12-17 DIAGNOSIS — E11.00 TYPE 2 DIABETES MELLITUS WITH HYPEROSMOLARITY WITHOUT COMA, WITH LONG-TERM CURRENT USE OF INSULIN (HCC): Primary | ICD-10-CM

## 2024-12-17 NOTE — TELEPHONE ENCOUNTER
Pt needs glucose monitor and supplies.  Pt currently not monitoring glucose levels at home.  DME order went out to [employer].  Account established today 12/17/24.  Total processing time is approximately 14-20 days before shipping.

## 2024-12-24 DIAGNOSIS — E11.00 TYPE 2 DIABETES MELLITUS WITH HYPEROSMOLARITY WITHOUT COMA, WITH LONG-TERM CURRENT USE OF INSULIN (HCC): Primary | ICD-10-CM

## 2024-12-24 DIAGNOSIS — Z79.4 TYPE 2 DIABETES MELLITUS WITH HYPEROSMOLARITY WITHOUT COMA, WITH LONG-TERM CURRENT USE OF INSULIN (HCC): Primary | ICD-10-CM

## 2024-12-24 RX ORDER — GLUCOSAMINE HCL/CHONDROITIN SU 500-400 MG
CAPSULE ORAL
Qty: 300 STRIP | Refills: 3 | Status: SHIPPED | OUTPATIENT
Start: 2024-12-24

## 2024-12-24 RX ORDER — LANCETS 30 GAUGE
EACH MISCELLANEOUS
Qty: 200 EACH | Refills: 3 | Status: SHIPPED | OUTPATIENT
Start: 2024-12-24

## 2025-01-07 DIAGNOSIS — E11.00 TYPE 2 DIABETES MELLITUS WITH HYPEROSMOLARITY WITHOUT COMA, WITH LONG-TERM CURRENT USE OF INSULIN (HCC): ICD-10-CM

## 2025-01-07 DIAGNOSIS — M54.16 LUMBAR RADICULOPATHY: ICD-10-CM

## 2025-01-07 DIAGNOSIS — M48.062 LUMBAR STENOSIS WITH NEUROGENIC CLAUDICATION: ICD-10-CM

## 2025-01-07 DIAGNOSIS — Z79.4 TYPE 2 DIABETES MELLITUS WITH HYPEROSMOLARITY WITHOUT COMA, WITH LONG-TERM CURRENT USE OF INSULIN (HCC): ICD-10-CM

## 2025-01-07 RX ORDER — DULOXETIN HYDROCHLORIDE 30 MG/1
30 CAPSULE, DELAYED RELEASE ORAL DAILY
Qty: 90 CAPSULE | Refills: 0 | Status: SHIPPED | OUTPATIENT
Start: 2025-01-07

## 2025-01-07 NOTE — TELEPHONE ENCOUNTER
Name of Medication(s) Requested:  Requested Prescriptions     Pending Prescriptions Disp Refills    tiZANidine (ZANAFLEX) 4 MG tablet [Pharmacy Med Name: TIZANIDINE 4MG TABLETS] 30 tablet 0     Sig: TAKE 1 TABLET BY MOUTH EVERY 12 HOURS AS NEEDED FOR BACK SPASMS       Medication is on current medication list Yes    Dosage and directions were verified? Yes    Quantity verified: 30 day supply     Pharmacy Verified?  Yes    Last Appointment:  12/9/2024    Future appts:  Future Appointments   Date Time Provider Department Center   1/8/2025 10:00 AM SCHEDULE, [facility]   1/8/2025  3:30 PM Arturo Mulligan MD MED ONC [facility]   1/17/2025  9:30 AM Kenny Parker MD [facility]        (If no appt send self scheduling link. .REFILLAPPT)  Scheduling request sent?     [] Yes  [x] No    Does patient need updated?  [] Yes  [x] No

## 2025-01-07 NOTE — TELEPHONE ENCOUNTER
Name of Medication(s) Requested:  Requested Prescriptions     Pending Prescriptions Disp Refills    DULoxetine (CYMBALTA) 30 MG extended release capsule [Pharmacy Med Name: DULOXETINE DR 30MG CAPSULES] 90 capsule 0     Sig: TAKE 1 CAPSULE BY MOUTH DAILY       Medication is on current medication list Yes    Dosage and directions were verified? Yes    Quantity verified: 90 day supply     Pharmacy Verified?  Yes    Last Appointment:  Visit date not found    Future appts:  Future Appointments   Date Time Provider Department Center   1/8/2025 10:00 AM SCHEDULE,  [facility] DEP   1/8/2025  3:30 PM Arturo Mulligan MD MED ONC [facility]   1/17/2025  9:30 AM Kenny Parker MD [facility] DEP        (If no appt send self scheduling link. .REFILLAPPT)  Scheduling request sent?     [] Yes  [x] No    Does patient need updated?  [] Yes  [x] No

## 2025-01-08 ENCOUNTER — SCHEDULED TELEPHONE ENCOUNTER (OUTPATIENT)
Dept: ONCOLOGY | Age: 65
End: 2025-01-08

## 2025-01-08 DIAGNOSIS — C49.A4 GIST (GASTROINTESTINAL STROMA TUMOR), MALIGNANT, COLON (HCC): Primary | ICD-10-CM

## 2025-01-08 NOTE — PROGRESS NOTES
Patient was Telephone virtual visit.  Patient has MyChart.  
except as mentioned above    Objective   No physical exam as this was a phone encounter    Arturo Mulligan MD  Hem/onc

## 2025-01-16 SDOH — ECONOMIC STABILITY: FOOD INSECURITY: WITHIN THE PAST 12 MONTHS, THE FOOD YOU BOUGHT JUST DIDN'T LAST AND YOU DIDN'T HAVE MONEY TO GET MORE.: SOMETIMES TRUE

## 2025-01-16 SDOH — ECONOMIC STABILITY: TRANSPORTATION INSECURITY
IN THE PAST 12 MONTHS, HAS THE LACK OF TRANSPORTATION KEPT YOU FROM MEDICAL APPOINTMENTS OR FROM GETTING MEDICATIONS?: YES

## 2025-01-16 SDOH — ECONOMIC STABILITY: FOOD INSECURITY: WITHIN THE PAST 12 MONTHS, YOU WORRIED THAT YOUR FOOD WOULD RUN OUT BEFORE YOU GOT MONEY TO BUY MORE.: SOMETIMES TRUE

## 2025-01-16 SDOH — ECONOMIC STABILITY: INCOME INSECURITY: IN THE LAST 12 MONTHS, WAS THERE A TIME WHEN YOU WERE NOT ABLE TO PAY THE MORTGAGE OR RENT ON TIME?: NO

## 2025-01-16 ASSESSMENT — PATIENT HEALTH QUESTIONNAIRE - PHQ9
3. TROUBLE FALLING OR STAYING ASLEEP: SEVERAL DAYS
4. FEELING TIRED OR HAVING LITTLE ENERGY: SEVERAL DAYS
SUM OF ALL RESPONSES TO PHQ QUESTIONS 1-9: 3
SUM OF ALL RESPONSES TO PHQ QUESTIONS 1-9: 3
3. TROUBLE FALLING OR STAYING ASLEEP: SEVERAL DAYS
5. POOR APPETITE OR OVEREATING: NOT AT ALL
6. FEELING BAD ABOUT YOURSELF - OR THAT YOU ARE A FAILURE OR HAVE LET YOURSELF OR YOUR FAMILY DOWN: NOT AT ALL
1. LITTLE INTEREST OR PLEASURE IN DOING THINGS: NOT AT ALL
2. FEELING DOWN, DEPRESSED OR HOPELESS: SEVERAL DAYS
7. TROUBLE CONCENTRATING ON THINGS, SUCH AS READING THE NEWSPAPER OR WATCHING TELEVISION: NOT AT ALL
6. FEELING BAD ABOUT YOURSELF - OR THAT YOU ARE A FAILURE OR HAVE LET YOURSELF OR YOUR FAMILY DOWN: NOT AT ALL
SUM OF ALL RESPONSES TO PHQ QUESTIONS 1-9: 3
SUM OF ALL RESPONSES TO PHQ QUESTIONS 1-9: 3
9. THOUGHTS THAT YOU WOULD BE BETTER OFF DEAD, OR OF HURTING YOURSELF: NOT AT ALL
2. FEELING DOWN, DEPRESSED OR HOPELESS: SEVERAL DAYS
SUM OF ALL RESPONSES TO PHQ9 QUESTIONS 1 & 2: 1
10. IF YOU CHECKED OFF ANY PROBLEMS, HOW DIFFICULT HAVE THESE PROBLEMS MADE IT FOR YOU TO DO YOUR WORK, TAKE CARE OF THINGS AT HOME, OR GET ALONG WITH OTHER PEOPLE: NOT DIFFICULT AT ALL
5. POOR APPETITE OR OVEREATING: NOT AT ALL
10. IF YOU CHECKED OFF ANY PROBLEMS, HOW DIFFICULT HAVE THESE PROBLEMS MADE IT FOR YOU TO DO YOUR WORK, TAKE CARE OF THINGS AT HOME, OR GET ALONG WITH OTHER PEOPLE: NOT DIFFICULT AT ALL
8. MOVING OR SPEAKING SO SLOWLY THAT OTHER PEOPLE COULD HAVE NOTICED. OR THE OPPOSITE - BEING SO FIDGETY OR RESTLESS THAT YOU HAVE BEEN MOVING AROUND A LOT MORE THAN USUAL: NOT AT ALL
7. TROUBLE CONCENTRATING ON THINGS, SUCH AS READING THE NEWSPAPER OR WATCHING TELEVISION: NOT AT ALL
8. MOVING OR SPEAKING SO SLOWLY THAT OTHER PEOPLE COULD HAVE NOTICED. OR THE OPPOSITE, BEING SO FIGETY OR RESTLESS THAT YOU HAVE BEEN MOVING AROUND A LOT MORE THAN USUAL: NOT AT ALL
SUM OF ALL RESPONSES TO PHQ QUESTIONS 1-9: 3
9. THOUGHTS THAT YOU WOULD BE BETTER OFF DEAD, OR OF HURTING YOURSELF: NOT AT ALL
1. LITTLE INTEREST OR PLEASURE IN DOING THINGS: NOT AT ALL
4. FEELING TIRED OR HAVING LITTLE ENERGY: SEVERAL DAYS

## 2025-01-17 ENCOUNTER — OFFICE VISIT (OUTPATIENT)
Dept: FAMILY MEDICINE CLINIC | Age: 65
End: 2025-01-17

## 2025-01-17 VITALS
OXYGEN SATURATION: 96 % | HEART RATE: 79 BPM | HEIGHT: 68 IN | DIASTOLIC BLOOD PRESSURE: 88 MMHG | TEMPERATURE: 97.9 F | RESPIRATION RATE: 18 BRPM | WEIGHT: 224 LBS | BODY MASS INDEX: 33.95 KG/M2 | SYSTOLIC BLOOD PRESSURE: 156 MMHG

## 2025-01-17 DIAGNOSIS — Z87.891 PERSONAL HISTORY OF TOBACCO USE: ICD-10-CM

## 2025-01-17 DIAGNOSIS — C49.A4 GIST (GASTROINTESTINAL STROMA TUMOR), MALIGNANT, COLON (HCC): ICD-10-CM

## 2025-01-17 DIAGNOSIS — E11.00 TYPE 2 DIABETES MELLITUS WITH HYPEROSMOLARITY WITHOUT COMA, WITH LONG-TERM CURRENT USE OF INSULIN (HCC): Primary | ICD-10-CM

## 2025-01-17 DIAGNOSIS — G95.9 SPINAL CORD LESION (HCC): ICD-10-CM

## 2025-01-17 DIAGNOSIS — M54.16 LUMBAR RADICULOPATHY: ICD-10-CM

## 2025-01-17 DIAGNOSIS — Z79.4 TYPE 2 DIABETES MELLITUS WITH HYPEROSMOLARITY WITHOUT COMA, WITH LONG-TERM CURRENT USE OF INSULIN (HCC): Primary | ICD-10-CM

## 2025-01-17 DIAGNOSIS — I10 ESSENTIAL HYPERTENSION: ICD-10-CM

## 2025-01-17 DIAGNOSIS — M48.062 LUMBAR STENOSIS WITH NEUROGENIC CLAUDICATION: ICD-10-CM

## 2025-01-17 DIAGNOSIS — I50.33 ACUTE ON CHRONIC HEART FAILURE WITH PRESERVED EJECTION FRACTION (HCC): ICD-10-CM

## 2025-01-17 DIAGNOSIS — E11.42 DIABETIC POLYNEUROPATHY ASSOCIATED WITH TYPE 2 DIABETES MELLITUS (HCC): ICD-10-CM

## 2025-01-17 RX ORDER — SEMAGLUTIDE 1.34 MG/ML
0.25 INJECTION, SOLUTION SUBCUTANEOUS WEEKLY
Qty: 4 ADJUSTABLE DOSE PRE-FILLED PEN SYRINGE | Refills: 3 | Status: SHIPPED | OUTPATIENT
Start: 2025-01-17 | End: 2025-02-14

## 2025-01-17 RX ORDER — NICOTINE 21 MG/24HR
1 PATCH, TRANSDERMAL 24 HOURS TRANSDERMAL DAILY
Qty: 14 PATCH | Refills: 0 | Status: SHIPPED | OUTPATIENT
Start: 2025-01-17 | End: 2025-01-31

## 2025-01-17 RX ORDER — DULOXETIN HYDROCHLORIDE 30 MG/1
30 CAPSULE, DELAYED RELEASE ORAL DAILY
Qty: 90 CAPSULE | Refills: 0 | Status: SHIPPED | OUTPATIENT
Start: 2025-01-17

## 2025-01-17 RX ORDER — LOSARTAN POTASSIUM 100 MG/1
100 TABLET ORAL DAILY
Qty: 90 TABLET | OUTPATIENT
Start: 2025-01-17 | End: 2025-04-17

## 2025-01-17 RX ORDER — LOSARTAN POTASSIUM 100 MG/1
100 TABLET ORAL DAILY
Qty: 30 TABLET | Refills: 2 | Status: SHIPPED | OUTPATIENT
Start: 2025-01-17 | End: 2025-04-17

## 2025-01-17 NOTE — TELEPHONE ENCOUNTER
Name of Medication(s) Requested:  Requested Prescriptions     Pending Prescriptions Disp Refills    losartan (COZAAR) 100 MG tablet [Pharmacy Med Name: LOSARTAN 100MG TABLETS] 90 tablet      Sig: TAKE 1 TABLET BY MOUTH DAILY       Medication is on current medication list Yes    Dosage and directions were verified? Yes    Quantity verified: 30 day supply     Pharmacy Verified?  Yes    Last Appointment:  1/17/2025    Future appts:  Future Appointments   Date Time Provider Department Center   2/17/2025  2:45 PM Kenny Parker MD [facility] [place] [facility]   6/9/2025  3:30 PM Arturo Mulligan MD MED ONC [facility]        (If no appt send self scheduling link. .REFILLAPPT)  Scheduling request sent?     [] Yes  [x] No    Does patient need updated?  [] Yes  [x] No

## 2025-01-17 NOTE — PROGRESS NOTES
replacement in the past.  Amenable to trying nicotine replacement again    Blood pressure (!) 156/88, pulse 79, temperature 97.9 °F (36.6 °C), temperature source Temporal, resp. rate 18, height 1.727 m (5' 8\"), weight 101.6 kg (224 lb), SpO2 96%.    HEENT WNL     Heart regular    Lungs clear    abd non-tender      1+ edema bilateral legs to the knees pulses intact tenderness to palpation of the lumbar paraspinal muscles-pain with flexion/extension/rotation of lumbar spine-full range of motion of cervical spine    Assessment and plan  Type 2 diabetes-refill Ozempic, return to office in 1 month with blood glucose log-adjust medications as needed at that time  Lumbar spinal stenosis-refill Cymbalta and Zanaflex  History of GIST/spinal cord lesion-low concern for osteomyelitis per ID, however patient was advised to call and follow-up to ensure no in office/telephone follow-up is required on their part (this was also encouraged by neurosurgery)-patient instructed on need for tobacco cessation so that further evaluation with possible biopsy of C3 may proceed per neurosurgery.  Also advised to call and follow-up with neurosurgery    [id number] with cardiology  Hypertension-increase Cozaar to 100 mg  - Tobacco use-patient declines [employer] tobacco cessation program but is amenable to nicotine patches    Attending Physician Statement  I have discussed the case, including pertinent history and exam findings with the resident. I agree with the documented assessment and plan.          
Mental Status: He is alert. Mental status is at baseline.   Psychiatric:         Mood and Affect: Mood normal.         Behavior: Behavior normal.           ______________________________________________________________________    Assessment & Plan:   Diagnosis Orders   1. Diabetic polyneuropathy associated with type 2 diabetes mellitus (HCC)        2. Type 2 diabetes mellitus with hyperosmolarity without coma, with long-term current use of insulin (HCC)  Semaglutide,0.25 or 0.5MG/DOS, (OZEMPIC, 0.25 OR 0.5 MG/DOSE,) 2 MG/1.5ML SOPN    DULoxetine (CYMBALTA) 30 MG extended release capsule      3. Lumbar radiculopathy  DULoxetine (CYMBALTA) 30 MG extended release capsule      4. Lumbar stenosis with neurogenic claudication  tiZANidine (ZANAFLEX) 4 MG tablet      5. GIST (gastrointestinal stroma tumor), malignant, colon (HCC)        6. Spinal cord lesion (HCC)        7. Acute on chronic heart failure with preserved ejection fraction (HCC)        8. Essential hypertension  losartan (COZAAR) 100 MG tablet      9. Personal history of tobacco use  nicotine (NICODERM CQ) 14 MG/24HR          T2DM- refill ozempic, patient advised to check glucose TID pre and postprandially, will f/u in 1 mo to monitor glucose and adjust as needed  Hx GIST, spinal cord lesion- patient advised to go to [facility] Tobacco Cessation program to help him stop smoking so he may undergo biopsy. Patient declines but is amenable to nicotine replacement.  Patient also advised to contact ID for one last f/u  Also advised to obtain LDCT for lung cancer screening   HFpEF- continue f/u with Dr. Del Cid. Continue GDMT  Lumbar spinal stenosis- refill Zanaflex and Cymbalta  BP poorly controlled- increase Cozaar to 100 mg, f/u in 2 weeks for BMP      Return to Office: Return in about 4 weeks (around 2/14/2025).    Kenny Parker MD   This case was discussed with Dr. Patten

## 2025-01-25 DIAGNOSIS — Z79.4 TYPE 2 DIABETES MELLITUS WITH HYPEROSMOLARITY WITHOUT COMA, WITH LONG-TERM CURRENT USE OF INSULIN (HCC): ICD-10-CM

## 2025-01-25 DIAGNOSIS — E11.00 TYPE 2 DIABETES MELLITUS WITH HYPEROSMOLARITY WITHOUT COMA, WITH LONG-TERM CURRENT USE OF INSULIN (HCC): ICD-10-CM

## 2025-01-27 RX ORDER — ATORVASTATIN CALCIUM 40 MG/1
40 TABLET, FILM COATED ORAL DAILY
Qty: 90 TABLET | Refills: 2 | Status: SHIPPED | OUTPATIENT
Start: 2025-01-27

## 2025-01-27 NOTE — TELEPHONE ENCOUNTER
Name of Medication(s) Requested:  Requested Prescriptions     Pending Prescriptions Disp Refills    atorvastatin (LIPITOR) 40 MG tablet [Pharmacy Med Name: ATORVASTATIN 40MG TABLETS] 90 tablet 2     Sig: TAKE 1 TABLET BY MOUTH DAILY       Medication is on current medication list Yes    Dosage and directions were verified? Yes    Quantity verified: 90 day supply     Pharmacy Verified?  Yes    Last Appointment:  1/17/2025    Future appts:  Future Appointments   Date Time Provider Department Center   2/17/2025  2:45 PM Kenny Parker MD [facility] [place]   6/9/2025  3:30 PM Arturo Mulligan MD MED ONC [facility]        (If no appt send self scheduling link. .REFILLAPPT)  Scheduling request sent?     [] Yes  [x] No    Does patient need updated?  [] Yes  [x] No

## 2025-03-10 DIAGNOSIS — M48.062 LUMBAR STENOSIS WITH NEUROGENIC CLAUDICATION: ICD-10-CM

## 2025-03-10 NOTE — TELEPHONE ENCOUNTER
Name of Medication(s) Requested:  Requested Prescriptions     Pending Prescriptions Disp Refills    tiZANidine (ZANAFLEX) 4 MG tablet [Pharmacy Med Name: TIZANIDINE 4MG TABLETS] 30 tablet 0     Sig: TAKE 1 TABLET BY MOUTH EVERY 8 HOURS AS NEEDED FOR BACK PAIN       Medication is on current medication list Yes    Dosage and directions were verified? Yes    Quantity verified: 30 day supply     Pharmacy Verified?  Yes    Last Appointment:  1/17/2025    Future appts:  Future Appointments   Date Time Provider Department Center   6/9/2025  3:30 PM Arturo Mulligan MD MED ONC [facility]        (If no appt send self scheduling link. .REFILLAPPT)  Scheduling request sent?     [] Yes  [x] No    Does patient need updated?  [] Yes  [x] No

## 2025-03-23 ENCOUNTER — HOSPITAL ENCOUNTER (EMERGENCY)
Age: 65
Discharge: HOME | End: 2025-03-23
Payer: MEDICARE

## 2025-03-23 ENCOUNTER — APPOINTMENT (OUTPATIENT)
Dept: GENERAL RADIOLOGY | Age: 65
End: 2025-03-23
Payer: MEDICARE

## 2025-03-23 VITALS
WEIGHT: 222 LBS | OXYGEN SATURATION: 98 % | DIASTOLIC BLOOD PRESSURE: 97 MMHG | RESPIRATION RATE: 16 BRPM | HEART RATE: 94 BPM | SYSTOLIC BLOOD PRESSURE: 173 MMHG | TEMPERATURE: 98.3 F | BODY MASS INDEX: 33.75 KG/M2

## 2025-03-23 DIAGNOSIS — M17.12 OSTEOARTHRITIS OF LEFT KNEE, UNSPECIFIED OSTEOARTHRITIS TYPE: ICD-10-CM

## 2025-03-23 DIAGNOSIS — M25.462 KNEE EFFUSION, LEFT: Primary | ICD-10-CM

## 2025-03-23 LAB
ANION GAP SERPL CALCULATED.3IONS-SCNC: 9 MMOL/L (ref 7–16)
BASOPHILS # BLD: 0.04 K/UL (ref 0–0.2)
BASOPHILS NFR BLD: 0 % (ref 0–2)
BUN SERPL-MCNC: 11 MG/DL (ref 6–23)
CALCIUM SERPL-MCNC: 8.7 MG/DL (ref 8.6–10.2)
CHLORIDE SERPL-SCNC: 98 MMOL/L (ref 98–107)
CO2 SERPL-SCNC: 27 MMOL/L (ref 22–29)
CREAT SERPL-MCNC: 0.8 MG/DL (ref 0.7–1.2)
EOSINOPHIL # BLD: 0.07 K/UL (ref 0.05–0.5)
EOSINOPHILS RELATIVE PERCENT: 1 % (ref 0–6)
ERYTHROCYTE [DISTWIDTH] IN BLOOD BY AUTOMATED COUNT: 13.9 % (ref 11.5–15)
GFR, ESTIMATED: >90 ML/MIN/1.73M2
GLUCOSE SERPL-MCNC: 160 MG/DL (ref 74–99)
HCT VFR BLD AUTO: 44.2 % (ref 37–54)
HGB BLD-MCNC: 15.1 G/DL (ref 12.5–16.5)
IMM GRANULOCYTES # BLD AUTO: 0.04 K/UL (ref 0–0.58)
IMM GRANULOCYTES NFR BLD: 0 % (ref 0–5)
LYMPHOCYTES NFR BLD: 2.24 K/UL (ref 1.5–4)
LYMPHOCYTES RELATIVE PERCENT: 24 % (ref 20–42)
MCH RBC QN AUTO: 31.1 PG (ref 26–35)
MCHC RBC AUTO-ENTMCNC: 34.2 G/DL (ref 32–34.5)
MCV RBC AUTO: 91.1 FL (ref 80–99.9)
MONOCYTES NFR BLD: 0.74 K/UL (ref 0.1–0.95)
MONOCYTES NFR BLD: 8 % (ref 2–12)
NEUTROPHILS NFR BLD: 67 % (ref 43–80)
NEUTS SEG NFR BLD: 6.27 K/UL (ref 1.8–7.3)
PLATELET # BLD AUTO: 270 K/UL (ref 130–450)
PMV BLD AUTO: 9.2 FL (ref 7–12)
POTASSIUM SERPL-SCNC: 3.5 MMOL/L (ref 3.5–5)
RBC # BLD AUTO: 4.85 M/UL (ref 3.8–5.8)
SODIUM SERPL-SCNC: 134 MMOL/L (ref 132–146)
URATE SERPL-MCNC: 5.2 MG/DL (ref 3.4–7)
WBC OTHER # BLD: 9.4 K/UL (ref 4.5–11.5)

## 2025-03-23 PROCEDURE — 84550 ASSAY OF BLOOD/URIC ACID: CPT

## 2025-03-23 PROCEDURE — 99284 EMERGENCY DEPT VISIT MOD MDM: CPT

## 2025-03-23 PROCEDURE — 80048 BASIC METABOLIC PNL TOTAL CA: CPT

## 2025-03-23 PROCEDURE — 85025 COMPLETE CBC W/AUTO DIFF WBC: CPT

## 2025-03-23 PROCEDURE — 73562 X-RAY EXAM OF KNEE 3: CPT

## 2025-03-23 ASSESSMENT — PAIN DESCRIPTION - DESCRIPTORS: DESCRIPTORS: THROBBING;DISCOMFORT

## 2025-03-23 ASSESSMENT — PAIN DESCRIPTION - LOCATION: LOCATION: KNEE

## 2025-03-23 ASSESSMENT — PAIN DESCRIPTION - ORIENTATION: ORIENTATION: LEFT

## 2025-03-23 ASSESSMENT — PAIN - FUNCTIONAL ASSESSMENT: PAIN_FUNCTIONAL_ASSESSMENT: 0-10

## 2025-03-23 ASSESSMENT — PAIN SCALES - GENERAL: PAINLEVEL_OUTOF10: 9

## 2025-03-23 NOTE — ED PROVIDER NOTES
Independent DI Visit.    [facility]  Department of Emergency Medicine   ED  Encounter Note  Admit Date/RoomTime: 3/23/2025 11:49 AM  ED Room:     NAME: Pipe Collins Jr.  : 1960  MRN: [id number]     Chief Complaint:  Knee Pain (Left knee pain for 5 month worse the last 3 days. Denies injury or trauma. )    History of Present Illness      Pipe Collins Jr. is a 64 y.o. old male who presents to the emergency department by private vehicle, for non-traumatic pain and swelling to left knee  which occured several month(s) prior to arrival.  The complaint is due to no known cause.  Since onset the symptoms have been gradually worsening.  Patient has no prior history of pain/injury with regards to today's visit.  His pain is aggraveated by any movement, any use of, pressure on or palpation of painful area, or weight bearing and relieved by rest of injured area. His weight bearing ability is: difficult secondary to discomfort. He denies any numbness, tingling, known injuries or falls.     ROS   Pertinent positives and negatives are stated within HPI, all other systems reviewed and are negative.    Past Medical History:  has a past medical history of Angiotensin converting enzyme inhibitor-aggravated angioedema, Arthritis, Carpal tunnel syndrome, Cecum mass, CHF (congestive heart failure) (HCC), CKD (chronic kidney disease), Diabetes mellitus (HCC), Gastrointestinal stromal tumor (GIST) (HCC), Gout, Hyperlipidemia, Hypertension, Hypokalemia, Mass of right submandibular region, Neuropathy, Obesity, and Sleep apnea.    Surgical History:  has a past surgical history that includes Tonsillectomy; Nasal septum surgery; Colonoscopy (2018); pr colonoscopy w/biopsy single/multiple (N/A, 2018); pr colsc flx w/rmvl of tumor polyp lesion snare tq (2018); US BIOPSY SOFT TISSUE NECK/THORAX (2021); Colon surgery (); Mouth surgery (N/A, 2022); Colonoscopy (2024);

## 2025-03-24 DIAGNOSIS — Z76.0 MEDICATION REFILL: ICD-10-CM

## 2025-03-24 RX ORDER — POTASSIUM CHLORIDE 1500 MG/1
20 TABLET, EXTENDED RELEASE ORAL 2 TIMES DAILY
Qty: 180 TABLET | Refills: 0 | Status: SHIPPED | OUTPATIENT
Start: 2025-03-24

## 2025-03-24 NOTE — TELEPHONE ENCOUNTER
Name of Medication(s) Requested:  Requested Prescriptions     Pending Prescriptions Disp Refills    potassium chloride (KLOR-CON M) 20 MEQ extended release tablet [Pharmacy Med Name: POTASSIUM CL 20MEQ ER TABLETS] 180 tablet 1     Sig: TAKE 1 TABLET BY MOUTH TWICE DAILY       Medication is on current medication list Yes    Dosage and directions were verified? Yes    Quantity verified: 90 day supply     Pharmacy Verified?  Yes    Last Appointment:  1/17/2025    Future appts:  Future Appointments   Date Time Provider Department Center   4/30/2025  9:00 AM Anmol Del Cid MD [facility]   6/9/2025  3:30 PM Arturo Mulligan MD MED ONC [facility]        (If no appt send self scheduling link. .REFILLAPPT)  Scheduling request sent?     [] Yes  [x] No    Does patient need updated?  [] Yes  [x] No

## 2025-04-09 DIAGNOSIS — I50.32 CHRONIC HEART FAILURE WITH PRESERVED EJECTION FRACTION (HFPEF) (HCC): ICD-10-CM

## 2025-04-09 DIAGNOSIS — M48.062 LUMBAR STENOSIS WITH NEUROGENIC CLAUDICATION: ICD-10-CM

## 2025-04-09 RX ORDER — AMLODIPINE BESYLATE 10 MG/1
10 TABLET ORAL DAILY
Qty: 90 TABLET | Refills: 3 | Status: SHIPPED | OUTPATIENT
Start: 2025-04-09

## 2025-04-09 NOTE — TELEPHONE ENCOUNTER
Name of Medication(s) Requested:  Requested Prescriptions     Pending Prescriptions Disp Refills    tiZANidine (ZANAFLEX) 4 MG tablet [Pharmacy Med Name: TIZANIDINE 4MG TABLETS] 30 tablet 0     Sig: TAKE 1 TABLET BY MOUTH EVERY 8 HOURS AS NEEDED FOR BACK PAIN    amLODIPine (NORVASC) 10 MG tablet [Pharmacy Med Name: AMLODIPINE BESYLATE 10MG TABLETS] 90 tablet 3     Sig: TAKE 1 TABLET BY MOUTH DAILY       Medication is on current medication list Yes    Dosage and directions were verified? Yes    Quantity verified: 30 day supply     Pharmacy Verified?  Yes    Last Appointment:  1/17/2025    Future appts:  Future Appointments   Date Time Provider Department Center   4/30/2025  9:00 AM Anmol Del Cid MD [facility]   6/9/2025  3:30 PM Arturo Mulligan MD MED ONC [facility]        (If no appt send self scheduling link. .REFILLAPPT)  Scheduling request sent?     [] Yes  [x] No    Does patient need updated?  [] Yes  [x] No

## 2025-04-15 DIAGNOSIS — I10 ESSENTIAL HYPERTENSION: ICD-10-CM

## 2025-04-15 RX ORDER — LOSARTAN POTASSIUM 100 MG/1
100 TABLET ORAL DAILY
Qty: 90 TABLET | Refills: 0 | Status: SHIPPED | OUTPATIENT
Start: 2025-04-15 | End: 2025-07-14

## 2025-04-15 NOTE — TELEPHONE ENCOUNTER
Name of Medication(s) Requested:  Requested Prescriptions     Pending Prescriptions Disp Refills    losartan (COZAAR) 100 MG tablet [Pharmacy Med Name: LOSARTAN 100MG TABLETS] 90 tablet      Sig: TAKE 1 TABLET BY MOUTH DAILY       Medication is on current medication list Yes    Dosage and directions were verified? Yes    Quantity verified: 90 day supply     Pharmacy Verified?  Yes    Last Appointment:  1/17/2025    Future appts:  Future Appointments   Date Time Provider Department Center   4/30/2025  9:00 AM Anmol Del Cid MD [facility] C[facility]   6/9/2025  3:30 PM Arturo Mulligan MD MED ONC [facility]        (If no appt send self scheduling link. .REFILLAPPT)  Scheduling request sent?     [] Yes  [x] No    Does patient need updated?  [] Yes  [x] No

## 2025-04-29 DIAGNOSIS — I50.32 CHRONIC HEART FAILURE WITH PRESERVED EJECTION FRACTION (HFPEF) (HCC): ICD-10-CM

## 2025-04-29 RX ORDER — EPLERENONE 50 MG/1
50 TABLET ORAL DAILY
Qty: 90 TABLET | Refills: 3 | Status: SHIPPED | OUTPATIENT
Start: 2025-04-29 | End: 2025-04-30 | Stop reason: SDUPTHER

## 2025-04-29 NOTE — TELEPHONE ENCOUNTER
Name of Medication(s) Requested:  Requested Prescriptions     Pending Prescriptions Disp Refills    eplerenone (INSPRA) 50 MG tablet [Pharmacy Med Name: EPLERENONE 50MG TABLETS] 90 tablet 3     Sig: TAKE 1 TABLET BY MOUTH DAILY       Medication is on current medication list Yes    Dosage and directions were verified? Yes    Quantity verified: 90 day supply     Pharmacy Verified?  Yes    Last Appointment:  1/17/2025    Future appts:  Future Appointments   Date Time Provider Department Center   4/30/2025  9:00 AM Anmol Del Cid MD [place] [facility]   5/6/2025 10:45 AM Kenny Parker MD [facility] DEP   6/9/2025  3:30 PM Arturo Mulligan MD MED ON[facility]        (If no appt send self scheduling link. .REFILLAPPT)  Scheduling request sent?     [] Yes  [x] No    Does patient need updated?  [] Yes  [x] No

## 2025-04-30 ENCOUNTER — OFFICE VISIT (OUTPATIENT)
Dept: CARDIOLOGY CLINIC | Age: 65
End: 2025-04-30
Payer: MEDICARE

## 2025-04-30 VITALS
RESPIRATION RATE: 20 BRPM | HEIGHT: 68 IN | DIASTOLIC BLOOD PRESSURE: 76 MMHG | HEART RATE: 78 BPM | BODY MASS INDEX: 33.95 KG/M2 | WEIGHT: 224 LBS | SYSTOLIC BLOOD PRESSURE: 142 MMHG

## 2025-04-30 DIAGNOSIS — I50.9 ACUTE DECOMPENSATED HEART FAILURE (HCC): Primary | ICD-10-CM

## 2025-04-30 DIAGNOSIS — E78.2 MIXED HYPERLIPIDEMIA: ICD-10-CM

## 2025-04-30 DIAGNOSIS — R06.02 SHORTNESS OF BREATH: ICD-10-CM

## 2025-04-30 DIAGNOSIS — E11.42 TYPE 2 DIABETES MELLITUS WITH DIABETIC POLYNEUROPATHY, UNSPECIFIED WHETHER LONG TERM INSULIN USE (HCC): ICD-10-CM

## 2025-04-30 DIAGNOSIS — I10 ESSENTIAL HYPERTENSION: ICD-10-CM

## 2025-04-30 DIAGNOSIS — E11.42 DIABETIC POLYNEUROPATHY ASSOCIATED WITH TYPE 2 DIABETES MELLITUS (HCC): ICD-10-CM

## 2025-04-30 DIAGNOSIS — I50.32 CHRONIC HEART FAILURE WITH PRESERVED EJECTION FRACTION (HFPEF) (HCC): ICD-10-CM

## 2025-04-30 PROCEDURE — G8427 DOCREV CUR MEDS BY ELIG CLIN: HCPCS | Performed by: INTERNAL MEDICINE

## 2025-04-30 PROCEDURE — 3077F SYST BP >= 140 MM HG: CPT | Performed by: INTERNAL MEDICINE

## 2025-04-30 PROCEDURE — G2211 COMPLEX E/M VISIT ADD ON: HCPCS | Performed by: INTERNAL MEDICINE

## 2025-04-30 PROCEDURE — 93000 ELECTROCARDIOGRAM COMPLETE: CPT | Performed by: INTERNAL MEDICINE

## 2025-04-30 PROCEDURE — 3078F DIAST BP <80 MM HG: CPT | Performed by: INTERNAL MEDICINE

## 2025-04-30 PROCEDURE — 3017F COLORECTAL CA SCREEN DOC REV: CPT | Performed by: INTERNAL MEDICINE

## 2025-04-30 PROCEDURE — 4004F PT TOBACCO SCREEN RCVD TLK: CPT | Performed by: INTERNAL MEDICINE

## 2025-04-30 PROCEDURE — 3046F HEMOGLOBIN A1C LEVEL >9.0%: CPT | Performed by: INTERNAL MEDICINE

## 2025-04-30 PROCEDURE — G8417 CALC BMI ABV UP PARAM F/U: HCPCS | Performed by: INTERNAL MEDICINE

## 2025-04-30 PROCEDURE — 2022F DILAT RTA XM EVC RTNOPTHY: CPT | Performed by: INTERNAL MEDICINE

## 2025-04-30 PROCEDURE — 99214 OFFICE O/P EST MOD 30 MIN: CPT | Performed by: INTERNAL MEDICINE

## 2025-04-30 RX ORDER — LOSARTAN POTASSIUM 100 MG/1
100 TABLET ORAL DAILY
Qty: 90 TABLET | Refills: 0 | Status: SHIPPED | OUTPATIENT
Start: 2025-04-30 | End: 2025-07-29

## 2025-04-30 RX ORDER — AMLODIPINE BESYLATE 10 MG/1
10 TABLET ORAL DAILY
Qty: 90 TABLET | Refills: 3 | Status: SHIPPED | OUTPATIENT
Start: 2025-04-30

## 2025-04-30 RX ORDER — BUMETANIDE 2 MG/1
2 TABLET ORAL DAILY
Qty: 90 TABLET | Refills: 1 | Status: SHIPPED | OUTPATIENT
Start: 2025-04-30

## 2025-04-30 RX ORDER — METOPROLOL SUCCINATE 50 MG/1
50 TABLET, EXTENDED RELEASE ORAL DAILY
Qty: 90 TABLET | Refills: 3 | Status: SHIPPED | OUTPATIENT
Start: 2025-04-30

## 2025-04-30 RX ORDER — EPLERENONE 50 MG/1
50 TABLET ORAL DAILY
Qty: 90 TABLET | Refills: 3 | Status: SHIPPED | OUTPATIENT
Start: 2025-04-30

## 2025-04-30 NOTE — PATIENT INSTRUCTIONS
Continue all your medications at current doses.   Take an extra mg bumex for 3 days and use extra pills for breathing trouble with exertion.  We will schedule echo.   I will give you a handout for healthy diet  Restrict sodium intake to less than 2-2.5 g/day. Restrict fluid intake to less than 2.2 L/day. Goal BP is less than 130/80.   If your weight increases by > 2 lbs in a day or >5 lbs in more than a day, take an extra lasix pill.   Please try to exercise for 150 minutes a week.   I will see you back in the office in 6 months. Please call the office at [phone] [phone] for Mary) if you have any questions.

## 2025-04-30 NOTE — PROGRESS NOTES
supplies.        Past Medical History:   Diagnosis Date    Angiotensin converting enzyme inhibitor-aggravated angioedema 03/29/2018    Arthritis     Carpal tunnel syndrome     Cecum mass     CHF (congestive heart failure) (HCC)     CKD (chronic kidney disease)     Diabetes mellitus (HCC)     Gastrointestinal stromal tumor (GIST) (HCC) 06/03/2019    Gout     Hyperlipidemia     Hypertension     Hypokalemia     Mass of right submandibular region     Neuropathy     Obesity     bmi 43.1 weight 283 #    Sleep apnea     c pap stopped using 10 years ago       Allergies   Allergen Reactions    Lasix [Furosemide] Other (See Comments)     Per pt potassium was too low when on lasix    Lisinopril Swelling     Angioedema    Prunus Persica Anaphylaxis     Per report to PFS manager, Samson.  Mariajose Henry, BS-NDTR    Atenolol Other (See Comments) and Swelling     Thins his blood    Nitrates, Organic Other (See Comments)     Is using Sildenafil.  containdicated with Nitrates    Spironolactone      Swelling in the chest area       Current Outpatient Medications   Medication Sig Dispense Refill    metoprolol succinate (TOPROL XL) 50 MG extended release tablet Take 1 tablet by mouth daily 90 tablet 3    losartan (COZAAR) 100 MG tablet Take 1 tablet by mouth daily 90 tablet 0    empagliflozin (JARDIANCE) 25 MG tablet Take 1 tablet by mouth daily 90 tablet 0    eplerenone (INSPRA) 50 MG tablet Take 1 tablet by mouth daily 90 tablet 3    bumetanide (BUMEX) 2 MG tablet Take 1 tablet by mouth daily 90 tablet 1    amLODIPine (NORVASC) 10 MG tablet Take 1 tablet by mouth daily 90 tablet 3    tiZANidine (ZANAFLEX) 4 MG tablet TAKE 1 TABLET BY MOUTH EVERY 8 HOURS AS NEEDED FOR BACK PAIN 30 tablet 0    potassium chloride (KLOR-CON M) 20 MEQ extended release tablet TAKE 1 TABLET BY MOUTH TWICE DAILY 180 tablet 0    atorvastatin (LIPITOR) 40 MG tablet TAKE 1 TABLET BY MOUTH DAILY 90 tablet 2    DULoxetine (CYMBALTA) 30 MG extended release

## 2025-05-06 ENCOUNTER — RESULTS FOLLOW-UP (OUTPATIENT)
Dept: FAMILY MEDICINE CLINIC | Age: 65
End: 2025-05-06

## 2025-05-06 ENCOUNTER — OFFICE VISIT (OUTPATIENT)
Dept: FAMILY MEDICINE CLINIC | Age: 65
End: 2025-05-06

## 2025-05-06 VITALS
WEIGHT: 234 LBS | TEMPERATURE: 98.4 F | SYSTOLIC BLOOD PRESSURE: 106 MMHG | RESPIRATION RATE: 17 BRPM | OXYGEN SATURATION: 96 % | HEART RATE: 66 BPM | HEIGHT: 68 IN | DIASTOLIC BLOOD PRESSURE: 55 MMHG | BODY MASS INDEX: 35.46 KG/M2

## 2025-05-06 DIAGNOSIS — E11.42 TYPE 2 DIABETES MELLITUS WITH DIABETIC POLYNEUROPATHY, UNSPECIFIED WHETHER LONG TERM INSULIN USE (HCC): Primary | ICD-10-CM

## 2025-05-06 DIAGNOSIS — M48.062 LUMBAR STENOSIS WITH NEUROGENIC CLAUDICATION: ICD-10-CM

## 2025-05-06 DIAGNOSIS — F17.200 TOBACCO USE DISORDER: ICD-10-CM

## 2025-05-06 DIAGNOSIS — Z79.4 TYPE 2 DIABETES MELLITUS WITH HYPEROSMOLARITY WITHOUT COMA, WITH LONG-TERM CURRENT USE OF INSULIN (HCC): ICD-10-CM

## 2025-05-06 DIAGNOSIS — C49.A4 GIST (GASTROINTESTINAL STROMA TUMOR), MALIGNANT, COLON (HCC): ICD-10-CM

## 2025-05-06 DIAGNOSIS — M54.16 LUMBAR RADICULOPATHY: ICD-10-CM

## 2025-05-06 DIAGNOSIS — E11.00 TYPE 2 DIABETES MELLITUS WITH HYPEROSMOLARITY WITHOUT COMA, WITH LONG-TERM CURRENT USE OF INSULIN (HCC): ICD-10-CM

## 2025-05-06 LAB
CREATININE URINE: 45.9 MG/DL (ref 40–278)
HBA1C MFR BLD: 8.3 %
MICROALBUMIN/CREAT 24H UR: 1067 MG/L (ref 0–20)
MICROALBUMIN/CREAT UR-RTO: 2325 MCG/MG CREAT (ref 0–30)

## 2025-05-06 RX ORDER — NICOTINE 21 MG/24HR
1 PATCH, TRANSDERMAL 24 HOURS TRANSDERMAL DAILY
Qty: 14 PATCH | Refills: 5 | Status: SHIPPED | OUTPATIENT
Start: 2025-05-06 | End: 2025-05-20

## 2025-05-06 RX ORDER — SEMAGLUTIDE 0.68 MG/ML
2 INJECTION, SOLUTION SUBCUTANEOUS WEEKLY
COMMUNITY
Start: 2025-05-05

## 2025-05-06 RX ORDER — DULOXETIN HYDROCHLORIDE 60 MG/1
60 CAPSULE, DELAYED RELEASE ORAL DAILY
Qty: 90 CAPSULE | Refills: 0 | Status: SHIPPED | OUTPATIENT
Start: 2025-05-06

## 2025-05-06 NOTE — PROGRESS NOTES
[facility]  FAMILY MEDICINE RESIDENCY PROGRAM  DATE OF VISIT : 2025    Patient : Pipe Collins Jr.   Age : 64 y.o.    : 1960   MRN : [id number]       Assessment & Plan:   Diagnosis Orders   1. Type 2 diabetes mellitus with diabetic polyneuropathy, unspecified whether long term insulin use (HCC)  POCT glycosylated hemoglobin (Hb A1C)    DULoxetine (CYMBALTA) 60 MG extended release capsule    Albumin/Creatinine Ratio, Urine      2. Lumbar stenosis with neurogenic claudication  tiZANidine (ZANAFLEX) 4 MG tablet      3. Lumbar radiculopathy  DULoxetine (CYMBALTA) 60 MG extended release capsule      4. GIST (gastrointestinal stroma tumor), malignant, colon (HCC)        5. Tobacco use disorder  nicotine (NICODERM CQ) 14 MG/24HR          T2DM- A1c not yet at goal  Pt declines further adjustment of T2DM meds at this time despite education on the importance of good glycemic control  Lumbar stenosis w/ radiculopathy  Refill Zanaflex  Increase Cymbalta to 60 mg for improved pain control  Patient STRONGLY advised to call neurosurgery for further evaluation and to work on smoking cessation so further evaluation of cervical spinal lesion can be undertaken  Hx GIST, spinal cord lesion- advise f/u with neurosurgery as above.   Tobacco use disorder- pt declines Wellbutrin/Chantix at this time, amenable to start w/ nicotine patches   _____________________________________________________________________    Chief Complaint:   Chief Complaint   Patient presents with    Diabetes     Follow Up    Medication Refill     Discuss medication    Back Pain       HPI:   Pipe Collins Jr. is a 64 y.o. male who presents for f/u      T2DM- Presents for f/u of Type 2 , non-insulin dependent diabetes mellitus.   Hemoglobin A1C   Date Value Ref Range Status   2025 8.3 % Final      Current meds are: ozempic 2 mg, jardiance 25 mg.   Patient is  taking medication as prescribed, just skipped last week. Refuses Metformin

## 2025-05-06 NOTE — PROGRESS NOTES
[facility]  [facility]    Subjective:  Follow up   Hx of DMII, TUD, lumbar stenosis, HFpEF  Hx of GIST  On Cymbalta and Zanaflex, pain uncontrolled. Declined injections with pain management   No radiating weakness  Tolerating Ozempic and Jardiance. Did not tolerate Metformin     ROS otherwise negative     Past medical, surgical, family and social history were reviewed, non-contributory, and unchanged unless otherwise stated.    Objective:    BP (!) 106/55 (BP Site: Left Upper Arm, Patient Position: Sitting, BP Cuff Size: Large Adult)   Pulse 66   Temp 98.4 °F (36.9 °C) (Temporal)   Resp 17   Ht 1.727 m (5' 8\")   Wt 106.1 kg (234 lb)   SpO2 96%   BMI 35.58 kg/m²     Exam is as noted by resident with the following changes, additions or corrections:    General:  NAD; alert & oriented x 3   Parapsinal tenderness    Assessment/Plan:  DMII- cont current regimen, diet changes. Does not want to add medication today  TUD- Nicotine patches   Lumbar stenosis, back lesion- increase Cymbalta. Advised to scheduled with Neurosurgery for follow up     Attending Physician Statement  I have reviewed the chart, including any radiology or labs. I have discussed the case, including pertinent history and exam findings with the resident.  I agree with the assessment, plan and orders as documented by the resident.  Please refer to the resident note for additional information.      Electronically signed by Austyn Hurst MD on 5/6/2025 at 11:30 AM

## 2025-05-06 NOTE — TELEPHONE ENCOUNTER
Left message advising patient results were available and to return call with best days/times to be reached

## 2025-05-08 ENCOUNTER — TELEPHONE (OUTPATIENT)
Dept: FAMILY MEDICINE CLINIC | Age: 65
End: 2025-05-08

## 2025-05-08 DIAGNOSIS — R80.9 POSITIVE FOR MACROALBUMINURIA: Primary | ICD-10-CM

## 2025-05-08 DIAGNOSIS — R80.1 PERSISTENT PROTEINURIA: ICD-10-CM

## 2025-05-08 NOTE — TELEPHONE ENCOUNTER
Patient recently had albumin/creatinine ratio that showed severe macroalbuminuria. Patient is already on Losartan and Jardiance. Recommend 24 hour urine protein to evaluate for nephrotic syndrome.     Patient called and informed of results.  Patient verbalized understanding and is agreeable to proceed w/ 24 hour urine protein.

## 2025-05-12 ENCOUNTER — HOSPITAL ENCOUNTER (OUTPATIENT)
Age: 65
Setting detail: SPECIMEN
Discharge: HOME | End: 2025-05-12

## 2025-05-15 ENCOUNTER — HOSPITAL ENCOUNTER (OUTPATIENT)
Age: 65
Setting detail: SPECIMEN
Discharge: HOME | End: 2025-05-15
Payer: MEDICARE

## 2025-05-15 DIAGNOSIS — R80.1 PERSISTENT PROTEINURIA: ICD-10-CM

## 2025-05-15 PROCEDURE — 84156 ASSAY OF PROTEIN URINE: CPT

## 2025-05-18 LAB
HOURS COLLECTED: 24 H
PROTEIN 24 HOUR URINE: 1596 MG/24 H (ref 0–150)
VOLUME: 2100 ML

## 2025-05-19 ENCOUNTER — RESULTS FOLLOW-UP (OUTPATIENT)
Dept: FAMILY MEDICINE CLINIC | Age: 65
End: 2025-05-19

## 2025-05-19 DIAGNOSIS — R80.9 PROTEINURIA, UNSPECIFIED TYPE: Primary | ICD-10-CM

## 2025-06-05 ENCOUNTER — TELEPHONE (OUTPATIENT)
Dept: FAMILY MEDICINE CLINIC | Age: 65
End: 2025-06-05

## 2025-06-05 NOTE — TELEPHONE ENCOUNTER
Patient phoned about his lab results  Tessy front office notified  patient of his lab results and referral with the office phone number so patient can call and schedule.April CMA

## 2025-06-06 NOTE — TELEPHONE ENCOUNTER
Dr Garcia  needs a referral to see him since it is a new provider in the practice.  Pipe called and said that his old nephrologist retired.

## 2025-06-09 ENCOUNTER — SCHEDULED TELEPHONE ENCOUNTER (OUTPATIENT)
Age: 65
End: 2025-06-09

## 2025-06-09 DIAGNOSIS — Z85.09 HISTORY OF GASTROINTESTINAL STROMAL TUMOR (GIST): Primary | ICD-10-CM

## 2025-06-09 NOTE — PROGRESS NOTES
Patient was Telephone virtual visit.  Patient has MyChart.  RTC disposition note:  Return if symptoms worsen or fail to improve.       
topically 4 times daily as needed for Pain (Knee)  Kenny Parker MD   VITAMIN D3 50 MCG (2000 UT) CAPS capsule TAKE 1 CAPSULE BY MOUTH DAILY  Kenny Parker MD   senna (SENOKOT) 8.6 MG tablet Take 1 tablet by mouth 2 times daily Hold if diarrhea or loose stool  Arturo Mulligan MD   docusate sodium (COLACE) 100 MG capsule Take 1 capsule by mouth 2 times daily  Kenny Parker MD   magnesium oxide (MAG-OX) 400 (240 Mg) MG tablet Take 1 tablet by mouth daily  Kenny Parker MD   Cream [employer] CREA Apply 2 g topically 4 times daily as needed (pain)  Luiza Johnson PA-C   colchicine (COLCRYS) 0.6 MG tablet Day 1: take 1.2 mg (2 pills),  followed by 0.6 mg (1 pill) after 1 hour . Day 2 and forward: 0.6 mg once or twice daily until flare resolves.  Jill Mabry MD   Continuous Blood Gluc  (FREESTYLE DEIDRE 2 READER) CARMELA Use to monitor sugars  Jill Mabry MD   Continuous Blood Gluc Sensor (FREESTYLE DEIDRE 2 SENSOR) [facility] [place] Please replace sensor every 14 days (prescription should cover one month of sensors with 11 months of refills)  Jill Mabry MD   gabapentin (NEURONTIN) 800 MG tablet Take 1 tablet by mouth 3 times daily as needed.  ProviderJessica MD   traMADol (ULTRAM) 50 MG tablet Take 1 tablet by mouth every 6 hours as needed for Pain.  ProviderJessica MD   sildenafil (VIAGRA) 100 MG tablet Take 1 tablet by mouth as needed Take an hour before sex.  [facility].  ProviderJessica MD     He is feeling well without any new complaints. Sounds to be comfortable, said he was found to have protein in the urine and will be following with urology in that regard  Said he was offered surgery for the back /neck pain symptoms but he had to stop smoking first and he declined for the time being    Review of Systems    Negative except as mentioned above    Objective   No physical exam as this was a phone encounter    Arturo Mulligan MD  Hem/onc

## 2025-06-30 ENCOUNTER — HOSPITAL ENCOUNTER (OUTPATIENT)
Dept: CARDIOLOGY | Age: 65
Discharge: HOME | End: 2025-07-02
Attending: INTERNAL MEDICINE
Payer: MEDICARE

## 2025-06-30 VITALS
BODY MASS INDEX: 33.95 KG/M2 | WEIGHT: 224 LBS | SYSTOLIC BLOOD PRESSURE: 142 MMHG | HEIGHT: 68 IN | DIASTOLIC BLOOD PRESSURE: 76 MMHG

## 2025-06-30 DIAGNOSIS — R06.02 SHORTNESS OF BREATH: ICD-10-CM

## 2025-06-30 LAB
ECHO AO ASC DIAM: 3.2 CM
ECHO AO ASCENDING AORTA INDEX: 1.5 CM/M2
ECHO AV AREA PEAK VELOCITY: 2.8 CM2
ECHO AV AREA VTI: 3.3 CM2
ECHO AV AREA/BSA PEAK VELOCITY: 1.3 CM2/M2
ECHO AV AREA/BSA VTI: 1.5 CM2/M2
ECHO AV CUSP MM: 2 CM
ECHO AV MEAN GRADIENT: 3 MMHG
ECHO AV MEAN VELOCITY: 0.8 M/S
ECHO AV PEAK GRADIENT: 6 MMHG
ECHO AV PEAK VELOCITY: 1.3 M/S
ECHO AV VELOCITY RATIO: 0.77
ECHO AV VTI: 19.6 CM
ECHO BSA: 2.21 M2
ECHO EST RA PRESSURE: 3 MMHG
ECHO LA DIAMETER INDEX: 1.68 CM/M2
ECHO LA DIAMETER: 3.6 CM
ECHO LA VOL A-L A2C: 47 ML (ref 18–58)
ECHO LA VOL A-L A4C: 45 ML (ref 18–58)
ECHO LA VOL MOD A2C: 45 ML (ref 18–58)
ECHO LA VOL MOD A4C: 41 ML (ref 18–58)
ECHO LA VOLUME AREA LENGTH: 46 ML
ECHO LA VOLUME INDEX A-L A2C: 22 ML/M2 (ref 16–34)
ECHO LA VOLUME INDEX A-L A4C: 21 ML/M2 (ref 16–34)
ECHO LA VOLUME INDEX AREA LENGTH: 21 ML/M2 (ref 16–34)
ECHO LA VOLUME INDEX MOD A2C: 21 ML/M2 (ref 16–34)
ECHO LA VOLUME INDEX MOD A4C: 19 ML/M2 (ref 16–34)
ECHO LV EF PHYSICIAN: 55 %
ECHO LV FRACTIONAL SHORTENING: 30 % (ref 28–44)
ECHO LV INTERNAL DIMENSION DIASTOLE INDEX: 2.01 CM/M2
ECHO LV INTERNAL DIMENSION DIASTOLIC: 4.3 CM (ref 4.2–5.9)
ECHO LV INTERNAL DIMENSION SYSTOLIC INDEX: 1.4 CM/M2
ECHO LV INTERNAL DIMENSION SYSTOLIC: 3 CM
ECHO LV IVSD: 1.3 CM (ref 0.6–1)
ECHO LV MASS 2D: 207.8 G (ref 88–224)
ECHO LV MASS INDEX 2D: 97.1 G/M2 (ref 49–115)
ECHO LV POSTERIOR WALL DIASTOLIC: 1.3 CM (ref 0.6–1)
ECHO LV RELATIVE WALL THICKNESS RATIO: 0.6
ECHO LVOT AREA: 3.8 CM2
ECHO LVOT AV VTI INDEX: 0.89
ECHO LVOT DIAM: 2.2 CM
ECHO LVOT MEAN GRADIENT: 2 MMHG
ECHO LVOT PEAK GRADIENT: 4 MMHG
ECHO LVOT PEAK VELOCITY: 1 M/S
ECHO LVOT STROKE VOLUME INDEX: 30.9 ML/M2
ECHO LVOT SV: 66.1 ML
ECHO LVOT VTI: 17.4 CM
ECHO MV A VELOCITY: 0.88 M/S
ECHO MV AREA PHT: 3.9 CM2
ECHO MV AREA VTI: 3.8 CM2
ECHO MV E DECELERATION TIME (DT): 236.4 MS
ECHO MV E VELOCITY: 0.44 M/S
ECHO MV E/A RATIO: 0.5
ECHO MV LVOT VTI INDEX: 0.99
ECHO MV MAX VELOCITY: 0.9 M/S
ECHO MV MEAN GRADIENT: 1 MMHG
ECHO MV MEAN VELOCITY: 0.4 M/S
ECHO MV PEAK GRADIENT: 3 MMHG
ECHO MV PRESSURE HALF TIME (PHT): 56.4 MS
ECHO MV VTI: 17.3 CM
ECHO PV MAX VELOCITY: 0.8 M/S
ECHO PV MEAN GRADIENT: 1 MMHG
ECHO PV MEAN VELOCITY: 0.5 M/S
ECHO PV PEAK GRADIENT: 3 MMHG
ECHO PV VTI: 10.4 CM
ECHO RV INTERNAL DIMENSION: 2.8 CM

## 2025-06-30 PROCEDURE — 93306 TTE W/DOPPLER COMPLETE: CPT | Performed by: INTERNAL MEDICINE

## 2025-06-30 PROCEDURE — 93306 TTE W/DOPPLER COMPLETE: CPT

## 2025-07-21 DIAGNOSIS — E11.42 TYPE 2 DIABETES MELLITUS WITH DIABETIC POLYNEUROPATHY, UNSPECIFIED WHETHER LONG TERM INSULIN USE (HCC): ICD-10-CM

## 2025-07-21 DIAGNOSIS — M54.16 LUMBAR RADICULOPATHY: ICD-10-CM

## 2025-07-21 RX ORDER — DULOXETIN HYDROCHLORIDE 60 MG/1
60 CAPSULE, DELAYED RELEASE ORAL DAILY
Qty: 90 CAPSULE | Refills: 0 | Status: SHIPPED | OUTPATIENT
Start: 2025-07-21

## 2025-08-08 DIAGNOSIS — E11.42 DIABETIC POLYNEUROPATHY ASSOCIATED WITH TYPE 2 DIABETES MELLITUS (HCC): ICD-10-CM

## 2025-08-11 RX ORDER — EMPAGLIFLOZIN 25 MG/1
25 TABLET, FILM COATED ORAL DAILY
Qty: 90 TABLET | Refills: 0 | Status: SHIPPED | OUTPATIENT
Start: 2025-08-11

## 2025-08-13 RX ORDER — SENNOSIDES 8.6 MG
TABLET ORAL
Qty: 60 TABLET | Refills: 11 | Status: SHIPPED | OUTPATIENT
Start: 2025-08-13

## (undated) DEVICE — STRAP POS MP 30X3 IN HK LOOP CLOSURE FOAM DISP

## (undated) DEVICE — SOLUTION IV IRRIG 500ML 0.9% SODIUM CHL 2F7123

## (undated) DEVICE — 4-PORT MANIFOLD: Brand: NEPTUNE 2

## (undated) DEVICE — CONNECTOR IRRIGATION AUXILIARY H2O JET W/ PRT MTL THRD HYDR

## (undated) DEVICE — PATIENT RETURN ELECTRODE, SINGLE-USE, CONTACT QUALITY MONITORING, ADULT, WITH 9FT CORD, FOR PATIENTS WEIGING OVER 33LBS. (15KG): Brand: MEGADYNE

## (undated) DEVICE — DEFENDO AIR WATER SUCTION AND BIOPSY VALVE KIT FOR  OLYMPUS: Brand: DEFENDO AIR/WATER/SUCTION AND BIOPSY VALVE

## (undated) DEVICE — PACK PROCEDURE SURG GEN CUST

## (undated) DEVICE — GAUZE,SPONGE,4"X4",8PLY,STRL,LF,10/TRAY: Brand: MEDLINE

## (undated) DEVICE — SYRINGE MED 50ML LUERSLIP TIP

## (undated) DEVICE — SURGICAL PROCEDURE PACK EENT CUST

## (undated) DEVICE — TOWEL,OR,DSP,ST,BLUE,STD,6/PK,12PK/CS: Brand: MEDLINE

## (undated) DEVICE — GLOVE ORANGE PI 8 1/2   MSG9085

## (undated) DEVICE — SET T AND A PASCOLINI

## (undated) DEVICE — BASIC SINGLE BASIN 1-LF: Brand: MEDLINE INDUSTRIES, INC.

## (undated) DEVICE — ELECTRODE ELECSURG NDL 2.8 INX7.2 CM COAT INSUL EDGE

## (undated) DEVICE — SNARE ENDOSCP L240CM LOOP W13MM SHTH DIA2.4MM SM OVL FLX

## (undated) DEVICE — ELECTRODE PT RET AD L9FT HI MOIST COND ADH HYDRGEL CORDED

## (undated) DEVICE — PUNCH TONSIL / ADENOID

## (undated) DEVICE — SOLUTION IV IRRIG POUR BRL 0.9% SODIUM CHL 2F7124

## (undated) DEVICE — COVER HNDL LT DISP

## (undated) DEVICE — CANNULA NSL ORAL AD FOR CAPNOFLEX CO2 O2 AIRLFE

## (undated) DEVICE — COAGULATOR SUCT 10FR LAIN FTSWCH ACTIVATION DISP VALLEYLAB

## (undated) DEVICE — CAESAR GRASPING FORCEPS: Brand: CAESAR

## (undated) DEVICE — LIGHT SOURCE WHT

## (undated) DEVICE — SNARE POLYP M L240CM LOOP W27MM SHTH DIA1.9MM OVL FLX DISP